# Patient Record
Sex: FEMALE | Race: WHITE | NOT HISPANIC OR LATINO | Employment: OTHER | URBAN - METROPOLITAN AREA
[De-identification: names, ages, dates, MRNs, and addresses within clinical notes are randomized per-mention and may not be internally consistent; named-entity substitution may affect disease eponyms.]

---

## 2017-01-02 ENCOUNTER — OFFICE VISIT (OUTPATIENT)
Dept: PODIATRY | Facility: CLINIC | Age: 82
End: 2017-01-02
Payer: COMMERCIAL

## 2017-01-02 VITALS
WEIGHT: 180 LBS | HEIGHT: 60 IN | SYSTOLIC BLOOD PRESSURE: 104 MMHG | BODY MASS INDEX: 35.34 KG/M2 | DIASTOLIC BLOOD PRESSURE: 62 MMHG

## 2017-01-02 DIAGNOSIS — L60.2 NAIL HYPERTROPHY: ICD-10-CM

## 2017-01-02 DIAGNOSIS — E11.9 TYPE 2 DIABETES MELLITUS WITHOUT COMPLICATION, WITHOUT LONG-TERM CURRENT USE OF INSULIN (H): ICD-10-CM

## 2017-01-02 DIAGNOSIS — M79.671 PAIN IN BOTH FEET: ICD-10-CM

## 2017-01-02 DIAGNOSIS — M79.672 PAIN IN BOTH FEET: ICD-10-CM

## 2017-01-02 DIAGNOSIS — L60.8 NAIL DEFORMITY: ICD-10-CM

## 2017-01-02 DIAGNOSIS — R09.89 DECREASED PULSES IN FEET: Primary | ICD-10-CM

## 2017-01-02 PROCEDURE — G0127 TRIM NAIL(S): HCPCS | Performed by: PODIATRIST

## 2017-01-02 PROCEDURE — 99203 OFFICE O/P NEW LOW 30 MIN: CPT | Mod: 25 | Performed by: PODIATRIST

## 2017-01-02 NOTE — MR AVS SNAPSHOT
"              After Visit Summary   1/2/2017    Regla Benavidez    MRN: [id number]           Patient Information     Date Of Birth          1/5/1926        Visit Information        Provider Department      1/2/2017 2:00 PM Bill Garcia DPM [facility]        Care Instructions    A List of Nail/Callus Care Alternatives  [facility]    [phone]    $40.00 per visit   [facility]   [phone] [address] [place] [address]     [facility]  [phone]  Services the [facility]  $32 per visit   [facility]    [phone]    At your home or at [address] [address]      [facility]    [phone] [address], [place] [address] [phone], [place] [address]  [facility]    [phone]    Routine foot care for older and diabetic feet by a licensed nurse in your home.      DIABETES AND YOUR FEET    What effect does diabetes have on the feet? Diabetes can result in several problems in the feet including ulcers (open sores) and amputations.  Two of the most important reasons why people develop foot problems when they have diabetes is :  1.  Neuropathy (loss of feeling) and 2.  Vascular disease (loss or decrease of blood flow).    What is neuropathy?  Neuropathy is a term used to describe a loss of nerve function.  Patients with diabetes are at risk of developing neuropathy if their sugars continue to run high and are above the normal value.  One theory for neuropathy is that the \"extra\" sugar in the body enters the nerves and is broken down.  These by-products build up in the nerve causing it to swell and impairing nerve function.  Often times, this can be prevented by controlling your sugars, dieting and exercise.    How do I know if I have neuropathy?  When a person develops neuropathy, " they usually begin to feel numbness or tingling in their feet and sometime in their legs.  Other symptoms may include painful burning or hot feet, tingling or feeling like insects or ants are crawling on your feet or legs.  If the diabetes is sever and the sugars run high for long periods of time, neuropathy can also occur in the hands.    What is vascular disease?  Vascular disease is a term used to describe a loss or decrease in circulation (blood flow).  There is a problem in getting blood and oxygen to areas that need it.  Similar to neuropathy, sugars can build up in the walls of the arteries (blood vessels) and cause them to become swollen, thickened and hardened.  This decreases the amount of blood that can go to an area that needs it.  Though this is common in the legs of diabetic patients, it can also affect other arteries (blood vessels) in the body such as in the heart and eyes.    How do I know if I have vascular disease?  In the legs, vascular disease usually results in cramping.  Patients who develop leg cramps after walking the same distance every time (i.e. One block, half a mile, ect.) need to let their doctors know so that their circulation may be checked.  Cramps causing severe pain in the feet and/or legs while sleeping and the cramps go away when you stand or hang your legs off the side of the bed, may also be a sign of poor blood circulation.  Occasional cramping in cold weather or on rare occasions with activity may not be due to poor circulation, but you should inform your doctor.    How can these problems be prevented?  The key to prevention is good blood sugar control.  Poor blood sugar control is a big reason many of these problems start.  Physical activity (exercise) is a very good way to help decrease your blood sugars.  Exercise can lower your blood sugar, blood pressure, and cholesterol.  It also reduces your risk for heart disease and stroke, relieves stress, and strengthens your  heart, muscles and bones.  In addition, regular activity helps insulin work better, improves your blood circulation, and keeps your joints flexible.  If you're trying to lose weight, a combination of exercise and wise food choices can help you reach your target weight and maintain it.      Pain Management Strategies:  1.Blood Sugar Control - Most important  2. Medications such as:  Amytriptylline, duloxetine, gabapentin, lyrica, tramadol  3. Nutritional therapy:  Vitamin B6 (100mg daily), Vitamin B12 (75mcg daily), Vitamin D 2000 IU daily), Alpha-Lipoic Acid (600-1800mg daily), Acetyl-L-Carnitine (500-1000mg TID, L-methyl folate (1500mcg daily)    ** Metformin can block Vitamin B6 and B12 so it is important to supplement**    Diabetic Foot Care Recommendations For Avoiding Serious Foot Issues    DO'S   1.Wash your feet with lukewarm water and a mild soap and then dry them thoroughly, especially between the toes.     2. Examine your feet daily looking for cuts, corns, blisters, cracks, ect..., especially after wearing new shoes.  Make sure to look between your toes.  If you cannot see the bottom of your feet, set a mirror on the floor and hold your foot over it, or ask a spouse, friend or family member to examine your feet for you.  Contact your doctor immediately if new problems are noted or if sores are not healing.     3.  Immediately apply moisturizer to the tops and bottoms of your feet, avoiding areas between the toes.  Hand lotion (Intesive Care, Christin, Eucerin, Neutrogena, Curel, ect...) is sufficient unless your doctor prescribes a medicated lotion.  Apply sunscreen to your feet when going swimming outside.     4.Use clean comfortable shoes, wear white socks (if you have any bleeding or drainage, you will see it on white socks).  Socks should not have thick seams or cut off the circulation around the leg.  Break in new shoes slowly and rotate with older shoes until broken in.  Check the inside of your shoes  with your hand to look for areas of irritation or objects that may have fallen into your shoes.       5. Keep slippers by the side of your bed for use during the night.     6. Shoes should be fitted by a professional and should not cause areas of irritation.  Check your feet regularly when wearing a new pair of shoes and replace them as needed.     7.  Talk to your doctor about proper exercise.  Exercise and stretching stimulate blood flow to your feet and maintain proper glucose levels.     8.  Monitor your blood glucose level as instructed by your doctor.  Notify your doctor immediately if your blood sugar is abnormally high or low.     9.  Cut your nails straight across, but then gently round any sharp edges with a cardboard nail file.  If you have neuropathy, peripheral vascular disease or cannot see that well to trim your own toenails, see a medical professional for care.    DONT'S   1.  Do not soak your feet if you have an open sore.  Use only lukewarm water and always check the temperature with your hand as hot water can easily burn your feet.       2.  Never use a hot water bottle or heating pad on your feet.  Also do not apply cold compresses to your feet.  With decreased sensation, you could burn or freeze your feet.       3.  Do not apply any of these to your feet:    -  Over the counter medicine for corns or warts    -  Harsh chemicals like boric acid    -  Do not self-treat corns, cuts, blisters or infections.  Always consult your doctor.       4.  Do not wear sandals, slippers or walk barefoot, especially on hot sand or concrete or other harsh surfaces.     5.  If you smoke, stop!!!                  Follow-ups after your visit        Who to contact     If you have questions or need follow up information about today's clinic visit or your schedule please contact [facility] directly at [phone].  Normal or non-critical lab and imaging results will be communicated to you by  MyChart, letter or phone within 4 business days after the clinic has received the results. If you do not hear from us within 7 days, please contact the clinic through [facility] or phone. If you have a critical or abnormal lab result, we will notify you by phone as soon as possible.  Submit refill requests through [facility] or call your pharmacy and they will forward the refill request to us. Please allow 3 business days for your refill to be completed.          Additional Information About Your Visit        [employer] Information     [facility] gives you secure access to your electronic health record. If you see a primary care provider, you can also send messages to your care team and make appointments. If you have questions, please call your primary care clinic.  If you do not have a primary care provider, please call [phone] and they will assist you.        Your Vitals Were     Height BMI (Body Mass Index)                5' (1.524 m) 35.15 kg/m2           Blood Pressure from Last 3 Encounters:   01/02/17 104/62   12/13/16 126/68   11/16/16 128/76    Weight from Last 3 Encounters:   01/02/17 180 lb (81.647 kg)   12/13/16 180 lb (81.647 kg)   11/16/16 183 lb (83.008 kg)              Today, you had the following     No orders found for display       Primary Care Provider Office Phone # Fax #    Leo Melgar [phone]       [facility] [address] [facility] [id number]        Thank you!     Thank you for choosing [facility]  for your care. Our goal is always to provide you with excellent care. Hearing back from our patients is one way we can continue to improve our services. Please take a few minutes to complete the written survey that you may receive in the mail after your visit with us. Thank you!             Your Updated Medication List - Protect others around you: Learn how to safely use, store and throw away your medicines at www.disposemymeds.org.           This list is accurate as of: 1/2/17  2:29 PM.  Always use your most recent med list.                   Brand Name Dispense Instructions for use    acetaminophen 325 MG tablet    TYLENOL    184 tablet    Take 2 tablets (650 mg) by mouth At Bedtime Take 2 tablets at bedtime       aspirin 325 MG tablet     120 tablet    Take 1 tablet (325 mg) by mouth daily       blood glucose monitoring test strip    ONE TOUCH ULTRA    100 each    USE TO TEST ONCE EVERY MORNING AS DIRECTED       co-enzyme Q-10 100 MG Caps capsule     30 capsule    Take 1 capsule (100 mg) by mouth daily       furosemide 20 MG tablet    LASIX    60 tablet    Take 1 tablet (20 mg) by mouth 2 times daily       lactobacillus acidophilus & bulgar chewable tablet     60 tablet    Take 1 tablet by mouth 2 times daily With meals       metFORMIN 500 MG tablet    GLUCOPHAGE    60 tablet    Take 1 tablet (500 mg) by mouth 2 times daily (with meals)       nystatin 781965 UNIT/GM Powd    MYCOSTATIN    60 g    Apply topically 3 times daily as needed       * order for DME     1 Device    Equipment being ordered: glucometer kit- brand per insurance coverage Patient prefers brand with DRUM lancets - patient is to test fasting once each morning #30 with 5 refills each of the lancets and test strips       * order for DME     100 lancet    Equipment being ordered: retractable safety lancets       * order for DME     3 Package    Equipment being ordered: Large Underpads       * order for DME     150 Units    Equipment being ordered: Esthela xtra absorbent large pull ups - Uses 5 per day.       polyethylene glycol powder    MIRALAX    510 g    Take 17 g (1 capful) by mouth twice a week       pramipexole 0.125 MG tablet    MIRAPEX    60 tablet    Take 2 tablets (0.25 mg) by mouth At Bedtime       ranitidine 150 MG tablet    ZANTAC    60 tablet    Take 1 tablet (150 mg) by mouth 2 times daily       saccharomyces boulardii 250 MG capsule    FLORASTOR    60 capsule     Take 1 capsule (250 mg) by mouth 2 times daily       senna-docusate 8.6-50 MG per tablet    SENOKOT-S;PERICOLACE     Take 1 tablet by mouth daily HOLD IF HAS LOOSE STOOLS       simvastatin 5 MG tablet    Zocor    30 tablet    Take 1 tablet (5 mg) by mouth every evening       spironolactone 25 MG tablet    ALDACTONE    30 tablet    Take 1 tablet (25 mg) by mouth daily       VITAMIN D3 PO      Take 2,000 Units by mouth daily       * Notice:  This list has 4 medication(s) that are the same as other medications prescribed for you. Read the directions carefully, and ask your doctor or other care provider to review them with you.

## 2017-01-02 NOTE — NURSING NOTE
Chief Complaint   Patient presents with     Foot Problems     both foot pain        Initial /62 mmHg  Ht 5' (1.524 m)  Wt 180 lb (81.647 kg)  BMI 35.15 kg/m2 Estimated body mass index is 35.15 kg/(m^2) as calculated from the following:    Height as of this encounter: 5' (1.524 m).    Weight as of this encounter: 180 lb (81.647 kg).  BP completed using cuff size: jasmin Reardon MA

## 2017-01-02 NOTE — PATIENT INSTRUCTIONS
"A List of Nail/Callus Care Alternatives  [facility]    [phone]    $40.00 per visit   [facility]   [phone]  [address] [place] [address]     [facility]  [phone]  Services the [facility]  $32 per visit   [facility]   [facility] Care Clinic    [phone]    At your home or at [address] [address]      [facility]    [phone] [address], [place], [place] [id number] [phone] [address], [place] [address]  [facility]    [phone]    Routine foot care for older and diabetic feet by a licensed nurse in your home.      DIABETES AND YOUR FEET    What effect does diabetes have on the feet? Diabetes can result in several problems in the feet including ulcers (open sores) and amputations.  Two of the most important reasons why people develop foot problems when they have diabetes is :  1.  Neuropathy (loss of feeling) and 2.  Vascular disease (loss or decrease of blood flow).    What is neuropathy?  Neuropathy is a term used to describe a loss of nerve function.  Patients with diabetes are at risk of developing neuropathy if their sugars continue to run high and are above the normal value.  One theory for neuropathy is that the \"extra\" sugar in the body enters the nerves and is broken down.  These by-products build up in the nerve causing it to swell and impairing nerve function.  Often times, this can be prevented by controlling your sugars, dieting and exercise.    How do I know if I have neuropathy?  When a person develops neuropathy, they usually begin to feel numbness or tingling in their feet and sometime in their legs.  Other symptoms may include painful burning or hot feet, tingling or feeling like insects or ants are crawling on your feet or legs.  If the diabetes is sever and the sugars run high for long periods of time, neuropathy can " also occur in the hands.    What is vascular disease?  Vascular disease is a term used to describe a loss or decrease in circulation (blood flow).  There is a problem in getting blood and oxygen to areas that need it.  Similar to neuropathy, sugars can build up in the walls of the arteries (blood vessels) and cause them to become swollen, thickened and hardened.  This decreases the amount of blood that can go to an area that needs it.  Though this is common in the legs of diabetic patients, it can also affect other arteries (blood vessels) in the body such as in the heart and eyes.    How do I know if I have vascular disease?  In the legs, vascular disease usually results in cramping.  Patients who develop leg cramps after walking the same distance every time (i.e. One block, half a mile, ect.) need to let their doctors know so that their circulation may be checked.  Cramps causing severe pain in the feet and/or legs while sleeping and the cramps go away when you stand or hang your legs off the side of the bed, may also be a sign of poor blood circulation.  Occasional cramping in cold weather or on rare occasions with activity may not be due to poor circulation, but you should inform your doctor.    How can these problems be prevented?  The key to prevention is good blood sugar control.  Poor blood sugar control is a big reason many of these problems start.  Physical activity (exercise) is a very good way to help decrease your blood sugars.  Exercise can lower your blood sugar, blood pressure, and cholesterol.  It also reduces your risk for heart disease and stroke, relieves stress, and strengthens your heart, muscles and bones.  In addition, regular activity helps insulin work better, improves your blood circulation, and keeps your joints flexible.  If you're trying to lose weight, a combination of exercise and wise food choices can help you reach your target weight and maintain it.      Pain Management  Strategies:  1.Blood Sugar Control - Most important  2. Medications such as:  Amytriptylline, duloxetine, gabapentin, lyrica, tramadol  3. Nutritional therapy:  Vitamin B6 (100mg daily), Vitamin B12 (75mcg daily), Vitamin D 2000 IU daily), Alpha-Lipoic Acid (600-1800mg daily), Acetyl-L-Carnitine (500-1000mg TID, L-methyl folate (1500mcg daily)    ** Metformin can block Vitamin B6 and B12 so it is important to supplement**    Diabetic Foot Care Recommendations For Avoiding Serious Foot Issues    DO'S   1.Wash your feet with lukewarm water and a mild soap and then dry them thoroughly, especially between the toes.     2. Examine your feet daily looking for cuts, corns, blisters, cracks, ect..., especially after wearing new shoes.  Make sure to look between your toes.  If you cannot see the bottom of your feet, set a mirror on the floor and hold your foot over it, or ask a spouse, friend or family member to examine your feet for you.  Contact your doctor immediately if new problems are noted or if sores are not healing.     3.  Immediately apply moisturizer to the tops and bottoms of your feet, avoiding areas between the toes.  Hand lotion (Intesive Care, Christin, Eucerin, Neutrogena, Curel, ect...) is sufficient unless your doctor prescribes a medicated lotion.  Apply sunscreen to your feet when going swimming outside.     4.Use clean comfortable shoes, wear white socks (if you have any bleeding or drainage, you will see it on white socks).  Socks should not have thick seams or cut off the circulation around the leg.  Break in new shoes slowly and rotate with older shoes until broken in.  Check the inside of your shoes with your hand to look for areas of irritation or objects that may have fallen into your shoes.       5. Keep slippers by the side of your bed for use during the night.     6. Shoes should be fitted by a professional and should not cause areas of irritation.  Check your feet regularly when wearing a new  pair of shoes and replace them as needed.     7.  Talk to your doctor about proper exercise.  Exercise and stretching stimulate blood flow to your feet and maintain proper glucose levels.     8.  Monitor your blood glucose level as instructed by your doctor.  Notify your doctor immediately if your blood sugar is abnormally high or low.     9.  Cut your nails straight across, but then gently round any sharp edges with a cardboard nail file.  If you have neuropathy, peripheral vascular disease or cannot see that well to trim your own toenails, see a medical professional for care.    DONT'S   1.  Do not soak your feet if you have an open sore.  Use only lukewarm water and always check the temperature with your hand as hot water can easily burn your feet.       2.  Never use a hot water bottle or heating pad on your feet.  Also do not apply cold compresses to your feet.  With decreased sensation, you could burn or freeze your feet.       3.  Do not apply any of these to your feet:    -  Over the counter medicine for corns or warts    -  Harsh chemicals like boric acid    -  Do not self-treat corns, cuts, blisters or infections.  Always consult your doctor.       4.  Do not wear sandals, slippers or walk barefoot, especially on hot sand or concrete or other harsh surfaces.     5.  If you smoke, stop!!!

## 2017-01-02 NOTE — PROGRESS NOTES
"  ASSESSMENT/PLAN:    Encounter Diagnoses   Name Primary?     Decreased pulses in feet Yes     Type 2 diabetes mellitus without complication, without long-term current use of insulin (H)      Pain in both feet      Nail deformity      Nail hypertrophy      Using a sterile nail nippers, debridement of 9 nails was performed.  Nails were shortened. The thickest nails were cut in a fasion to skive off some of the bulk.  Subungual debris was cleared away where needed.  I trimmed the distal lateral aspect of the right hallux nail plate at a slant.     Pt to monitor and call clinic if increasing rendess, pain, and swelling.    STEVE/ arterial ultrasound.  I had a difficult time palpating her pulses.  She has pain in her calf muscles with walking.     Information for nail care provided.     Body mass index is 35.15 kg/(m^2).    Weight management plan: Patient was referred to their PCP to discuss a diet and exercise plan.      Bill Garcia DPM, FACFAS, MS    [place] [facility] Department of Podiatry/Foot & Ankle Surgery      ____________________________________________________________________    HPI:       She is accompanied by her daughter  Chief Complaint: pain in toes  Onset of problem: weeks  Pain/ discomfort is described as:  Intermittent. She said it comes and goes  There was some concern about an \"ingrown\" nail and need to see a podiatrist.       She has diabetes.  Last Hgb A1C = 7.6. .      Past Medical History   Diagnosis Date     HTN (hypertension)      Varicose vein of leg      Elevated glucose      Shingles 2005     DJD (degenerative joint disease) of lumbar spine 2009     Hard of hearing      hearing aids     Cholelithiasis 2009     incidental finding   *  *  Past Surgical History   Procedure Laterality Date     Cataract iol, rt/lt     *  *  Current Outpatient Prescriptions   Medication Sig Dispense Refill     saccharomyces boulardii (FLORASTOR) 250 MG capsule Take 1 capsule (250 mg) by mouth 2 times daily 60 capsule 3     " lactobacillus acidophilus & bulgar (LACTINEX) chewable tablet Take 1 tablet by mouth 2 times daily With meals 60 tablet 11     blood glucose monitoring (ONE TOUCH ULTRA) test strip USE TO TEST ONCE EVERY MORNING AS DIRECTED 100 each 1     acetaminophen (TYLENOL) 325 MG tablet Take 2 tablets (650 mg) by mouth At Bedtime Take 2 tablets at bedtime 184 tablet 2     pramipexole (MIRAPEX) 0.125 MG tablet Take 2 tablets (0.25 mg) by mouth At Bedtime 60 tablet 12     polyethylene glycol (MIRALAX) powder Take 17 g (1 capful) by mouth twice a week 510 g 12     ranitidine (ZANTAC) 150 MG tablet Take 1 tablet (150 mg) by mouth 2 times daily 60 tablet 5     furosemide (LASIX) 20 MG tablet Take 1 tablet (20 mg) by mouth 2 times daily 60 tablet 12     nystatin (MYCOSTATIN) 522789 UNIT/GM POWD Apply topically 3 times daily as needed 60 g 1     ORDER FOR DME Equipment being ordered: Large Underpads 3 Package 6     ORDER FOR DME Equipment being ordered: Esthela xtra absorbent large pull ups - Uses 5 per day. 150 Units 11     spironolactone (ALDACTONE) 25 MG tablet Take 1 tablet (25 mg) by mouth daily 30 tablet 3     Cholecalciferol (VITAMIN D3 PO) Take 2,000 Units by mouth daily       senna-docusate (SENOKOT-S;PERICOLACE) 8.6-50 MG per tablet Take 1 tablet by mouth daily HOLD IF HAS LOOSE STOOLS       ORDER FOR DME Equipment being ordered: retractable safety lancets 100 lancet 1     ORDER FOR DME Equipment being ordered: glucometer kit- brand per insurance coverage  Patient prefers brand with DRUM lancets - patient is to test fasting once each morning  #30 with 5 refills each of the lancets and test strips 1 Device 0     metFORMIN (GLUCOPHAGE) 500 MG tablet Take 1 tablet (500 mg) by mouth 2 times daily (with meals) 60 tablet 12     co-enzyme Q-10 100 MG CAPS Take 1 capsule (100 mg) by mouth daily 30 capsule 0     simvastatin (ZOCOR) 5 MG tablet Take 1 tablet (5 mg) by mouth every evening 30 tablet      aspirin 325 MG tablet Take 1  "tablet (325 mg) by mouth daily 120 tablet        ROS:     A 10-point review of systems was performed and is positive for that noted in the HPI and as seen below.  All other areas are negative.     Numbness in feet?  no   Calf pain with walking? both  Recent foot/ankle injury? no  Weight change over past 12 months? no  Self perception as overweight? yes  Recent flu-like symptoms? no  Joint pain other than feet ? no    Social History: Employment:  retired;  Exercise/Physical activity:  no;  Tobacco use:  no   Social History     Social History     Marital Status:      Spouse Name: N/A     Number of Children: 5     Years of Education: N/A     Occupational History     Not on file.     Social History Main Topics     Smoking status: Never Smoker      Smokeless tobacco: Never Used     Alcohol Use: No     Drug Use: No     Sexual Activity: No     Other Topics Concern     Parent/Sibling W/ Cabg, Mi Or Angioplasty Before 65f 55m? No     Social History Narrative       Family history:  Family History   Problem Relation Age of Onset     DIABETES Brother        EXAM:    Vitals: /62 mmHg  Ht 5' (1.524 m)  Wt 180 lb (81.647 kg)  BMI 35.15 kg/m2  BMI: Body mass index is 35.15 kg/(m^2).  Height: 5' 0\"    Constitutional/ general:  Pt is in no apparent distress, appears well-nourished.  Cooperative with history and physical exam.     Vascular:  Pedal pulses are faintly palpable bilaterally for both the DP and PT arteries.  CFT < 3 sec.  No edema.     Neuro:  Alert and oriented x 3. Coordinated gait.  Light touch sensation is intact to the L4, L5, S1 distributions. No obvious deficits.  No evidence of neurological-based weakness, spasticity, or contracture in the lower extremities.     Derm: Normal texture and turgor.  No erythema, ecchymosis, or cyanosis.  No open lesions.     Some nails are elongated, some thickened. The right hallux nail is thick and incurvates along the lateral edge. There is tenderness.  No erythema.  "     Musculoskeletal:    Lower extremity muscle strength is normal.  Patient is ambulatory without an assistive device or brace .  No gross deformities.        Bill Garcia DPM, FACFAS, MS    [facility] Department of Podiatry/Foot & Ankle Surgery

## 2017-01-04 ENCOUNTER — CARE COORDINATION (OUTPATIENT)
Dept: GERIATRIC MEDICINE | Facility: CLINIC | Age: 82
End: 2017-01-04

## 2017-01-04 NOTE — PROGRESS NOTES
[facility] Six-Month Telephone Assessment    6 month telephone assessment completed today.     ER visits: 0  Hospitalizations: 0  TCU stays: 0  Significant health status changes: 0  Falls/Injuries: one fall where client got out of bed in middle of night and no injuries.  ADL/IADL changes: none  Caregiver assessment follow up: mailed to daughter.    Changes in services:   No new service needs identified or requested at this time.     Goals: See POC in chart for goal progress documentation.    Will see client in 6 months for an annual health risk assessment.   Encouraged client to call CM with any questions or concerns in the meantime.   Autumn Jacobsen RN, PHN, CCM  [facility]   [phone]

## 2017-01-05 ENCOUNTER — TELEPHONE (OUTPATIENT)
Dept: FAMILY MEDICINE | Facility: CLINIC | Age: 82
End: 2017-01-05

## 2017-01-05 NOTE — TELEPHONE ENCOUNTER
Reason for Call:  Form, our goal is to have forms completed with 72 hours, however, some forms may require a visit or additional information.    Type of letter, form or note:  [facility]    Who is the form from?: [facility] (if other please explain)    Where did the form come from: form was faxed in    What clinic location was the form placed at?: [facility]    Where the form was placed: 's Box: Leo Melgar MD    What number is listed as a contact on the form?: [facility], fax # [phone]       Additional comments: Labs and vital signs    Call taken on 1/5/2017 at 3:51 PM by GINETTE MIX

## 2017-01-10 ENCOUNTER — TELEPHONE (OUTPATIENT)
Dept: FAMILY MEDICINE | Facility: CLINIC | Age: 82
End: 2017-01-10

## 2017-01-10 DIAGNOSIS — R19.7 DIARRHEA, UNSPECIFIED TYPE: Primary | ICD-10-CM

## 2017-01-10 RX ORDER — LACTOBACILLUS ACIDOPH-L.BULGARICUS 1 MILLION CELL CHEWABLE TABLET 1MM CELL
1 TABLET,CHEWABLE ORAL
Qty: 90 TABLET | Refills: 11 | Status: SHIPPED | OUTPATIENT
Start: 2017-01-10 | End: 2018-01-27

## 2017-01-10 NOTE — TELEPHONE ENCOUNTER
Daphney called.  Looks like insurance for 2017 might not cover it also.  She is asking family if they would like to pay for it because it is only about 13 dollar a month.  They will let us know what they decide

## 2017-01-10 NOTE — TELEPHONE ENCOUNTER
Form reviewed and signed with new orders by Dr. Leo Melgar and faxed to [facility].  Phuong Zafar TC

## 2017-01-10 NOTE — TELEPHONE ENCOUNTER
Form reviewed and signed by Dr. Leo Melgar and faxed to [facility].  Phuong Zafar TC

## 2017-01-10 NOTE — TELEPHONE ENCOUNTER
I filled the medication(s) and e-mailed the rx to the pharmacy.([facility])         Please let them know.

## 2017-01-10 NOTE — TELEPHONE ENCOUNTER
Reason for Call:  Form, our goal is to have forms completed with 72 hours, however, some forms may require a visit or additional information.    Type of letter, form or note:  [facility]    Who is the form from?: [facility] (if other please explain)    Where did the form come from: form was faxed in    What clinic location was the form placed at?: [facility]    Where the form was placed: Dr's Box: Leo Melgar MD    What number is listed as a contact on the form?: [facility], fax # [phone]       Additional comments: Order to substitute Lactinex    Call taken on 1/10/2017 at 11:17 AM by GINETTE MIX

## 2017-01-11 ENCOUNTER — TELEPHONE (OUTPATIENT)
Dept: FAMILY MEDICINE | Facility: CLINIC | Age: 82
End: 2017-01-11

## 2017-01-11 NOTE — TELEPHONE ENCOUNTER
Reason for Call:  Form, our goal is to have forms completed with 72 hours, however, some forms may require a visit or additional information.    Type of letter, form or note:  [facility]    Who is the form from?: [facility] (if other please explain)    Where did the form come from: form was faxed in    What clinic location was the form placed at?: [facility]    Where the form was placed: 's Box: Leo Melgar MD    What number is listed as a contact on the form?: [facility], fax # [phone]       Additional comments: Request for directives/orders for constipation    Call taken on 1/11/2017 at 3:34 PM by GINETTE MIX

## 2017-01-12 NOTE — TELEPHONE ENCOUNTER
Jaime from [facility] called.  He said the can get partial coverage for this medication  Through her secondary ins so they will fill it.

## 2017-01-12 NOTE — TELEPHONE ENCOUNTER
Form reviewed, new order written, and signed by Dr. Leo Melgar and faxed to [facility].  Phuong Zafar TC

## 2017-01-25 ENCOUNTER — TELEPHONE (OUTPATIENT)
Dept: FAMILY MEDICINE | Facility: CLINIC | Age: 82
End: 2017-01-25

## 2017-01-25 NOTE — TELEPHONE ENCOUNTER
Reason for Call:  Form, our goal is to have forms completed with 72 hours, however, some forms may require a visit or additional information.    Type of letter, form or note:  [facility]    Who is the form from?: [facility] (if other please explain)    Where did the form come from: form was faxed in    What clinic location was the form placed at?: [facility]    Where the form was placed: Dr's Box: Leo Melgar MD    What number is listed as a contact on the form?: [facility], fax # [phone]       Additional comments: Order for Hearing Evaluation     Call taken on 1/25/2017 at 9:32 AM by GINETTE MIX

## 2017-01-27 ENCOUNTER — TELEPHONE (OUTPATIENT)
Dept: FAMILY MEDICINE | Facility: CLINIC | Age: 82
End: 2017-01-27

## 2017-01-27 NOTE — TELEPHONE ENCOUNTER
Reason for Call:  Form, our goal is to have forms completed with 72 hours, however, some forms may require a visit or additional information.    Type of letter, form or note:  [facility]    Who is the form from?: [facility] (if other please explain)    Where did the form come from: form was faxed in    What clinic location was the form placed at?: [facility]    Where the form was placed: Dr's Box: Leo Melgar MD    What number is listed as a contact on the form?: [facility], fax # [phone]       Additional comments: Request for new order for Debrox    Call taken on 1/27/2017 at 4:03 PM by GINETTE MIX

## 2017-02-02 ENCOUNTER — TELEPHONE (OUTPATIENT)
Dept: FAMILY MEDICINE | Facility: CLINIC | Age: 82
End: 2017-02-02

## 2017-02-02 ENCOUNTER — TRANSFERRED RECORDS (OUTPATIENT)
Dept: HEALTH INFORMATION MANAGEMENT | Facility: CLINIC | Age: 82
End: 2017-02-02

## 2017-02-02 LAB
CREAT SERPL-MCNC: 1.16 MG/DL (ref 0.55–1.02)
GFR SERPL CREATININE-BSD FRML MDRD: 41 ML/MIN/1.73M2
GLUCOSE SERPL-MCNC: 185 MG/DL (ref 70–100)
POTASSIUM SERPL-SCNC: 4.9 MMOL/L (ref 3.5–5.2)

## 2017-02-02 NOTE — TELEPHONE ENCOUNTER
Reason for Call:  Form, our goal is to have forms completed with 72 hours, however, some forms may require a visit or additional information.    Type of letter, form or note:  [facility]    Who is the form from?: [facility] (if other please explain)    Where did the form come from: form was faxed in    What clinic location was the form placed at?: [facility]    Where the form was placed: Dr's Box: Leo Melgar MD    What number is listed as a contact on the form?: [facility], fax # [phone]       Additional comments: Monthly labs and vital signs.    Call taken on 2/2/2017 at 2:09 PM by GINETTE MIX

## 2017-02-07 NOTE — TELEPHONE ENCOUNTER
Form reviewed and signed by Dr. Leo Melgar and faxed to [facility].  [facility].  Phuong Zafar TC

## 2017-02-08 ENCOUNTER — TELEPHONE (OUTPATIENT)
Dept: FAMILY MEDICINE | Facility: CLINIC | Age: 82
End: 2017-02-08

## 2017-02-08 DIAGNOSIS — Z53.9 ERRONEOUS ENCOUNTER--DISREGARD: Primary | ICD-10-CM

## 2017-02-08 DIAGNOSIS — N39.0 RECURRENT UTI: Primary | ICD-10-CM

## 2017-02-08 NOTE — TELEPHONE ENCOUNTER
Reason for Call:  Form, our goal is to have forms completed with 72 hours, however, some forms may require a visit or additional information.    Type of letter, form or note:  [facility]    Who is the form from?: [facility] (if other please explain)    Where did the form come from: form was faxed in    What clinic location was the form placed at?: [facility]    Where the form was placed: Dr's Box: Leo Melgar MD    What number is listed as a contact on the form?: [facility], fax # [phone]      Additional comments: Physician's Orders    Call taken on 2/8/2017 at 1:41 PM by GINETTE MIX

## 2017-02-08 NOTE — TELEPHONE ENCOUNTER
I have generated an order for a urine test at our clinic.                         Please let them know.

## 2017-02-08 NOTE — TELEPHONE ENCOUNTER
We already have a sodium level from this wk; we do not need another one so soon.  Please let them know.

## 2017-02-08 NOTE — TELEPHONE ENCOUNTER
"Patient's dtr called. Patient and assisted living nurse are confused on how much water the patient should drink.   Per last OV 12/13/16 she was told by PCP to \"Cut back water intake advised.\"   Keshav thinks she drinks a lot of water, but nurses are encouraging her to drink more all the time.   Nurses need a specific amount she should be drinking.   Informed this is hard to do because we don't know the baseline and there is nobody to measure this at assisted living.   Suggested to use a smaller cup, and drink when thirsty, watch for signs for dehydration: dry mouth, dark urine, sunken eyes.   Keshav made an OV with PCP on Saturday, and would like him to place labs (sodium), so she can bring mom to lab today and review on Saturday.  Will route to PCP.    "

## 2017-02-08 NOTE — TELEPHONE ENCOUNTER
Huddled with Dr. Melgar, who said sodium was just done 1-2 days ago and was 140.   He has suggested increasing water intake by 6oz a day.   Appt cancelled for Saturday.   Assisted living nurses should be able to see Dr. Melgar's messages.  Dtr would like Dr. Melgar to place an order for a urine sample to be done at the clinic since she was taken off the prophylactic antibiotic.    Dtr did not mention any symptoms except occasional confusion.   She is more comfortable with patient giving a urine sample at the clinic.   Will route to PCP and providers team 1.

## 2017-02-14 ENCOUNTER — OFFICE VISIT (OUTPATIENT)
Dept: FAMILY MEDICINE | Facility: CLINIC | Age: 82
End: 2017-02-14
Payer: COMMERCIAL

## 2017-02-14 VITALS
HEART RATE: 71 BPM | TEMPERATURE: 97.8 F | DIASTOLIC BLOOD PRESSURE: 74 MMHG | WEIGHT: 181 LBS | RESPIRATION RATE: 16 BRPM | SYSTOLIC BLOOD PRESSURE: 118 MMHG | BODY MASS INDEX: 35.35 KG/M2 | OXYGEN SATURATION: 96 %

## 2017-02-14 DIAGNOSIS — N39.0 RECURRENT UTI: Primary | ICD-10-CM

## 2017-02-14 DIAGNOSIS — R79.89 PRERENAL AZOTEMIA: ICD-10-CM

## 2017-02-14 DIAGNOSIS — I50.9 CHRONIC CONGESTIVE HEART FAILURE, UNSPECIFIED CONGESTIVE HEART FAILURE TYPE: ICD-10-CM

## 2017-02-14 DIAGNOSIS — H61.23 CERUMINOSIS, BILATERAL: ICD-10-CM

## 2017-02-14 DIAGNOSIS — E11.9 TYPE 2 DIABETES MELLITUS WITHOUT COMPLICATION, WITHOUT LONG-TERM CURRENT USE OF INSULIN (H): ICD-10-CM

## 2017-02-14 LAB
ALBUMIN UR-MCNC: NEGATIVE MG/DL
APPEARANCE UR: CLEAR
BACTERIA #/AREA URNS HPF: ABNORMAL /HPF
BILIRUB UR QL STRIP: NEGATIVE
COLOR UR AUTO: YELLOW
GLUCOSE UR STRIP-MCNC: 100 MG/DL
HGB UR QL STRIP: ABNORMAL
KETONES UR STRIP-MCNC: NEGATIVE MG/DL
LEUKOCYTE ESTERASE UR QL STRIP: ABNORMAL
NITRATE UR QL: POSITIVE
NON-SQ EPI CELLS #/AREA URNS LPF: ABNORMAL /LPF
PH UR STRIP: 6 PH (ref 5–7)
RBC #/AREA URNS AUTO: ABNORMAL /HPF (ref 0–2)
SP GR UR STRIP: 1.01 (ref 1–1.03)
URN SPEC COLLECT METH UR: ABNORMAL
UROBILINOGEN UR STRIP-ACNC: 0.2 EU/DL (ref 0.2–1)
WBC #/AREA URNS AUTO: ABNORMAL /HPF (ref 0–2)

## 2017-02-14 PROCEDURE — 81001 URINALYSIS AUTO W/SCOPE: CPT | Performed by: INTERNAL MEDICINE

## 2017-02-14 PROCEDURE — 87186 SC STD MICRODIL/AGAR DIL: CPT | Performed by: INTERNAL MEDICINE

## 2017-02-14 PROCEDURE — 87088 URINE BACTERIA CULTURE: CPT | Performed by: INTERNAL MEDICINE

## 2017-02-14 PROCEDURE — 99214 OFFICE O/P EST MOD 30 MIN: CPT | Performed by: INTERNAL MEDICINE

## 2017-02-14 PROCEDURE — 87086 URINE CULTURE/COLONY COUNT: CPT | Performed by: INTERNAL MEDICINE

## 2017-02-14 RX ORDER — SPIRONOLACTONE 25 MG/1
25 TABLET ORAL DAILY
Qty: 30 TABLET | Refills: 3 | COMMUNITY
Start: 2017-02-14 | End: 2017-03-22

## 2017-02-14 NOTE — NURSING NOTE
Chief Complaint   Patient presents with     UTI     Fungal Infection     Other     labs f/u       Initial /74 (BP Location: Left arm, Patient Position: Chair, Cuff Size: Adult Large)  Pulse 71  Temp 97.8  F (36.6  C) (Tympanic)  Resp 16  Wt 181 lb (82.1 kg)  SpO2 96%  BMI 35.35 kg/m2 Estimated body mass index is 35.35 kg/(m^2) as calculated from the following:    Height as of 1/2/17: 5' (1.524 m).    Weight as of this encounter: 181 lb (82.1 kg).  Medication Reconciliation: complete

## 2017-02-14 NOTE — MR AVS SNAPSHOT
After Visit Summary   2/14/2017    Regla Benavidez    MRN: [id number]           Patient Information     Date Of Birth          1/5/1926        Visit Information        Provider Department      2/14/2017 11:30 AM Leo Melgar MD [facility]        Today's Diagnoses     Recurrent UTI    -  1    Prerenal azotemia        Type 2 diabetes mellitus without complication, without long-term current use of insulin (H)        Chronic congestive heart failure, unspecified congestive heart failure type (H)        Ceruminosis, bilateral           Follow-ups after your visit        Who to contact     If you have questions or need follow up information about today's clinic visit or your schedule please contact [facility] directly at [phone].  Normal or non-critical lab and imaging results will be communicated to you by MyChart, letter or phone within 4 business days after the clinic has received the results. If you do not hear from us within 7 days, please contact the clinic through [employer] or phone. If you have a critical or abnormal lab result, we will notify you by phone as soon as possible.  Submit refill requests through [employer] or call your pharmacy and they will forward the refill request to us. Please allow 3 business days for your refill to be completed.          Additional Information About Your Visit        MyChart Information     [employer] gives you secure access to your electronic health record. If you see a primary care provider, you can also send messages to your care team and make appointments. If you have questions, please call your primary care clinic.  If you do not have a primary care provider, please call [phone] and they will assist you.        Care EveryWhere ID     This is your Care EveryWhere ID. This could be used by other organizations to access your [facility] medical records  [id number]        Your Vitals Were      Pulse Temperature Respirations Pulse Oximetry BMI (Body Mass Index)       71 97.8  F (36.6  C) (Tympanic) 16 96% 35.35 kg/m2        Blood Pressure from Last 3 Encounters:   02/14/17 118/74   01/02/17 104/62   12/13/16 126/68    Weight from Last 3 Encounters:   02/14/17 181 lb (82.1 kg)   01/02/17 180 lb (81.6 kg)   12/13/16 180 lb (81.6 kg)              We Performed the Following     UA reflex to Microscopic     Urine Culture Aerobic Bacterial     Urine Microscopic          Today's Medication Changes          These changes are accurate as of: 2/14/17 12:38 PM.  If you have any questions, ask your nurse or doctor.               These medicines have changed or have updated prescriptions.        Dose/Directions    spironolactone 25 MG tablet   Commonly known as:  ALDACTONE   This may have changed:  when to take this   Used for:  Chronic congestive heart failure, unspecified congestive heart failure type (H)   Changed by:  Leo Melgar MD        Dose:  25 mg   Take 1 tablet (25 mg) by mouth every 48 hours   Quantity:  30 tablet   Refills:  3                Primary Care Provider Office Phone # Fax #    Leo Melgar [phone]       [facility] [address] [place] [facility] [address]        Thank you!     Thank you for choosing [facility]  for your care. Our goal is always to provide you with excellent care. Hearing back from our patients is one way we can continue to improve our services. Please take a few minutes to complete the written survey that you may receive in the mail after your visit with us. Thank you!             Your Updated Medication List - Protect others around you: Learn how to safely use, store and throw away your medicines at www.disposemymeds.org.          This list is accurate as of: 2/14/17 12:38 PM.  Always use your most recent med list.                   Brand Name Dispense Instructions for use    acetaminophen 325 MG tablet     TYLENOL    184 tablet    Take 2 tablets (650 mg) by mouth At Bedtime Take 2 tablets at bedtime       aspirin 325 MG tablet     120 tablet    Take 1 tablet (325 mg) by mouth daily       blood glucose monitoring test strip    ONE TOUCH ULTRA    100 each    USE TO TEST ONCE EVERY MORNING AS DIRECTED       co-enzyme Q-10 100 MG Caps capsule     30 capsule    Take 1 capsule (100 mg) by mouth daily       furosemide 20 MG tablet    LASIX    60 tablet    Take 1 tablet (20 mg) by mouth 2 times daily       * lactobacillus acidophilus & bulgar chewable tablet     60 tablet    Take 1 tablet by mouth 2 times daily With meals       * lactobacillus acidophilus & bulgar chewable tablet     90 tablet    Take 1 tablet by mouth 3 times daily (with meals)       metFORMIN 500 MG tablet    GLUCOPHAGE    60 tablet    Take 1 tablet (500 mg) by mouth 2 times daily (with meals)       nystatin 721835 UNIT/GM Powd    MYCOSTATIN    60 g    Apply topically 3 times daily as needed       * order for DME     1 Device    Equipment being ordered: glucometer kit- brand per insurance coverage Patient prefers brand with DRUM lancets - patient is to test fasting once each morning #30 with 5 refills each of the lancets and test strips       * order for DME     100 lancet    Equipment being ordered: retractable safety lancets       * order for DME     3 Package    Equipment being ordered: Large Underpads       * order for DME     150 Units    Equipment being ordered: Esthela xtra absorbent large pull ups - Uses 5 per day.       polyethylene glycol powder    MIRALAX    510 g    Take 17 g (1 capful) by mouth twice a week       pramipexole 0.125 MG tablet    MIRAPEX    60 tablet    Take 2 tablets (0.25 mg) by mouth At Bedtime       ranitidine 150 MG tablet    ZANTAC    60 tablet    Take 1 tablet (150 mg) by mouth 2 times daily       saccharomyces boulardii 250 MG capsule    FLORASTOR    60 capsule    Take 1 capsule (250 mg) by mouth 2 times daily        senna-docusate 8.6-50 MG per tablet    SENOKOT-S;PERICOLACE     Take 1 tablet by mouth daily HOLD IF HAS LOOSE STOOLS       simvastatin 5 MG tablet    ZOCOR    30 tablet    Take 1 tablet (5 mg) by mouth every evening       spironolactone 25 MG tablet    ALDACTONE    30 tablet    Take 1 tablet (25 mg) by mouth every 48 hours       VITAMIN D3 PO      Take 2,000 Units by mouth daily       * Notice:  This list has 6 medication(s) that are the same as other medications prescribed for you. Read the directions carefully, and ask your doctor or other care provider to review them with you.

## 2017-02-14 NOTE — PROGRESS NOTES
SUBJECTIVE:                                                    Regla Benavidez is a 89 year old female who presents to clinic today for the following health issues:      URINARY TRACT SYMPTOMS     Onset: today    Description:   Painful urination (Dysuria): no   Blood in urine (Hematuria): no   Delay in urine (Hesitency): no     Intensity: moderate    Progression of Symptoms:  worsening    Accompanying Signs & Symptoms:  Fever/chills: no   Flank pain no   Nausea and vomiting: no   Any vaginal symptoms: odor  Abdominal/Pelvic Pain: no    History:   History of frequent UTI's: yes   History of kidney stones: no   Sexually Active: no   Possibility of pregnancy: No    Precipitating factors:   n/a         Therapies Tried and outcome: n/a      Fungal infection      Duration: ongoing    Description (location/character/radiation): patient has fungal infection on her left foot/toes    Intensity:  mild, moderate    Accompanying signs and symptoms: see above    History (similar episodes/previous evaluation): None    Precipitating or alleviating factors: None    Therapies tried and outcome: would like to try topical to get rid of this issue        family is worried that she has or will have another UTI.  No longer getting a prophylactic antibiotic.                            Also, monthly labs continue to show variable electrolyte results, sometimes with hyponatremia; and with mild azotemia.            Also has a POLST form.        Also wants ears checked; getting new hearing aids.      Problem list and histories reviewed & adjusted, as indicated.  Additional history: as documented    Current Outpatient Prescriptions   Medication Sig Dispense Refill     lactobacillus acidophilus & bulgar (LACTINEX) chewable tablet Take 1 tablet by mouth 3 times daily (with meals) 90 tablet 11     saccharomyces boulardii (FLORASTOR) 250 MG capsule Take 1 capsule (250 mg) by mouth 2 times daily 60 capsule 3     lactobacillus acidophilus &  bulgar (LACTINEX) chewable tablet Take 1 tablet by mouth 2 times daily With meals 60 tablet 11     blood glucose monitoring (ONE TOUCH ULTRA) test strip USE TO TEST ONCE EVERY MORNING AS DIRECTED 100 each 1     acetaminophen (TYLENOL) 325 MG tablet Take 2 tablets (650 mg) by mouth At Bedtime Take 2 tablets at bedtime 184 tablet 2     pramipexole (MIRAPEX) 0.125 MG tablet Take 2 tablets (0.25 mg) by mouth At Bedtime 60 tablet 12     polyethylene glycol (MIRALAX) powder Take 17 g (1 capful) by mouth twice a week 510 g 12     ranitidine (ZANTAC) 150 MG tablet Take 1 tablet (150 mg) by mouth 2 times daily 60 tablet 5     furosemide (LASIX) 20 MG tablet Take 1 tablet (20 mg) by mouth 2 times daily 60 tablet 12     nystatin (MYCOSTATIN) 241275 UNIT/GM POWD Apply topically 3 times daily as needed 60 g 1     ORDER FOR DME Equipment being ordered: Large Underpads 3 Package 6     ORDER FOR DME Equipment being ordered: Esthela xtra absorbent large pull ups - Uses 5 per day. 150 Units 11     spironolactone (ALDACTONE) 25 MG tablet Take 1 tablet (25 mg) by mouth daily 30 tablet 3     Cholecalciferol (VITAMIN D3 PO) Take 2,000 Units by mouth daily       senna-docusate (SENOKOT-S;PERICOLACE) 8.6-50 MG per tablet Take 1 tablet by mouth daily HOLD IF HAS LOOSE STOOLS       ORDER FOR DME Equipment being ordered: retractable safety lancets 100 lancet 1     ORDER FOR DME Equipment being ordered: glucometer kit- brand per insurance coverage  Patient prefers brand with DRUM lancets - patient is to test fasting once each morning  #30 with 5 refills each of the lancets and test strips 1 Device 0     metFORMIN (GLUCOPHAGE) 500 MG tablet Take 1 tablet (500 mg) by mouth 2 times daily (with meals) 60 tablet 12     co-enzyme Q-10 100 MG CAPS Take 1 capsule (100 mg) by mouth daily 30 capsule 0     simvastatin (ZOCOR) 5 MG tablet Take 1 tablet (5 mg) by mouth every evening 30 tablet      aspirin 325 MG tablet Take 1 tablet (325 mg) by mouth daily  120 tablet      Recent Labs   Lab Test 02/02/17 12/01/16 07/07/16  11/05/15 10/22/15  07/02/15   03/15/15   1435   11/15/14   0815 09/04/14   10/22/13   0745   11/10/11   1351   A1C   --    --   7.6*   --   6.8*   --    --   6.5*   --    --    < >   --   6.6*   < >   --    --   6.0   LDL   --    --    --    --    --    --    --    --    --    --    --    --   69   --   114   --   141.6*   HDL   --    --    --    --    --    --    --    --    --    --    --    --   64   --   38*   --   59   TRIG   --    --    --    --    --    --    --    --    --    --    --    --   98   --   92   --   97   ALT   --    --    --    --    --   25   --    --    --   39   --   29   --    --   67*   --   33.0   CR  1.16*  0.94  1.33   < >  1.1  0.9   < >  1.0   < >  1.02   < >  0.70  1.1   < >  0.89   < >  1.0   GFRESTIMATED  41  53*  40   < >  46  58   < >  53   < >  51*   < >  78  48   < >  60*   < >   --    GFRESTBLACK  48  >60  46   < >  55   --    < >  >60   < >  62   < >  >90   GFR Calc    58   < >  73   < >   --    POTASSIUM  4.9  4.5  4.5   < >  4.2  4.6   < >  4.6   < >  3.9   < >  4.2   --    < >  3.1*   < >  3.7   TSH   --    --    --    --    --   1.98   --    --    --    --    --    --    --    --   0.45   --    --     < > = values in this interval not displayed.      BP Readings from Last 3 Encounters:   02/14/17 118/74   01/02/17 104/62   12/13/16 126/68    Wt Readings from Last 3 Encounters:   02/14/17 181 lb (82.1 kg)   01/02/17 180 lb (81.6 kg)   12/13/16 180 lb (81.6 kg)                  Problem list, Medication list, Allergies, and Medical/Social/Surgical histories reviewed in EPIC and updated as appropriate.    ROS:  CONSTITUTIONAL:NEGATIVE for fever, chills, change in weight  : negative for hematuria  NEURO: NEGATIVE for gait disturbance and expressive aphasia    OBJECTIVE:                                                    /74 (BP Location: Left arm, Patient Position: Chair, Cuff Size: Adult  Large)  Pulse 71  Temp 97.8  F (36.6  C) (Tympanic)  Resp 16  Wt 181 lb (82.1 kg)  SpO2 96%  BMI 35.35 kg/m2  Body mass index is 35.35 kg/(m^2).  GENERAL APPEARANCE: alert, no distress and over weight  HENT: Minimal cerumen both ear canals  CV: regular rates and rhythm and trace edema both lower legs  NEURO: gait abnormal and expressive aphasia    Diagnostic test results:  Results for orders placed or performed in visit on 02/14/17 (from the past 24 hour(s))   UA reflex to Microscopic   Result Value Ref Range    Color Urine Yellow     Appearance Urine Clear     Glucose Urine 100 (A) NEG mg/dL    Bilirubin Urine Negative NEG    Ketones Urine Negative NEG mg/dL    Specific Gravity Urine 1.010 1.003 - 1.035    Blood Urine Small (A) NEG    pH Urine 6.0 5.0 - 7.0 pH    Protein Albumin Urine Negative NEG mg/dL    Urobilinogen Urine 0.2 0.2 - 1.0 EU/dL    Nitrite Urine Positive (A) NEG    Leukocyte Esterase Urine Large (A) NEG    Source Midstream Urine    Urine Microscopic   Result Value Ref Range    WBC Urine 10-25 (A) 0 - 2 /HPF    RBC Urine 2-5 (A) 0 - 2 /HPF    Squamous Epithelial /LPF Urine Few FEW /LPF    Bacteria Urine Moderate (A) NEG /HPF        ASSESSMENT/PLAN:                                                        ICD-10-CM    1. Recurrent UTI N39.0 UA reflex to Microscopic     Urine Microscopic     Urine Culture Aerobic Bacterial   2. Type 2 diabetes mellitus without complication, without long-term current use of insulin (H) E11.9    3. Prerenal azotemia R79.89      Summary and implications:  Multiple issues.  Await UC result.                    Cut back diuretics slightly, namely spironolactone, to 25 mg every other day.  Continue with monthly basic metabolic panel.         POLST form signed. She wants no intubation.         Ear wash done.       Follow up with Provider - prn     Leo Melgar MD  [facility]   addendum: Klebsiella, S to most antibiotics.          Plan: cipro for  5 days

## 2017-02-17 LAB
BACTERIA SPEC CULT: ABNORMAL
MICRO REPORT STATUS: ABNORMAL
MICROORGANISM SPEC CULT: ABNORMAL
MICROORGANISM SPEC CULT: ABNORMAL
SPECIMEN SOURCE: ABNORMAL

## 2017-02-17 RX ORDER — CIPROFLOXACIN 250 MG/1
250 TABLET, FILM COATED ORAL 2 TIMES DAILY
Qty: 10 TABLET | Refills: 0 | Status: SHIPPED | OUTPATIENT
Start: 2017-02-17 | End: 2017-02-22

## 2017-03-01 ENCOUNTER — MEDICAL CORRESPONDENCE (OUTPATIENT)
Dept: HEALTH INFORMATION MANAGEMENT | Facility: CLINIC | Age: 82
End: 2017-03-01

## 2017-03-02 ENCOUNTER — TELEPHONE (OUTPATIENT)
Dept: FAMILY MEDICINE | Facility: CLINIC | Age: 82
End: 2017-03-02

## 2017-03-02 ENCOUNTER — TRANSFERRED RECORDS (OUTPATIENT)
Dept: HEALTH INFORMATION MANAGEMENT | Facility: CLINIC | Age: 82
End: 2017-03-02

## 2017-03-02 LAB
CREAT SERPL-MCNC: 1.03 MG/DL (ref 0.55–1.02)
GFR SERPL CREATININE-BSD FRML MDRD: 47 ML/MIN/1.73M2
GLUCOSE SERPL-MCNC: 200 MG/DL (ref 70–100)
HBA1C MFR BLD: 7.8 % (ref 4–5.6)
POTASSIUM SERPL-SCNC: 4.7 MMOL/L (ref 3.5–5.2)

## 2017-03-03 NOTE — TELEPHONE ENCOUNTER
Reason for Call:  Form, our goal is to have forms completed with 72 hours, however, some forms may require a visit or additional information.    Type of letter, form or note:  medical    Who is the form from?: [facility]    Where did the form come from: form was faxed in    What clinic location was the form placed at?: [facility]    Where the form was placed: Dr's Box: Leo Melgar MD    What number is listed as a contact on the form?: Fax # [phone]       Additional comments: Monthly Labs, Vitals, & Weights     Call taken on 3/3/2017 at 10:06 AM by Steven Barlow

## 2017-03-20 ENCOUNTER — CARE COORDINATION (OUTPATIENT)
Dept: GERIATRIC MEDICINE | Facility: CLINIC | Age: 82
End: 2017-03-20

## 2017-03-20 NOTE — PROGRESS NOTES
Daughter Keshav called [phone] and cell [phone], stating that her mom recently had her walker taken away from her at her lunch table and was wondering if anything could be done about this. Keshav states that other people have their walkers with them and client has had hers with her for years and finds it very upsetting that now it is being taken outside the dining room until after the meal is done. Keshav states her mom is aphasic and hard for her to ask for things. Recommended Keshav to discuss this with AL nurse Daphney and if that does not resolve things, Keshav wanted to know who to talk with next. Gave her the number for the ombudsman at [phone]. Keshav also states at some point, client may need to go to the NH side of the building and is hoping that it will be a smooth transition. Discussed talking with client to prepare her for when it may happen in the future.  Autumn Jacobsen RN, PHN, [facility]   [phone]

## 2017-03-22 ENCOUNTER — OFFICE VISIT (OUTPATIENT)
Dept: FAMILY MEDICINE | Facility: CLINIC | Age: 82
End: 2017-03-22
Payer: COMMERCIAL

## 2017-03-22 VITALS
HEART RATE: 71 BPM | BODY MASS INDEX: 36.52 KG/M2 | TEMPERATURE: 98 F | SYSTOLIC BLOOD PRESSURE: 118 MMHG | DIASTOLIC BLOOD PRESSURE: 60 MMHG | OXYGEN SATURATION: 94 % | WEIGHT: 186 LBS | HEIGHT: 60 IN | RESPIRATION RATE: 14 BRPM

## 2017-03-22 DIAGNOSIS — I73.9 PAD (PERIPHERAL ARTERY DISEASE) (H): ICD-10-CM

## 2017-03-22 DIAGNOSIS — N39.0 RECURRENT UTI: ICD-10-CM

## 2017-03-22 DIAGNOSIS — R79.89 PRERENAL AZOTEMIA: ICD-10-CM

## 2017-03-22 DIAGNOSIS — R60.0 BILATERAL LEG EDEMA: Primary | ICD-10-CM

## 2017-03-22 LAB
ALBUMIN UR-MCNC: NEGATIVE MG/DL
APPEARANCE UR: CLEAR
BILIRUB UR QL STRIP: NEGATIVE
COLOR UR AUTO: YELLOW
GLUCOSE UR STRIP-MCNC: NEGATIVE MG/DL
HGB UR QL STRIP: NEGATIVE
KETONES UR STRIP-MCNC: NEGATIVE MG/DL
LEUKOCYTE ESTERASE UR QL STRIP: ABNORMAL
NITRATE UR QL: NEGATIVE
NON-SQ EPI CELLS #/AREA URNS LPF: NORMAL /LPF
PH UR STRIP: 6 PH (ref 5–7)
RBC #/AREA URNS AUTO: NORMAL /HPF (ref 0–2)
SP GR UR STRIP: 1.01 (ref 1–1.03)
URN SPEC COLLECT METH UR: ABNORMAL
UROBILINOGEN UR STRIP-ACNC: 0.2 EU/DL (ref 0.2–1)
WBC #/AREA URNS AUTO: NORMAL /HPF (ref 0–2)

## 2017-03-22 PROCEDURE — 99214 OFFICE O/P EST MOD 30 MIN: CPT | Performed by: INTERNAL MEDICINE

## 2017-03-22 PROCEDURE — 87086 URINE CULTURE/COLONY COUNT: CPT | Performed by: INTERNAL MEDICINE

## 2017-03-22 PROCEDURE — 87088 URINE BACTERIA CULTURE: CPT | Performed by: INTERNAL MEDICINE

## 2017-03-22 PROCEDURE — 87186 SC STD MICRODIL/AGAR DIL: CPT | Performed by: INTERNAL MEDICINE

## 2017-03-22 PROCEDURE — 81001 URINALYSIS AUTO W/SCOPE: CPT | Performed by: INTERNAL MEDICINE

## 2017-03-22 RX ORDER — SPIRONOLACTONE 25 MG/1
25 TABLET ORAL DAILY
Qty: 30 TABLET | Refills: 11 | Status: SHIPPED | OUTPATIENT
Start: 2017-03-22 | End: 2018-01-27

## 2017-03-22 RX ORDER — FUROSEMIDE 20 MG
TABLET ORAL
Qty: 75 TABLET | Refills: 12 | Status: SHIPPED | OUTPATIENT
Start: 2017-03-22 | End: 2018-01-27

## 2017-03-22 NOTE — PROGRESS NOTES
SUBJECTIVE:                                                    Regla Benavidez is a 89 year old female who presents to clinic today for the following health issues:        Edema      Duration: 02/14/2017    Description (location/character/radiation): Bilateral ankle and foot edema, left greater than right    Intensity:  moderate    Accompanying signs and symptoms: Some discomfort    History (similar episodes/previous evaluation): Yes    Precipitating or alleviating factors: None    Therapies tried and outcome: Dose change in diuretic                  Patient is here with her daughter.         We decreased her spironolactone, and now patient seems to be retaining more fluid, at least has more peripheral edema.           The daughter is concerned about recurrent hyponatremia. However that occurred while she was taking hydrochlorothiazide and had an acute illness.                This patient has expressive aphasia, and does not contribute much at all to the history.    The daughter also expresses some concern regarding circulatory status of her mother's legs. 3 months ago a podiatrist recommended STEVE test due to poor peripheral pulses.    However the family decided not to go ahead with that testing.    Patient reports a long history of foot pain, some of this has been interpreted as restless legs in nature. It mainly bothers her in the evening. She indicates it starts in the right leg and moves to the left leg and foot. She does not have chronic rest pain. She has not had foot ulcers.                                                                                  Daughter is also concerned about urinary tract infections due to odiferous urine.  She wants her mother's urine checked.     Patient Active Problem List    Diagnosis Date Noted     Hyponatremia 12/20/2014     Priority: High     With acute illness 11/14; HCTZ stopped.                Later,furosemide and spironolactone added; in 3/15, lytes normal; K+4.8;  "BUN 29,creat 1.0 ,  A1C 6.2       Aspiration pneumonia (H) 11/15/2014     Priority: High     11/14; and UTI with sepsis       Type 2 diabetes mellitus without complication (H) 07/02/2014     Priority: High     Expressive aphasia 07/02/2014     Priority: High     Cerebral infarction (H) 10/22/2013     Priority: High     10/21/13;L frontal, MCA ; see hosp () and rehab (Dr. Saba) reports.            Has severe expressive aphasia  Diagnosis updated by automated process. Provider to review and confirm.       Elevated glucose 09/15/2009     Priority: High     115 with A1C 6.0 in 11/11                         123 and 6.6 in 9/14                    130 and 6.2 in 3/15       HTN (hypertension) 09/15/2009     Priority: High     Echo normal 4/09       Bilateral leg edema 03/22/2017     Priority: Medium     Prerenal azotemia 02/14/2017     Priority: Medium     Abdominal bloating 10/28/2015     Priority: Medium     CT neg except L ovarian cyst; CA-125= 3.    The other labs ok; see office visit note 10/21/15       Abnormal weight gain 10/28/2015     Priority: Medium     Cyst of left ovary 10/28/2015     Priority: Medium     Complex on CT and US: 6x4 cm in 11/15; d/w her son; recheck in 3-6 months       Recurrent UTI 03/18/2015     Priority: Medium     See urology note 12/15; cysto neg; PVR low; \"consider a prophylactic antibiotic\"       Low back pain 03/18/2015     Priority: Medium     Diagnosis updated by automated process. Provider to review and confirm.       Urinary frequency 01/22/2014     Priority: Medium     Restless legs syndrome 11/27/2013     Priority: Medium     Hard of hearing      Priority: Medium     hearing aids       Constipation 11/18/2013     Priority: Medium     Branch retinal vein occlusion of left eye 11/26/2010     Priority: Medium     Rx Avastin in 2011       Health Care Home 06/03/2016     Priority: Low     [facility] Care Coordinator  Autumn Jacobsen, RN  [phone]    [facility] " CARE PLAN SUMMARY    Client Name:  Regla Benavidez  Address:  C/O David Benavidez-son  [address] [facility] [address]    Regla Benavidez  [address] [place] [address]  [address]  [place] [address] Phone: [phone]  But call son Ed or daughter Obdulio  [phone] ext [phone] (work-preferred)     :  1926 Date of Assessment:  16   Health Plan:  [employer]  Health Plan Number: [id number] Medical Assistance Number: [id number]  Financial Worker: [facility]   Case #:     FVP :  Autumn Jacobsen RN CM Phone:  [phone]  CM Fax:  [phone]   FVP Enrollment Date: 16 Case Mix:  D  Rate Cell:  B    Waiver Type:  EW   Emergency Contact: Melchor Benavidez-son/POA   W Phone: [phone]-[phone].   Home [phone]  Secondary Contact: Obdulio Joyce-daughter  Home Phone: [phone] or [phone] Language:  English  :  No   Health Care Agent/POA: Son David Benavidez and then son Neftali Benavidez  Advanced Directives/Living Will:  Yes   Primary Care Clinic/Phone/Fax[facility]  [facility]p) [phone], (f) [phone] Primary Dx: Expressive Aphasia R47.01   Secondary Dx:  DM E11.9   Primary Physician:  Leo Melgar   Height: 5' 0'' Weight: 186 #     Specialty Physician:   Audiologist:  Has hearing aids, appt due   Eye Care Provider:  Has glasses and hx bilateral cataract surgery. Appt due Dental Care Provider:  Own teeth-appt due  [facility]: [facility] [phone]   Other:        [facility] CURRENT SERVICES SUMMARY  Equipment owned/DME history: toilet safety frame, transfer bath bench, bed rails, walker, lift chair    SERVICE TYPE/PROVIDER NAME/PHONE AUTH DATE FREQUENCY Units OR $ Amt DESCRIPTION   Medical Transportation: [id number] [phone] 16-17 As needed na Medial rides   Assisted Living: [facility] [phone] 24 hr Customized Living  Fax: [phone]  6/1/15-6/30/16 7/1/16-6/30/17 Daily See RS/AL Tool Assisted living services   DME: [facility] [phone]  Fax: [phone]    DME-[facility] [phone] and [phone]  7/1/16-9/30/16      10/1/16-6/30/17 Monthly      Monthly Wipes-2 pkgs at $8 ea pkg    Wipes-2 Pkgs $11.50/pkg Depends, Chux, Wipes      Prevail wipes, chux, 10 pks of XL Esthela Protective underwear [id number]   EW depends [facility] [phone]   Fax [phone]    EW Esthela pullups-[facility] [phone] fax [phone] 9/1/16-9/30/13      10/1/16-6/30/17 Monthly      Monthly EW-      EW- [id number] Esthela pull up briefs-3 pkgs of 14 per month    3 extra pkgs Esthela underwear at$23.60 each     * For ADC please select ADC provider and EW Transportation in order to process auth           ACP (advance care planning) 11/17/2014     Priority: Low     .Advance Care Planning 6/16/2016: ACP Review of ACP with client and family.  Reviewed advance care plan and POLST with client and it still reflects client's wishes.  Regla Benavidez has an up to date advance care plan on file.  Added by Autumn Jacobsen  Advance Care Planning:   Receipt of ACP document:  Received: POLST which was signed and dated by provider on 2/11/14.  Document previously scanned on 4/8/14.  Order reviewed and found to be valid.  Code Status reflects choices in most recent ACP document.  Confirmed/documented designated decision maker(s). See permanent comments section of demographics in clinical tab. View document(s) and details by clicking on code status. Added by Ashanti Benavidez on 11/17/2014.  Advance Care Planning:   Receipt of ACP document:  Received: Health Care Directive which was witnessed or notarized on 9/30/09.  Document previously scanned on 10/29/13.  Validation form completed and  scanned.  Code Status reflects choices in most recent ACP document has a POLST .  Confirmed/documented designated decision maker(s). See permanent comments section of demographics  in clinical tab. View document(s) and details by clicking on code status. Added by Ashanti Benavidez on 11/17/2014.           Preventive measure 09/11/2013     Priority: Low     APRIMA DATA BASE UNDER THE 9/11/13 NOTE  Colonoscopy 11/01; mammogram 6/00    [facility] [phone]           Current Outpatient Prescriptions   Medication Sig Dispense Refill     spironolactone (ALDACTONE) 25 MG tablet Take 1 tablet (25 mg) by mouth every 48 hours 30 tablet 3     lactobacillus acidophilus & bulgar (LACTINEX) chewable tablet Take 1 tablet by mouth 3 times daily (with meals) 90 tablet 11     saccharomyces boulardii (FLORASTOR) 250 MG capsule Take 1 capsule (250 mg) by mouth 2 times daily 60 capsule 3     lactobacillus acidophilus & bulgar (LACTINEX) chewable tablet Take 1 tablet by mouth 2 times daily With meals 60 tablet 11     blood glucose monitoring (ONE TOUCH ULTRA) test strip USE TO TEST ONCE EVERY MORNING AS DIRECTED 100 each 1     acetaminophen (TYLENOL) 325 MG tablet Take 2 tablets (650 mg) by mouth At Bedtime Take 2 tablets at bedtime 184 tablet 2     pramipexole (MIRAPEX) 0.125 MG tablet Take 2 tablets (0.25 mg) by mouth At Bedtime 60 tablet 12     polyethylene glycol (MIRALAX) powder Take 17 g (1 capful) by mouth twice a week 510 g 12     ranitidine (ZANTAC) 150 MG tablet Take 1 tablet (150 mg) by mouth 2 times daily 60 tablet 5     furosemide (LASIX) 20 MG tablet Take 1 tablet (20 mg) by mouth 2 times daily 60 tablet 12     nystatin (MYCOSTATIN) 978047 UNIT/GM POWD Apply topically 3 times daily as needed 60 g 1     ORDER FOR DME Equipment being ordered: Large Underpads 3 Package 6     ORDER FOR DME Equipment being ordered: Esthela xtra absorbent large pull ups - Uses 5 per day. 150 Units 11     Cholecalciferol (VITAMIN D3 PO) Take 2,000 Units by mouth daily       senna-docusate (SENOKOT-S;PERICOLACE) 8.6-50 MG per tablet Take 1 tablet by mouth daily HOLD IF HAS LOOSE STOOLS       ORDER FOR DME Equipment being  ordered: retractable safety lancets 100 lancet 1     ORDER FOR DME Equipment being ordered: glucometer kit- brand per insurance coverage  Patient prefers brand with DRUM lancets - patient is to test fasting once each morning  #30 with 5 refills each of the lancets and test strips 1 Device 0     metFORMIN (GLUCOPHAGE) 500 MG tablet Take 1 tablet (500 mg) by mouth 2 times daily (with meals) 60 tablet 12     co-enzyme Q-10 100 MG CAPS Take 1 capsule (100 mg) by mouth daily 30 capsule 0     simvastatin (ZOCOR) 5 MG tablet Take 1 tablet (5 mg) by mouth every evening 30 tablet      aspirin 325 MG tablet Take 1 tablet (325 mg) by mouth daily 120 tablet      Allergies   Allergen Reactions     Cozaar [Losartan Potassium] Swelling     Leg swelling. Same from Avapro.     Hydrochlorothiazide Other (See Comments)     hyponatremia     Hydrocodone Nausea and Vomiting     Nausea     BP Readings from Last 3 Encounters:   03/22/17 118/60   02/14/17 118/74   01/02/17 104/62    Wt Readings from Last 3 Encounters:   03/22/17 186 lb (84.4 kg)   02/14/17 181 lb (82.1 kg)   01/02/17 180 lb (81.6 kg)                    Reviewed and updated as needed this visit by clinical staff  Tobacco  Allergies  Meds  Med Hx  Surg Hx  Fam Hx  Soc Hx      Reviewed and updated as needed this visit by Provider         ROS:  CONSTITUTIONAL:POSITIVE  for weight gain of approximately 3 pounds and NEGATIVE  for chills and fever   RESP:NEGATIVE for significant cough or SOB  CV: POSITIVE for lower extremity edema and NEGATIVE for chest pain/chest pressure and claudication  : negative for dysuria    OBJECTIVE:                                                    /60 (BP Location: Right arm, Patient Position: Chair, Cuff Size: Adult Regular)  Pulse 71  Temp 98  F (36.7  C)  Resp 14  Ht 5' (1.524 m)  Wt 186 lb (84.4 kg)  SpO2 94%  Breastfeeding? No  BMI 36.33 kg/m2  Body mass index is 36.33 kg/(m^2).  GENERAL APPEARANCE: alert, no distress and  over weight  CV: regular rates and rhythm, abnormal pulse , both feet, which are diminished, and the left foot is noticeably cooler than the right foot. There are no foot ulcers or leg ulcers.             She also has pitting B/L LE edema to 2+ bilaterally.gait abnormal   NEURO: , uses a walker, and has expressive aphasia    Diagnostic test results:  none      ASSESSMENT/PLAN:                                                        ICD-10-CM    1. Bilateral leg edema R60.0 furosemide (LASIX) 20 MG tablet     spironolactone (ALDACTONE) 25 MG tablet     Urine Microscopic   2. PAD (peripheral artery disease) (H) I73.9    3. Prerenal azotemia R79.89    4. Recurrent UTI N39.0 UA reflex to Microscopic     Urine Culture Aerobic Bacterial       I had a long discussion with the daughter and with the patient present, regarding the finding of likely significant peripheral arterial disease.  We also discussed that this would not be a simple matter to correct.                     She does not have rest pain nor foot ulcers.                 We will continue observation for now.                        We can increase her diuretics a bit, to spironolactone 25 mg per day, and furosemide 60 mg per day, dosage is to be adjusted based on future lab results.          we can also check her urine today.    Follow up with Provider - kwabena.Leo Melgar MD  [facility]                                                                                                                                                   Addendum:  UC E coli , should be S to cefpodoxime.                                    Labs 4/6/17 received: Na+ 136, K+ 4.6, BUN 41, creat 1.15; My chart message sent to family.

## 2017-03-22 NOTE — MR AVS SNAPSHOT
After Visit Summary   3/22/2017    Regla Benavidez    MRN: [id number]           Patient Information     Date Of Birth          1/5/1926        Visit Information        Provider Department      3/22/2017 2:30 PM Leo Melgar MD [facility]        Today's Diagnoses     Bilateral leg edema    -  1    PAD (peripheral artery disease) (H)        Prerenal azotemia        Recurrent UTI           Follow-ups after your visit        Who to contact     If you have questions or need follow up information about today's clinic visit or your schedule please contact [facility] directly at [phone].  Normal or non-critical lab and imaging results will be communicated to you by MyChart, letter or phone within 4 business days after the clinic has received the results. If you do not hear from us within 7 days, please contact the clinic through [employer] or phone. If you have a critical or abnormal lab result, we will notify you by phone as soon as possible.  Submit refill requests through [employer] or call your pharmacy and they will forward the refill request to us. Please allow 3 business days for your refill to be completed.          Additional Information About Your Visit        MyChart Information     [employer] gives you secure access to your electronic health record. If you see a primary care provider, you can also send messages to your care team and make appointments. If you have questions, please call your primary care clinic.  If you do not have a primary care provider, please call [phone] and they will assist you.        Care EveryWhere ID     This is your Care EveryWhere ID. This could be used by other organizations to access your [place] medical records  [id number]        Your Vitals Were     Pulse Temperature Respirations Height Pulse Oximetry Breastfeeding?    71 98  F (36.7  C) 14 5' (1.524 m) 94% No    BMI (Body Mass Index)                    36.33 kg/m2            Blood Pressure from Last 3 Encounters:   03/22/17 118/60   02/14/17 118/74   01/02/17 104/62    Weight from Last 3 Encounters:   03/22/17 186 lb (84.4 kg)   02/14/17 181 lb (82.1 kg)   01/02/17 180 lb (81.6 kg)              We Performed the Following     UA reflex to Microscopic     Urine Culture Aerobic Bacterial     Urine Microscopic          Today's Medication Changes          These changes are accurate as of: 3/22/17  6:17 PM.  If you have any questions, ask your nurse or doctor.               These medicines have changed or have updated prescriptions.        Dose/Directions    furosemide 20 MG tablet   Commonly known as:  LASIX   This may have changed:    - how much to take  - how to take this  - when to take this  - additional instructions   Used for:  Bilateral leg edema   Changed by:  Leo Melgar MD        40 mg AM, 20 mg PM   Quantity:  75 tablet   Refills:  12            Where to get your medicines      These medications were sent to [facility] - [place], [place] - [address]  [address], [facility] [address]     Phone:  [phone]     furosemide 20 MG tablet    spironolactone 25 MG tablet                Primary Care Provider Office Phone # Fax #    Leo Melgar [phone]       [facility] [address] [facility] [place] [facility] [address]        Thank you!     Thank you for choosing [facility]  for your care. Our goal is always to provide you with excellent care. Hearing back from our patients is one way we can continue to improve our services. Please take a few minutes to complete the written survey that you may receive in the mail after your visit with us. Thank you!             Your Updated Medication List - Protect others around you: Learn how to safely use, store and throw away your medicines at www.disposemymeds.org.          This list is accurate as of: 3/22/17  6:17 PM.  Always use your  most recent med list.                   Brand Name Dispense Instructions for use    acetaminophen 325 MG tablet    TYLENOL    184 tablet    Take 2 tablets (650 mg) by mouth At Bedtime Take 2 tablets at bedtime       aspirin 325 MG tablet     120 tablet    Take 1 tablet (325 mg) by mouth daily       blood glucose monitoring test strip    ONE TOUCH ULTRA    100 each    USE TO TEST ONCE EVERY MORNING AS DIRECTED       co-enzyme Q-10 100 MG Caps capsule     30 capsule    Take 1 capsule (100 mg) by mouth daily       furosemide 20 MG tablet    LASIX    75 tablet    40 mg AM, 20 mg PM       * lactobacillus acidophilus & bulgar chewable tablet     60 tablet    Take 1 tablet by mouth 2 times daily With meals       * lactobacillus acidophilus & bulgar chewable tablet     90 tablet    Take 1 tablet by mouth 3 times daily (with meals)       metFORMIN 500 MG tablet    GLUCOPHAGE    60 tablet    Take 1 tablet (500 mg) by mouth 2 times daily (with meals)       nystatin 730760 UNIT/GM Powd    MYCOSTATIN    60 g    Apply topically 3 times daily as needed       * order for DME     1 Device    Equipment being ordered: glucometer kit- brand per insurance coverage Patient prefers brand with DRUM lancets - patient is to test fasting once each morning #30 with 5 refills each of the lancets and test strips       * order for DME     100 lancet    Equipment being ordered: retractable safety lancets       * order for DME     3 Package    Equipment being ordered: Large Underpads       * order for DME     150 Units    Equipment being ordered: Esthela xtra absorbent large pull ups - Uses 5 per day.       polyethylene glycol powder    MIRALAX    510 g    Take 17 g (1 capful) by mouth twice a week       pramipexole 0.125 MG tablet    MIRAPEX    60 tablet    Take 2 tablets (0.25 mg) by mouth At Bedtime       ranitidine 150 MG tablet    ZANTAC    60 tablet    Take 1 tablet (150 mg) by mouth 2 times daily       saccharomyces boulardii 250 MG capsule     FLORASTOR    60 capsule    Take 1 capsule (250 mg) by mouth 2 times daily       senna-docusate 8.6-50 MG per tablet    SENOKOT-S;PERICOLACE     Take 1 tablet by mouth daily HOLD IF HAS LOOSE STOOLS       simvastatin 5 MG tablet    ZOCOR    30 tablet    Take 1 tablet (5 mg) by mouth every evening       spironolactone 25 MG tablet    ALDACTONE    30 tablet    Take 1 tablet (25 mg) by mouth daily       VITAMIN D3 PO      Take 2,000 Units by mouth daily       * Notice:  This list has 6 medication(s) that are the same as other medications prescribed for you. Read the directions carefully, and ask your doctor or other care provider to review them with you.

## 2017-03-22 NOTE — NURSING NOTE
Chief Complaint   Patient presents with     Edema     Left ankle edema since decrease in diuretic       Initial /60 (BP Location: Right arm, Patient Position: Chair, Cuff Size: Adult Regular)  Pulse 71  Temp 98  F (36.7  C)  Resp 14  Ht 5' (1.524 m)  Wt 186 lb (84.4 kg)  SpO2 94%  Breastfeeding? No  BMI 36.33 kg/m2 Estimated body mass index is 36.33 kg/(m^2) as calculated from the following:    Height as of this encounter: 5' (1.524 m).    Weight as of this encounter: 186 lb (84.4 kg).  Medication Reconciliation: complete     Antonia Orr LPN

## 2017-03-23 ENCOUNTER — TELEPHONE (OUTPATIENT)
Dept: FAMILY MEDICINE | Facility: CLINIC | Age: 82
End: 2017-03-23

## 2017-03-23 NOTE — TELEPHONE ENCOUNTER
Received fax from [facility] stating that the son would like the CoQ10 medication/order discontinued.

## 2017-03-24 ENCOUNTER — TELEPHONE (OUTPATIENT)
Dept: FAMILY MEDICINE | Facility: CLINIC | Age: 82
End: 2017-03-24

## 2017-03-24 LAB
BACTERIA SPEC CULT: ABNORMAL
Lab: ABNORMAL
MICRO REPORT STATUS: ABNORMAL
MICROORGANISM SPEC CULT: ABNORMAL
SPECIMEN SOURCE: ABNORMAL

## 2017-03-24 RX ORDER — CEFPODOXIME PROXETIL 100 MG/1
100 TABLET, FILM COATED ORAL 2 TIMES DAILY
Qty: 14 TABLET | Refills: 0 | Status: SHIPPED | OUTPATIENT
Start: 2017-03-24 | End: 2017-04-24

## 2017-03-24 NOTE — TELEPHONE ENCOUNTER
Called patient's son, David to let him know the UC results and that a medication is to be picked up the pharmacy for the patient to treat her newly diagnosed UTI.  Ed asked me to call the nurse at [facility] and let her know. Gave me the name of Daphney with a phone number of [phone].     Spoke to Daphney let her know of the results and that the prescription has been called in to [facility].  Sara Chua RN  03/24/17  11:58 AM

## 2017-03-24 NOTE — TELEPHONE ENCOUNTER
Reason for Call:  Form, our goal is to have forms completed with 72 hours, however, some forms may require a visit or additional information.    Type of letter, form or note:  [facility]    Who is the form from?: [facility] (if other please explain)    Where did the form come from: form was faxed in    What clinic location was the form placed at?: [facility]    Where the form was placed: 's Box: Leo Melgar MD    What number is listed as a contact on the form?: [facility], fax # [phone]       Additional comments: Request to discontinue COQ10 per family request      Call taken on 3/24/2017 at 12:14 PM by GINETTE MIX

## 2017-03-24 NOTE — TELEPHONE ENCOUNTER
Daphney, Nurse @ [facility] is requesting a signed order to discontinue the Coenzyme Q10 from Dr. Melgar.   Sara Chua RN  03/24/17  11:59 AM

## 2017-03-28 ENCOUNTER — TELEPHONE (OUTPATIENT)
Dept: FAMILY MEDICINE | Facility: CLINIC | Age: 82
End: 2017-03-28

## 2017-03-28 NOTE — TELEPHONE ENCOUNTER
Reason for Call:  Form, our goal is to have forms completed with 72 hours, however, some forms may require a visit or additional information.    Type of letter, form or note:  medical    Who is the form from?: Home care    Where did the form come from: form was faxed in    What clinic location was the form placed at?: [facility]     Where the form was placed: 's Box: Leo Melgar MD    What number is listed as a contact on the form?: [phone]       Additional comments:     Call taken on 3/28/2017 at 10:31 AM by Helene Yu

## 2017-03-29 NOTE — TELEPHONE ENCOUNTER
Form reviewed and signed by Dr. Leo Melgar and faxed to [facility] on 03-.  Phuong Zafar TC

## 2017-03-30 ENCOUNTER — MEDICAL CORRESPONDENCE (OUTPATIENT)
Dept: HEALTH INFORMATION MANAGEMENT | Facility: CLINIC | Age: 82
End: 2017-03-30

## 2017-04-06 ENCOUNTER — TELEPHONE (OUTPATIENT)
Dept: FAMILY MEDICINE | Facility: CLINIC | Age: 82
End: 2017-04-06

## 2017-04-06 ENCOUNTER — TRANSFERRED RECORDS (OUTPATIENT)
Dept: HEALTH INFORMATION MANAGEMENT | Facility: CLINIC | Age: 82
End: 2017-04-06

## 2017-04-06 LAB
CREAT SERPL-MCNC: 1.15 MG/DL (ref 0.55–1.02)
GFR SERPL CREATININE-BSD FRML MDRD: 42 ML/MIN/1.73M2
GLUCOSE SERPL-MCNC: 201 MG/DL (ref 70–100)
POTASSIUM SERPL-SCNC: 4.6 MMOL/L (ref 3.5–5.2)

## 2017-04-06 NOTE — TELEPHONE ENCOUNTER
Reason for Call:  Form, our goal is to have forms completed with 72 hours, however, some forms may require a visit or additional information.    Type of letter, form or note:   [facility]    Who is the form from?:  [facility] (if other please explain)    Where did the form come from: form was faxed in    What clinic location was the form placed at?: [facility]    Where the form was placed: Dr's Box: Leo Melgar MD    What number is listed as a contact on the form?: [facility], fax # [phone]       Additional comments: Forms with monthly labs and vital signs    Call taken on 4/6/2017 at 2:17 PM by GINETTE MIX

## 2017-04-11 ENCOUNTER — TELEPHONE (OUTPATIENT)
Dept: FAMILY MEDICINE | Facility: CLINIC | Age: 82
End: 2017-04-11

## 2017-04-11 NOTE — TELEPHONE ENCOUNTER
Reason for Call:  Form, our goal is to have forms completed with 72 hours, however, some forms may require a visit or additional information.    Type of letter, form or note:  [facility]    Who is the form from?: [facility] (if other please explain)    Where did the form come from: form was faxed in    What clinic location was the form placed at?: [facility]    Where the form was placed: Dr's Box: Leo Melgar MD    What number is listed as a contact on the form?: [facility], fax # [phone]       Additional comments: Signature on form with Physician Orders    Call taken on 4/11/2017 at 11:54 AM by GINETTE MIX

## 2017-04-12 NOTE — TELEPHONE ENCOUNTER
Form reviewed and signed by Dr. Leo Melgar and faxed to  [facility].  Phuong Zafar TC

## 2017-04-12 NOTE — TELEPHONE ENCOUNTER
Forms reviewed and signed by Dr. Leo Melgar and faxed to [facility].  Phuong Zafar TC

## 2017-04-24 ENCOUNTER — TELEPHONE (OUTPATIENT)
Dept: FAMILY MEDICINE | Facility: CLINIC | Age: 82
End: 2017-04-24

## 2017-04-24 DIAGNOSIS — N39.0 RECURRENT UTI: ICD-10-CM

## 2017-04-24 DIAGNOSIS — R30.0 DYSURIA: ICD-10-CM

## 2017-04-24 DIAGNOSIS — R30.0 DYSURIA: Primary | ICD-10-CM

## 2017-04-24 LAB
ALBUMIN UR-MCNC: NEGATIVE MG/DL
AMORPH CRY #/AREA URNS HPF: ABNORMAL /HPF
APPEARANCE UR: CLEAR
BACTERIA #/AREA URNS HPF: ABNORMAL /HPF
BILIRUB UR QL STRIP: NEGATIVE
COLOR UR AUTO: YELLOW
GLUCOSE UR STRIP-MCNC: 500 MG/DL
HGB UR QL STRIP: ABNORMAL
KETONES UR STRIP-MCNC: NEGATIVE MG/DL
LEUKOCYTE ESTERASE UR QL STRIP: ABNORMAL
NITRATE UR QL: POSITIVE
NON-SQ EPI CELLS #/AREA URNS LPF: ABNORMAL /LPF
PH UR STRIP: 5.5 PH (ref 5–7)
RBC #/AREA URNS AUTO: ABNORMAL /HPF (ref 0–2)
SP GR UR STRIP: 1.01 (ref 1–1.03)
URN SPEC COLLECT METH UR: ABNORMAL
UROBILINOGEN UR STRIP-ACNC: 0.2 EU/DL (ref 0.2–1)
WBC #/AREA URNS AUTO: ABNORMAL /HPF (ref 0–2)

## 2017-04-24 PROCEDURE — 87086 URINE CULTURE/COLONY COUNT: CPT | Performed by: INTERNAL MEDICINE

## 2017-04-24 PROCEDURE — 81001 URINALYSIS AUTO W/SCOPE: CPT | Performed by: FAMILY MEDICINE

## 2017-04-24 PROCEDURE — 87186 SC STD MICRODIL/AGAR DIL: CPT | Performed by: INTERNAL MEDICINE

## 2017-04-24 PROCEDURE — 87088 URINE BACTERIA CULTURE: CPT | Performed by: INTERNAL MEDICINE

## 2017-04-24 RX ORDER — CEFPODOXIME PROXETIL 100 MG/1
100 TABLET, FILM COATED ORAL 2 TIMES DAILY
Qty: 14 TABLET | Refills: 0 | Status: SHIPPED | OUTPATIENT
Start: 2017-04-24 | End: 2017-07-05

## 2017-04-24 NOTE — TELEPHONE ENCOUNTER
Called patient's daughter Keshav and went over the message below. Patient's daughter wants to wait until Dr. Melgar is in office and what he recommends would like a call back to see if he would rather the patient wait until the urine culture gets back and if a different antibiotic would be better suited for the patient. Keshav (patient's daughter) would like a call back from dr Melgar on how to proceed once he is in office.   Sara Chua RN  04/24/17  12:43 PM

## 2017-04-24 NOTE — TELEPHONE ENCOUNTER
Called Daughter back, she will collect the sample and bring it in to the clinic.   Routing to provider so he knows to check on lab results and treat if possible.

## 2017-04-24 NOTE — TELEPHONE ENCOUNTER
UA does look suspicious for infection.  Notify Pt's daughter  I sent refill of the Cefpodoxime (Vantin) 100 mg to [facility] for her to start taking 1 twice daily  Urine culture started

## 2017-04-25 ENCOUNTER — TELEPHONE (OUTPATIENT)
Dept: FAMILY MEDICINE | Facility: CLINIC | Age: 82
End: 2017-04-25

## 2017-04-25 NOTE — TELEPHONE ENCOUNTER
Called and spoke with Keshav, Patient's daughter, she is in agreement with the plan.  Sara Chua RN  04/25/17  8:27 AM

## 2017-04-25 NOTE — TELEPHONE ENCOUNTER
Nurse is calling asking why we they have an antibiotic, went over the message from yesterday regarding the treatment of the UTI. Please see message from 4/24.  Sara hCua RN  04/25/17  11:20 AM

## 2017-04-27 ENCOUNTER — TELEPHONE (OUTPATIENT)
Dept: FAMILY MEDICINE | Facility: CLINIC | Age: 82
End: 2017-04-27

## 2017-04-27 NOTE — TELEPHONE ENCOUNTER
Nurse from assisted living calling back to clarify that the provider does want the patient to start the ABx (cefpodoxime (VANTIN) 100 MG tablet) after receiving the urine culture results. Writer confirmed that Yes, we would like the patient to start the medication to treat the UTI with this antibiotic.  Sara Chua RN  04/27/17  9:38 AM

## 2017-05-04 ENCOUNTER — TELEPHONE (OUTPATIENT)
Dept: FAMILY MEDICINE | Facility: CLINIC | Age: 82
End: 2017-05-04

## 2017-05-04 ENCOUNTER — MEDICAL CORRESPONDENCE (OUTPATIENT)
Dept: HEALTH INFORMATION MANAGEMENT | Facility: CLINIC | Age: 82
End: 2017-05-04

## 2017-05-04 ENCOUNTER — TRANSFERRED RECORDS (OUTPATIENT)
Dept: HEALTH INFORMATION MANAGEMENT | Facility: CLINIC | Age: 82
End: 2017-05-04

## 2017-05-04 LAB
CREAT SERPL-MCNC: 1.18 MG/DL (ref 0.55–1.02)
GFR SERPL CREATININE-BSD FRML MDRD: 40 ML/MIN/1.73M2
GLUCOSE SERPL-MCNC: 257 MG/DL (ref 70–100)
POTASSIUM SERPL-SCNC: 4.5 MMOL/L (ref 3.5–5.2)

## 2017-05-09 ENCOUNTER — MYC MEDICAL ADVICE (OUTPATIENT)
Dept: FAMILY MEDICINE | Facility: CLINIC | Age: 82
End: 2017-05-09

## 2017-05-09 DIAGNOSIS — E87.1 HYPONATREMIA: ICD-10-CM

## 2017-05-10 NOTE — TELEPHONE ENCOUNTER
Reason for Call:  Form, our goal is to have forms completed with 72 hours, however, some forms may require a visit or additional information.    Type of letter, form or note:  medical    Who is the form from?: Home care    Where did the form come from: form was faxed in    What clinic location was the form placed at?: [facility]    Where the form was placed: 's Box: Leo Melgar MD    What number is listed as a contact on the form?: [phone]       Additional comments: [facility] review of labs and vitals by MD    Call taken on 5/10/2017 at 9:54 AM by Helene Yu

## 2017-05-12 ENCOUNTER — TELEPHONE (OUTPATIENT)
Dept: FAMILY MEDICINE | Facility: CLINIC | Age: 82
End: 2017-05-12

## 2017-05-12 DIAGNOSIS — Z20.828 EXPOSURE TO INFLUENZA: Primary | ICD-10-CM

## 2017-05-12 RX ORDER — OSELTAMIVIR PHOSPHATE 30 MG/1
30 CAPSULE ORAL DAILY
Qty: 10 CAPSULE | Refills: 0 | Status: SHIPPED | OUTPATIENT
Start: 2017-05-12 | End: 2017-05-22

## 2017-05-12 NOTE — TELEPHONE ENCOUNTER
Daphney nurse from [facility] called reporting there are two confirmed cases of influenza B in their facility. Nurse would like to know if PCP recommend pt take tamiflu. If Rx appropriate can it be sent to [facility]. Nurse is also requesting a call with providers response.

## 2017-05-12 NOTE — TELEPHONE ENCOUNTER
Daphney at [facility] was called with provider response. Copy of script was faxed per request to Daphney fax #[phone].

## 2017-06-01 ENCOUNTER — TRANSFERRED RECORDS (OUTPATIENT)
Dept: HEALTH INFORMATION MANAGEMENT | Facility: CLINIC | Age: 82
End: 2017-06-01

## 2017-06-01 ENCOUNTER — MEDICAL CORRESPONDENCE (OUTPATIENT)
Dept: HEALTH INFORMATION MANAGEMENT | Facility: CLINIC | Age: 82
End: 2017-06-01

## 2017-06-01 ENCOUNTER — TELEPHONE (OUTPATIENT)
Dept: FAMILY MEDICINE | Facility: CLINIC | Age: 82
End: 2017-06-01

## 2017-06-01 LAB
CREAT SERPL-MCNC: 1.23 MG/DL (ref 0.55–1.02)
GFR SERPL CREATININE-BSD FRML MDRD: 38 ML/MIN/1.73M2
GLUCOSE SERPL-MCNC: 201 MG/DL (ref 70–100)
POTASSIUM SERPL-SCNC: 4.8 MMOL/L (ref 3.5–5.2)

## 2017-06-12 ENCOUNTER — CARE COORDINATION (OUTPATIENT)
Dept: GERIATRIC MEDICINE | Facility: CLINIC | Age: 82
End: 2017-06-12

## 2017-06-12 DIAGNOSIS — Z76.89 HEALTH CARE HOME: ICD-10-CM

## 2017-06-12 DIAGNOSIS — Z71.89 ACP (ADVANCE CARE PLANNING): Chronic | ICD-10-CM

## 2017-06-12 NOTE — PROGRESS NOTES
Home visit/Naldo Risk Assessment/EW screening completed today.  Present included client, son David and this CM. Met with AL nurse Daphney after the visit. Client with expressive aphasia and is able to communicate fairly well at times and son supplemented when she was having difficulty. Client may nod or say yes when she means no. Family are very in tune to what client is wanting or saying. Client likes to radha and may forget how to radha while she is in the middle of a project. Client will continue to work on the project until she remembers again how to do it and then gets it right. Client able to speak in complete sentences and at times, may not have appropriate content. Family catches this right away and will re ask client the question. Client seems to understand your speaking to her if she can see your face/mouth and you speak a little slower. AL nurse also states that client likes to keep things orderly and even her used depends are folded in an orderly fashion in the garbage. Daphney reports that client will ask for something from the kitchen, like yogurt and when staff brings it to her, she get angry and upset and will yell and swear as that was not what she wanted, even though that is what she asked for. In order to lessen these outbursts, daughter will go through the weeks menu with client and pick out the foods with client.   Client is a  and had 5 children. One son  and another is at [facility] with epilepsy and autistic disorder. Client has two other sons and a daughter who live nearby and are involved. 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   Member resides: In her own apt at [facility]. Her room is large and includes her bed, table and chairs. She has her own bathroom and a small refrigerator in room. No stove.  Member currently receiving the following services: Assisted Living services and incontinent products.   See EMR for a list of client's diagnoses and  medications.  AL staff set up and administer all meds to client.   Medication management: Medications reviewed: Client does not know what meds she is on, but her children know her meds and med list is in apt. Client has her meds in a lock box in her closet and staff come, unlock the box, and give client her meds.   Falls: Client fell in April while trying to pull out her chair by her desk and tripping over her walker. No injuries.   ADL's/IADL's:  Client can answer her phone but lets it go to the machine and listens to the message. If it is someone she recognizes like her children, she will pick it up. Otherwise she will let it go to her voicemail. Client can dial her children and has their numbers written down and uses this list to call children. Children state it is basic information that can be done on the phone with their mom, but if it is more detailed, they need to come visit her to find out what she is wanting. Client will rarely shop with daughter, so daughter usually picks things up for client. Client does not cook and has mainly yogurt in her refrigerator. All meals are in the AL dining room. Staff comes in daily to do the light cleaning and weekly for the heavy cleaning, as well as laundry. Client on oral meds for diabetes. Family transports client to all medical appts. Client has a walker she uses and needs assist in getting into and out of car. Discussed [place] transportation and client would not be able to get in and out of taxi on her own and would need to go by wheelchair. Daughter has a w/c for client in case she needs it. Client can get dressed mainly by herself but daughter or staff assist as needed. Client needs assist with her hearing aids and reports can wash face and teeth as well as comb her hair. Daughter reports that client states she can brush her teeth but checks toothbrush and it is dry after client states she has brushed. Client also needs assist with weekly shower. Client has a flexible  shower head and transfer bath bench. Client can feed herself. Client has two bed rails and can get in and out of bed by self. Client has a lift chair in her room and needs assist from staff in dining room to get up from chair at times. Client has a walker she uses most of the time for ambulation. Client has a toilet safety frame in the bathroom and staff need to assist with clean up, neyda after incontinence from stool. Client has a hard time reaching back to clean herself and Daphney reports client will just pull up her pants instead of wiping herself off.  Member Mood/behavior-PHQ2 score:  0  [facility] sponsored benefits: Information shared.  Plan of Care Is: Continue with AL services and incontinent products.  Reviewed Community wide emergency planning: Client to follow direction of AL staff and family would help in any community wide disaster.   Release of Information: Signed to speak with two sons Melchor and Neftali, as well as daughter Obdulio.  Follow-Up Plan: Member informed of future contact, plan to f/u with member with a 6 month telephone assessment.  Contact information shared with member and family, encouraged member to call with any questions or concerns prior to this.  See [facility] for further detailed information.  Autumn Jacobsen RN, PHN, CCM  [facility]   [phone]

## 2017-06-12 NOTE — PROGRESS NOTES
Late entry. Called son Ed and daughter Obdulio to see about scheduling an annual visit with client and was able to make an appt with son Ed to see client for 6/12/17.  Autumn Jacobsen RN, PHN, [facility] [place]   [phone]

## 2017-06-13 NOTE — TELEPHONE ENCOUNTER
Reason for Call:  Form, our goal is to have forms completed with 72 hours, however, some forms may require a visit or additional information.    Type of letter, form or note:  medical    Who is the form from?: Home care    Where did the form come from: form was faxed in    What clinic location was the form placed at?: [facility]    Where the form was placed: 's Box: Leo Melgar MD    What number is listed as a contact on the form?: [phone]       Additional comments: [facility] and services faxing monthly vitals and labs    Call taken on 6/13/2017 at 9:30 AM by Helene Yu

## 2017-06-16 NOTE — PROGRESS NOTES
MMIS completed and entered. Client remains a rate cell B. Residential Services tool completed and faxed to Luann at [facility]. Luann checked it over and agrees with plan as it is. CPS updated in epic. POC completed and ready for CMS to process along with RS tool and send copy to son as he handles all finances.   Autumn Jacobsen RN, PHN, [facility]   [phone]

## 2017-06-19 ENCOUNTER — CARE COORDINATION (OUTPATIENT)
Dept: GERIATRIC MEDICINE | Facility: CLINIC | Age: 82
End: 2017-06-19

## 2017-06-19 NOTE — PROGRESS NOTES
Mailed copy of care plan to client.  As requested/needed:  emailed CPS to Lynnette for auths, updated services in access as needed and submitted appropriate referrals/auths. Chart was returned to CM.     Mailed copy of CL tool to client, faxed copy to AL facility, uploaded into [employer] and emailed copy to Lynnette LOPEZ for auth.    Angela Benavidez  Case Management Specialist  [facility]  [phone]

## 2017-06-20 ENCOUNTER — CARE COORDINATION (OUTPATIENT)
Dept: GERIATRIC MEDICINE | Facility: CLINIC | Age: 82
End: 2017-06-20

## 2017-06-20 NOTE — PROGRESS NOTES
Sent authorization to [facility] for Esthela pullups 3 extra packages Esthela underwear at $23.60 each.     Angela Benavidez  Case Management Specialist  [facility]  [phone]

## 2017-06-29 ENCOUNTER — TRANSFERRED RECORDS (OUTPATIENT)
Dept: HEALTH INFORMATION MANAGEMENT | Facility: CLINIC | Age: 82
End: 2017-06-29

## 2017-06-29 LAB — HBA1C MFR BLD: 9.8 % (ref 4–5.6)

## 2017-06-30 ENCOUNTER — MEDICAL CORRESPONDENCE (OUTPATIENT)
Dept: HEALTH INFORMATION MANAGEMENT | Facility: CLINIC | Age: 82
End: 2017-06-30

## 2017-07-05 DIAGNOSIS — E11.65 TYPE 2 DIABETES MELLITUS WITH HYPERGLYCEMIA, WITHOUT LONG-TERM CURRENT USE OF INSULIN (H): Primary | ICD-10-CM

## 2017-07-05 RX ORDER — GLIPIZIDE 5 MG/1
2.5 TABLET ORAL
Qty: 45 TABLET | Refills: 3 | Status: SHIPPED | OUTPATIENT
Start: 2017-07-05 | End: 2017-10-31

## 2017-07-06 ENCOUNTER — TRANSFERRED RECORDS (OUTPATIENT)
Dept: HEALTH INFORMATION MANAGEMENT | Facility: CLINIC | Age: 82
End: 2017-07-06

## 2017-07-06 ENCOUNTER — TELEPHONE (OUTPATIENT)
Dept: FAMILY MEDICINE | Facility: CLINIC | Age: 82
End: 2017-07-06

## 2017-07-06 LAB
CREAT SERPL-MCNC: 1.17 MG/DL (ref 0.55–1.02)
GFR SERPL CREATININE-BSD FRML MDRD: 41 ML/MIN/1.73M2
GLUCOSE SERPL-MCNC: 257 MG/DL (ref 70–100)
POTASSIUM SERPL-SCNC: 4.4 MMOL/L (ref 3.5–5.2)

## 2017-07-06 NOTE — TELEPHONE ENCOUNTER
Called Daphney and let her know that I am faxing this order over for [place]-looks like Dr. Melgar wrote these orders and placed them in his outbox. Left VM for Daphney.

## 2017-07-06 NOTE — TELEPHONE ENCOUNTER
Reason for Call: Request for an order or referral: order for medication  Order or referral being requested: glipiZIDE (GLUCOTROL) 5 MG tablet    Date needed: as soon as possible    Has the patient been seen by the PCP for this problem? YES    Additional comments: Daphney from [facility] called.  They have an rx for the Glipizide but  Do not have an order.  Please call [phone] or fax to [phone]    Phone number Patient can be reached at:  Home number on file [phone] (home)    Best Time:  any    Can we leave a detailed message on this number?  YES    Call taken on 7/6/2017 at 11:44 AM by ZEE LUEVANO

## 2017-07-13 ENCOUNTER — TELEPHONE (OUTPATIENT)
Dept: FAMILY MEDICINE | Facility: CLINIC | Age: 82
End: 2017-07-13

## 2017-07-13 ENCOUNTER — DOCUMENTATION ONLY (OUTPATIENT)
Dept: OTHER | Facility: CLINIC | Age: 82
End: 2017-07-13

## 2017-07-13 DIAGNOSIS — Z71.89 ACP (ADVANCE CARE PLANNING): Chronic | ICD-10-CM

## 2017-07-13 NOTE — TELEPHONE ENCOUNTER
Reason for Call:  Form, our goal is to have forms completed with 72 hours, however, some forms may require a visit or additional information.    Type of letter, form or note:  medical    Who is the form from?: Home care    Where did the form come from: form was faxed in    What clinic location was the form placed at?: [facility]    Where the form was placed: 's Box: Leo Melgar MD    What number is listed as a contact on the form?: [phone]       Additional comments: [facility] review of monthly lab results and vitals, & A1C check since next one is 11/9/17    Call taken on 7/13/2017 at 10:15 AM by Helene Yu

## 2017-07-28 ENCOUNTER — MEDICAL CORRESPONDENCE (OUTPATIENT)
Dept: HEALTH INFORMATION MANAGEMENT | Facility: CLINIC | Age: 82
End: 2017-07-28

## 2017-07-29 ENCOUNTER — OFFICE VISIT (OUTPATIENT)
Dept: FAMILY MEDICINE | Facility: CLINIC | Age: 82
End: 2017-07-29
Payer: COMMERCIAL

## 2017-07-29 VITALS
RESPIRATION RATE: 15 BRPM | DIASTOLIC BLOOD PRESSURE: 76 MMHG | BODY MASS INDEX: 36.44 KG/M2 | WEIGHT: 185.6 LBS | SYSTOLIC BLOOD PRESSURE: 118 MMHG | OXYGEN SATURATION: 94 % | HEIGHT: 60 IN | HEART RATE: 80 BPM | TEMPERATURE: 98.2 F

## 2017-07-29 DIAGNOSIS — R47.01 EXPRESSIVE APHASIA: ICD-10-CM

## 2017-07-29 DIAGNOSIS — N39.45 CONTINUOUS LEAKAGE OF URINE: ICD-10-CM

## 2017-07-29 DIAGNOSIS — H91.93 BILATERAL HEARING LOSS, UNSPECIFIED HEARING LOSS TYPE: ICD-10-CM

## 2017-07-29 DIAGNOSIS — R82.90 ABNORMAL URINE FINDINGS: ICD-10-CM

## 2017-07-29 DIAGNOSIS — L30.9 DERMATITIS: ICD-10-CM

## 2017-07-29 DIAGNOSIS — N76.0 OTHER VAGINITIS: ICD-10-CM

## 2017-07-29 DIAGNOSIS — E11.9 TYPE 2 DIABETES MELLITUS WITHOUT COMPLICATION, WITHOUT LONG-TERM CURRENT USE OF INSULIN (H): ICD-10-CM

## 2017-07-29 DIAGNOSIS — N39.0 RECURRENT UTI: Primary | ICD-10-CM

## 2017-07-29 LAB
ALBUMIN UR-MCNC: NEGATIVE MG/DL
APPEARANCE UR: ABNORMAL
BACTERIA #/AREA URNS HPF: ABNORMAL /HPF
BILIRUB UR QL STRIP: NEGATIVE
COLOR UR AUTO: YELLOW
GLUCOSE UR STRIP-MCNC: 500 MG/DL
HGB UR QL STRIP: ABNORMAL
KETONES UR STRIP-MCNC: NEGATIVE MG/DL
LEUKOCYTE ESTERASE UR QL STRIP: ABNORMAL
NITRATE UR QL: POSITIVE
NON-SQ EPI CELLS #/AREA URNS LPF: ABNORMAL /LPF
PH UR STRIP: 7 PH (ref 5–7)
RBC #/AREA URNS AUTO: ABNORMAL /HPF (ref 0–2)
SP GR UR STRIP: 1.01 (ref 1–1.03)
URN SPEC COLLECT METH UR: ABNORMAL
UROBILINOGEN UR STRIP-ACNC: 0.2 EU/DL (ref 0.2–1)
WBC #/AREA URNS AUTO: >100 /HPF (ref 0–2)

## 2017-07-29 PROCEDURE — 87086 URINE CULTURE/COLONY COUNT: CPT | Performed by: INTERNAL MEDICINE

## 2017-07-29 PROCEDURE — 87088 URINE BACTERIA CULTURE: CPT | Performed by: INTERNAL MEDICINE

## 2017-07-29 PROCEDURE — 87186 SC STD MICRODIL/AGAR DIL: CPT | Performed by: INTERNAL MEDICINE

## 2017-07-29 PROCEDURE — 81001 URINALYSIS AUTO W/SCOPE: CPT | Performed by: INTERNAL MEDICINE

## 2017-07-29 PROCEDURE — 99214 OFFICE O/P EST MOD 30 MIN: CPT | Performed by: INTERNAL MEDICINE

## 2017-07-29 RX ORDER — NYSTATIN 100000 [USP'U]/G
1 POWDER TOPICAL 3 TIMES DAILY PRN
Qty: 60 G | Refills: 11 | Status: SHIPPED | OUTPATIENT
Start: 2017-07-29 | End: 2019-11-26

## 2017-07-29 NOTE — PATIENT INSTRUCTIONS
Let's do this:  Keep using the nystatin powder under the breasts and in the inguinal areas.                  Especially at night, put a cloth under the breasts and in the inguinal areas to keep the skin folds apart.                                 The vaginal/labial areas show evidence of chemical irritation due to urinary incontinence.                    Adding some creamy Desitin on a regular basis should help.

## 2017-07-29 NOTE — NURSING NOTE
Chief Complaint   Patient presents with     UTI     odor     Sore     Under the right breast, right pelvic area     Derm Problem     Left side of back       Initial /76 (BP Location: Left arm, Patient Position: Sitting, Cuff Size: Adult Regular)  Pulse 80  Temp 98.2  F (36.8  C)  Resp 15  Ht 5' (1.524 m)  Wt 185 lb 9.6 oz (84.2 kg)  SpO2 94%  Breastfeeding? No  BMI 36.25 kg/m2 Estimated body mass index is 36.25 kg/(m^2) as calculated from the following:    Height as of this encounter: 5' (1.524 m).    Weight as of this encounter: 185 lb 9.6 oz (84.2 kg).  Medication Reconciliation: complete     Antonia Orr LPN

## 2017-07-29 NOTE — MR AVS SNAPSHOT
After Visit Summary   7/29/2017    Regla Benavidez    MRN: [id number]           Patient Information     Date Of Birth          1/5/1926        Visit Information        Provider Department      7/29/2017 10:00 AM Leo Melgar MD [facility]        Today's Diagnoses     Recurrent UTI    -  1    Dermatitis        Other vaginitis        Continuous leakage of urine          Care Instructions    Let's do this:  Keep using the nystatin powder under the breasts and in the inguinal areas.                  Especially at night, put a cloth under the breasts and in the inguinal areas to keep the skin folds apart.                                 The vaginal/labial areas show evidence of chemical irritation due to urinary incontinence.                    Adding some creamy Desitin on a regular basis should help.                           Follow-ups after your visit        Who to contact     If you have questions or need follow up information about today's clinic visit or your schedule please contact [facility] directly at [phone].  Normal or non-critical lab and imaging results will be communicated to you by [employer], letter or phone within 4 business days after the clinic has received the results. If you do not hear from us within 7 days, please contact the clinic through [employer] or phone. If you have a critical or abnormal lab result, we will notify you by phone as soon as possible.  Submit refill requests through [employer] or call your pharmacy and they will forward the refill request to us. Please allow 3 business days for your refill to be completed.          Additional Information About Your Visit        [employer] Information     [employer] gives you secure access to your electronic health record. If you see a primary care provider, you can also send messages to your care team and make appointments. If you have questions, please call your primary  [facility] clinic.  If you do not have a primary care provider, please call [phone] and they will assist you.        Care EveryWhere ID     This is your Care EveryWhere ID. This could be used by other organizations to access your [place] medical records  [id number]        Your Vitals Were     Pulse Temperature Respirations Height Pulse Oximetry Breastfeeding?    80 98.2  F (36.8  C) 15 5' (1.524 m) 94% No    BMI (Body Mass Index)                   36.25 kg/m2            Blood Pressure from Last 3 Encounters:   07/29/17 118/76   03/22/17 118/60   02/14/17 118/74    Weight from Last 3 Encounters:   07/29/17 185 lb 9.6 oz (84.2 kg)   03/22/17 186 lb (84.4 kg)   02/14/17 181 lb (82.1 kg)              We Performed the Following     UA with Microscopic reflex to Culture        Primary Care Provider Office Phone # Fax #    Leo Melgar [phone]       [facility] [address] [address]        Equal Access to Services     DESI REESE : Hadii aad ku hadasho Soomaali, waaxda luqadaha, qaybta kaalmada adeegyada, eriberto gamboain hayavelinan tracey priest . So [facility] [phone].    ATENCIÓN: Si habla español, tiene a garzon disposición servicios gratuitos de asistencia lingüística. Rubina al [phone].    We comply with applicable federal civil rights laws and [place] laws. We do not discriminate on the basis of race, color, national origin, age, disability sex, sexual orientation or gender identity.            Thank you!     Thank you for choosing [facility]  for your care. Our goal is always to provide you with excellent care. Hearing back from our patients is one way we can continue to improve our services. Please take a few minutes to complete the written survey that you may receive in the mail after your visit with us. Thank you!             Your Updated Medication List - Protect others around you: Learn how to safely use, store and  throw away your medicines at www.disposemymeds.org.          This list is accurate as of: 7/29/17 10:55 AM.  Always use your most recent med list.                   Brand Name Dispense Instructions for use Diagnosis    * ACETAMINOPHEN PO      Take 325 mg by mouth 2 Tablets  As needed. May have 4 hrs apart as needed up to a total of 3 times daily prn. Already receives scheduled dose at bedtime. Not to exceed 3000 mg in 24 hours. For pain.        * acetaminophen 325 MG tablet    TYLENOL    184 tablet    Take 2 tablets (650 mg) by mouth At Bedtime Take 2 tablets at bedtime    Midline low back pain without sciatica       aspirin 325 MG tablet     120 tablet    Take 1 tablet (325 mg) by mouth daily    Stroke (H)       blood glucose monitoring test strip    ONE TOUCH ULTRA    100 each    USE TO TEST ONCE EVERY MORNING AS DIRECTED    Type 2 diabetes mellitus without complication, without long-term current use of insulin (H)       carbamide peroxide 6.5 % otic solution    DEBROX     Place 5 drops into both ears daily as needed for other        co-enzyme Q-10 100 MG Caps capsule     30 capsule    Take 1 capsule (100 mg) by mouth daily    Stroke (H), Hyperlipidemia LDL goal <70       furosemide 20 MG tablet    LASIX    75 tablet    40 mg AM, 20 mg PM    Bilateral leg edema       glipiZIDE 5 MG tablet    GLUCOTROL    45 tablet    Take 0.5 tablets (2.5 mg) by mouth every morning (before breakfast)    Type 2 diabetes mellitus with hyperglycemia, without long-term current use of insulin (H)       * lactobacillus acidophilus & bulgar chewable tablet     60 tablet    Take 1 tablet by mouth 2 times daily With meals    Diarrhea, unspecified type       * lactobacillus acidophilus & bulgar chewable tablet     90 tablet    Take 1 tablet by mouth 3 times daily (with meals)    Diarrhea, unspecified type       metFORMIN 500 MG tablet    GLUCOPHAGE    60 tablet    Take 1 tablet (500 mg) by mouth 2 times daily (with meals)    Type 2  diabetes, HbA1C goal < 8% (H)       nystatin [id number] UNIT/GM Powd    MYCOSTATIN    60 g    Apply topically 3 times daily as needed    Fungal infection       * order for DME     1 Device    Equipment being ordered: glucometer kit- brand per insurance coverage Patient prefers brand with DRUM lancets - patient is to test fasting once each morning #30 with 5 refills each of the lancets and test strips    Type II or unspecified type diabetes mellitus without mention of complication, not stated as uncontrolled       * order for DME     100 lancet    Equipment being ordered: retractable safety lancets    Type II or unspecified type diabetes mellitus without mention of complication, not stated as uncontrolled       * order for DME     3 Package    Equipment being ordered: Large Underpads    Urinary frequency       * order for DME     150 Units    Equipment being ordered: Esthela xtra absorbent large pull ups - Uses 5 per day.    Urinary incontinence       polyethylene glycol powder    MIRALAX    510 g    Take 17 g (1 capful) by mouth twice a week    Constipation, unspecified constipation type       pramipexole 0.125 MG tablet    MIRAPEX    60 tablet    Take 2 tablets (0.25 mg) by mouth At Bedtime    Restless legs syndrome       ranitidine 150 MG tablet    ZANTAC    60 tablet    Take 1 tablet (150 mg) by mouth 2 times daily    Gastroesophageal reflux disease without esophagitis       saccharomyces boulardii 250 MG capsule    FLORASTOR    60 capsule    Take 1 capsule (250 mg) by mouth 2 times daily    Diarrhea, unspecified type       senna-docusate 8.6-50 MG per tablet    SENOKOT-S;PERICOLACE     Take 1 tablet by mouth daily HOLD IF HAS LOOSE STOOLS        simvastatin 5 MG tablet    ZOCOR    30 tablet    Take 1 tablet (5 mg) by mouth every evening    Stroke (H)       spironolactone 25 MG tablet    ALDACTONE    30 tablet    Take 1 tablet (25 mg) by mouth daily    Bilateral leg edema       VITAMIN D3 PO      Take 2,000 Units by  mouth daily        * Notice:  This list has 8 medication(s) that are the same as other medications prescribed for you. Read the directions carefully, and ask your doctor or other care provider to review them with you.

## 2017-08-01 LAB
BACTERIA SPEC CULT: ABNORMAL
MICRO REPORT STATUS: ABNORMAL
MICROORGANISM SPEC CULT: ABNORMAL
SPECIMEN SOURCE: ABNORMAL

## 2017-08-01 RX ORDER — SULFAMETHOXAZOLE AND TRIMETHOPRIM 400; 80 MG/1; MG/1
1 TABLET ORAL 2 TIMES DAILY
Qty: 10 TABLET | Refills: 0 | Status: SHIPPED | OUTPATIENT
Start: 2017-08-01 | End: 2017-08-06

## 2017-08-03 ENCOUNTER — TRANSFERRED RECORDS (OUTPATIENT)
Dept: HEALTH INFORMATION MANAGEMENT | Facility: CLINIC | Age: 82
End: 2017-08-03

## 2017-08-03 LAB
CREAT SERPL-MCNC: 1.27 MG/DL (ref 0.55–1.02)
GFR SERPL CREATININE-BSD FRML MDRD: 37 ML/MIN/1.73M2
GLUCOSE SERPL-MCNC: 229 MG/DL (ref 70–100)
POTASSIUM SERPL-SCNC: 4.5 MMOL/L (ref 3.5–5.2)

## 2017-08-04 ENCOUNTER — TELEPHONE (OUTPATIENT)
Dept: FAMILY MEDICINE | Facility: CLINIC | Age: 82
End: 2017-08-04

## 2017-09-01 ENCOUNTER — MEDICAL CORRESPONDENCE (OUTPATIENT)
Dept: HEALTH INFORMATION MANAGEMENT | Facility: CLINIC | Age: 82
End: 2017-09-01

## 2017-09-07 ENCOUNTER — TRANSFERRED RECORDS (OUTPATIENT)
Dept: HEALTH INFORMATION MANAGEMENT | Facility: CLINIC | Age: 82
End: 2017-09-07

## 2017-09-07 LAB
CREAT SERPL-MCNC: 1.11 MG/DL (ref 0.55–1.02)
GFR SERPL CREATININE-BSD FRML MDRD: 43 ML/MIN/1.73M2
GLUCOSE SERPL-MCNC: 271 MG/DL (ref 70–100)
POTASSIUM SERPL-SCNC: 4.5 MMOL/L (ref 3.5–5.2)

## 2017-09-08 ENCOUNTER — TELEPHONE (OUTPATIENT)
Dept: FAMILY MEDICINE | Facility: CLINIC | Age: 82
End: 2017-09-08

## 2017-09-12 NOTE — TELEPHONE ENCOUNTER
Reason for Call:  Form, our goal is to have forms completed with 72 hours, however, some forms may require a visit or additional information.    Type of letter, form or note:  medical    Who is the form from?: Home care    Where did the form come from: form was faxed in    What clinic location was the form placed at?: [facility]    Where the form was placed: 's Box: Leo Melgar MD    What number is listed as a contact on the form?: [phone]       Additional comments: [facility] monthly BMP and vitals     Call taken on 9/12/2017 at 11:08 AM by Helene Yu

## 2017-10-13 ENCOUNTER — TELEPHONE (OUTPATIENT)
Dept: FAMILY MEDICINE | Facility: CLINIC | Age: 82
End: 2017-10-13

## 2017-10-13 DIAGNOSIS — N39.0 URINARY TRACT INFECTION WITHOUT HEMATURIA, SITE UNSPECIFIED: ICD-10-CM

## 2017-10-13 DIAGNOSIS — R30.0 DYSURIA: Primary | ICD-10-CM

## 2017-10-13 DIAGNOSIS — R82.90 NONSPECIFIC FINDING ON EXAMINATION OF URINE: Primary | ICD-10-CM

## 2017-10-13 DIAGNOSIS — R30.0 DYSURIA: ICD-10-CM

## 2017-10-13 LAB
ALBUMIN UR-MCNC: NEGATIVE MG/DL
APPEARANCE UR: ABNORMAL
BACTERIA #/AREA URNS HPF: ABNORMAL /HPF
BILIRUB UR QL STRIP: NEGATIVE
COLOR UR AUTO: YELLOW
GLUCOSE UR STRIP-MCNC: >=1000 MG/DL
HGB UR QL STRIP: ABNORMAL
KETONES UR STRIP-MCNC: NEGATIVE MG/DL
LEUKOCYTE ESTERASE UR QL STRIP: ABNORMAL
NITRATE UR QL: NEGATIVE
PH UR STRIP: 7 PH (ref 5–7)
RBC #/AREA URNS AUTO: ABNORMAL /HPF
SOURCE: ABNORMAL
SP GR UR STRIP: <=1.005 (ref 1–1.03)
UROBILINOGEN UR STRIP-ACNC: 0.2 EU/DL (ref 0.2–1)
WBC #/AREA URNS AUTO: ABNORMAL /HPF

## 2017-10-13 PROCEDURE — 87086 URINE CULTURE/COLONY COUNT: CPT | Performed by: INTERNAL MEDICINE

## 2017-10-13 PROCEDURE — 87186 SC STD MICRODIL/AGAR DIL: CPT | Performed by: INTERNAL MEDICINE

## 2017-10-13 PROCEDURE — 81001 URINALYSIS AUTO W/SCOPE: CPT | Performed by: PHYSICIAN ASSISTANT

## 2017-10-13 NOTE — TELEPHONE ENCOUNTER
Patient's daughter calling to say that patient is symptomatic of an UTI with cloudy smelly urine.Has sterile container at home for collection and will bring in specimen for testing.

## 2017-10-13 NOTE — TELEPHONE ENCOUNTER
Reason for Call:  Other     Detailed comments: mother has UTI and daughter usually bring in sample.  Needs a order    Phone Number Patient can be reached at: Other phone number:  [phone] daughter Keshav    Best Time: soon    Can we leave a detailed message on this number? YES    Call taken on 10/13/2017 at 8:34 AM by KALLIE BOSS

## 2017-10-15 LAB
BACTERIA SPEC CULT: ABNORMAL
Lab: ABNORMAL
SPECIMEN SOURCE: ABNORMAL

## 2017-10-15 RX ORDER — CEFUROXIME AXETIL 250 MG/1
250 TABLET ORAL 2 TIMES DAILY
Qty: 14 TABLET | Refills: 0 | Status: SHIPPED | OUTPATIENT
Start: 2017-10-15 | End: 2018-12-21

## 2017-10-15 NOTE — PROGRESS NOTES
Results for orders placed or performed in visit on 10/13/17   UA with Microscopic reflex to Culture   Result Value Ref Range    Color Urine Yellow     Appearance Urine Slightly Cloudy     Glucose Urine >=1000 (A) NEG^Negative mg/dL    Bilirubin Urine Negative NEG^Negative    Ketones Urine Negative NEG^Negative mg/dL    Specific Gravity Urine <=1.005 1.003 - 1.035    pH Urine 7.0 5.0 - 7.0 pH    Protein Albumin Urine Negative NEG^Negative mg/dL    Urobilinogen Urine 0.2 0.2 - 1.0 EU/dL    Nitrite Urine Negative NEG^Negative    Blood Urine Trace (A) NEG^Negative    Leukocyte Esterase Urine Large (A) NEG^Negative    Source Midstream Urine     WBC Urine 10-25 (A) OTO2^O - 2 /HPF    RBC Urine O - 2 OTO2^O - 2 /HPF    Bacteria Urine Moderate (A) NEG^Negative /HPF   Urine Culture Aerobic Bacterial   Result Value Ref Range    Specimen Description Midstream Urine     Special Requests Midstream Urine     Culture Micro >100,000 colonies/mL  Escherichia coli   (A)        Susceptibility    Escherichia coli - AMMON     AMPICILLIN >=32 Resistant ug/mL     CEFAZOLIN* <=4 Sensitive ug/mL      * Cefazolin AMMON breakpoints are for the treatment of uncomplicated urinary tract infections.  For the treatment of systemic infections, please contact the laboratory for additional testing.     CEFOXITIN <=4 Sensitive ug/mL     CEFTAZIDIME <=1 Sensitive ug/mL     CEFTRIAXONE <=1 Sensitive ug/mL     CIPROFLOXACIN >=4 Resistant ug/mL     GENTAMICIN >=16 Resistant ug/mL     LEVOFLOXACIN >=8 Resistant ug/mL     NITROFURANTOIN <=16 Sensitive ug/mL     TOBRAMYCIN 8 Intermediate ug/mL     Trimethoprim/Sulfa >=16/304 Resistant ug/mL     AMPICILLIN/SULBACTAM >=32 Resistant ug/mL     Piperacillin/Tazo <=4 Sensitive ug/mL     CEFEPIME <=1 Sensitive ug/mL     Rz awnr.

## 2017-10-27 ENCOUNTER — MEDICAL CORRESPONDENCE (OUTPATIENT)
Dept: HEALTH INFORMATION MANAGEMENT | Facility: CLINIC | Age: 82
End: 2017-10-27

## 2017-10-30 ENCOUNTER — TRANSFERRED RECORDS (OUTPATIENT)
Dept: HEALTH INFORMATION MANAGEMENT | Facility: CLINIC | Age: 82
End: 2017-10-30

## 2017-10-30 ENCOUNTER — CARE COORDINATION (OUTPATIENT)
Dept: GERIATRIC MEDICINE | Facility: CLINIC | Age: 82
End: 2017-10-30

## 2017-10-30 ENCOUNTER — TELEPHONE (OUTPATIENT)
Dept: FAMILY MEDICINE | Facility: CLINIC | Age: 82
End: 2017-10-30

## 2017-10-30 DIAGNOSIS — E11.65 TYPE 2 DIABETES MELLITUS WITH HYPERGLYCEMIA, WITHOUT LONG-TERM CURRENT USE OF INSULIN (H): ICD-10-CM

## 2017-10-30 DIAGNOSIS — R19.7 DIARRHEA: Primary | ICD-10-CM

## 2017-10-30 LAB
CREAT SERPL-MCNC: 1.3 MG/DL (ref 0.55–1.02)
GFR SERPL CREATININE-BSD FRML MDRD: 36 ML/MIN/1.73M2
GLUCOSE SERPL-MCNC: 495 MG/DL (ref 70–100)
POTASSIUM SERPL-SCNC: 4.9 MMOL/L (ref 3.5–5.2)

## 2017-10-30 NOTE — TELEPHONE ENCOUNTER
Alec is requesting a call from ASH Pandey. She would like to know if BMP labs results were received. Informed Keshav no record that labs were received. These are reviwed by PCP and then scanned to pt's chart. Will send message to Katherin.   Keshav can be called at [phone]

## 2017-10-30 NOTE — TELEPHONE ENCOUNTER
Keshav will call Assisted Living re patient's monthly BMP.    Patient is active on MyChart for any advise.

## 2017-10-30 NOTE — PROGRESS NOTES
Received a message from Kimi at [facility] stating client's waiver needed to be updated for her Esthela [place]. [place] for [place]. Sometimes, waiver closes accidentally by the county when a person reapplies for their insurance. Called Kimi back at [phone] and an answer. ML that this  saw client in June and [place] and [place]. To call back if has questions.  Autumn Jacobsen RN, PHN, [facility]   [phone]

## 2017-10-30 NOTE — TELEPHONE ENCOUNTER
cefuroxime (CEFTIN) 250 MG tablet 14 tablet 0 10/15/2017 10/22/2017 --      Sig: Take 1 tablet (250 mg) by mouth 2 times daily for 7 days     Class: E-Prescribe     Route: Oral     Order: [id number]     E-Prescribing Status: Receipt confirmed by pharmacy (10/15/2017 12:54 PM CDT)     Patient called reporting diarrhea     Diarrhea  Onset: 3 days    Description:   Consistency of stool: watery, pasty and stains water bowl, loose  Blood in stool: no   Number of loose stools in past 24 hours: atleast 3 a day since Saturday, patient is a poor historian    Progression of Symptoms:  worsening    Accompanying Signs & Symptoms:  Fever: no   Nausea or vomiting; no   Abdominal pain: no   Episodes of constipation: no   Weight loss: no     History:   Ill contacts: no   Recent use of antibiotics: YES   Recent travels: no          Recent medication-new or changes(Rx or OTC): YES    Precipitating factors:   On Ceftin completed course 10/22/17 for UTI    Alleviating factors:   None identified.     Therapies Tried and outcome:  None, patient is in an assisted living and has all of her medications given to her. Daughter is asking for advice on what the doctor can prescribe to help with the patient's comfort. Would like a phone call back from the provider today if possible. Routing to provider for review and advice.        Recent Medication changes: Ceftin for UTI course completed 10/22/17    Home Care information given: patient to stay hydrated, but daughter was worried of giving her too much liquid as she has heart failure and was told her fluids should be restricted. Wants advice from the doctor on what to do that is safe for the patient.     References used: Telephone Triage Protocols for Nurses fifth edition     Please advise as appropriate with further recommendations as appropriate.    Sara Chua, RN   Triage RN  [facility]

## 2017-10-30 NOTE — TELEPHONE ENCOUNTER
dtr was called - the patient never had the October monthly labs done as ordered so assisted living is having someone come today to draw a BMP.  We are to watch for these labs and give to Dr. Leo Melgar .  dtr will bring in stool sample.

## 2017-10-31 DIAGNOSIS — R19.7 DIARRHEA: ICD-10-CM

## 2017-10-31 LAB
C DIFF TOX B STL QL: NEGATIVE
SPECIMEN SOURCE: NORMAL

## 2017-10-31 PROCEDURE — 87493 C DIFF AMPLIFIED PROBE: CPT | Performed by: INTERNAL MEDICINE

## 2017-10-31 RX ORDER — GLIPIZIDE 5 MG/1
5 TABLET ORAL
Qty: 180 TABLET | Refills: 3 | Status: SHIPPED | OUTPATIENT
Start: 2017-10-31 | End: 2017-11-14

## 2017-10-31 NOTE — TELEPHONE ENCOUNTER
Glucose was 495 after supper.    Current glipizide dose is 2.5 mg bid.                                 Plan: increase glipizide to 5 mg bid; with bkfst and supper; please let them know.                I have sent an Rx to her pharmacy for the new dosage.

## 2017-10-31 NOTE — TELEPHONE ENCOUNTER
Called Keshav and let her know that the new order for glipizide 5 mg bid was sent to the pharmacy as her glucose was 495 after supper.

## 2017-10-31 NOTE — TELEPHONE ENCOUNTER
Daphney at [facility] notified of change in glipizide dose and that prescription has been sent into pharmacy.

## 2017-10-31 NOTE — TELEPHONE ENCOUNTER
rec'd call from nurse, Daphney, at [facility] [phone].  She is faxing over patient's labs done yesterday now.  BS is noted to be elevated.

## 2017-11-09 ENCOUNTER — TELEPHONE (OUTPATIENT)
Dept: FAMILY MEDICINE | Facility: CLINIC | Age: 82
End: 2017-11-09

## 2017-11-09 NOTE — TELEPHONE ENCOUNTER
Reason for Call:  Form, our goal is to have forms completed with 72 hours, however, some forms may require a visit or additional information.    Type of letter, form or note:  medical    Who is the form from?: Home care    Where did the form come from: form was faxed in    What clinic location was the form placed at?: [facility]    Where the form was placed: 's Box: Leo Melgar MD    What number is listed as a contact on the form?: [phone]  Phone [phone]       Additional comments: scheduled A1C faxed over     Call taken on 11/9/2017 at 4:46 PM by Helene Yu

## 2017-11-10 ENCOUNTER — CARE COORDINATION (OUTPATIENT)
Dept: GERIATRIC MEDICINE | Facility: CLINIC | Age: 82
End: 2017-11-10

## 2017-11-10 NOTE — PROGRESS NOTES
Client due for 6 month telephone assessment. Called son Ed and no answer. ML requesting a call back.  Autumn Jacobsen RN, PHN, [facility]   [phone]

## 2017-11-14 ENCOUNTER — RESULT FOLLOW UP (OUTPATIENT)
Dept: FAMILY MEDICINE | Facility: CLINIC | Age: 82
End: 2017-11-14

## 2017-11-14 ENCOUNTER — TELEPHONE (OUTPATIENT)
Dept: FAMILY MEDICINE | Facility: CLINIC | Age: 82
End: 2017-11-14

## 2017-11-14 DIAGNOSIS — E11.65 TYPE 2 DIABETES MELLITUS WITH HYPERGLYCEMIA, WITHOUT LONG-TERM CURRENT USE OF INSULIN (H): ICD-10-CM

## 2017-11-14 DIAGNOSIS — E11.9 TYPE 2 DIABETES MELLITUS WITHOUT COMPLICATION, WITHOUT LONG-TERM CURRENT USE OF INSULIN (H): ICD-10-CM

## 2017-11-14 RX ORDER — GLIPIZIDE 10 MG/1
10 TABLET ORAL
Qty: 60 TABLET | Refills: 11 | Status: SHIPPED | OUTPATIENT
Start: 2017-11-14 | End: 2018-11-08

## 2017-11-14 RX ORDER — INSULIN GLARGINE 100 [IU]/ML
INJECTION, SOLUTION SUBCUTANEOUS
Qty: 10 ML | Refills: 1 | Status: SHIPPED | OUTPATIENT
Start: 2017-11-14 | End: 2017-11-14

## 2017-11-14 RX ORDER — INSULIN GLARGINE 100 [IU]/ML
INJECTION, SOLUTION SUBCUTANEOUS
Qty: 10 ML | Refills: 11 | Status: SHIPPED | OUTPATIENT
Start: 2017-11-14 | End: 2017-11-14

## 2017-11-14 NOTE — PROGRESS NOTES
David called back. He would like to know if there is something else they can try like changes in diet and oral medications before they start the insulin? Please advise.

## 2017-11-14 NOTE — PROGRESS NOTES
I called Daphney at [facility] ([phone]). She states that adding lantus will take a while to do as she will have to have educators come out, teach her staff how to properly administer insulin and she is not sure if the patient's family even wants to do this. I am going to call pt's family to discuss this. Daphney is wondering if there are any oral medications pt can take instead of insulin? Also Daphney wants to know how often [place] should be getting her A1C checked as well as her BMP? She states that she was getting the BMP checked monthly.    I called David and left  asking him to call me back regarding this.    Please advise

## 2017-11-14 NOTE — PROGRESS NOTES
Daphney wants to know how often her A1C should be checked? They are testing BMP once a month. Please advise

## 2017-11-14 NOTE — PROGRESS NOTES
Called David and Daphney and left VM for both of them letting them know Dr. Melgar's message-cancelled insulin, changed dose on glipizide and added januvia. Called pharmacy to let them know that the insulin is cancelled and that the glipizide and januvia are new scripts.

## 2017-11-14 NOTE — TELEPHONE ENCOUNTER
David called- wants to know if patient should continue to test once daily? If so, a new Rx will be needed.

## 2017-11-14 NOTE — PROGRESS NOTES
Cancel insulin.                       Keep sugars and desserts away from her.               Increase glipizide to 10 mg bid.              Add Januvia 50 mg per day.                           I refilled the medication(s) and e-mailed the rx to the pharmacy.         Please let them know.

## 2017-11-14 NOTE — PROGRESS NOTES
Received a call back from son Ed.  [facility] Six-Month Telephone Assessment    6 month telephone assessment completed today.    ER visits: 0  Hospitalizations: 0  TCU stays: 0  Significant health status changes: a couple UTI's. Elevated blood sugars over last 6 months so starting new meds for diabetes. Ed was asking for printed info on diabetes and what client can eat. Recommended speaking with PCP and or dietician for specific info. General info on diabetes from [employer] sent to son.  Falls/Injuries: none  ADL/IADL changes: none  Caregiver assessment follow up: na    Changes in services:   No new service needs identified or requested at this time.     Goals: See POC in chart for goal progress documentation.          Will see client in 6 months for an annual health risk assessment.   Encouraged client to call CM with any questions or concerns in the meantime.   Autumn Jacobsen RN, PHN, CCM  [facility]   [phone]

## 2017-11-14 NOTE — PROGRESS NOTES
A1C 11.8.               The A1C of 11.8 correlates to an average blood sugar of approximately 290.                     Add lantus 10 units every evening.                                             Rx sent.                                    Check glucometer every morning and send to me the results weekly.                             Please let the people at her assisted living know these orders; call them.   Also let the family know.

## 2017-11-14 NOTE — TELEPHONE ENCOUNTER
Reason for Call:  Form, our goal is to have forms completed with 72 hours, however, some forms may require a visit or additional information.    Type of letter, form or note:  medical    Who is the form from?: Home care    Where did the form come from: form was faxed in    What clinic location was the form placed at?: [facility]    Where the form was placed: 's Box: Leo Melgar MD    What number is listed as a contact on the form?: [phone]  Phone [phone]       Additional comments: [facility] control solution for level 1 & 2    Call taken on 11/14/2017 at 4:40 PM by Helene Yu

## 2017-11-16 ENCOUNTER — TELEPHONE (OUTPATIENT)
Dept: FAMILY MEDICINE | Facility: CLINIC | Age: 82
End: 2017-11-16

## 2017-11-16 NOTE — TELEPHONE ENCOUNTER
Reason for Call:  Form, our goal is to have forms completed with 72 hours, however, some forms may require a visit or additional information.    Type of letter, form or note:  medical    Who is the form from?: Home care    Where did the form come from: form was faxed in    What clinic location was the form placed at?: [facility]    Where the form was placed: 's Box: Leo Melgar MD    What number is listed as a contact on the form?: [phone]  Phone [phone]       [facility]   change in diabetic medications, which parameters do you want to be notified of?    Call taken on 11/16/2017 at 1:49 PM by Helene Yu

## 2017-11-27 ENCOUNTER — TRANSFERRED RECORDS (OUTPATIENT)
Dept: HEALTH INFORMATION MANAGEMENT | Facility: CLINIC | Age: 82
End: 2017-11-27

## 2017-11-27 ENCOUNTER — MEDICAL CORRESPONDENCE (OUTPATIENT)
Dept: HEALTH INFORMATION MANAGEMENT | Facility: CLINIC | Age: 82
End: 2017-11-27

## 2017-11-27 LAB
CREAT SERPL-MCNC: 1.26 MG/DL (ref 0.55–1.02)
GFR SERPL CREATININE-BSD FRML MDRD: 37 ML/MIN/1.73M2
GLUCOSE SERPL-MCNC: 188 MG/DL (ref 70–100)
POTASSIUM SERPL-SCNC: 4.3 MMOL/L (ref 3.5–5.2)

## 2017-11-28 ENCOUNTER — TELEPHONE (OUTPATIENT)
Dept: FAMILY MEDICINE | Facility: CLINIC | Age: 82
End: 2017-11-28

## 2017-11-28 NOTE — TELEPHONE ENCOUNTER
Reason for Call:  Form, our goal is to have forms completed with 72 hours, however, some forms may require a visit or additional information.    Type of letter, form or note:  medical    Who is the form from?: Home care    Where did the form come from: form was faxed in    What clinic location was the form placed at?: [facility]    Where the form was placed: 's Box: Leo Melgar MD    What number is listed as a contact on the form?: [phone]  Phone [phone]       Additional comments: [facility] MD sign annual physician orders     Call taken on 11/28/2017 at 3:36 PM by Helene Yu

## 2017-12-07 ENCOUNTER — TELEPHONE (OUTPATIENT)
Dept: FAMILY MEDICINE | Facility: CLINIC | Age: 82
End: 2017-12-07

## 2017-12-07 NOTE — TELEPHONE ENCOUNTER
Reason for Call:  Form, our goal is to have forms completed with 72 hours, however, some forms may require a visit or additional information.    Type of letter, form or note:  medical    Who is the form from?: Home care    Where did the form come from: form was faxed in    What clinic location was the form placed at?: [facility]    Where the form was placed: 's Box: Leo Melgar MD    What number is listed as a contact on the form?: [phone]  Phone [phone]       Additional comments: [facility] med orders and D/C insulin order     Call taken on 12/7/2017 at 2:37 PM by Helene Yu

## 2017-12-07 NOTE — TELEPHONE ENCOUNTER
Reason for Call:  Form, our goal is to have forms completed with 72 hours, however, some forms may require a visit or additional information.    Type of letter, form or note:  medical    Who is the form from?: Home care    Where did the form come from: form was faxed in    What clinic location was the form placed at?: [facility]    Where the form was placed: 's Box: Leo Melgar MD    What number is listed as a contact on the form?: [phone]  Phone [phone]       Additional comments: [facility] annual physician orders ( claim they never got the form)    Call taken on 12/7/2017 at 2:35 PM by Helene Yu

## 2017-12-12 ENCOUNTER — TELEPHONE (OUTPATIENT)
Dept: FAMILY MEDICINE | Facility: CLINIC | Age: 82
End: 2017-12-12

## 2017-12-12 NOTE — TELEPHONE ENCOUNTER
Reason for Call:  Form, our goal is to have forms completed with 72 hours, however, some forms may require a visit or additional information.    Type of letter, form or note:  medical    Who is the form from?: Home care    Where did the form come from: form was faxed in    What clinic location was the form placed at?: [facility]    Where the form was placed: 's Box: Leo Melgar MD    What number is listed as a contact on the form?: [phone]  Phone [phone]       Additional comments: [facility] new orders   Signature required by MD    Call taken on 12/12/2017 at 2:13 PM by Helene uY

## 2017-12-28 ENCOUNTER — MEDICAL CORRESPONDENCE (OUTPATIENT)
Dept: HEALTH INFORMATION MANAGEMENT | Facility: CLINIC | Age: 82
End: 2017-12-28

## 2017-12-28 ENCOUNTER — TRANSFERRED RECORDS (OUTPATIENT)
Dept: HEALTH INFORMATION MANAGEMENT | Facility: CLINIC | Age: 82
End: 2017-12-28

## 2017-12-28 LAB
CREAT SERPL-MCNC: 1.28 MG/DL (ref 0.55–1.02)
GFR SERPL CREATININE-BSD FRML MDRD: 37 ML/MIN/1.73M2
GLUCOSE SERPL-MCNC: 126 MG/DL (ref 70–100)
POTASSIUM SERPL-SCNC: 3.9 MMOL/L (ref 3.5–5.2)

## 2018-01-02 ENCOUNTER — TELEPHONE (OUTPATIENT)
Dept: FAMILY MEDICINE | Facility: CLINIC | Age: 83
End: 2018-01-02

## 2018-01-02 DIAGNOSIS — Z20.828 EXPOSURE TO INFLUENZA: Primary | ICD-10-CM

## 2018-01-02 NOTE — TELEPHONE ENCOUNTER
Okay for Tamiflu, 30 mg per day (dose adjusted for renal function)                      please let them know

## 2018-01-02 NOTE — TELEPHONE ENCOUNTER
Reason for Call:  Other     Detailed comments: Daphney from [facility] called, a number of patient have the flu, wondering if they could give patient gilbert flu, ok per family.  Please call Daphney back    Phone Number Patient can be reached at: Other phone number:  [phone]     Best Time: soon    Can we leave a detailed message on this number? YES    Call taken on 1/2/2018 at 1:26 PM by KALLIE BOSS

## 2018-01-03 RX ORDER — OSELTAMIVIR PHOSPHATE 30 MG/1
30 CAPSULE ORAL DAILY
Qty: 30 CAPSULE | Refills: 3 | Status: CANCELLED | OUTPATIENT
Start: 2018-01-03

## 2018-01-03 RX ORDER — OSELTAMIVIR PHOSPHATE 30 MG/1
30 CAPSULE ORAL DAILY
Qty: 30 CAPSULE | Refills: 3 | Status: SHIPPED | OUTPATIENT
Start: 2018-01-03 | End: 2018-01-04

## 2018-01-03 NOTE — TELEPHONE ENCOUNTER
Left detailed voice message with providers message below. Asked Daphney to call triage back if further questions re: message.  Okay for Tamiflu, 30 mg per day (dose adjusted for renal function).

## 2018-01-03 NOTE — TELEPHONE ENCOUNTER
Please resend prescription for Tamiflu- need length for treatment. Please return to triage again and we will contact assisted living nurse.  thanks

## 2018-01-03 NOTE — TELEPHONE ENCOUNTER
Need prescription sent to [facility] pharmacy with length needed.  please check t'd up med for accuracy--thanks

## 2018-01-03 NOTE — TELEPHONE ENCOUNTER
The Rx is for 30 days.     There is no definite time frame when it is being ordered for prophylaxis.         Please let them know.

## 2018-01-04 ENCOUNTER — TELEPHONE (OUTPATIENT)
Dept: FAMILY MEDICINE | Facility: CLINIC | Age: 83
End: 2018-01-04

## 2018-01-04 DIAGNOSIS — Z20.828 EXPOSURE TO INFLUENZA: ICD-10-CM

## 2018-01-04 RX ORDER — OSELTAMIVIR PHOSPHATE 30 MG/1
30 CAPSULE ORAL DAILY
Qty: 14 CAPSULE | Refills: 0 | Status: SHIPPED | OUTPATIENT
Start: 2018-01-04 | End: 2018-01-27

## 2018-01-04 NOTE — TELEPHONE ENCOUNTER
For how long does the NH anticipated ongoing issues with influenza in their facility?              Of course this is unknown.                 If they want to just give her the med for 14 days, and call back if they want to extend it, that will be fine.                Please let them know.

## 2018-01-04 NOTE — TELEPHONE ENCOUNTER
Chelo from [facility] called requesting clarification on rx sent for tamiflu Rx was sent for 30 day supply. Per pharmacist Rx is not typically sent for that amount. Typical prophylactic  is 14 days. Would provider like patient to take as ordered or can new script be sent with usual dose?

## 2018-01-08 ENCOUNTER — TELEPHONE (OUTPATIENT)
Dept: FAMILY MEDICINE | Facility: CLINIC | Age: 83
End: 2018-01-08

## 2018-01-08 NOTE — TELEPHONE ENCOUNTER
Reason for Call:  Form, our goal is to have forms completed with 72 hours, however, some forms may require a visit or additional information.    Type of letter, form or note:  medical    Who is the form from?: Home care    Where did the form come from: form was faxed in    What clinic location was the form placed at?: [facility]    Where the form was placed: 's Box: Leo Melgar MD    What number is listed as a contact on the form?: [phone]  Phone [phone]         Additional comments: [facility] annual physician orders and what does this message say  on the page    Call taken on 1/8/2018 at 1:27 PM by Helene Yu

## 2018-01-26 ENCOUNTER — TELEPHONE (OUTPATIENT)
Dept: FAMILY MEDICINE | Facility: CLINIC | Age: 83
End: 2018-01-26

## 2018-01-26 NOTE — TELEPHONE ENCOUNTER
Reason for Call:  Form, our goal is to have forms completed with 72 hours, however, some forms may require a visit or additional information.    Type of letter, form or note:  medical    Who is the form from?: Home care    Where did the form come from: form was faxed in    What clinic location was the form placed at?: [facility]    Where the form was placed: 's Box: Leo Melgar MD    What number is listed as a contact on the form?: [phone]  Phone         Additional comments: [facility] & services  lab results and recent BS daily    Call taken on 1/26/2018 at 12:00 PM by Helene Yu

## 2018-01-27 ENCOUNTER — OFFICE VISIT (OUTPATIENT)
Dept: FAMILY MEDICINE | Facility: CLINIC | Age: 83
End: 2018-01-27
Payer: COMMERCIAL

## 2018-01-27 VITALS
DIASTOLIC BLOOD PRESSURE: 64 MMHG | BODY MASS INDEX: 35.53 KG/M2 | RESPIRATION RATE: 20 BRPM | SYSTOLIC BLOOD PRESSURE: 128 MMHG | WEIGHT: 181 LBS | HEART RATE: 73 BPM | TEMPERATURE: 97.8 F | HEIGHT: 60 IN | OXYGEN SATURATION: 95 %

## 2018-01-27 DIAGNOSIS — G25.81 RESTLESS LEGS SYNDROME: ICD-10-CM

## 2018-01-27 DIAGNOSIS — H91.93 BILATERAL HEARING LOSS, UNSPECIFIED HEARING LOSS TYPE: ICD-10-CM

## 2018-01-27 DIAGNOSIS — E11.65 TYPE 2 DIABETES MELLITUS WITH HYPERGLYCEMIA, WITHOUT LONG-TERM CURRENT USE OF INSULIN (H): ICD-10-CM

## 2018-01-27 DIAGNOSIS — I10 ESSENTIAL HYPERTENSION: ICD-10-CM

## 2018-01-27 DIAGNOSIS — R47.01 EXPRESSIVE APHASIA: ICD-10-CM

## 2018-01-27 DIAGNOSIS — R60.0 BILATERAL LEG EDEMA: ICD-10-CM

## 2018-01-27 DIAGNOSIS — Z00.00 ROUTINE GENERAL MEDICAL EXAMINATION AT A HEALTH CARE FACILITY: Primary | ICD-10-CM

## 2018-01-27 DIAGNOSIS — N18.30 CRI (CHRONIC RENAL INSUFFICIENCY), STAGE 3 (MODERATE) (H): ICD-10-CM

## 2018-01-27 PROCEDURE — 69209 REMOVE IMPACTED EAR WAX UNI: CPT | Performed by: INTERNAL MEDICINE

## 2018-01-27 PROCEDURE — 99207 ZZC FOOT EXAM  NO CHARGE: CPT | Mod: 25 | Performed by: INTERNAL MEDICINE

## 2018-01-27 PROCEDURE — 99397 PER PM REEVAL EST PAT 65+ YR: CPT | Mod: 25 | Performed by: INTERNAL MEDICINE

## 2018-01-27 RX ORDER — SPIRONOLACTONE 25 MG/1
12.5 TABLET ORAL DAILY
Qty: 30 TABLET | Refills: 11 | COMMUNITY
Start: 2018-01-27 | End: 2019-08-22

## 2018-01-27 RX ORDER — FUROSEMIDE 20 MG
20 TABLET ORAL 2 TIMES DAILY
Qty: 60 TABLET | Refills: 12 | COMMUNITY
Start: 2018-01-27

## 2018-01-27 ASSESSMENT — ACTIVITIES OF DAILY LIVING (ADL)
CURRENT_FUNCTION: LAUNDRY REQUIRES ASSISTANCE
CURRENT_FUNCTION: MEDICATION ADMINISTRATION REQUIRES ASSISTANCE
CURRENT_FUNCTION: HOUSEWORK REQUIRES ASSISTANCE
CURRENT_FUNCTION: TELEPHONE REQUIRES ASSISTANCE
CURRENT_FUNCTION: MONEY MANAGEMENT REQUIRES ASSISTANCE
CURRENT_FUNCTION: SHOPPING REQUIRES ASSISTANCE
CURRENT_FUNCTION: TRANSPORTATION REQUIRES ASSISTANCE
CURRENT_FUNCTION: PREPARING MEALS REQUIRES ASSISTANCE
CURRENT_FUNCTION: BATHING REQUIRES ASSISTANCE

## 2018-01-27 NOTE — MR AVS SNAPSHOT
After Visit Summary   1/27/2018    Regla Benavidez    MRN: [id number]           Patient Information     Date Of Birth          1/5/1926        Visit Information        Provider Department      1/27/2018 11:00 AM Leo Melgar MD [facility]        Today's Diagnoses     Routine general medical examination at a health care facility    -  1    Type 2 diabetes mellitus with hyperglycemia, without long-term current use of insulin (H)        Expressive aphasia        CRI (chronic renal insufficiency), stage 3 (moderate)        Restless legs syndrome        Bilateral hearing loss, unspecified hearing loss type        Essential hypertension          Care Instructions         Blood pressure and diabetes control are acceptable.                  Renal function is slightly worse.                    We need to cut back the furosemide to 20 mg twice per day, and the spironolactone to 12.5 mg daily.            We will also cut back the metformin to 500 mg per day.                         They should use the nystatin powder daily.                              Follow-ups after your visit        Who to contact     If you have questions or need follow up information about today's clinic visit or your schedule please contact [facility] directly at [phone].  Normal or non-critical lab and imaging results will be communicated to you by MyChart, letter or phone within 4 business days after the clinic has received the results. If you do not hear from us within 7 days, please contact the clinic through MyChart or phone. If you have a critical or abnormal lab result, we will notify you by phone as soon as possible.  Submit refill requests through [email or web address] or call your pharmacy and they will forward the refill request to us. Please allow 3 business days for your refill to be completed.          Additional Information About Your Visit        MyChart Information      [employer] gives you secure access to your electronic health record. If you see a primary care provider, you can also send messages to your care team and make appointments. If you have questions, please call your primary care clinic.  If you do not have a primary care provider, please call [phone] and they will assist you.        Care EveryWhere ID     This is your Care EveryWhere ID. This could be used by other organizations to access your [facility] medical records  [id number]        Your Vitals Were     Pulse Temperature Respirations Height Pulse Oximetry Breastfeeding?    73 97.8  F (36.6  C) 20 5' (1.524 m) 95% No    BMI (Body Mass Index)                   35.35 kg/m2            Blood Pressure from Last 3 Encounters:   01/27/18 128/64   07/29/17 118/76   03/22/17 118/60    Weight from Last 3 Encounters:   01/27/18 181 lb (82.1 kg)   07/29/17 185 lb 9.6 oz (84.2 kg)   03/22/17 186 lb (84.4 kg)              Today, you had the following     No orders found for display       Primary Care Provider Office Phone # Fax #    Leo Melgar [phone]       [address] [facility] [address]        Equal Access to Services     DESI REESE : Hadii aad ku hadasho Soomaali, waaxda luqadaha, qaybta kaalmada adeegyada, waxay idiin hayaan adeeg kharaluther la'noah . So [facility] [phone].    ATENCIÓN: Si habla español, tiene a garzon disposición servicios gratuitos de asistencia lingüística. Llame al [phone].    We comply with applicable federal civil rights laws and [place] laws. We do not discriminate on the basis of race, color, national origin, age, disability, sex, sexual orientation, or gender identity.            Thank you!     Thank you for choosing [facility]  for your care. Our goal is always to provide you with excellent care. Hearing back from our patients is one way we can continue to improve our services. Please take a few minutes to complete the  written survey that you may receive in the mail after your visit with us. Thank you!             Your Updated Medication List - Protect others around you: Learn how to safely use, store and throw away your medicines at www.disposemymeds.org.          This list is accurate as of 1/27/18 11:43 AM.  Always use your most recent med list.                   Brand Name Dispense Instructions for use Diagnosis    acetaminophen 325 MG tablet    TYLENOL    184 tablet    Take 2 tablets (650 mg) by mouth At Bedtime Take 2 tablets at bedtime    Midline low back pain without sciatica       aspirin 325 MG tablet     120 tablet    Take 1 tablet (325 mg) by mouth daily    Stroke (H)       blood glucose monitoring test strip    ONETOUCH ULTRA    100 each    USE TO TEST ONCE EVERY MORNING AS DIRECTED    Type 2 diabetes mellitus without complication, without long-term current use of insulin (H)       carbamide peroxide 6.5 % otic solution    DEBROX     Place 5 drops into both ears daily as needed for other        co-enzyme Q-10 100 MG Caps capsule     30 capsule    Take 1 capsule (100 mg) by mouth daily    Stroke (H), Hyperlipidemia LDL goal <70       furosemide 20 MG tablet    LASIX    75 tablet    40 mg AM, 20 mg PM    Bilateral leg edema       glipiZIDE 10 MG tablet    GLUCOTROL    60 tablet    Take 1 tablet (10 mg) by mouth 2 times daily (before meals)    Type 2 diabetes mellitus with hyperglycemia, without long-term current use of insulin (H)       lactobacillus acidophilus & bulgar chewable tablet     60 tablet    Take 1 tablet by mouth 2 times daily With meals    Diarrhea, unspecified type       metFORMIN 500 MG tablet    GLUCOPHAGE    60 tablet    Take 1 tablet (500 mg) by mouth 2 times daily (with meals)    Type 2 diabetes, HbA1C goal < 8% (H)       nystatin 951190 UNIT/GM Powd    MYCOSTATIN    60 g    Apply 1 g topically 3 times daily as needed        * order for DME     1 Device    Equipment being ordered: glucometer kit-  brand per insurance coverage Patient prefers brand with DRUM lancets - patient is to test fasting once each morning #30 with 5 refills each of the lancets and test strips    Type II or unspecified type diabetes mellitus without mention of complication, not stated as uncontrolled       * order for DME     100 lancet    Equipment being ordered: retractable safety lancets    Type II or unspecified type diabetes mellitus without mention of complication, not stated as uncontrolled       * order for DME     3 Package    Equipment being ordered: Large Underpads    Urinary frequency       * order for DME     150 Units    Equipment being ordered: Esthela xtra absorbent large pull ups - Uses 5 per day.    Urinary incontinence       polyethylene glycol powder    MIRALAX    510 g    Take 17 g (1 capful) by mouth twice a week    Constipation, unspecified constipation type       pramipexole 0.125 MG tablet    MIRAPEX    60 tablet    Take 2 tablets (0.25 mg) by mouth At Bedtime    Restless legs syndrome       ranitidine 150 MG tablet    ZANTAC    60 tablet    Take 1 tablet (150 mg) by mouth 2 times daily    Gastroesophageal reflux disease without esophagitis       saccharomyces boulardii 250 MG capsule    FLORASTOR    60 capsule    Take 1 capsule (250 mg) by mouth 2 times daily    Diarrhea, unspecified type       senna-docusate 8.6-50 MG per tablet    SENOKOT-S;PERICOLACE     Take 1 tablet by mouth daily HOLD IF HAS LOOSE STOOLS        simvastatin 5 MG tablet    ZOCOR    30 tablet    Take 1 tablet (5 mg) by mouth every evening    Stroke (H)       sitagliptin 50 MG tablet    JANUVIA    30 tablet    Take 1 tablet (50 mg) by mouth daily    Uncontrolled type 2 diabetes mellitus with complication, without long-term current use of insulin (H), Type 2 diabetes mellitus with hyperglycemia, without long-term current use of insulin (H)       spironolactone 25 MG tablet    ALDACTONE    30 tablet    Take 1 tablet (25 mg) by mouth daily     Bilateral leg edema       VITAMIN D3 PO      Take 2,000 Units by mouth daily        * Notice:  This list has 4 medication(s) that are the same as other medications prescribed for you. Read the directions carefully, and ask your doctor or other care provider to review them with you.

## 2018-01-27 NOTE — PATIENT INSTRUCTIONS
Blood pressure and diabetes control are acceptable.                  Renal function is slightly worse.                    We need to cut back the furosemide to 20 mg twice per day, and the spironolactone to 12.5 mg daily.            We will also cut back the metformin to 500 mg per day.                         They should use the nystatin powder daily.

## 2018-01-27 NOTE — NURSING NOTE
Chief Complaint   Patient presents with     Physical       Initial /64 (BP Location: Right arm, Patient Position: Chair, Cuff Size: Adult Large)  Pulse 73  Temp 97.8  F (36.6  C)  Resp 20  Ht 5' (1.524 m)  Wt 181 lb (82.1 kg)  SpO2 95%  Breastfeeding? No  BMI 35.35 kg/m2 Estimated body mass index is 35.35 kg/(m^2) as calculated from the following:    Height as of this encounter: 5' (1.524 m).    Weight as of this encounter: 181 lb (82.1 kg).  Medication Reconciliation: complete   Oumou Calzada LPN

## 2018-01-27 NOTE — PROGRESS NOTES
SUBJECTIVE:   Regla Benavidez is a 89 year old female who presents for Preventive Visit.      Are you in the first 12 months of your Medicare coverage?  No    Physical   Annual:     Getting at least 3 servings of Calcium per day::  Yes    Bi-annual eye exam::  NO    Dental care twice a year::  NO    Sleep apnea or symptoms of sleep apnea::  None    Diet::  Diabetic    Frequency of exercise::  None    Taking medications regularly::  Yes    Medication side effects::  None    Additional concerns today::  YES    Ability to successfully perform activities of daily living: preparing meals requires assistance, shopping requires assistance, transportation requires assistance, telephone requires assistance, housework requires assistance, bathing requires assistance, laundry requires assistance, medication administration requires assistance and money management requires assistance    Hearing Impairment: need to ask people to speak up or repeat themselves        Fall risk:       COGNITIVE SCREEN- not done due to mental stabilty today      Mini-CogTM Copyright HERBERTH Morales. Licensed by the author for use in [facility]; reprinted with permission (dae@[facility]). All rights reserved.        Reviewed and updated as needed this visit by clinical staff  Tobacco  Allergies  Meds  Med Hx  Surg Hx  Fam Hx  Soc Hx        Reviewed and updated as needed this visit by Provider            No flowsheet data found.                     Patient is here with a son and a daughter.  Due to her expressive aphasia, she does not contribute much to the history. The daughter reports that overall her intertrigo is improving.  nystatin powder is on her medication list, but the daughter reports nursing staff does not apply this regularly.                         Her diabetes control is somewhat better.  Nearly all of her glucoses are under 200, but not lower than 138.  Recent labs showed renal insufficiency slightly worse with a BUN 43  creatinine 1.47 and potassium was 4.7.    Today's PHQ-2 Score:   PHQ-2 ( 1999 Pfizer) 6/12/2017   Q1: Little interest or pleasure in doing things 0   Q2: Feeling down, depressed or hopeless 0   PHQ-2 Score 0       Do you feel safe in your environment - Yes    Do you have a Health Care Directive?: Yes: Advance Directive has been received and scanned.    Current providers sharing in care for this patient include:   Patient Care Team:  Leo Melgar MD as PCP - General (Internal Medicine)  Autumn Jacobsen, ASH as   Heidi Durand as Other (see comments)  Puja Perez    The following health maintenance items are reviewed in Epic and correct as of today:  Health Maintenance   Topic Date Due     EYE EXAM Q1 YEAR  01/05/1927     MICROALBUMIN Q1 YEAR  01/05/1927     LIPID MONITORING Q1 YEAR  09/04/2015     INFLUENZA VACCINE (SYSTEM ASSIGNED)  09/01/2017     TSH W/ FREE T4 REFLEX Q2 YEAR  10/22/2017     FOOT EXAM Q1 YEAR  12/13/2017     A1C Q6 MO  12/29/2017     FALL RISK ASSESSMENT  03/22/2018     CREATININE Q1 YEAR  11/27/2018     TETANUS IMMUNIZATION (SYSTEM ASSIGNED)  11/08/2020     ADVANCE DIRECTIVE PLANNING Q5 YRS  07/13/2022     PNEUMOCOCCAL  Completed     Patient Active Problem List    Diagnosis Date Noted     Hyponatremia 12/20/2014     Priority: High     With acute illness 11/14; HCTZ stopped.                Later,furosemide and spironolactone added; in 5/17, 36/1.18, Na+ 137,K+4.5, glucose 257       Aspiration pneumonia (H) 11/15/2014     Priority: High     11/14; and UTI with sepsis       Type 2 diabetes mellitus without complication (H) 07/02/2014     Priority: High     Expressive aphasia 07/02/2014     Priority: High     Cerebral infarction (H) 10/22/2013     Priority: High     10/21/13;L frontal, MCA ; see hosp () and rehab (Dr. Saba) reports.            Has severe expressive aphasia  Diagnosis updated by automated process. Provider to review and confirm.       Elevated glucose  "09/15/2009     Priority: High     115 with A1C 6.0 in 11/11                         123 and 6.6 in 9/14                    130 and 6.2 in 3/15       HTN (hypertension) 09/15/2009     Priority: High     Echo normal 4/09       Other vaginitis 07/29/2017     Priority: Medium     due to urinary incontinence       Dermatitis 07/29/2017     Priority: Medium     skin folds       Continuous leakage of urine 07/29/2017     Priority: Medium     Bilateral hearing loss, unspecified hearing loss type 07/29/2017     Priority: Medium     Type 2 diabetes mellitus with hyperglycemia, without long-term current use of insulin (H) 07/05/2017     Priority: Medium     Bilateral leg edema 03/22/2017     Priority: Medium     Prerenal azotemia 02/14/2017     Priority: Medium     Abdominal bloating 10/28/2015     Priority: Medium     CT neg except L ovarian cyst; CA-125= 3.    The other labs ok; see office visit note 10/21/15       Abnormal weight gain 10/28/2015     Priority: Medium     Cyst of left ovary 10/28/2015     Priority: Medium     Complex on CT and US: 6x4 cm in 11/15; d/w her son; recheck in 3-6 months       Recurrent UTI 03/18/2015     Priority: Medium     See urology note 12/15; cysto neg; PVR low; \"consider a prophylactic antibiotic\"       Low back pain 03/18/2015     Priority: Medium     Diagnosis updated by automated process. Provider to review and confirm.       Urinary frequency 01/22/2014     Priority: Medium     Restless legs syndrome 11/27/2013     Priority: Medium     Hard of hearing      Priority: Medium     hearing aids       Constipation 11/18/2013     Priority: Medium     Branch retinal vein occlusion of left eye 11/26/2010     Priority: Medium     Rx Avastin in 2011       Health Care Home 06/03/2016     Priority: Low     [facility] Care Coordinator  Autumn Jacobsen, RN  [phone]    [facility] CARE PLAN SUMMARY    Client Name:  Regla Benavidez  Address:  C/O David Benavidez-son  [address]  [facility] [address]    [address]  [place] [address] Phone: [phone]  But call son Melchor or daughter Obdulio  [phone] ext [phone] (work-preferred)     :  1926 Date of Assessment:  2017   Health Plan:  [employer] [facility]  Health Plan Number: [id number] Medical Assistance Number: [id number]  Financial Worker: [facility]   Case #:     FV :  Autumn Jacobsen RN CM Phone:  [phone]  CM Fax:  [phone]   FVP Enrollment Date: 16 Case Mix:  D  Rate Cell:  B    Waiver Type:  EW   Emergency Contact: Melchor Benavidez-son/POA   W Phone: [phone]-preferred.   Home [phone]  Secondary Contact: Obdulio Joyce-daughter  Home Phone: [phone] or [phone] Language:  English  :  No   Health Care Agent/POA: Son David Benavidez and then son Neftali Benavidez  Advanced Directives/Living Will:  Yes   Primary Care Clinic/Phone/Fax:  [facility]/(p) [phone], (f) [phone] Primary Dx: Expressive Aphasia R47.01   Secondary Dx:  DM E11.9   Primary Physician:  Leo Melgar   Height: 5' 0'' Weight: 186 lbs   Specialty Physician:   Audiologist:  Has hearing aids, appt due   Eye Care Provider:  Has glasses and hx bilateral cataract surgery. Appt due Dental Care Provider:  Own teeth-appt due  [facility]: [facility] [phone]   Other:        [facility] CURRENT SERVICES SUMMARY  Equipment owned/DME history: toilet safety frame, transfer bath bench, bed rails, walker, lift chair    SERVICE TYPE/PROVIDER NAME/PHONE AUTH DATE FREQUENCY Units OR $ Amt DESCRIPTION   Medical Transportation: [facility] [phone] 17-18 As needed na Medial rides   Assisted Living: [facility] [phone] 24 hr Customized Living  Fax: [phone] 17-18 Daily See RS/AL Tool Assisted living services   [facility] [phone]  [phone]   7/1/17-6/30/18 Monthly Wipes-2 Pkgs $11.50/pkg Prevail wipes, chux, 10 pks of XL Esthela Protective underwear 55706  EW-Esthela pullups 3 extra packages Esthela underwear at $23.60 each            * For ADC please select ADC provider and [employer] Transportation in order to process auth           ACP (advance care planning) 11/17/2014     Priority: Low     Advance Care Planning 6/12/2017: ACP Review of ACP with client and family. Reviewed advanced care plan and POLST and all remain current.  Justusmiguel Benavidez has an up to date advance care plan on file.  Added by Autumn Jacobsen  Advance Care Planning 6/16/2016: ACP Review of ACP with client and family.  Reviewed advance care plan and POLST with client and it still reflects client's wishes.  Regla Benavidez has an up to date advance care plan on file.  Added by Autumn Jacobsen  Advance Care Planning:   Receipt of ACP document:  Received: POLST which was signed and dated by provider on 2/11/14.  Document previously scanned on 4/8/14.  Order reviewed and found to be valid.  Code Status reflects choices in most recent ACP document.  Confirmed/documented designated decision maker(s). See permanent comments section of demographics in clinical tab. View document(s) and details by clicking on code status. Added by Ashanti Benavidez on 11/17/2014.  Advance Care Planning: Receipt of ACP document:  Received: Health Care Directive which was witnessed or notarized on 9/30/09.  Document previously scanned on 10/29/13.  Validation form completed and  scanned.  Code Status reflects choices in most recent ACP document has a POLST .  Confirmed/documented designated decision maker(s). See permanent comments section of demographics in clinical tab. View document(s) and details by clicking on code status. Added by Ashanti Benavidez on 11/17/2014.           Preventive measure 09/11/2013     Priority: Low     APRIMA DATA BASE UNDER THE 9/11/13 NOTE  Colonoscopy 11/01; mammogram 6/00    [facility]  [phone]       Past Surgical History:   Procedure Laterality Date     CATARACT IOL, RT/LT       Social History     Social History     Marital status:      Spouse name: N/A     Number of children: 5     Years of education: N/A     Occupational History     Not on file.     Social History Main Topics     Smoking status: Never Smoker     Smokeless tobacco: Never Used     Alcohol use No     Drug use: No     Sexual activity: No     Other Topics Concern     Parent/Sibling W/ Cabg, Mi Or Angioplasty Before 65f 55m? No     Social History Narrative     Immunization History   Administered Date(s) Administered     Influenza (High Dose) 3 valent vaccine 09/25/2015, 10/04/2016     Influenza (IIV3) PF 09/15/2009, 10/03/2013, 10/14/2014     Pneumo Conj 13-V (2010&after) 11/16/2016     Pneumococcal 23 valent 09/15/2002     TD (ADULT, 7+) 11/08/2010       BP Readings from Last 3 Encounters:   07/29/17 118/76   03/22/17 118/60   02/14/17 118/74    Wt Readings from Last 3 Encounters:   07/29/17 185 lb 9.6 oz (84.2 kg)   03/22/17 186 lb (84.4 kg)   02/14/17 181 lb (82.1 kg)                  Current Outpatient Prescriptions   Medication Sig Dispense Refill     glipiZIDE (GLUCOTROL) 10 MG tablet Take 1 tablet (10 mg) by mouth 2 times daily (before meals) 60 tablet 11     blood glucose monitoring (ONETOUCH ULTRA) test strip USE TO TEST ONCE EVERY MORNING AS DIRECTED 100 each 11     sitagliptin (JANUVIA) 50 MG tablet Take 1 tablet (50 mg) by mouth daily 30 tablet 11     nystatin (MYCOSTATIN) 532632 UNIT/GM POWD Apply 1 g topically 3 times daily as needed 60 g 11     carbamide peroxide (DEBROX) 6.5 % otic solution Place 5 drops into both ears daily as needed for other       furosemide (LASIX) 20 MG tablet 40 mg AM, 20 mg PM 75 tablet 12     spironolactone (ALDACTONE) 25 MG tablet Take 1 tablet (25 mg) by mouth daily 30 tablet 11     saccharomyces boulardii (FLORASTOR) 250 MG capsule Take 1 capsule (250 mg) by mouth 2 times daily  60 capsule 3     lactobacillus acidophilus & bulgar (LACTINEX) chewable tablet Take 1 tablet by mouth 2 times daily With meals 60 tablet 11     acetaminophen (TYLENOL) 325 MG tablet Take 2 tablets (650 mg) by mouth At Bedtime Take 2 tablets at bedtime 184 tablet 2     pramipexole (MIRAPEX) 0.125 MG tablet Take 2 tablets (0.25 mg) by mouth At Bedtime 60 tablet 12     polyethylene glycol (MIRALAX) powder Take 17 g (1 capful) by mouth twice a week 510 g 12     ranitidine (ZANTAC) 150 MG tablet Take 1 tablet (150 mg) by mouth 2 times daily 60 tablet 5     ORDER FOR DME Equipment being ordered: Large Underpads 3 Package 6     ORDER FOR DME Equipment being ordered: Esthela xtra absorbent large pull ups - Uses 5 per day. 150 Units 11     Cholecalciferol (VITAMIN D3 PO) Take 2,000 Units by mouth daily       senna-docusate (SENOKOT-S;PERICOLACE) 8.6-50 MG per tablet Take 1 tablet by mouth daily HOLD IF HAS LOOSE STOOLS       ORDER FOR DME Equipment being ordered: retractable safety lancets 100 lancet 1     ORDER FOR DME Equipment being ordered: glucometer kit- brand per insurance coverage  Patient prefers brand with DRUM lancets - patient is to test fasting once each morning  #30 with 5 refills each of the lancets and test strips 1 Device 0     metFORMIN (GLUCOPHAGE) 500 MG tablet Take 1 tablet (500 mg) by mouth 2 times daily (with meals) 60 tablet 12     co-enzyme Q-10 100 MG CAPS Take 1 capsule (100 mg) by mouth daily 30 capsule 0     simvastatin (ZOCOR) 5 MG tablet Take 1 tablet (5 mg) by mouth every evening 30 tablet      aspirin 325 MG tablet Take 1 tablet (325 mg) by mouth daily 120 tablet      Allergies   Allergen Reactions     Cozaar [Losartan Potassium] Swelling     Leg swelling. Same from Avapro.     Hydrochlorothiazide Other (See Comments)     hyponatremia     Hydrocodone Nausea and Vomiting     Nausea           Review of Systems of questionable accuracy due to her expressive aphasia  C: NEGATIVE for fever, chills,  change in weight  I: NEGATIVE for worrisome rashes, moles or lesions  E: NEGATIVE for vision changes or irritation  ENT/MOUTH: POSITIVE for hearing loss  R: NEGATIVE for significant cough or SOB  CV: NEGATIVE for chest pain/chest pressure and palpitations  GI: NEGATIVE for hematochezia and melena  NEURO: POSITIVE for restless legs sx; controlled with pramipexole  E: NEGATIVE for temperature intolerance, skin/hair changes  H: NEGATIVE for bleeding problems  P: NEGATIVE for changes in mood or affect  Wt Readings from Last 4 Encounters:   01/27/18 181 lb (82.1 kg)   07/29/17 185 lb 9.6 oz (84.2 kg)   03/22/17 186 lb (84.4 kg)   02/14/17 181 lb (82.1 kg)       OBJECTIVE:   /64 (BP Location: Right arm, Patient Position: Chair, Cuff Size: Adult Large)  Pulse 73  Temp 97.8  F (36.6  C)  Resp 20  Ht 5' (1.524 m)  Wt 181 lb (82.1 kg)  SpO2 95%  Breastfeeding? No  BMI 35.35 kg/m2 Estimated body mass index is 36.25 kg/(m^2) as calculated from the following:    Height as of 7/29/17: 5' (1.524 m).    Weight as of 7/29/17: 185 lb 9.6 oz (84.2 kg).  Physical Exam  GENERAL APPEARANCE: alert, no distress, over weight and elderly  EYES: Eyes grossly normal to inspection  HENT: oropharynx clear and right cerumen impaction  NECK: no adenopathy, no asymmetry, masses, or scars and thyroid normal to palpation  RESP: lungs clear to auscultation - no rales, rhonchi or wheezes  CV: regular rates and rhythm, normal S1 S2, no S3 or S4, no murmur, click or rub and pulses are diminished both feet, but both feet are warm  ABDOMEN: soft, nontender, no hepatosplenomegaly, no masses and protuberant  SKIN: Minimal intertrigo under abdominal pannus  NEURO: Expressive aphasia, and uses a walker  DIABETIC FOOT EXAM: no trophic changes or ulcerative lesions, DP reduced bilateral and PT reduced bilateral  PSYCH: well groomed    ASSESSMENT / PLAN:   Alberta was seen today for physical.    Diagnoses and all orders for this visit:    Routine  general medical examination at a health care facility    Type 2 diabetes mellitus with hyperglycemia, without long-term current use of insulin (H)    Expressive aphasia    CRI (chronic renal insufficiency), stage 3 (moderate)    Restless legs syndrome    Bilateral hearing loss, unspecified hearing loss type    Essential hypertension    Bilateral leg edema               Summary and implications:     We reviewed multiple issues.  All of her labs are obtained at the assisted living facility.                   Patient Instructions        Blood pressure and diabetes control are acceptable.                  Renal function is slightly worse.                    We need to cut back the furosemide to 20 mg twice per day, and the spironolactone to 12.5 mg daily.            We will also cut back the metformin to 500 mg per day.                         They should use the nystatin powder daily.                               Ear wash done by the LPN right ear with good results.      End of Life Planning:  Patient currently has an advanced directive: Yes.  Practitioner is supportive of decision.    COUNSELING:  Reviewed preventive health counseling, as reflected in patient instructions  Special attention given to:       Regular exercise       Healthy diet/nutrition        Estimated body mass index is 36.25 kg/(m^2) as calculated from the following:    Height as of 7/29/17: 5' (1.524 m).    Weight as of 7/29/17: 185 lb 9.6 oz (84.2 kg).  Weight management plan: Discussed healthy diet and exercise guidelines and patient will follow up in 12 months in clinic to re-evaluate.   reports that she has never smoked. She has never used smokeless tobacco.      Appropriate preventive services were discussed with this patient, including applicable screening as appropriate for cardiovascular disease, diabetes, osteopenia/osteoporosis, and glaucoma.  As appropriate for age/gender, discussed screening for colorectal cancer, prostate cancer,  breast cancer, and cervical cancer. Checklist reviewing preventive services available has been given to the patient.    Reviewed patients plan of care and provided an AVS. The Advanced Care Plan (routine screening as documented in Health Maintenance) for Alberta meets the Care Plan requirement. This Care Plan has been established and reviewed with the Patient and son and daughter.    Counseling Resources:  ATP IV Guidelines  Pooled Cohorts Equation Calculator  Breast Cancer Risk Calculator  FRAX Risk Assessment  ICSI Preventive Guidelines  Dietary Guidelines for Americans, 2010  USDA's MyPlate  ASA Prophylaxis  Lung CA Screening    Leo Melgar MD  [facility]

## 2018-02-01 ENCOUNTER — CARE COORDINATION (OUTPATIENT)
Dept: GERIATRIC MEDICINE | Facility: CLINIC | Age: 83
End: 2018-02-01

## 2018-02-01 ENCOUNTER — TRANSFERRED RECORDS (OUTPATIENT)
Dept: HEALTH INFORMATION MANAGEMENT | Facility: CLINIC | Age: 83
End: 2018-02-01

## 2018-02-01 LAB — HBA1C MFR BLD: 7.4 % (ref 4–5.6)

## 2018-02-02 ENCOUNTER — TELEPHONE (OUTPATIENT)
Dept: FAMILY MEDICINE | Facility: CLINIC | Age: 83
End: 2018-02-02

## 2018-02-02 NOTE — TELEPHONE ENCOUNTER
Reason for Call:  Form, our goal is to have forms completed with 72 hours, however, some forms may require a visit or additional information.    Type of letter, form or note:  medical    Who is the form from?: Home care    Where did the form come from: form was faxed in    What clinic location was the form placed at?: [facility]    Where the form was placed: 's Box: Leo Melgar MD    What number is listed as a contact on the form?: [phone]  Phone [phone]       Additional comments: [facility] A1C results     Call taken on 2/2/2018 at 2:07 PM by Helene Yu

## 2018-02-06 ENCOUNTER — TELEPHONE (OUTPATIENT)
Dept: FAMILY MEDICINE | Facility: CLINIC | Age: 83
End: 2018-02-06

## 2018-02-06 NOTE — TELEPHONE ENCOUNTER
Reason for Call:  Form, our goal is to have forms completed with 72 hours, however, some forms may require a visit or additional information.    Type of letter, form or note:  medical    Who is the form from?: Home care    Where did the form come from: form was faxed in    What clinic location was the form placed at?: [facility]    Where the form was placed: 's Box: Leo Melgar MD    What number is listed as a contact on the form?: [phone]  Phone [phone]       Additional comments: [facility]  medication review       Call taken on 2/6/2018 at 3:48 PM by Helene Yu

## 2018-02-07 ENCOUNTER — CARE COORDINATION (OUTPATIENT)
Dept: GERIATRIC MEDICINE | Facility: CLINIC | Age: 83
End: 2018-02-07

## 2018-02-07 NOTE — PROGRESS NOTES
Order placed with [facility] (p: [phone]; f: [phone]) for No Rinse Body Wash.  Order placed on 02/01/2018. Access updated.  As required, authorization submitted to health plan.    Angela Benavidez  Case Management Specialist  [facility]  [phone]

## 2018-02-07 NOTE — PROGRESS NOTES
Had received a VM from Carine from [facility] at [phone] last week asking for more information on client getting a hearing exam in August and again in Feb. Had contacted family members and was told that they at first worked with the provider that came to the NH and the hearing aids were too big and fell out of the ear and the sound could not be adjusted well enough for client to hear out of, then client lost one of them. Client then went to an outside provider and they were able to provide a kick stand for the one hearing aid so it would fit better as well as fitted for the other ear hearing aid. Son Ed and daughter also asked about a body wash for client and had requested CMS to please order this for client-2 bottles a month. Called and left a message for Carine last week re the hearing aid situation and no response. So called Carine today to be sure she got this CM message and she said she did and it was all taken care of. Carine states she was sorry she did not contact this CM to let this CM know that she got all the info she needed.  Autumn Jacobsen RN, PHN, [facility]   [phone]

## 2018-02-09 ENCOUNTER — OFFICE VISIT (OUTPATIENT)
Dept: FAMILY MEDICINE | Facility: CLINIC | Age: 83
End: 2018-02-09
Payer: COMMERCIAL

## 2018-02-09 ENCOUNTER — RADIANT APPOINTMENT (OUTPATIENT)
Dept: GENERAL RADIOLOGY | Facility: CLINIC | Age: 83
End: 2018-02-09
Attending: FAMILY MEDICINE
Payer: COMMERCIAL

## 2018-02-09 ENCOUNTER — TELEPHONE (OUTPATIENT)
Dept: FAMILY MEDICINE | Facility: CLINIC | Age: 83
End: 2018-02-09

## 2018-02-09 VITALS
TEMPERATURE: 98 F | DIASTOLIC BLOOD PRESSURE: 68 MMHG | OXYGEN SATURATION: 98 % | HEIGHT: 60 IN | BODY MASS INDEX: 35.34 KG/M2 | HEART RATE: 78 BPM | RESPIRATION RATE: 14 BRPM | SYSTOLIC BLOOD PRESSURE: 112 MMHG | WEIGHT: 180 LBS

## 2018-02-09 DIAGNOSIS — E11.65 TYPE 2 DIABETES MELLITUS WITH HYPERGLYCEMIA, WITHOUT LONG-TERM CURRENT USE OF INSULIN (H): ICD-10-CM

## 2018-02-09 DIAGNOSIS — H91.93 BILATERAL HEARING LOSS, UNSPECIFIED HEARING LOSS TYPE: ICD-10-CM

## 2018-02-09 DIAGNOSIS — J20.9 ACUTE BRONCHITIS, UNSPECIFIED ORGANISM: Primary | ICD-10-CM

## 2018-02-09 DIAGNOSIS — N18.30 CKD (CHRONIC KIDNEY DISEASE) STAGE 3, GFR 30-59 ML/MIN (H): ICD-10-CM

## 2018-02-09 DIAGNOSIS — J01.90 ACUTE NON-RECURRENT SINUSITIS, UNSPECIFIED LOCATION: ICD-10-CM

## 2018-02-09 DIAGNOSIS — R09.89 ABNORMAL LUNG SOUNDS: ICD-10-CM

## 2018-02-09 DIAGNOSIS — R60.0 BILATERAL LEG EDEMA: ICD-10-CM

## 2018-02-09 DIAGNOSIS — R09.82 POST-NASAL DRIP: ICD-10-CM

## 2018-02-09 DIAGNOSIS — R07.0 THROAT PAIN: ICD-10-CM

## 2018-02-09 DIAGNOSIS — I10 ESSENTIAL HYPERTENSION: ICD-10-CM

## 2018-02-09 DIAGNOSIS — N18.30 TYPE 2 DIABETES MELLITUS WITH STAGE 3 CHRONIC KIDNEY DISEASE, WITHOUT LONG-TERM CURRENT USE OF INSULIN (H): ICD-10-CM

## 2018-02-09 DIAGNOSIS — I73.9 PAD (PERIPHERAL ARTERY DISEASE) (H): ICD-10-CM

## 2018-02-09 DIAGNOSIS — E11.22 TYPE 2 DIABETES MELLITUS WITH STAGE 3 CHRONIC KIDNEY DISEASE, WITHOUT LONG-TERM CURRENT USE OF INSULIN (H): ICD-10-CM

## 2018-02-09 DIAGNOSIS — I50.9 CHRONIC CONGESTIVE HEART FAILURE, UNSPECIFIED CONGESTIVE HEART FAILURE TYPE: ICD-10-CM

## 2018-02-09 DIAGNOSIS — J20.9 ACUTE BRONCHITIS, UNSPECIFIED ORGANISM: ICD-10-CM

## 2018-02-09 DIAGNOSIS — Z78.9 NONSMOKER: ICD-10-CM

## 2018-02-09 PROBLEM — R73.01 IMPAIRED FASTING GLUCOSE: Status: ACTIVE | Noted: 2018-02-09

## 2018-02-09 LAB
BASOPHILS # BLD AUTO: 0 10E9/L (ref 0–0.2)
BASOPHILS NFR BLD AUTO: 0.2 %
CHOLEST SERPL-MCNC: 122 MG/DL
CREAT UR-MCNC: 84 MG/DL
DIFFERENTIAL METHOD BLD: ABNORMAL
EOSINOPHIL # BLD AUTO: 0.3 10E9/L (ref 0–0.7)
EOSINOPHIL NFR BLD AUTO: 2.5 %
ERYTHROCYTE [DISTWIDTH] IN BLOOD BY AUTOMATED COUNT: 15.5 % (ref 10–15)
FLUAV+FLUBV AG SPEC QL: NEGATIVE
FLUAV+FLUBV AG SPEC QL: NEGATIVE
FLUAV+FLUBV RNA SPEC QL NAA+PROBE: ABNORMAL
FLUAV+FLUBV RNA SPEC QL NAA+PROBE: ABNORMAL
HBA1C MFR BLD: 6.9 % (ref 4.3–6)
HCT VFR BLD AUTO: 40.1 % (ref 35–47)
HDLC SERPL-MCNC: 46 MG/DL
HGB BLD-MCNC: 12.8 G/DL (ref 11.7–15.7)
LDLC SERPL CALC-MCNC: 49 MG/DL
LYMPHOCYTES # BLD AUTO: 1.7 10E9/L (ref 0.8–5.3)
LYMPHOCYTES NFR BLD AUTO: 15.4 %
MCH RBC QN AUTO: 29.6 PG (ref 26.5–33)
MCHC RBC AUTO-ENTMCNC: 31.9 G/DL (ref 31.5–36.5)
MCV RBC AUTO: 93 FL (ref 78–100)
MICROALBUMIN UR-MCNC: 6 MG/L
MICROALBUMIN/CREAT UR: 7.1 MG/G CR (ref 0–25)
MONOCYTES # BLD AUTO: 1.2 10E9/L (ref 0–1.3)
MONOCYTES NFR BLD AUTO: 10.6 %
NEUTROPHILS # BLD AUTO: 7.7 10E9/L (ref 1.6–8.3)
NEUTROPHILS NFR BLD AUTO: 71.3 %
NONHDLC SERPL-MCNC: 76 MG/DL
PLATELET # BLD AUTO: 190 10E9/L (ref 150–450)
RBC # BLD AUTO: 4.33 10E12/L (ref 3.8–5.2)
RSV RNA SPEC NAA+PROBE: ABNORMAL
SPECIMEN SOURCE: ABNORMAL
SPECIMEN SOURCE: NORMAL
TRIGL SERPL-MCNC: 135 MG/DL
TSH SERPL DL<=0.005 MIU/L-ACNC: 0.77 MU/L (ref 0.4–4)
WBC # BLD AUTO: 10.8 10E9/L (ref 4–11)

## 2018-02-09 PROCEDURE — 83036 HEMOGLOBIN GLYCOSYLATED A1C: CPT | Performed by: FAMILY MEDICINE

## 2018-02-09 PROCEDURE — 85025 COMPLETE CBC W/AUTO DIFF WBC: CPT | Performed by: FAMILY MEDICINE

## 2018-02-09 PROCEDURE — 80061 LIPID PANEL: CPT | Performed by: FAMILY MEDICINE

## 2018-02-09 PROCEDURE — 82043 UR ALBUMIN QUANTITATIVE: CPT | Performed by: FAMILY MEDICINE

## 2018-02-09 PROCEDURE — 36415 COLL VENOUS BLD VENIPUNCTURE: CPT | Performed by: FAMILY MEDICINE

## 2018-02-09 PROCEDURE — 87631 RESP VIRUS 3-5 TARGETS: CPT | Performed by: FAMILY MEDICINE

## 2018-02-09 PROCEDURE — 71046 X-RAY EXAM CHEST 2 VIEWS: CPT | Mod: FY

## 2018-02-09 PROCEDURE — 99215 OFFICE O/P EST HI 40 MIN: CPT | Performed by: FAMILY MEDICINE

## 2018-02-09 PROCEDURE — 84443 ASSAY THYROID STIM HORMONE: CPT | Performed by: FAMILY MEDICINE

## 2018-02-09 PROCEDURE — 87804 INFLUENZA ASSAY W/OPTIC: CPT | Mod: 59 | Performed by: FAMILY MEDICINE

## 2018-02-09 NOTE — LETTER
February 9, 2018      Regla Benavidez  C/O MONALISA BENAVIDEZ  [address]  [place] [address]        Dear ,    We are writing to inform you of your test results.    They are all normal     THE FOLLOWING ARE EXPLANATIONS OF SOME OF OUR LAB TESTS     YOU DID NOT NECESSARILY HAVE ALL OF THESE DONE     Hgb is the blood iron level   WBC means White Blood Cells   Platelets are small blood cells that help with forming the blood clots along with other blood factors.   Electrolytes are Sodium, Potassium, Calcium, Magnesium, Phosphorus.   Liver tests are: AST, ALT, Bilirubin, Alkaline Phosphatase.   Kidney tests are Creatinine, GFR.   HDL Cholesterol - is the good cholesterol and it is good to have it high.   LDL cholesterol is the bad cholesterol and it is good to have it low.   It is recommended to have LDL less than 130 for people with hypertension and to have it less than 100 for people with heart disease, diabetes and chronic kidney disease.   Triglycerides are another type of lipid that can cause heart disease, like the cholesterol and should be kept low   Thyroid tests are TSH, T4, T3   Glucose is sugar.   A1c is a test that gives us an idea about how well was controlled the diabetes for the last 3 months.###Diabetes is in good control     PSA stands for Prostate Specific Antigen and it can be elevated with prostate cancer or prostate inflammation.     Your chest x ray is normal   The heart is a little large but not increasing in size     Get better !!!       Please continue on the same medications    Resulted Orders   HEMOGLOBIN A1C   Result Value Ref Range    Hemoglobin A1C 6.9 (H) 4.3 - 6.0 %   Influenza A and B and RSV PCR   Result Value Ref Range    Specimen Description Nasal     Influenza A PCR Canceled, Test credited (A) NEG^Negative      Comment:      Incorrectly ordered by PCU/Clinic  PLEASE SEE REORDERED TEST FOR RESULTS      Influenza B PCR Canceled, Test credited (A) NEG^Negative       Comment:      Incorrectly ordered by PCU/Clinic  PLEASE SEE REORDERED TEST FOR RESULTS      Resp Syncytial Virus Canceled, Test credited (A) NEG^Negative      Comment:      Incorrectly ordered by PCU/Clinic  PLEASE SEE REORDERED TEST FOR RESULTS     CBC with platelets differential   Result Value Ref Range    WBC 10.8 4.0 - 11.0 10e9/L    RBC Count 4.33 3.8 - 5.2 10e12/L    Hemoglobin 12.8 11.7 - 15.7 g/dL    Hematocrit 40.1 35.0 - 47.0 %    MCV 93 78 - 100 fl    MCH 29.6 26.5 - 33.0 pg    MCHC 31.9 31.5 - 36.5 g/dL    RDW 15.5 (H) 10.0 - 15.0 %    Platelet Count 190 150 - 450 10e9/L    Diff Method Automated Method     % Neutrophils 71.3 %    % Lymphocytes 15.4 %    % Monocytes 10.6 %    % Eosinophils 2.5 %    % Basophils 0.2 %    Absolute Neutrophil 7.7 1.6 - 8.3 10e9/L    Absolute Lymphocytes 1.7 0.8 - 5.3 10e9/L    Absolute Monocytes 1.2 0.0 - 1.3 10e9/L    Absolute Eosinophils 0.3 0.0 - 0.7 10e9/L    Absolute Basophils 0.0 0.0 - 0.2 10e9/L   Influenza A/B antigen   Result Value Ref Range    Influenza A/B Agn Specimen Nasal     Influenza A Negative NEG^Negative    Influenza B Negative NEG^Negative      Comment:      Test results must be correlated with clinical data. If necessary, results   should be confirmed by a molecular assay or viral culture.         If you have any questions or concerns, please call the clinic at the number listed above.       Sincerely,        Corin Heath MD

## 2018-02-09 NOTE — LETTER
My Heart Failure Action Plan   Name: Regla Benavidez    YOB: 1926   Date: 2/9/2018    My doctor: Leo Melgar     [address] [id number] [phone]  My Diagnosis: Combined Heart Failure   My Ejection Fraction: Over 50%    My Exercise Goal: 30 minutes daily  .     My Weight Goal: --  Wt Readings from Last 2 Encounters:   02/09/18 180 lb (81.6 kg)   01/27/18 181 lb (82.1 kg)     Weigh yourself daily using the same scale. If you gain more than 2 pounds in 24 hours or 5 pounds in a week 0    My Diet Goal: No added salt    Emergency Room Visits:    Our goal is to improve your quality of life and help you avoid a visit to the emergency room or hospital.  If we work together, we can achieve this goal. But, if you feel you need to call 911 or go to the emergency room, please do so.  If you go to the emergency room, please bring your list of medicines and your daily weight chart with you.       GREEN ZONE     Doing well today    Weight gained is no more than 2 pounds a day or 5 pounds a week.    No swelling in feet, ankles, legs or stomach.    No more swelling than usual.    No more trouble breathing than usual.    No change in my sleep.    No other problems. Actions:    I am doing fine.  I will take my medicine, follow my diet, see my doctor, exercise, and watch for symptoms.           YELLOW ZONE         Having a bad day or flare up    Weight gain of more than 2 pounds in one day or 5 pounds in one week.    New swelling in ankle, leg, knee or thigh.    Bloating in belly, pants feel tighter.    Swelling in hands or face.    Coughing or trouble breathing while walking or talking.    Harder to breathe last night.    Have trouble sleeping, wake up short of breath.    Much more tired than usual.    Not eating.    Pain in my chest or bad leg cramps.    Feel weak or dizzy. Actions:    I need to take action and call my  doctor or nurse today.                 RED ZONE         Need medical care now    Weight gain of 5 pounds overnight.    Chest pain or pressure that does not go away.    Feel less alert.    Wheezing or have trouble breathing when at rest.    Cannot sleep lying down.    Cannot take my water pill.    Pass out or faint. Actions:    I need to call my doctor or nurse now!    Call 911 if I have chest pain or cannot breathe.        Electronically signed by: Corin Heath, February 9, 2018

## 2018-02-09 NOTE — MR AVS SNAPSHOT
After Visit Summary   2/9/2018    Regla Benavidez    MRN: [id number]           Patient Information     Date Of Birth          1/5/1926        Visit Information        Provider Department      2/9/2018 11:40 AM Corin Heath MD [facility]        Today's Diagnoses     Acute bronchitis, unspecified organism    -  1    Chronic congestive heart failure, unspecified congestive heart failure type (H)        Acute non-recurrent sinusitis, unspecified location        PAD (peripheral artery disease) (H)        Throat pain        Post-nasal drip        Impaired fasting glucose        Type 2 diabetes mellitus with hyperglycemia, without long-term current use of insulin (H)        Bilateral leg edema        Bilateral hearing loss, unspecified hearing loss type        Essential hypertension        Nonsmoker          Care Instructions    1.  Boil water and breath the warm vapors 2-3 times a day to try to open up the sinuses. Run a steamer or cold air vaporizer as much as possible. Take Mucinex plain blue  1200 mg  One tablet twice a day (This may come as 600mg/tablet and you need to take 2 tabs twice a day) or you could buy generic 400mgm / tab and take 2 tablets 3 x a day or 1 and 1/2 tablets 4 x a day . . Mucinex is guaifenesin, the major component of most cough syrups, because it makes the mucus less thick, and therefore it drains out better and you are less likely to cough from it dripping on the back of your throat.  Irrigate the  nose with plain water under the kitchen sink faucet or the shower.  Mari pots, spray bottles, etc accumulate bacteria and are not recommended.   The tickle in the throat is also helped by gargling with vinegar and honey mixture, or pop or mouth wash as these coat the throat.  Please try to rinse teeth with water after using these .     2.  Weight Loss Tips  1. Do not eat after 6 hrs before your expected bedtime  2. Have your heaviest meal  for breakfast, a slightly lighter meal at lunch and a snack 6 hrs before bed  3. No sugar/calorie drinks except milk ie no fruit juice, pop, alcohol.  4. Drink milk 30min before meals to decrease your hunger. Also it is excellent as part of your last meal of the day snack  5. Drink lots of water  6. Increase fiber in diet: all bran cereal, salads, popcorn etc  7. Have only one small serving of fruit a day about 1/2 cup (as this is high in sugar)  8. EXERCISE is the bottom line. Without it, you will gain weight even on a low calorie diet. Best if done 2-3X a day as can    Being overweight contributes to high blood pressure and high cholesterol, both of which cause heart attacks, strokes and kidney failure, prediabetes and diabetes, arthritis, and liver disease             Follow-ups after your visit        Who to contact     If you have questions or need follow up information about today's clinic visit or your schedule please contact [facility] directly at [phone].  Normal or non-critical lab and imaging results will be communicated to you by [facility], letter or phone within 4 business days after the clinic has received the results. If you do not hear from us within 7 days, please contact the clinic through [email or web address] or phone. If you have a critical or abnormal lab result, we will notify you by phone as soon as possible.  Submit refill requests through [email or web address] or call your pharmacy and they will forward the refill request to us. Please allow 3 business days for your refill to be completed.          Additional Information About Your Visit        [email or web address] Information     [email or web address] gives you secure access to your electronic health record. If you see a primary care provider, you can also send messages to your care team and make appointments. If you have questions, please call your primary care clinic.  If you do not have a primary care provider, please call [phone] and they will assist  you.        Care EveryWhere ID     This is your Care EveryWhere ID. This could be used by other organizations to access your [place] medical records  [id number]        Your Vitals Were     Pulse Temperature Respirations Height Last Period Pulse Oximetry    78 98  F (36.7  C) (Tympanic) 14 5' (1.524 m) (LMP Unknown) 98%    Breastfeeding? BMI (Body Mass Index)                No 35.15 kg/m2           Blood Pressure from Last 3 Encounters:   02/09/18 112/68   01/27/18 128/64   07/29/17 118/76    Weight from Last 3 Encounters:   02/09/18 180 lb (81.6 kg)   01/27/18 181 lb (82.1 kg)   07/29/17 185 lb 9.6 oz (84.2 kg)              We Performed the Following     Albumin Random Urine Quantitative with Creat Ratio     CBC with platelets differential     HEMOGLOBIN A1C     Influenza A and B and RSV PCR     Influenza A/B antigen     Lipid panel reflex to direct LDL Fasting     TSH WITH FREE T4 REFLEX        Primary Care Provider Office Phone # Fax #    Leo Melgar [phone]       [address] [id number]        Equal Access to Services     JAMES REESE : Hadii aad ku hadasho Soomaali, waaxda luqadaha, qaybta kaalmada adeegyada, waxay josue hayavelinan tracey priest . So [facility] [phone].    ATENCIÓN: Si habla español, tiene a garzon disposición servicios gratuitos de asistencia lingüística. Llame al [phone].    We comply with applicable federal civil rights laws and [place] laws. We do not discriminate on the basis of race, color, national origin, age, disability, sex, sexual orientation, or gender identity.            Thank you!     Thank you for choosing [facility]  for your care. Our goal is always to provide you with excellent care. Hearing back from our patients is one way we can continue to improve our services. Please take a few minutes to complete the written survey that you may receive in the mail after your visit with us. Thank you!              Your Updated Medication List - Protect others around you: Learn how to safely use, store and throw away your medicines at www.disposemymeds.org.          This list is accurate as of 2/9/18  1:20 PM.  Always use your most recent med list.                   Brand Name Dispense Instructions for use Diagnosis    acetaminophen 325 MG tablet    TYLENOL    184 tablet    Take 2 tablets (650 mg) by mouth At Bedtime Take 2 tablets at bedtime    Midline low back pain without sciatica       aspirin 325 MG tablet     120 tablet    Take 1 tablet (325 mg) by mouth daily    Stroke (H)       blood glucose monitoring test strip    ONETOUCH ULTRA    100 each    USE TO TEST ONCE EVERY MORNING AS DIRECTED    Type 2 diabetes mellitus without complication, without long-term current use of insulin (H)       carbamide peroxide 6.5 % otic solution    DEBROX     Place 5 drops into both ears daily as needed for other        co-enzyme Q-10 100 MG Caps capsule     30 capsule    Take 1 capsule (100 mg) by mouth daily    Stroke (H), Hyperlipidemia LDL goal <70       furosemide 20 MG tablet    LASIX    60 tablet    Take 1 tablet (20 mg) by mouth 2 times daily As of 1/27/18    Bilateral leg edema       glipiZIDE 10 MG tablet    GLUCOTROL    60 tablet    Take 1 tablet (10 mg) by mouth 2 times daily (before meals)    Type 2 diabetes mellitus with hyperglycemia, without long-term current use of insulin (H)       lactobacillus acidophilus & bulgar chewable tablet     60 tablet    Take 1 tablet by mouth 2 times daily With meals    Diarrhea, unspecified type       metFORMIN 500 MG tablet    GLUCOPHAGE    30 tablet    Take 1 tablet (500 mg) by mouth daily (with dinner)        nystatin 019047 UNIT/GM Powd    MYCOSTATIN    60 g    Apply 1 g topically 3 times daily as needed        * order for DME     1 Device    Equipment being ordered: glucometer kit- brand per insurance coverage Patient prefers brand with DRUM lancets - patient is to test fasting  once each morning #30 with 5 refills each of the lancets and test strips    Type II or unspecified type diabetes mellitus without mention of complication, not stated as uncontrolled       * order for DME     100 lancet    Equipment being ordered: retractable safety lancets    Type II or unspecified type diabetes mellitus without mention of complication, not stated as uncontrolled       * order for DME     3 Package    Equipment being ordered: Large Underpads    Urinary frequency       * order for DME     150 Units    Equipment being ordered: Esthela xtra absorbent large pull ups - Uses 5 per day.    Urinary incontinence       polyethylene glycol powder    MIRALAX    510 g    Take 17 g (1 capful) by mouth twice a week    Constipation, unspecified constipation type       pramipexole 0.125 MG tablet    MIRAPEX    60 tablet    Take 2 tablets (0.25 mg) by mouth At Bedtime    Restless legs syndrome       ranitidine 150 MG tablet    ZANTAC    60 tablet    Take 1 tablet (150 mg) by mouth 2 times daily    Gastroesophageal reflux disease without esophagitis       saccharomyces boulardii 250 MG capsule    FLORASTOR    60 capsule    Take 1 capsule (250 mg) by mouth 2 times daily    Diarrhea, unspecified type       senna-docusate 8.6-50 MG per tablet    SENOKOT-S;PERICOLACE     Take 1 tablet by mouth daily HOLD IF HAS LOOSE STOOLS        simvastatin 5 MG tablet    ZOCOR    30 tablet    Take 1 tablet (5 mg) by mouth every evening    Stroke (H)       sitagliptin 50 MG tablet    JANUVIA    30 tablet    Take 1 tablet (50 mg) by mouth daily    Uncontrolled type 2 diabetes mellitus with complication, without long-term current use of insulin (H), Type 2 diabetes mellitus with hyperglycemia, without long-term current use of insulin (H)       spironolactone 25 MG tablet    ALDACTONE    30 tablet    Take 0.5 tablets (12.5 mg) by mouth daily    Bilateral leg edema       VITAMIN D3 PO      Take 2,000 Units by mouth daily        * Notice:  This  list has 4 medication(s) that are the same as other medications prescribed for you. Read the directions carefully, and ask your doctor or other care provider to review them with you.

## 2018-02-09 NOTE — PROGRESS NOTES
SUBJECTIVE:   Regla Benavidez is a 89 year old female who presents to clinic today for the following health issues:    ENT Symptoms             Symptoms: cc Present Absent Comment   Fever/Chills  x     Fatigue  x     Muscle Aches  x     Eye Irritation   x    Sneezing   x    Nasal Uziel/Drg  x     Sinus Pressure/Pain  x     Loss of smell   x    Dental pain   x    Sore Throat  x     Swollen Glands   x    Ear Pain/Fullness   x    Cough  x     Wheeze  x     Chest Pain  x     Shortness of breath  x     Rash   x    Other   x      Symptom duration:  x 3 days   Symptom severity:  Mild   Treatments tried:  Tylenol, [place], Increased Fluids and Cough Drops   Contacts:  Home=assisted living with other ill people      Diabetes Follow-up    Patient is checking blood sugars: once daily.  Results are as follows:         am -     Diabetic concerns: CKD, PAD , CHF , HTN -uncontrolled     Symptoms of hypoglycemia (low blood sugar): none--always confused  Since CVA     Paresthesias (numbness or burning in feet) or sores: No     Date of last diabetic eye exam: 2017    BP Readings from Last 2 Encounters:   02/09/18 112/68   01/27/18 128/64     Hemoglobin A1C (%)   Date Value   06/29/2017 9.8 (A)   03/02/2017 7.8 (H)     LDL Cholesterol Calculated (mg/dL)   Date Value   09/04/2014 69   10/22/2013 114     Hypertension Follow-up      Outpatient blood pressures are being checked at home.  Results are <140/80.    Low Salt Diet: no added salt    Vascular Disease Follow-up:  Peripheral Vascular Disease (PVD)      Chest pain or pressure, left side neck or arm pain: No    Shortness of breath/increased sweats/nausea with exertion: [place] hardly walks > 1.2 block     Pain in calves walking 1-2 blocks: No    Worsened or new symptoms since last visit: No    Nitroglycerin use: no    Daily aspirin use: Yes    No hx of surgery bypass     Heart Failure Follow-up    Symptoms:    Shortness of breath: happens with exertion only - stable & hardly  walks > 1block a day     Lower extremity edema: stable     Chest pain: No    Using more pillows than normal: No    Cough at night: Yes-  But has bronchitis     Weight:    Checking weight daily: No    Weight change: none    Cardiology visits, ER/UC, or hospital admissions since last visit: None and Cardiology Visit -     Medication side effects: none    Chronic Kidney Disease:Stage 3 Follow-up      Current NSAID use?  No    Comorbid DM, HTN, PAD, CHF,     EDEMA OF LEGS     -less so with pt ill and in bed a lot   -still pitting     NONMORBID OBESITY    -BMI= 35.23  -Comorbid DM, HTN, PAD, CHF,   - sedentary in her elderly state and with her [place] and some confusion     Problem list and histories reviewed & adjusted, as indicated.  Additional history: as documented    Labs reviewed in EPIC    Reviewed and updated as needed this visit by clinical staff  Allergies  Meds  Problems       Reviewed and updated as needed this visit by Provider         ROS:  CONSTITUTIONAL:POSITIVE  for chills, fatigue, fever  and malaise, chills, fatigue, fever , malaise and weight gain  I: NEGATIVE for worrisome rashes, moles or lesions  E: NEGATIVE for vision changes or irritation  ENT/MOUTH: POSITIVE for , hoarse voice, nasal congestion, postnasal drainage, rhinorrhea-clear and sore throat  RESP:POSITIVE for , cough-productive and dyspnea on exertion- partly from CHF   B: NEGATIVE for masses, tenderness or discharge  CV: NEGATIVE for chest pain, palpitations or peripheral edema  GI: NEGATIVE for nausea, abdominal pain, heartburn, or change in bowel habits  : NEGATIVE for frequency, dysuria, or hematuria  M: NEGATIVE for significant arthralgias or myalgia  N: NEGATIVE for weakness, dizziness or paresthesias  E: NEGATIVE for temperature intolerance, skin/hair changes  H: NEGATIVE for bleeding problems  PSYCHIATRIC: POSITIVE for, concentration difficulty, depressed mood, fatigue and impaired memory    OBJECTIVE:     /68  Pulse 78   Temp 98  F (36.7  C) (Tympanic)  Resp 14  Ht 5' (1.524 m)  Wt 180 lb (81.6 kg)  LMP  (LMP Unknown)  SpO2 98%  Breastfeeding? No  BMI 35.15 kg/m2  Body mass index is 35.15 kg/(m^2).  GENERAL: healthy, alert,  distress, obese, frail, elderly and fatigued  EYES: Eyes grossly normal to inspection, PERRL and conjunctivae and sclerae normal  HENT: ear canals and TM's normal, nose and mouth without ulcers or lesions  NECK: no adenopathy, no asymmetry, masses, or scars and thyroid normal to palpation  RESP: lungs clear to auscultation - no rales, rhonchi or wheezes  CV: regular rate and rhythm, normal S1 S2, no S3 or S4, no murmur, click or rub, no peripheral edema and peripheral pulses strong  MS: no gross musculoskeletal defects noted, no edema  SKIN: no suspicious lesions or rashes  NEURO: Normal strength and tone, mentation intact and speech normal  PSYCH: concentration poor, confused, disoriented, inattentive, affect normal/bright, affect flat, fatigued and appearance well groomed  LYMPH: Negative CCOA nodes and thyroid and inguinal nodes     Diagnostic Test Results:  Results for orders placed or performed in visit on 02/09/18   HEMOGLOBIN A1C   Result Value Ref Range    Hemoglobin A1C 6.9 (H) 4.3 - 6.0 %   CBC with platelets differential   Result Value Ref Range    WBC 10.8 4.0 - 11.0 10e9/L    RBC Count 4.33 3.8 - 5.2 10e12/L    Hemoglobin 12.8 11.7 - 15.7 g/dL    Hematocrit 40.1 35.0 - 47.0 %    MCV 93 78 - 100 fl    MCH 29.6 26.5 - 33.0 pg    MCHC 31.9 31.5 - 36.5 g/dL    RDW 15.5 (H) 10.0 - 15.0 %    Platelet Count 190 150 - 450 10e9/L    Diff Method Automated Method     % Neutrophils 71.3 %    % Lymphocytes 15.4 %    % Monocytes 10.6 %    % Eosinophils 2.5 %    % Basophils 0.2 %    Absolute Neutrophil 7.7 1.6 - 8.3 10e9/L    Absolute Lymphocytes 1.7 0.8 - 5.3 10e9/L    Absolute Monocytes 1.2 0.0 - 1.3 10e9/L    Absolute Eosinophils 0.3 0.0 - 0.7 10e9/L    Absolute Basophils 0.0 0.0 - 0.2 10e9/L   Influenza  A and B and RSV PCR   Result Value Ref Range    Specimen Description Nasal     Influenza A PCR Canceled, Test credited (A) NEG^Negative    Influenza B PCR Canceled, Test credited (A) NEG^Negative    Resp Syncytial Virus Canceled, Test credited (A) NEG^Negative   Influenza A/B antigen   Result Value Ref Range    Influenza A/B Agn Specimen Nasal     Influenza A Negative NEG^Negative    Influenza B Negative NEG^Negative       ASSESSMENT/PLAN:               ICD-10-CM    1. Acute bronchitis, unspecified organism J20.9 Influenza A and B and RSV PCR     CBC with platelets differential     XR Chest 2 Views     Influenza A/B antigen   2. Abnormal lung sounds- rhonchi Rt base  R09.89    3. Chronic congestive heart failure, unspecified congestive heart failure type (H) I50.9 Albumin Random Urine Quantitative with Creat Ratio     CBC with platelets differential     XR Chest 2 Views     HEART FAILURE ACTION PLAN   4. Acute non-recurrent sinusitis, unspecified location J01.90 CBC with platelets differential   5. PAD (peripheral artery disease) (H) I73.9    6. Throat pain R07.0    7. Post-nasal drip R09.82    8. Type 2 diabetes mellitus with hyperglycemia, without long-term current use of insulin (H) E11.65 HEMOGLOBIN A1C     Lipid panel reflex to direct LDL Fasting     Albumin Random Urine Quantitative with Creat Ratio     TSH WITH FREE T4 REFLEX   9. Type 2 diabetes mellitus with stage 3 chronic kidney disease, without long-term current use of insulin (H) E11.22     N18.3    10. Bilateral leg edema R60.0    11. Bilateral hearing loss, unspecified hearing loss type H91.93    12. Essential hypertension I10 Albumin Random Urine Quantitative with Creat Ratio   13. CKD (chronic kidney disease) stage 3, GFR 30-59 ml/min N18.3    14. Nonsmoker Z78.9    15. Class 2 obesity due to excess calories with serious comorbidity and body mass index (BMI) of 35.0 to 35.9 in adult E66.01     Z68.35        Patient Instructions   1.  Boil water and  breath the warm vapors 2-3 times a day to try to open up the sinuses. Run a steamer or cold air vaporizer as much as possible. Take Mucinex plain blue  1200 mg  One tablet twice a day (This may come as 600mg/tablet and you need to take 2 tabs twice a day) or you could buy generic 400mgm / tab and take 2 tablets 3 x a day or 1 and 1/2 tablets 4 x a day . . Mucinex is guaifenesin, the major component of most cough syrups, because it makes the mucus less thick, and therefore it drains out better and you are less likely to cough from it dripping on the back of your throat.  Irrigate the  nose with plain water under the kitchen sink faucet or the shower.  Mari pots, spray bottles, etc accumulate bacteria and are not recommended.   The tickle in the throat is also helped by gargling with vinegar and honey mixture, or pop or mouth wash as these coat the throat.  Please try to rinse teeth with water after using these .     2.  Weight Loss Tips  1. Do not eat after 6 hrs before your expected bedtime  2. Have your heaviest meal for breakfast, a slightly lighter meal at lunch and a snack 6 hrs before bed  3. No sugar/calorie drinks except milk ie no fruit juice, pop, alcohol.  4. Drink milk 30min before meals to decrease your hunger. Also it is excellent as part of your last meal of the day snack  5. Drink lots of water  6. Increase fiber in diet: all bran cereal, salads, popcorn etc  7. Have only one small serving of fruit a day about 1/2 cup (as this is high in sugar)  8. EXERCISE is the bottom line. Without it, you will gain weight even on a low calorie diet. Best if done 2-3X a day as can    Being overweight contributes to high blood pressure and high cholesterol, both of which cause heart attacks, strokes and kidney failure, prediabetes and diabetes, arthritis, and liver disease         Corin Heath MD  [facility]      Weight management plan: Discussed healthy diet and exercise  guidelines and patient will follow up in 3 months in clinic to re-evaluate.    Time spent with the patient 44mins, more than 50% in counseling and coordinating care, Re above medical problems.    Difficult to communicate with pt with her severe [place] and appears also to have some dementia .  Isaiah is here & is agreat help     The differential was between bronchiti , pneumonia, exacerbation of her CHF ---the CXR is neg , despite the rhonchi in the Rt base ,  And the CBC is wnl so assue viral bronchitis  Emphasized to isaiah to tell all caregivers that this elderly lady can get ill/septic, rapidly  If goes [place] at all, needs to go to ED  Not [facility]     eval and review of all above problems     Needs to rtc for re auscultation of chest to be sure the rhonchi resolve        Corin Heath MD

## 2018-02-09 NOTE — PROGRESS NOTES
.  Please see attached lab results  They are all normal     THE FOLLOWING ARE EXPLANATIONS OF SOME OF OUR LAB TESTS     YOU DID NOT NECESSARILY HAVE ALL OF THESE DONE     Hgb is the blood iron level  WBC means White Blood Cells  Platelets are small blood cells that help with forming the blood clots along with other blood factors.  Electrolytes are Sodium, Potassium, Calcium, Magnesium, Phosphorus.  Liver tests are: AST, ALT, Bilirubin, Alkaline Phosphatase.  Kidney tests are Creatinine, GFR.  HDL Cholesterol - is the good cholesterol and it is good to have it high.  LDL cholesterol is the bad cholesterol and it is good to have it low.  It is recommended to have LDL less than 130 for people with hypertension and to have it less than 100 for people with heart disease, diabetes and chronic kidney disease.  Triglycerides are another type of lipid that can cause heart disease, like the cholesterol and should be kept low   Thyroid tests are TSH, T4, T3  Glucose is sugar.  A1c is a test that gives us an idea about how well was controlled the diabetes for the last 3 months.###Diabetes is in good control    PSA stands for Prostate Specific Antigen and it can be elevated with prostate cancer or prostate inflammation.    Your chest x ray is normal   The heart is a little large but not increasing in size     Get better !!!       Please continue on the same medications

## 2018-02-09 NOTE — LETTER
February 12, 2018      Regla Benavidez  C/O MONALISA BENAVIDEZ  [address]  [place] [address]        Dear ,    We are writing to inform you of your test results.    They are all normal     THE FOLLOWING ARE EXPLANATIONS OF SOME OF OUR LAB TESTS     YOU DID NOT NECESSARILY HAVE ALL OF THESE DONE     Hgb is the blood iron level   WBC means White Blood Cells   Platelets are small blood cells that help with forming the blood clots along with other blood factors.   Electrolytes are Sodium, Potassium, Calcium, Magnesium, Phosphorus.   Liver tests are: AST, ALT, Bilirubin, Alkaline Phosphatase.   Kidney tests are Creatinine, GFR.   HDL Cholesterol - is the good cholesterol and it is good to have it high.   LDL cholesterol is the bad cholesterol and it is good to have it low.   It is recommended to have LDL less than 130 for people with hypertension and to have it less than 100 for people with heart disease, diabetes and chronic kidney disease.   Triglycerides are another type of lipid that can cause heart disease, like the cholesterol and should be kept low   Thyroid tests are TSH, T4, T3   Glucose is sugar.   A1c is a test that gives us an idea about how well was controlled the diabetes for the last 3 months.   PSA stands for Prostate Specific Antigen and it can be elevated with prostate cancer or prostate inflammation.     Please continue on the same medications    Resulted Orders   HEMOGLOBIN A1C   Result Value Ref Range    Hemoglobin A1C 6.9 (H) 4.3 - 6.0 %   Lipid panel reflex to direct LDL Fasting   Result Value Ref Range    Cholesterol 122 <200 mg/dL    Triglycerides 135 <150 mg/dL    HDL Cholesterol 46 (L) >49 mg/dL    LDL Cholesterol Calculated 49 <100 mg/dL      Comment:      Desirable:       <100 mg/dl    Non HDL Cholesterol 76 <130 mg/dL   Albumin Random Urine Quantitative with Creat Ratio   Result Value Ref Range    Creatinine Urine 84 mg/dL    Albumin Urine mg/L 6 mg/L    Albumin Urine mg/g  Cr 7.10 0 - 25 mg/g Cr   TSH WITH FREE T4 REFLEX   Result Value Ref Range    TSH 0.77 0.40 - 4.00 mU/L   Influenza A and B and RSV PCR   Result Value Ref Range    Specimen Description Nasal     Influenza A PCR Canceled, Test credited (A) NEG^Negative      Comment:      Incorrectly ordered by PCU/Clinic  PLEASE SEE REORDERED TEST FOR RESULTS      Influenza B PCR Canceled, Test credited (A) NEG^Negative      Comment:      Incorrectly ordered by PCU/Clinic  PLEASE SEE REORDERED TEST FOR RESULTS      Resp Syncytial Virus Canceled, Test credited (A) NEG^Negative      Comment:      Incorrectly ordered by PCU/Clinic  PLEASE SEE REORDERED TEST FOR RESULTS     CBC with platelets differential   Result Value Ref Range    WBC 10.8 4.0 - 11.0 10e9/L    RBC Count 4.33 3.8 - 5.2 10e12/L    Hemoglobin 12.8 11.7 - 15.7 g/dL    Hematocrit 40.1 35.0 - 47.0 %    MCV 93 78 - 100 fl    MCH 29.6 26.5 - 33.0 pg    MCHC 31.9 31.5 - 36.5 g/dL    RDW 15.5 (H) 10.0 - 15.0 %    Platelet Count 190 150 - 450 10e9/L    Diff Method Automated Method     % Neutrophils 71.3 %    % Lymphocytes 15.4 %    % Monocytes 10.6 %    % Eosinophils 2.5 %    % Basophils 0.2 %    Absolute Neutrophil 7.7 1.6 - 8.3 10e9/L    Absolute Lymphocytes 1.7 0.8 - 5.3 10e9/L    Absolute Monocytes 1.2 0.0 - 1.3 10e9/L    Absolute Eosinophils 0.3 0.0 - 0.7 10e9/L    Absolute Basophils 0.0 0.0 - 0.2 10e9/L   Influenza A/B antigen   Result Value Ref Range    Influenza A/B Agn Specimen Nasal     Influenza A Negative NEG^Negative    Influenza B Negative NEG^Negative      Comment:      Test results must be correlated with clinical data. If necessary, results   should be confirmed by a molecular assay or viral culture.         If you have any questions or concerns, please call the clinic at the number listed above.       Sincerely,        Corin Heath MD

## 2018-02-09 NOTE — TELEPHONE ENCOUNTER
Influenza-Like Illness ([facility]) Protocol    Regla Benavidez      Age: 89 year old     YOB: 1926    Are you currently sick or have you had close contact with someone who is currently sick?   Yes, this patient is currently sick.     Adult Clinical Evaluation    Is this patient experiencing ANY of the following?  Unconsciousness or unresponsiveness No   Difficulty breathing or swallowing No   Blue or dusky lips, skin, or nail beds No   Chest pain No   Severe confusion or delirium No   Seizure activity: ongoing or stopped No   Severe dehydration or signs of shock No   Patient sounds very sick on the phone No     Is this patient experiencing ANY of the following?  Fever > 104 or shaking chills No   Wheezing with minimal response to usual wheezing medications or new wheezing No   Repeated vomiting or diarrhea with signs of dehydration (no urination within last 12 hours) No   Flu-like symptoms that initially improved but returned with fever and a worse cough No   Stiff or painful neck No   Severe headache No     Does the patient have any of the following?  Measured fever > 100 degrees Yes   Chills or feels very warm to the touch No   Cough Yes   Sore throat Yes   Muscle/ body aches No   Headaches No   Fatigue (tiredness) No     Nursing Plan  Scheduled appointment    Provided home care instructions    General home care instruction:      Avoid contact with people in your household who are at increased risk for more severe complications of influenza (such as pregnant women or people who have a chronic health condition, for example diabetes, heart disease, asthma, or emphysema).    Stay home from work, school, childcare or other public places until your fever (37.8 degrees Celsius [100 degrees Fahrenheit]) has been gone for at least 24 hours, except to seek medical care. (Fever should be gone without the use of fever-reducing medications.) Use a surgical mask if available, or cover your mouth and nose with a  tissue if possible if you need to seek medical care. Contact your work place, school, or  as they may have longer exclusion times.    You may continue to shed virus after your fever is gone. Limit your contact with high-risk individuals for 10 days after your symptoms started and be especially careful to cover your coughs/sneezes and wash your hands.    Cover your cough and wash your hands often, and especially after coughing, sneezing, blowing your nose.    Drink plenty of fluids (such as water, broth, sports drinks, electrolyte beverages for children) to prevent dehydration.    Avoid tobacco and second hand smoke.    Get plenty of rest.    Use over-the-counter pain relievers as needed per  instructions.    Do not give aspirin (acetylsalicylic acid) or products that contain aspirin (e.g. bismuth subsalicylate - Pepto Bismol) to children or teenagers 18 years or younger.    A small number of people with influenza do not have fever. If you have respiratory symptoms and are at increased risk for complications of influenza, contact your health care provider to discuss these symptoms.    For parents of infants:    If possible, only family members who are not sick should care for infants.    Wash your hands with soap and water, or an alcohol-based hand rub (if your hands are not visibly soiled) before caring for your infant.    Cover your mouth and nose with a tissue when coughing or sneezing, and clean your hands.    Contact a health care provider to discuss your illness within 1-2 days if you are    Pregnant    Immunocompromised    Call 911 if you experience:    Difficulty breathing or shortness of breath    Pain or pressure in the chest    Confusion or less responsive than normal    Seizure activity: ongoing or stopped    Severe dehydration or signs of shock     Blue or dusky lips, skin, or nail beds    If further questions/concerns or if new symptoms develop, call your PCP or [facility] as soon as possible.    When to seek medical attention    Contact your health care provider right away if you experience:    A painful sore throat accompanied by fever persists for more than 48 hours    Ear pain, sinus pain, persistent vomiting and/or diarrhea    Oral temperature greater than 104  Fahrenheit (40  Celsius)    Dehydration (e.g., mouth feeling dry, dizzy when sitting/standing, decreased urine output)    Severe or persistent vomiting; unable to keep fluids down    Improvement in flu-like symptoms (fever and cough or sore throat) but then return of fever and worse cough or sore throat    Not drinking enough fluid    Any other concerns not stated above      Additional educational resources include:    http://www.[employer].com    http://www.cdc.gov/flu/  Sara Chua

## 2018-02-09 NOTE — PATIENT INSTRUCTIONS
1.  Boil water and breath the warm vapors 2-3 times a day to try to open up the sinuses. Run a steamer or cold air vaporizer as much as possible. Take Mucinex plain blue  1200 mg  One tablet twice a day (This may come as 600mg/tablet and you need to take 2 tabs twice a day) or you could buy generic 400mgm / tab and take 2 tablets 3 x a day or 1 and 1/2 tablets 4 x a day . . Mucinex is guaifenesin, the major component of most cough syrups, because it makes the mucus less thick, and therefore it drains out better and you are less likely to cough from it dripping on the back of your throat.  Irrigate the  nose with plain water under the kitchen sink faucet or the shower.  Mari pots, spray bottles, etc accumulate bacteria and are not recommended.   The tickle in the throat is also helped by gargling with vinegar and honey mixture, or pop or mouth wash as these coat the throat.  Please try to rinse teeth with water after using these .     2.  Weight Loss Tips  1. Do not eat after 6 hrs before your expected bedtime  2. Have your heaviest meal for breakfast, a slightly lighter meal at lunch and a snack 6 hrs before bed  3. No sugar/calorie drinks except milk ie no fruit juice, pop, alcohol.  4. Drink milk 30min before meals to decrease your hunger. Also it is excellent as part of your last meal of the day snack  5. Drink lots of water  6. Increase fiber in diet: all bran cereal, salads, popcorn etc  7. Have only one small serving of fruit a day about 1/2 cup (as this is high in sugar)  8. EXERCISE is the bottom line. Without it, you will gain weight even on a low calorie diet. Best if done 2-3X a day as can    Being overweight contributes to high blood pressure and high cholesterol, both of which cause heart attacks, strokes and kidney failure, prediabetes and diabetes, arthritis, and liver disease

## 2018-02-09 NOTE — NURSING NOTE
Chief Complaint   Patient presents with     URI     /68  Pulse 78  Temp 98  F (36.7  C) (Tympanic)  Resp 14  Ht 5' (1.524 m)  Wt 180 lb (81.6 kg)  LMP  (LMP Unknown)  SpO2 98%  Breastfeeding? No  BMI 35.15 kg/m2 Estimated body mass index is 35.15 kg/(m^2) as calculated from the following:    Height as of this encounter: 5' (1.524 m).    Weight as of this encounter: 180 lb (81.6 kg).  BP completed using cuff size: jasmin Merrill CMA    Health Maintenance Due   Topic Date Due     EYE EXAM Q1 YEAR  01/05/1927     MICROALBUMIN Q1 YEAR  01/05/1927     LIPID MONITORING Q1 YEAR  09/04/2015     TSH W/ FREE T4 REFLEX Q2 YEAR  10/22/2017     A1C Q6 MO  12/29/2017     Health Maintenance reviewed at today's visit patient asked to schedule/complete:   Diabetes:  Patient agrees to schedule

## 2018-02-16 ENCOUNTER — TELEPHONE (OUTPATIENT)
Dept: FAMILY MEDICINE | Facility: CLINIC | Age: 83
End: 2018-02-16

## 2018-02-16 NOTE — TELEPHONE ENCOUNTER
Reason for Call:  Form, our goal is to have forms completed with 72 hours, however, some forms may require a visit or additional information.    Type of letter, form or note:  medical    Who is the form from?: Home care    Where did the form come from: form was faxed in    What clinic location was the form placed at?: [facility]    Where the form was placed: 's Box: Leo Melgar MD    What number is listed as a contact on the form?: [phone]  Phone [phone]        Additional comments: [facility] results & vitals    Call taken on 2/16/2018 at 4:25 PM by Helene Yu

## 2018-02-19 ENCOUNTER — TELEPHONE (OUTPATIENT)
Dept: FAMILY MEDICINE | Facility: CLINIC | Age: 83
End: 2018-02-19

## 2018-02-19 NOTE — TELEPHONE ENCOUNTER
Daphney from [facility] called today re: patient and wheezing and poss? Trying Mucinex, but has other concerns as well. Please call at [phone]. Thank you  Oumou Calzada LPN

## 2018-02-22 ENCOUNTER — TELEPHONE (OUTPATIENT)
Dept: FAMILY MEDICINE | Facility: CLINIC | Age: 83
End: 2018-02-22

## 2018-02-22 ENCOUNTER — MEDICAL CORRESPONDENCE (OUTPATIENT)
Dept: HEALTH INFORMATION MANAGEMENT | Facility: CLINIC | Age: 83
End: 2018-02-22

## 2018-02-22 ENCOUNTER — TRANSFERRED RECORDS (OUTPATIENT)
Dept: HEALTH INFORMATION MANAGEMENT | Facility: CLINIC | Age: 83
End: 2018-02-22

## 2018-02-22 LAB
BUN SERPL-MCNC: 35 MG/DL (ref 9–26)
CALCIUM SERPL-MCNC: 9.5 MG/DL (ref 8.4–10.2)
CHLORIDE SERPLBLD-SCNC: 103 MMOL/L (ref 98–109)
CO2 SERPL-SCNC: 27 MMOL/L (ref 22–31)
CREAT SERPL-MCNC: 1.18 MG/DL (ref 0.55–1.02)
GFR SERPL CREATININE-BSD FRML MDRD: 40 ML/MIN/1.73M2
GLUCOSE SERPL-MCNC: 169 MG/DL (ref 70–100)
POTASSIUM SERPL-SCNC: 4.7 MMOL/L (ref 3.5–5.2)
SODIUM SERPL-SCNC: 140 MMOL/L (ref 136–145)

## 2018-02-22 NOTE — TELEPHONE ENCOUNTER
Reason for Call:  Form, our goal is to have forms completed with 72 hours, however, some forms may require a visit or additional information.    Type of letter, form or note:  medical    Who is the form from?: Home care    Where did the form come from: form was faxed in    What clinic location was the form placed at?: [facility]    Where the form was placed: 's Box: Leo Melgar MD    What number is listed as a contact on the form?: [phone]  Phone [phone]       Additional comments: [facility] BMP, weight, and vitals    Call taken on 2/22/2018 at 4:16 PM by Helene Yu

## 2018-02-24 ENCOUNTER — HOSPITAL ENCOUNTER (EMERGENCY)
Facility: CLINIC | Age: 83
Discharge: HOME | End: 2018-02-24
Attending: EMERGENCY MEDICINE | Admitting: INTERNAL MEDICINE
Payer: COMMERCIAL

## 2018-02-24 ENCOUNTER — APPOINTMENT (OUTPATIENT)
Dept: GENERAL RADIOLOGY | Facility: CLINIC | Age: 83
End: 2018-02-24
Attending: EMERGENCY MEDICINE
Payer: COMMERCIAL

## 2018-02-24 ENCOUNTER — APPOINTMENT (OUTPATIENT)
Dept: ULTRASOUND IMAGING | Facility: CLINIC | Age: 83
End: 2018-02-24
Attending: EMERGENCY MEDICINE
Payer: COMMERCIAL

## 2018-02-24 VITALS
SYSTOLIC BLOOD PRESSURE: 126 MMHG | OXYGEN SATURATION: 93 % | TEMPERATURE: 98.6 F | RESPIRATION RATE: 18 BRPM | DIASTOLIC BLOOD PRESSURE: 63 MMHG

## 2018-02-24 DIAGNOSIS — M79.673 FOOT PAIN: ICD-10-CM

## 2018-02-24 DIAGNOSIS — R26.2 INABILITY TO AMBULATE DUE TO ANKLE OR FOOT: ICD-10-CM

## 2018-02-24 DIAGNOSIS — L03.119 CELLULITIS OF FOOT: ICD-10-CM

## 2018-02-24 LAB
ANION GAP SERPL CALCULATED.3IONS-SCNC: 7 MMOL/L (ref 3–14)
BASOPHILS # BLD AUTO: 0 10E9/L (ref 0–0.2)
BASOPHILS NFR BLD AUTO: 0.1 %
BUN SERPL-MCNC: 27 MG/DL (ref 7–30)
CALCIUM SERPL-MCNC: 8.7 MG/DL (ref 8.5–10.1)
CHLORIDE SERPL-SCNC: 104 MMOL/L (ref 94–109)
CO2 SERPL-SCNC: 28 MMOL/L (ref 20–32)
CREAT SERPL-MCNC: 1.09 MG/DL (ref 0.52–1.04)
DIFFERENTIAL METHOD BLD: ABNORMAL
EOSINOPHIL # BLD AUTO: 0.2 10E9/L (ref 0–0.7)
EOSINOPHIL NFR BLD AUTO: 1.6 %
ERYTHROCYTE [DISTWIDTH] IN BLOOD BY AUTOMATED COUNT: 15.2 % (ref 10–15)
GFR SERPL CREATININE-BSD FRML MDRD: 47 ML/MIN/1.7M2
GLUCOSE SERPL-MCNC: 114 MG/DL (ref 70–99)
HCT VFR BLD AUTO: 38 % (ref 35–47)
HGB BLD-MCNC: 12.5 G/DL (ref 11.7–15.7)
IMM GRANULOCYTES # BLD: 0 10E9/L (ref 0–0.4)
IMM GRANULOCYTES NFR BLD: 0.2 %
LYMPHOCYTES # BLD AUTO: 2.1 10E9/L (ref 0.8–5.3)
LYMPHOCYTES NFR BLD AUTO: 14.4 %
MCH RBC QN AUTO: 29.6 PG (ref 26.5–33)
MCHC RBC AUTO-ENTMCNC: 32.9 G/DL (ref 31.5–36.5)
MCV RBC AUTO: 90 FL (ref 78–100)
MONOCYTES # BLD AUTO: 1.5 10E9/L (ref 0–1.3)
MONOCYTES NFR BLD AUTO: 10.5 %
NEUTROPHILS # BLD AUTO: 10.6 10E9/L (ref 1.6–8.3)
NEUTROPHILS NFR BLD AUTO: 73.2 %
NRBC # BLD AUTO: 0 10*3/UL
NRBC BLD AUTO-RTO: 0 /100
PLATELET # BLD AUTO: 246 10E9/L (ref 150–450)
POTASSIUM SERPL-SCNC: 3.8 MMOL/L (ref 3.4–5.3)
RBC # BLD AUTO: 4.22 10E12/L (ref 3.8–5.2)
SODIUM SERPL-SCNC: 139 MMOL/L (ref 133–144)
WBC # BLD AUTO: 14.4 10E9/L (ref 4–11)

## 2018-02-24 PROCEDURE — 25000132 ZZH RX MED GY IP 250 OP 250 PS 637: Mod: GY | Performed by: EMERGENCY MEDICINE

## 2018-02-24 PROCEDURE — 99285 EMERGENCY DEPT VISIT HI MDM: CPT | Mod: 25

## 2018-02-24 PROCEDURE — 93971 EXTREMITY STUDY: CPT | Mod: LT

## 2018-02-24 PROCEDURE — 85025 COMPLETE CBC W/AUTO DIFF WBC: CPT | Performed by: EMERGENCY MEDICINE

## 2018-02-24 PROCEDURE — 80048 BASIC METABOLIC PNL TOTAL CA: CPT | Performed by: EMERGENCY MEDICINE

## 2018-02-24 PROCEDURE — 73630 X-RAY EXAM OF FOOT: CPT | Mod: LT

## 2018-02-24 PROCEDURE — A9270 NON-COVERED ITEM OR SERVICE: HCPCS | Mod: GY | Performed by: EMERGENCY MEDICINE

## 2018-02-24 RX ORDER — CEPHALEXIN 500 MG/1
500 CAPSULE ORAL 2 TIMES DAILY
Qty: 14 CAPSULE | Refills: 0 | Status: ON HOLD | OUTPATIENT
Start: 2018-02-24 | End: 2018-03-03

## 2018-02-24 RX ORDER — CEPHALEXIN 500 MG/1
500 CAPSULE ORAL ONCE
Status: COMPLETED | OUTPATIENT
Start: 2018-02-24 | End: 2018-02-24

## 2018-02-24 RX ORDER — LACTOBACILLUS RHAMNOSUS GG 10B CELL
1 CAPSULE ORAL 2 TIMES DAILY
Qty: 28 CAPSULE | Refills: 0 | Status: SHIPPED | OUTPATIENT
Start: 2018-02-24 | End: 2018-03-01

## 2018-02-24 RX ADMIN — CEPHALEXIN 500 MG: 500 CAPSULE ORAL at 13:24

## 2018-02-24 NOTE — PROGRESS NOTES
Care Transition Initial Assessment -   Reason For Consult: discharge planning  Met with: Patient and Family    Active Problems:    * No active hospital problems. *       DATA  Lives With: facility resident  Living Arrangements: assisted living-[facility] ([phone]).  Description of Support System: Supportive, Involved  Who is your support system?: Children  Support Assessment: Adequate family and caregiver support.     Identified issues/concerns regarding health management: Per MD, pt presented to the ED due to foot pain. However, she is medically cleared and can d/c to a TCU. Writer spoke to pt's daughter Keshav, and she shared that after discussing the options with her siblings they have decided to take pt back to her Ind. living and will take turns caring for her. Per Keshav, the Ind. Living facility does manage pt's medications, food, and light household chores. Writer followed up with MD, and she informed writer that pt will be admitted on the OBS unit. Family shared that they are overwhelmed by the choices, and are unable to care for pt at her Ind. Living.      Quality Of Family Relationships: supportive, involved, helpful     ASSESSMENT  Cognitive Status:  alert  Concerns to be addressed: D/C planning .   PLAN  Financial costs for the patient includes: N/A-Pt has a MA plan.  Patient given options and choices for discharge: Yes  Patient/family is agreeable to the plan?  No-Family are undecided.   Patient Goals and Preferences: Return to Ind. Living vs TCU.   Patient anticipates discharging to: Return to Ind. Living vs TCU.     ANA Amador, [facility]  [phone]  2:00 PM

## 2018-02-24 NOTE — DISCHARGE INSTRUCTIONS
*Elevate your leg as much as possible.  *Take medications as prescribed. Cephalexin.  Culturelle to prevent diarrhea. Continue your current medications  *Follow-up with your doctor for a recheck in the next 24-48 hours.  *Return if you develop fever, rapid spread or pain/redness/warmth, worsening pain, faint or feel like you will faint or become worse in any way.      Discharge Instructions  Cellulitis    Cellulitis is an infection of the skin that occurs when bacteria enter the skin.   Symptoms are generally redness, swelling, warmth and pain.  Your infection appeared to be appropriate to treat at home with antibiotics.  However, sometimes your infection may be worse than it seemed at first, or may worsen with time. If you have new or worse symptoms, you may need to be seen again in the Emergency Department or by your primary doctor.    Return to the Emergency Department if:    The redness, pain, or swelling gets a lot worse.  If the red area was marked, return if it is red beyond the marked area.    You are unable to get your antibiotics, or are vomiting them up or you can t take them.    You are feeling more ill, weak or lightheaded.    You start to run a new fever (temperature >101).    Anything else about the infection worries or concerns you.  Treatment:    Start your antibiotics right away, and take them as prescribed. Be sure to finish the whole prescription, even if you are better.    Apply a heating pad, warm packs, or warm water soaks to the infected area for 15 minutes at a time, at least 3 times a day. Do not use a heating pad on your feet or legs if you have diabetes. Do not sleep with a heating pad on, since this can cause burns or skin injury.    Rest your injured area for at least 1-2 days. After that you may start using your extremity again as long as there is not too much pain.     Raise the injured area above the level of your heart as much as possible in the first 1-2 days.    Tylenol   "(acetaminophen), Motrin  (ibuprofen), or Advil  (ibuprofen) may help may help reduce pain and fever and may help you feel more comfortable. Be sure to read and follow the package directions, and ask your doctor if you have questions.    Follow-up with your doctor:    Re-check in clinic within 2-3 days.  Probiotics: If you have been given an antibiotic, you may want to also take a probiotic pill or eat yogurt with live cultures. Probiotics have \"good bacteria\" to help your intestines stay healthy. Studies have shown that probiotics help prevent diarrhea and other intestine problems (including C. diff infection) when you take antibiotics. You can buy these without a prescription in the pharmacy section of the store.     If you were given a prescription for medicine here today, be sure to read all of the information (including the package insert) that comes with your prescription.  This will include important information about the medicine, its side effects, and any warnings that you need to know about.  The pharmacist who fills the prescription can provide more information and answer questions you may have about the medicine.  If you have questions or concerns that the pharmacist cannot address, please call or return to the Emergency Department.     Opioid Medication Information    Pain medications are among the most commonly prescribed medicines, so we are including this information for all our patients. If you did not receive pain medication or get a prescription for pain medicine, you can ignore it.     You may have been given a prescription for an opioid (narcotic) pain medicine and/or have received a pain medicine while here in the Emergency Department. These medicines can make you drowsy or impaired. You must not drive, operate dangerous equipment, or engage in any other dangerous activities while taking these medications. If you drive while taking these medications, you could be arrested for DUI, or driving " under the influence. Do not drink any alcohol while you are taking these medications.     Opioid pain medications can cause addiction. If you have a history of chemical dependency of any type, you are at a higher risk of becoming addicted to pain medications.  Only take these prescribed medications to treat your pain when all other options have been tried. Take it for as short a time and as few doses as possible. Store your pain pills in a secure place, as they are frequently stolen and provide a dangerous opportunity for children or visitors in your house to start abusing these powerful medications. We will not replace any lost or stolen medicine.  As soon as your pain is better, you should flush all your remaining medication.     Many prescription pain medications contain Tylenol  (acetaminophen), including Vicodin , Tylenol #3 , Norco , Lortab , and Percocet .  You should not take any extra pills of Tylenol  if you are using these prescription medications or you can get very sick.  Do not ever take more than 3000 mg of acetaminophen in any 24 hour period.    All opioids tend to cause constipation. Drink plenty of water and eat foods that have a lot of fiber, such as fruits, vegetables, prune juice, apple juice and high fiber cereal.  Take a laxative if you don t move your bowels at least every other day. Miralax , Milk of Magnesia, Colace , or Senna  can be used to keep you regular.      Remember that you can always come back to the Emergency Department if you are not able to see your regular doctor in the amount of time listed above, if you get any new symptoms, or if there is anything that worries you.

## 2018-02-24 NOTE — Clinical Note
Admitting Physician: ROBERTO AVILA [[id number]]   Bed Type: Observation Unit [54]   Special needs: No Special Needs [20]   Bed request comments: OBS

## 2018-02-24 NOTE — ED AVS SNAPSHOT
Emergency Department    [address] [place] [id number]    Phone:  [phone]    Fax:  [phone]                                       Regla Benavidez   MRN: [id number]    Department:   Emergency Department   Date of Visit:  2/24/2018           After Visit Summary Signature Page     I have received my discharge instructions, and my questions have been answered. I have discussed any challenges I see with this plan with the nurse or doctor.    ..........................................................................................................................................  Patient/Patient Representative Signature      ..........................................................................................................................................  Patient Representative Print Name and Relationship to Patient    ..................................................               ................................................  Date                                            Time    ..........................................................................................................................................  Reviewed by Signature/Title    ...................................................              ..............................................  Date                                                            Time

## 2018-02-24 NOTE — PLAN OF CARE
RECEIVING UNIT ED HANDOFF REVIEW    ED Nurse Handoff Report was reviewed by: Olivia Berg on February 24, 2018 at 2:13 PM

## 2018-02-24 NOTE — ED NOTES
[facility]  ED Nurse Handoff Report    ED Chief complaint: Foot Pain (L foot pain, sent from nursing home)      ED Diagnosis:   Final diagnoses:   Foot pain   Inability to ambulate due to ankle or foot       Code Status: DNR / DNI    Allergies:   Allergies   Allergen Reactions     Cozaar [Losartan Potassium] Swelling     Leg swelling. Same from Avapro.     Hydrochlorothiazide Other (See Comments)     hyponatremia     Hydrocodone Nausea and Vomiting     Nausea       Activity level - Baseline/Home:  Independent    Activity Level - Current:   Stand with Assist of 2     Needed?: No    Isolation: No  Infection: Not Applicable    Bariatric?: No    Vital Signs:   Vitals:    02/24/18 1138   BP: 147/60   Resp: 16   Temp: 98.6  F (37  C)   TempSrc: Oral   SpO2: 94%       Cardiac Rhythm: ,        Pain level: 0-10 Pain Scale: 4    Is this patient confused?: No    Patient Report: Initial Complaint: Foot pain   Focused Assessment: Pt came to ER today complaining of left foot pain.  No injury noted.  She lives in assisted living and due to foot pain is unable to care for self at this time.  She has minimal weight baring on that foot.  She normally uses a walker at home.  Pt has aphasia from a previous stroke.  She can talk a little.    Tests Performed:   Abnormal Results:   Results for orders placed or performed during the hospital encounter of 02/24/18 (from the past 24 hour(s))   Foot XR, G/E 3 views, left    Narrative    LEFT FOOT THREE OR MORE VIEWS  2/24/2018 12:02 PM     HISTORY: Atraumatic foot pain over medial aspect of plantar foot.     COMPARISON: None.      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 No evidence for  fracture. Benign traction spurs are seen off the plantar fashion and  Achilles tendon attachment sites.    LUCIE AMIN MD   US Lower Extremity Venous Duplex Left    Narrative    VENOUS  ULTRASOUND LEFT LEG  2/24/2018 12:39 PM     HISTORY: Lower calf and foot pain.    COMPARISON: None.    FINDINGS:  Examination of the deep veins with graded compression and  color flow Doppler with spectral wave form analysis shows no evidence  of thrombus in the common femoral vein, femoral vein, popliteal vein  or calf veins.  There is no venous insufficiency.  Incidental note is  made of a probable 1 cm lipoma in the left groin.      Impression    IMPRESSION: No evidence of deep venous thrombosis.    LUCIE AMIN MD   CBC with platelets + differential   Result Value Ref Range    WBC 14.4 (H) 4.0 - 11.0 10e9/L    RBC Count 4.22 3.8 - 5.2 10e12/L    Hemoglobin 12.5 11.7 - 15.7 g/dL    Hematocrit 38.0 35.0 - 47.0 %    MCV 90 78 - 100 fl    MCH 29.6 26.5 - 33.0 pg    MCHC 32.9 31.5 - 36.5 g/dL    RDW 15.2 (H) 10.0 - 15.0 %    Platelet Count 246 150 - 450 10e9/L    Diff Method Automated Method     % Neutrophils 73.2 %    % Lymphocytes 14.4 %    % Monocytes 10.5 %    % Eosinophils 1.6 %    % Basophils 0.1 %    % Immature Granulocytes 0.2 %    Nucleated RBCs 0 0 /100    Absolute Neutrophil 10.6 (H) 1.6 - 8.3 10e9/L    Absolute Lymphocytes 2.1 0.8 - 5.3 10e9/L    Absolute Monocytes 1.5 (H) 0.0 - 1.3 10e9/L    Absolute Eosinophils 0.2 0.0 - 0.7 10e9/L    Absolute Basophils 0.0 0.0 - 0.2 10e9/L    Abs Immature Granulocytes 0.0 0 - 0.4 10e9/L    Absolute Nucleated RBC 0.0      Treatments provided:     Family Comments: Daughter at bedside    OBS brochure/video discussed/provided to patient: Yes    ED Medications:   Medications   cephALEXin (KEFLEX) capsule 500 mg (500 mg Oral Given 2/24/18 1324)       Drips infusing?:  No      ED NURSE PHONE NUMBER: [phone]

## 2018-02-24 NOTE — ED PROVIDER NOTES
History     Chief Complaint:  Foot Pain    The history is provided by the patient. The history is limited by the condition of the patient.      Regla Benavidez is a 89 year old female who presents to the emergency department today for evaluation of foot pain. The patient was sent here from her nursing home with left foot pain on the bottom of her foot. She does not remember injuring it, and has some lower left leg pain as well.  Daughter feels it is mildly red.  No fevers, vomiting or other symptoms.  Patient has expressive aphasia from and old stroke.     Allergies:  Cozaar [Losartan Potassium]  Hydrochlorothiazide  Hydrocodone     Medications:    furosemide (LASIX) 20 MG tablet  spironolactone (ALDACTONE) 25 MG tablet  metformin (GLUCOPHAGE) 500 MG tablet  glipizide (GLUCOTROL) 10 MG tablet  blood glucose monitoring (ONETOUCH ULTRA) test strip  sitagliptin (JANUVIA) 50 MG tablet  nystatin (MYCOSTATIN) 490799 UNIT/GM POWD  carbamide peroxide (DEBROX) 6.5 % otic solution  saccharomyces boulardii (FLORASTOR) 250 MG capsule  lactobacillus acidophilus & bulgar (LACTINEX) chewable tablet  acetaminophen (TYLENOL) 325 MG tablet  pramipexole (MIRAPEX) 0.125 MG tablet  polyethylene glycol (MIRALAX) powder  ranitidine (ZANTAC) 150 MG tablet  Cholecalciferol (VITAMIN D3 PO)  senna-docusate (SENOKOT-S;PERICOLACE) 8.6-50 MG per tablet  co-enzyme Q-10 100 MG CAPS  simvastatin (ZOCOR) 5 MG tablet  aspirin 325 MG tablet     Past Medical History:    Cholelithiasis   DJD (degenerative joint disease) of lumbar spine   Elevated glucose   Hard of hearing   HTN (hypertension)   Shingles   Varicose vein of leg    Past Surgical History:    Cataract IOL, RT/LT    Family History:    Diabetes  Brother    Social History:  The patient was accompanied to the ED by brother and assisted living careworker.  Smoking Status: Never Smoker  Smokeless Tobacco: Never Used  Alcohol Use: Negative   Marital Status:       Review of Systems    Musculoskeletal:        Foot pain (L)   All other systems reviewed and are negative.    Physical Exam     Patient Vitals for the past 24 hrs:   BP Temp Temp src Heart Rate Resp SpO2   02/24/18 1138 147/60 98.6  F (37  C) Oral 78 16 94 %      Physical Exam  General: Well-nourished  Eyes: PERRL, conjunctivae pink no scleral icterus or conjunctival injection  ENT:  Moist mucus membranes, posterior oropharynx clear without erythema or exudates  Respiratory:  Lungs clear to auscultation bilaterally, no crackles/rubs/wheezes.  Good air movement  CV: Normal rate and rhythm, no murmurs/rubs/gallops.  Symmetric DP pulses and capillary refill, temp and color over dorsum of foot  GI:  Abdomen soft and non-distended.  Normoactive BS.  No tenderness, guarding or rebound  Skin: Warm, dry.  No rashes or petechiae.  ?subtle redness and warmth of skin of arch of left foot extending slightly medially  Musculoskeletal: No peripheral edema or calf tenderness.  Mild calf discomfort.  Tenderness over arch of left foot.  Joints of foot/ankle/toes without obvious redness or effusion  Neuro: Alert and oriented to person/place/time  Psychiatric: Normal affect      Emergency Department Course     Imaging:  Radiology findings were communicated with the patient and her caregiver who voiced understanding of the findings.    US Lower Extremity Venous Duplex Left  No evidence of deep venous thrombosis.  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 No evidence for  fracture. Benign traction spurs are seen off the plantar fashion and  Achilles tendon attachment sites.  Reading per radiology    Interventions:  1324 Keflex 500 mg PO    Emergency Department Course:    1138 Nursing notes and vitals reviewed.    1145 I performed an exam of the patient as documented above.     1149 The patient was sent for a XR and US while in the emergency  department, results above.     1331 I spoke with Dr. Wade of the Hospitalist service regarding patient's presentation, findings, and plan of care.     1342 Patient was rechecked and updated.    1345 I personally reviewed the imaging results with the patient and her family and answered all related questions prior to admission for observation.    Impression & Plan      Medical Decision Making:  Regla Benavidez is a 89 year old female who presents to the emergency department today for evaluation of atraumatic left foot pain. On exam there's a tiny area of redness and some very subtle edema. I don't appreciate any streaking or other signs of rapidly expanding cellulitis but given the findings, pain and slight leukocytosis, I suspect this could be an early or subtle cellulitis. The patient is not toxic, is afebrile, and otherwise well appearing. An XR of the foot is negative without signs of fracture. Her Doppler US shows no signs of DVT and she has normal, symmetric DP pulses, cap refill, and color so I doubt this is arterial issue, although I  do know that she has a history of Periferal Arterial Disease. It does not appear in the right distribution for Gout, and she has no history of Gout, so this is less likely. She was treated with Tylenol. Her pain has improved but she is still not able to ambulate on it. She was given oral Keflex. Labs were drawn. I spoke with a  about placement, and were not able to find placement today. Family vacillated on observation vs discharge and so I spoke with hospitalist Dr. Wade who was very gracious about accepting the patient for nursing cares and placement.  Family eventually was able to come up with a plan for her family to provide care in her assisted living facility.  We will discharge with a script for cephalexin and culturelle, recommendations for ice and elevation and early follow-up with her PMD.    Diagnosis:    ICD-10-CM    1. Foot pain M79.673       Disposition:   Admission to observation     Scribe Disclosure:  I, Fred Pramod, am serving as a scribe at 11:43 AM on 2/24/2018 to document services personally performed by Lauren Bray MD based on my observations and the provider's statements to me.       EMERGENCY DEPARTMENT       Lauren Bray MD  02/24/18 1425

## 2018-02-24 NOTE — ED AVS SNAPSHOT
Emergency Department    [address] [facility] [address]    Phone:  [phone]    Fax:  [phone]                                       Regla Benavidez   MRN: [id number]    Department:   Emergency Department   Date of Visit:  2/24/2018           Patient Information     Date Of Birth          1/5/1926        Your diagnoses for this visit were:     Foot pain     Inability to ambulate due to ankle or foot     Cellulitis of foot        You were seen by Lauren Bray MD.      Follow-up Information     Follow up with Leo Melgar MD. Schedule an appointment as soon as possible for a visit in 2 days.    Specialty:  Internal Medicine    Contact information:    [address]  [facility] [address] [phone]          Discharge Instructions       *Elevate your leg as much as possible.  *Take medications as prescribed. Cephalexin.  Culturelle to prevent diarrhea. Continue your current medications  *Follow-up with your doctor for a recheck in the next 24-48 hours.  *Return if you develop fever, rapid spread or pain/redness/warmth, worsening pain, faint or feel like you will faint or become worse in any way.      Discharge Instructions  Cellulitis    Cellulitis is an infection of the skin that occurs when bacteria enter the skin.   Symptoms are generally redness, swelling, warmth and pain.  Your infection appeared to be appropriate to treat at home with antibiotics.  However, sometimes your infection may be worse than it seemed at first, or may worsen with time. If you have new or worse symptoms, you may need to be seen again in the Emergency Department or by your primary doctor.    Return to the Emergency Department if:    The redness, pain, or swelling gets a lot worse.  If the red area was marked, return if it is red beyond the marked area.    You are unable to get your antibiotics, or are vomiting them up or you can t take them.    You are feeling more ill, weak or lightheaded.    You  "start to run a new fever (temperature >101).    Anything else about the infection worries or concerns you.  Treatment:    Start your antibiotics right away, and take them as prescribed. Be sure to finish the whole prescription, even if you are better.    Apply a heating pad, warm packs, or warm water soaks to the infected area for 15 minutes at a time, at least 3 times a day. Do not use a heating pad on your feet or legs if you have diabetes. Do not sleep with a heating pad on, since this can cause burns or skin injury.    Rest your injured area for at least 1-2 days. After that you may start using your extremity again as long as there is not too much pain.     Raise the injured area above the level of your heart as much as possible in the first 1-2 days.    Tylenol  (acetaminophen), Motrin  (ibuprofen), or Advil  (ibuprofen) may help may help reduce pain and fever and may help you feel more comfortable. Be sure to read and follow the package directions, and ask your doctor if you have questions.    Follow-up with your doctor:    Re-check in clinic within 2-3 days.  Probiotics: If you have been given an antibiotic, you may want to also take a probiotic pill or eat yogurt with live cultures. Probiotics have \"good bacteria\" to help your intestines stay healthy. Studies have shown that probiotics help prevent diarrhea and other intestine problems (including C. diff infection) when you take antibiotics. You can buy these without a prescription in the pharmacy section of the store.     If you were given a prescription for medicine here today, be sure to read all of the information (including the package insert) that comes with your prescription.  This will include important information about the medicine, its side effects, and any warnings that you need to know about.  The pharmacist who fills the prescription can provide more information and answer questions you may have about the medicine.  If you have questions or " concerns that the pharmacist cannot address, please call or return to the Emergency Department.     Opioid Medication Information    Pain medications are among the most commonly prescribed medicines, so we are including this information for all our patients. If you did not receive pain medication or get a prescription for pain medicine, you can ignore it.     You may have been given a prescription for an opioid (narcotic) pain medicine and/or have received a pain medicine while here in the Emergency Department. These medicines can make you drowsy or impaired. You must not drive, operate dangerous equipment, or engage in any other dangerous activities while taking these medications. If you drive while taking these medications, you could be arrested for DUI, or driving under the influence. Do not drink any alcohol while you are taking these medications.     Opioid pain medications can cause addiction. If you have a history of chemical dependency of any type, you are at a higher risk of becoming addicted to pain medications.  Only take these prescribed medications to treat your pain when all other options have been tried. Take it for as short a time and as few doses as possible. Store your pain pills in a secure place, as they are frequently stolen and provide a dangerous opportunity for children or visitors in your house to start abusing these powerful medications. We will not replace any lost or stolen medicine.  As soon as your pain is better, you should flush all your remaining medication.     Many prescription pain medications contain Tylenol  (acetaminophen), including Vicodin , Tylenol #3 , Norco , Lortab , and Percocet .  You should not take any extra pills of Tylenol  if you are using these prescription medications or you can get very sick.  Do not ever take more than 3000 mg of acetaminophen in any 24 hour period.    All opioids tend to cause constipation. Drink plenty of water and eat foods that have a lot  of fiber, such as fruits, vegetables, prune juice, apple juice and high fiber cereal.  Take a laxative if you don t move your bowels at least every other day. Miralax , Milk of Magnesia, Colace , or Senna  can be used to keep you regular.      Remember that you can always come back to the Emergency Department if you are not able to see your regular doctor in the amount of time listed above, if you get any new symptoms, or if there is anything that worries you.            24 Hour Appointment Hotline       To make an appointment at any [facility], call [phone] ([phone]). If you don't have a family doctor or clinic, we will help you find one. [facility] are conveniently located to serve the needs of you and your family.             Review of your medicines      START taking        Dose / Directions Last dose taken    [facility] Caps   Dose:  1 capsule   Quantity:  28 capsule        Take 1 capsule by mouth 2 times daily for 14 days   Refills:  0          Our records show that you are taking the medicines listed below. If these are incorrect, please call your family doctor or clinic.        Dose / Directions Last dose taken    acetaminophen 325 MG tablet   Commonly known as:  TYLENOL   Dose:  650 mg   Quantity:  184 tablet        Take 2 tablets (650 mg) by mouth At Bedtime Take 2 tablets at bedtime   Refills:  2        aspirin 325 MG tablet   Dose:  325 mg   Quantity:  120 tablet        Take 1 tablet (325 mg) by mouth daily   Refills:  0        blood glucose monitoring test strip   Commonly known as:  ONETOUCH ULTRA   Quantity:  100 each        USE TO TEST ONCE EVERY MORNING AS DIRECTED   Refills:  11        carbamide peroxide 6.5 % otic solution   Commonly known as:  DEBROX   Dose:  5 drop        Place 5 drops into both ears daily as needed for other   Refills:  0        co-enzyme Q-10 100 MG Caps capsule   Dose:  100 mg   Quantity:  30 capsule        Take 1 capsule (100 mg) by  mouth daily   Refills:  0        furosemide 20 MG tablet   Commonly known as:  LASIX   Dose:  20 mg   Quantity:  60 tablet        Take 1 tablet (20 mg) by mouth 2 times daily As of 1/27/18   Refills:  12        glipiZIDE 10 MG tablet   Commonly known as:  GLUCOTROL   Dose:  10 mg   Quantity:  60 tablet        Take 1 tablet (10 mg) by mouth 2 times daily (before meals)   Refills:  11        lactobacillus acidophilus & bulgar chewable tablet   Dose:  1 tablet   Quantity:  60 tablet        Take 1 tablet by mouth 2 times daily With meals   Refills:  11        metFORMIN 500 MG tablet   Commonly known as:  GLUCOPHAGE   Dose:  500 mg   Quantity:  30 tablet        Take 1 tablet (500 mg) by mouth daily (with dinner)   Refills:  12        nystatin 618062 UNIT/GM Powd   Commonly known as:  MYCOSTATIN   Dose:  1 g   Quantity:  60 g        Apply 1 g topically 3 times daily as needed   Refills:  11        * order for DME   Quantity:  1 Device        Equipment being ordered: glucometer kit- brand per insurance coverage Patient prefers brand with DRUM lancets - patient is to test fasting once each morning #30 with 5 refills each of the lancets and test strips   Refills:  0        * order for DME   Quantity:  100 lancet        Equipment being ordered: retractable safety lancets   Refills:  1        * order for DME   Quantity:  3 Package        Equipment being ordered: Large Underpads   Refills:  6        * order for DME   Quantity:  150 Units        Equipment being ordered: Esthela xtra absorbent large pull ups - Uses 5 per day.   Refills:  11        polyethylene glycol powder   Commonly known as:  MIRALAX   Dose:  1 capful   Quantity:  510 g        Take 17 g (1 capful) by mouth twice a week   Refills:  12        pramipexole 0.125 MG tablet   Commonly known as:  MIRAPEX   Dose:  0.25 mg   Quantity:  60 tablet        Take 2 tablets (0.25 mg) by mouth At Bedtime   Refills:  12        ranitidine 150 MG tablet   Commonly known as:  ZANTAC    Dose:  150 mg   Quantity:  60 tablet        Take 1 tablet (150 mg) by mouth 2 times daily   Refills:  5        saccharomyces boulardii 250 MG capsule   Commonly known as:  FLORASTOR   Dose:  250 mg   Quantity:  60 capsule        Take 1 capsule (250 mg) by mouth 2 times daily   Refills:  3        senna-docusate 8.6-50 MG per tablet   Commonly known as:  SENOKOT-S;PERICOLACE   Dose:  1 tablet        Take 1 tablet by mouth daily HOLD IF HAS LOOSE STOOLS   Refills:  0        simvastatin 5 MG tablet   Commonly known as:  ZOCOR   Dose:  5 mg   Quantity:  30 tablet        Take 1 tablet (5 mg) by mouth every evening   Refills:  0        sitagliptin 50 MG tablet   Commonly known as:  JANUVIA   Dose:  50 mg   Quantity:  30 tablet        Take 1 tablet (50 mg) by mouth daily   Refills:  11        spironolactone 25 MG tablet   Commonly known as:  ALDACTONE   Dose:  12.5 mg   Quantity:  30 tablet        Take 0.5 tablets (12.5 mg) by mouth daily   Refills:  11        VITAMIN D3 PO   Dose:  2000 Units        Take 2,000 Units by mouth daily   Refills:  0        * Notice:  This list has 4 medication(s) that are the same as other medications prescribed for you. Read the directions carefully, and ask your doctor or other care provider to review them with you.            Prescriptions were sent or printed at these locations (1 Prescription)                   Other Prescriptions                Printed at Department/Unit printer (1 of 1)         Lactobacillus-Inulin ([facility] DIGESTIVE [facility]) CAPS                Procedures and tests performed during your visit     Basic metabolic panel    CBC with platelets + differential    ED Bed Request    Foot XR, G/E 3 views, left    Patient care order    US Lower Extremity Venous Duplex Left      Orders Needing Specimen Collection     None      Pending Results     No orders found from 2/22/2018 to 2/25/2018.            Pending Culture Results     No orders found from 2/22/2018 to 2/25/2018.             Pending Results Instructions     If you had any lab results that were not finalized at the time of your Discharge, you can call the ED Lab Result RN at [phone]. You will be contacted by this team for any positive Lab results or changes in treatment. The nurses are available 7 days a week from 10A to 6:30P.  You can leave a message 24 hours per day and they will return your call.        Test Results From Your Hospital Stay        2/24/2018 12:28 PM      Narrative     LEFT FOOT THREE OR MORE VIEWS  2/24/2018 12:02 PM     HISTORY: Atraumatic foot pain over medial aspect of plantar foot.     COMPARISON: None.        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 No evidence for  fracture. Benign traction spurs are seen off the plantar fashion and  Achilles tendon attachment sites.    LUCIE AMIN MD         2/24/2018 12:51 PM      Narrative     VENOUS ULTRASOUND LEFT LEG  2/24/2018 12:39 PM     HISTORY: Lower calf and foot pain.    COMPARISON: None.    FINDINGS:  Examination of the deep veins with graded compression and  color flow Doppler with spectral wave form analysis shows no evidence  of thrombus in the common femoral vein, femoral vein, popliteal vein  or calf veins.  There is no venous insufficiency.  Incidental note is  made of a probable 1 cm lipoma in the left groin.        Impression     IMPRESSION: No evidence of deep venous thrombosis.    LUCIE AMIN MD         2/24/2018  1:57 PM      Component Results     Component Value Ref Range & Units Status    WBC 14.4 (H) 4.0 - 11.0 10e9/L Final    RBC Count 4.22 3.8 - 5.2 10e12/L Final    Hemoglobin 12.5 11.7 - 15.7 g/dL Final    Hematocrit 38.0 35.0 - 47.0 % Final    MCV 90 78 - 100 fl Final    MCH 29.6 26.5 - 33.0 pg Final    MCHC 32.9 31.5 - 36.5 g/dL Final    RDW 15.2 (H) 10.0 - 15.0 % Final    Platelet Count 246 150 - 450 10e9/L Final    Diff Method Automated  Method  Final    % Neutrophils 73.2 % Final    % Lymphocytes 14.4 % Final    % Monocytes 10.5 % Final    % Eosinophils 1.6 % Final    % Basophils 0.1 % Final    % Immature Granulocytes 0.2 % Final    Nucleated RBCs 0 0 /100 Final    Absolute Neutrophil 10.6 (H) 1.6 - 8.3 10e9/L Final    Absolute Lymphocytes 2.1 0.8 - 5.3 10e9/L Final    Absolute Monocytes 1.5 (H) 0.0 - 1.3 10e9/L Final    Absolute Eosinophils 0.2 0.0 - 0.7 10e9/L Final    Absolute Basophils 0.0 0.0 - 0.2 10e9/L Final    Abs Immature Granulocytes 0.0 0 - 0.4 10e9/L Final    Absolute Nucleated RBC 0.0  Final         2/24/2018  2:06 PM      Component Results     Component Value Ref Range & Units Status    Sodium 139 133 - 144 mmol/L Final    Potassium 3.8 3.4 - 5.3 mmol/L Final    Chloride 104 94 - 109 mmol/L Final    Carbon Dioxide 28 20 - 32 mmol/L Final    Anion Gap 7 3 - 14 mmol/L Final    Glucose 114 (H) 70 - 99 mg/dL Final    Urea Nitrogen 27 7 - 30 mg/dL Final    Creatinine 1.09 (H) 0.52 - 1.04 mg/dL Final    GFR Estimate 47 (L) >60 mL/min/1.7m2 Final    Non  GFR Calc    GFR Estimate If Black 57 (L) >60 mL/min/1.7m2 Final    African American GFR Calc    Calcium 8.7 8.5 - 10.1 mg/dL Final                Clinical Quality Measure: Blood Pressure Screening     Your blood pressure was checked while you were in the emergency department today. The last reading we obtained was  BP: 147/60 . Please read the guidelines below about what these numbers mean and what you should do about them.  If your systolic blood pressure (the top number) is less than 120 and your diastolic blood pressure (the bottom number) is less than 80, then your blood pressure is normal. There is nothing more that you need to do about it.  If your systolic blood pressure (the top number) is 120-139 or your diastolic blood pressure (the bottom number) is 80-89, your blood pressure may be higher than it should be. You should have your blood pressure rechecked within a  year by a primary care provider.  If your systolic blood pressure (the top number) is 140 or greater or your diastolic blood pressure (the bottom number) is 90 or greater, you may have high blood pressure. High blood pressure is treatable, but if left untreated over time it can put you at risk for heart attack, stroke, or kidney failure. You should have your blood pressure rechecked by a primary care provider within the next 4 weeks.  If your provider in the emergency department today gave you specific instructions to follow-up with your doctor or provider even sooner than that, you should follow that instruction and not wait for up to 4 weeks for your follow-up visit.        Thank you for choosing [facility]       Thank you for choosing [facility] for your care. Our goal is always to provide you with excellent care. Hearing back from our patients is one way we can continue to improve our services. Please take a few minutes to complete the written survey that you may receive in the mail after you visit with us. Thank you!        [employer] Information     [employer] gives you secure access to your electronic health record. If you see a primary care provider, you can also send messages to your care team and make appointments. If you have questions, please call your primary care clinic.  If you do not have a primary care provider, please call [phone] and they will assist you.        Care EveryWhere ID     This is your Care EveryWhere ID. This could be used by other organizations to access your [facility] medical records  [id number]        Equal Access to Services     DESI REESE : Jessica Jenkins, wamilo damon, qaybta eriberto yanez. So [facility] [phone].    ATENCIÓN: Si habla español, tiene a garzon disposición servicios gratuitos de asistencia lingüística. Rubina al [phone].    We comply with applicable federal civil rights laws and [place] laws. We do  not discriminate on the basis of race, color, national origin, age, disability, sex, sexual orientation, or gender identity.            After Visit Summary       This is your record. Keep this with you and show to your community pharmacist(s) and doctor(s) at your next visit.

## 2018-02-26 ENCOUNTER — CARE COORDINATION (OUTPATIENT)
Dept: GERIATRIC MEDICINE | Facility: CLINIC | Age: 83
End: 2018-02-26

## 2018-02-26 NOTE — PROGRESS NOTES
Received notice that member had an ER visit on 2/24/18 for foot pain and inability to walk. Member was not admitted to the hospital and instead was discharged back to the AL with antibiotics for cellulitis. Called and spoke with AL nurse Luann. Luann reports that family brought in a w/c for member to use and that son Ed had stayed overnight on the weekend. Daughter Bismark is with member today. Biggest concern is compliance on part of member in using the call light when member needs to use the bathroom, so member does not transfer on her own. Luann states she discussed this with member and member indicated she would use the light to call for staff but then 15 min later, member was found in the bathroom and had transferred herself. Discussed TCU and if member needed that, member would not need a 3 day hospital stay. As long as member will use the call light as member could fall in AL or NH if doesn't use her light. Member on antibiotics and will hopefully have therapy once she is able to bear weight. Member has a follow up appt with PCP on 2/28/18. Called daughter Bismark and no answer, so detailed ML requesting a call back if has questions.   Autumn Jacobsen RN, PHN, [facility]   [phone]

## 2018-02-28 ENCOUNTER — OFFICE VISIT (OUTPATIENT)
Dept: FAMILY MEDICINE | Facility: CLINIC | Age: 83
End: 2018-02-28
Payer: COMMERCIAL

## 2018-02-28 VITALS
RESPIRATION RATE: 20 BRPM | OXYGEN SATURATION: 96 % | DIASTOLIC BLOOD PRESSURE: 68 MMHG | SYSTOLIC BLOOD PRESSURE: 138 MMHG | WEIGHT: 180 LBS | HEART RATE: 67 BPM | TEMPERATURE: 97.8 F | HEIGHT: 60 IN | BODY MASS INDEX: 35.34 KG/M2

## 2018-02-28 DIAGNOSIS — I73.9 PAD (PERIPHERAL ARTERY DISEASE) (H): ICD-10-CM

## 2018-02-28 DIAGNOSIS — G25.81 RESTLESS LEGS SYNDROME: ICD-10-CM

## 2018-02-28 DIAGNOSIS — M79.672 LEFT FOOT PAIN: Primary | ICD-10-CM

## 2018-02-28 DIAGNOSIS — E11.65 TYPE 2 DIABETES MELLITUS WITH HYPERGLYCEMIA, WITHOUT LONG-TERM CURRENT USE OF INSULIN (H): ICD-10-CM

## 2018-02-28 LAB
BASOPHILS # BLD AUTO: 0 10E9/L (ref 0–0.2)
BASOPHILS NFR BLD AUTO: 0.2 %
DIFFERENTIAL METHOD BLD: ABNORMAL
EOSINOPHIL # BLD AUTO: 0.5 10E9/L (ref 0–0.7)
EOSINOPHIL NFR BLD AUTO: 4.4 %
ERYTHROCYTE [DISTWIDTH] IN BLOOD BY AUTOMATED COUNT: 15 % (ref 10–15)
ERYTHROCYTE [SEDIMENTATION RATE] IN BLOOD BY WESTERGREN METHOD: 49 MM/H (ref 0–30)
HCT VFR BLD AUTO: 39.4 % (ref 35–47)
HGB BLD-MCNC: 12.6 G/DL (ref 11.7–15.7)
LYMPHOCYTES # BLD AUTO: 2.1 10E9/L (ref 0.8–5.3)
LYMPHOCYTES NFR BLD AUTO: 18.1 %
MCH RBC QN AUTO: 29.6 PG (ref 26.5–33)
MCHC RBC AUTO-ENTMCNC: 32 G/DL (ref 31.5–36.5)
MCV RBC AUTO: 93 FL (ref 78–100)
MONOCYTES # BLD AUTO: 1 10E9/L (ref 0–1.3)
MONOCYTES NFR BLD AUTO: 8.3 %
NEUTROPHILS # BLD AUTO: 8.1 10E9/L (ref 1.6–8.3)
NEUTROPHILS NFR BLD AUTO: 69 %
PLATELET # BLD AUTO: 266 10E9/L (ref 150–450)
RBC # BLD AUTO: 4.26 10E12/L (ref 3.8–5.2)
WBC # BLD AUTO: 11.7 10E9/L (ref 4–11)

## 2018-02-28 PROCEDURE — 99214 OFFICE O/P EST MOD 30 MIN: CPT | Performed by: INTERNAL MEDICINE

## 2018-02-28 PROCEDURE — 84550 ASSAY OF BLOOD/URIC ACID: CPT | Performed by: INTERNAL MEDICINE

## 2018-02-28 PROCEDURE — 85025 COMPLETE CBC W/AUTO DIFF WBC: CPT | Performed by: INTERNAL MEDICINE

## 2018-02-28 PROCEDURE — 85652 RBC SED RATE AUTOMATED: CPT | Performed by: INTERNAL MEDICINE

## 2018-02-28 PROCEDURE — 36415 COLL VENOUS BLD VENIPUNCTURE: CPT | Performed by: INTERNAL MEDICINE

## 2018-02-28 RX ORDER — PRAMIPEXOLE DIHYDROCHLORIDE 0.25 MG/1
0.38 TABLET ORAL AT BEDTIME
Qty: 45 TABLET | Refills: 11 | Status: SHIPPED | OUTPATIENT
Start: 2018-02-28 | End: 2021-04-28

## 2018-02-28 RX ORDER — COLCHICINE 0.6 MG/1
TABLET ORAL
Qty: 16 TABLET | Refills: 0 | Status: ON HOLD | OUTPATIENT
Start: 2018-02-28 | End: 2018-03-03

## 2018-02-28 NOTE — NURSING NOTE
Chief Complaint   Patient presents with     Hospital F/U       Initial /68 (BP Location: Left arm, Patient Position: Chair, Cuff Size: Adult Large)  Pulse 67  Temp 97.8  F (36.6  C)  Resp 20  Ht 5' (1.524 m)  Wt 180 lb (81.6 kg)  LMP  (LMP Unknown)  SpO2 96%  Breastfeeding? No  BMI 35.15 kg/m2 Estimated body mass index is 35.15 kg/(m^2) as calculated from the following:    Height as of this encounter: 5' (1.524 m).    Weight as of this encounter: 180 lb (81.6 kg).  Medication Reconciliation: complete   Oumou Calzada LPN

## 2018-02-28 NOTE — MR AVS SNAPSHOT
After Visit Summary   2/28/2018    Regla Benavidez    MRN: [id number]           Patient Information     Date Of Birth          1/5/1926        Visit Information        Provider Department      2/28/2018 11:30 AM Leo Melgar MD [facility]        Today's Diagnoses     Left foot pain    -  1    PAD (peripheral artery disease) (H)        Type 2 diabetes mellitus with hyperglycemia, without long-term current use of insulin (H)        Restless legs syndrome          Care Instructions    Please call to schedule the MRI of the left foot.              Call [phone] for an appointment.                      Follow-ups after your visit        Future tests that were ordered for you today     Open Future Orders        Priority Expected Expires Ordered    MR Foot Left w/o Contrast Routine  2/28/2019 2/28/2018            Who to contact     If you have questions or need follow up information about today's clinic visit or your schedule please contact [facility] directly at [phone].  Normal or non-critical lab and imaging results will be communicated to you by NameMediahart, letter or phone within 4 business days after the clinic has received the results. If you do not hear from us within 7 days, please contact the clinic through CPUsaget or phone. If you have a critical or abnormal lab result, we will notify you by phone as soon as possible.  Submit refill requests through TripTouch or call your pharmacy and they will forward the refill request to us. Please allow 3 business days for your refill to be completed.          Additional Information About Your Visit        NameMediahart Information     TripTouch gives you secure access to your electronic health record. If you see a primary care provider, you can also send messages to your care team and make appointments. If you have questions, please call your primary care clinic.  If you do not have a primary care  provider, please call [phone] and they will assist you.        Care EveryWhere ID     This is your Care EveryWhere ID. This could be used by other organizations to access your [place] medical records  [id number]        Your Vitals Were     Pulse Temperature Respirations Height Last Period Pulse Oximetry    67 97.8  F (36.6  C) 20 5' (1.524 m) (LMP Unknown) 96%    Breastfeeding? BMI (Body Mass Index)                No 35.15 kg/m2           Blood Pressure from Last 3 Encounters:   02/28/18 138/68   02/24/18 126/63   02/09/18 112/68    Weight from Last 3 Encounters:   02/28/18 180 lb (81.6 kg)   02/09/18 180 lb (81.6 kg)   01/27/18 181 lb (82.1 kg)              We Performed the Following     CBC with platelets and differential     ESR: Erythrocyte sedimentation rate     Uric acid          Today's Medication Changes          These changes are accurate as of 2/28/18 12:40 PM.  If you have any questions, ask your nurse or doctor.               Start taking these medicines.        Dose/Directions    colchicine 0.6 MG tablet   Commonly known as:  COLCRYS   Used for:  Left foot pain   Started by:  Leo Melgar MD        Take one tablet bid for 2 days, then one per day for 4 more days.   Quantity:  16 tablet   Refills:  0         These medicines have changed or have updated prescriptions.        Dose/Directions    pramipexole 0.25 MG tablet   Commonly known as:  MIRAPEX   This may have changed:    - medication strength  - how much to take   Used for:  Restless legs syndrome   Changed by:  Leo Melgar MD        Dose:  0.375 mg   Take 1.5 tablets (0.375 mg) by mouth At Bedtime   Quantity:  45 tablet   Refills:  11         Stop taking these medicines if you haven't already. Please contact your care team if you have questions.     co-enzyme Q-10 100 MG Caps capsule   Stopped by:  Leo Melgar MD                Where to get your medicines      These medications were sent to [facility] - [place], [place] -  [address]  [address], [facility] [address]     Phone:  [phone]     colchicine 0.6 MG tablet    pramipexole 0.25 MG tablet                Primary Care Provider Office Phone # Fax #    Leo Melgar [phone] [address]  [facility] [phone]        Equal Access to Services     DESI REESE : Hadii aad ku hadasho Soomaali, waaxda luqadaha, qaybta kaalmada adeegyada, waxay idiin hayaan adeeg kharash la'aan . So [facility] [phone].    ATENCIÓN: Si habla espcelia, tiene a garzon disposición servicios gratuitos de asistencia lingüística. Llame al [phone].    We comply with applicable federal civil rights laws and [place] laws. We do not discriminate on the basis of race, color, national origin, age, disability, sex, sexual orientation, or gender identity.            Thank you!     Thank you for choosing [facility]  for your care. Our goal is always to provide you with excellent care. Hearing back from our patients is one way we can continue to improve our services. Please take a few minutes to complete the written survey that you may receive in the mail after your visit with us. Thank you!             Your Updated Medication List - Protect others around you: Learn how to safely use, store and throw away your medicines at www.disposemymeds.org.          This list is accurate as of 2/28/18 12:40 PM.  Always use your most recent med list.                   Brand Name Dispense Instructions for use Diagnosis    acetaminophen 325 MG tablet    TYLENOL    184 tablet    Take 2 tablets (650 mg) by mouth At Bedtime Take 2 tablets at bedtime    Midline low back pain without sciatica       aspirin 325 MG tablet     120 tablet    Take 1 tablet (325 mg) by mouth daily    Stroke (H)       blood glucose monitoring test strip    ONETOUCH ULTRA    100 each    USE TO TEST ONCE EVERY MORNING AS DIRECTED    Type 2 diabetes mellitus without complication,  without long-term current use of insulin (H)       carbamide peroxide 6.5 % otic solution    DEBROX     Place 5 drops into both ears daily as needed for other        cephALEXin 500 MG capsule    KEFLEX    14 capsule    Take 1 capsule (500 mg) by mouth 2 times daily for 7 days        colchicine 0.6 MG tablet    COLCRYS    16 tablet    Take one tablet bid for 2 days, then one per day for 4 more days.    Left foot pain       CULTUREE DIGESTIVE HEALTH Caps     28 capsule    Take 1 capsule by mouth 2 times daily for 14 days        furosemide 20 MG tablet    LASIX    60 tablet    Take 1 tablet (20 mg) by mouth 2 times daily As of 1/27/18    Bilateral leg edema       glipiZIDE 10 MG tablet    GLUCOTROL    60 tablet    Take 1 tablet (10 mg) by mouth 2 times daily (before meals)    Type 2 diabetes mellitus with hyperglycemia, without long-term current use of insulin (H)       lactobacillus acidophilus & bulgar chewable tablet     60 tablet    Take 1 tablet by mouth 2 times daily With meals    Diarrhea, unspecified type       metFORMIN 500 MG tablet    GLUCOPHAGE    30 tablet    Take 1 tablet (500 mg) by mouth daily (with dinner)        nystatin 554050 UNIT/GM Powd    MYCOSTATIN    60 g    Apply 1 g topically 3 times daily as needed        * order for DME     1 Device    Equipment being ordered: glucometer kit- brand per insurance coverage Patient prefers brand with DRUM lancets - patient is to test fasting once each morning #30 with 5 refills each of the lancets and test strips    Type II or unspecified type diabetes mellitus without mention of complication, not stated as uncontrolled       * order for DME     100 lancet    Equipment being ordered: retractable safety lancets    Type II or unspecified type diabetes mellitus without mention of complication, not stated as uncontrolled       * order for DME     3 Package    Equipment being ordered: Large Underpads    Urinary frequency       * order for DME     150 Units     Equipment being ordered: Esthela xtra absorbent large pull ups - Uses 5 per day.    Urinary incontinence       polyethylene glycol powder    MIRALAX    510 g    Take 17 g (1 capful) by mouth twice a week    Constipation, unspecified constipation type       pramipexole 0.25 MG tablet    MIRAPEX    45 tablet    Take 1.5 tablets (0.375 mg) by mouth At Bedtime    Restless legs syndrome       ranitidine 150 MG tablet    ZANTAC    60 tablet    Take 1 tablet (150 mg) by mouth 2 times daily    Gastroesophageal reflux disease without esophagitis       saccharomyces boulardii 250 MG capsule    FLORASTOR    60 capsule    Take 1 capsule (250 mg) by mouth 2 times daily    Diarrhea, unspecified type       senna-docusate 8.6-50 MG per tablet    SENOKOT-S;PERICOLACE     Take 1 tablet by mouth daily HOLD IF HAS LOOSE STOOLS        simvastatin 5 MG tablet    ZOCOR    30 tablet    Take 1 tablet (5 mg) by mouth every evening    Stroke (H)       sitagliptin 50 MG tablet    JANUVIA    30 tablet    Take 1 tablet (50 mg) by mouth daily    Uncontrolled type 2 diabetes mellitus with complication, without long-term current use of insulin (H), Type 2 diabetes mellitus with hyperglycemia, without long-term current use of insulin (H)       spironolactone 25 MG tablet    ALDACTONE    30 tablet    Take 0.5 tablets (12.5 mg) by mouth daily    Bilateral leg edema       VITAMIN D3 PO      Take 2,000 Units by mouth daily        ZOFRAN PO      Take 4 mg by mouth every 8 hours as needed for nausea or vomiting        * Notice:  This list has 4 medication(s) that are the same as other medications prescribed for you. Read the directions carefully, and ask your doctor or other care provider to review them with you.

## 2018-02-28 NOTE — PROGRESS NOTES
"  SUBJECTIVE:   Regla Benavidez is a 89 year old female who presents to clinic today for the following health issues:      ED/UC Followup:    Facility:  [facility]  Date of visit: 2/24/18  Reason for visit: Left foot pain  Current Status: stable- diagnosed with Cellulitis, but \"family\" has concerns         This patient is here with her son and with her daughter.            She had an abrupt onset of left foot pain without injury 4 days ago.       She asked to be brought to the emergency room.         Evaluation there included a negative foot x-ray, negative ultrasound of the left leg for DVT, but her white count was elevated at 14,000.  They suspected cellulitis, so cephalexin was ordered  , and she is still taking that.  History is difficult from the patient given her expressive aphasia.           However family reports she is a little better able to bear weight now.                                                                                                                    They are also concerned about her ongoing restless leg symptoms and want a higher dose of the pramipexole.    Problem list and histories reviewed & adjusted, as indicated.      Patient Active Problem List    Diagnosis Date Noted     Hyponatremia 12/20/2014     Priority: High     With acute illness 11/14; HCTZ stopped.                Later,furosemide and spironolactone added; in 5/17, 36/1.18, Na+ 137,K+4.5, glucose 257       Aspiration pneumonia (H) 11/15/2014     Priority: High     11/14; and UTI with sepsis       Type 2 diabetes mellitus without complication (H) 07/02/2014     Priority: High     Expressive aphasia 07/02/2014     Priority: High     Cerebral infarction (H) 10/22/2013     Priority: High     10/21/13;L frontal, MCA ; see hosp () and rehab (Dr. Saba) reports.            Has severe expressive aphasia  Diagnosis updated by automated process. Provider to review and confirm.       Impaired fasting glucose 02/09/2018     " "Priority: Medium     PAD (peripheral artery disease) (H) 02/09/2018     Priority: Medium     Nonsmoker 02/09/2018     Priority: Medium     CKD (chronic kidney disease) stage 3, GFR 30-59 ml/min 02/09/2018     Priority: Medium     Class 2 obesity due to excess calories with serious comorbidity and body mass index (BMI) of 35.0 to 35.9 in adult 02/09/2018     Priority: Medium     Abnormal lung sounds- rhonchi Rt base  02/09/2018     Priority: Medium     Type 2 diabetes mellitus with stage 3 chronic kidney disease, without long-term current use of insulin (H) 02/09/2018     Priority: Medium     CRI (chronic renal insufficiency), stage 3 (moderate) 01/27/2018     Priority: Medium     Other vaginitis 07/29/2017     Priority: Medium     due to urinary incontinence       Dermatitis 07/29/2017     Priority: Medium     skin folds       Continuous leakage of urine 07/29/2017     Priority: Medium     Bilateral hearing loss, unspecified hearing loss type 07/29/2017     Priority: Medium     Type 2 diabetes mellitus with hyperglycemia, without long-term current use of insulin (H) 07/05/2017     Priority: Medium     Bilateral leg edema 03/22/2017     Priority: Medium     Prerenal azotemia 02/14/2017     Priority: Medium     Abdominal bloating 10/28/2015     Priority: Medium     CT neg except L ovarian cyst; CA-125= 3.    The other labs ok; see office visit note 10/21/15       Abnormal weight gain 10/28/2015     Priority: Medium     Cyst of left ovary 10/28/2015     Priority: Medium     Complex on CT and US: 6x4 cm in 11/15; d/w her son; recheck in 3-6 months       Recurrent UTI 03/18/2015     Priority: Medium     See urology note 12/15; cysto neg; PVR low; \"consider a prophylactic antibiotic\"       Low back pain 03/18/2015     Priority: Medium     Diagnosis updated by automated process. Provider to review and confirm.       Urinary frequency 01/22/2014     Priority: Medium     Restless legs syndrome 11/27/2013     Priority: Medium "     Hard of hearing      Priority: Medium     hearing aids       Constipation 2013     Priority: Medium     Branch retinal vein occlusion of left eye 2010     Priority: Medium     Rx Avastin in 2011       Health Care Home 2016     Priority: Low     [facility] Care Coordinator  Autumn Jacobsen RN  [phone]    [facility] CARE PLAN SUMMARY    Client Name:  Regla Benavidez  Address:  C/O David Benavidez-son  [address] [address]    Regla Benavidez  [address] [place] [address] [id number]  [place] [address] Phone: [phone]  But call son Ed or daughter Obdulio  [phone] ext [phone] (work-preferred)     :  [phone] Date of Assessment:  2017   Health Plan:  [employer]  Health Plan Number: [id number] Medical Assistance Number: [id number]  Financial Worker: [facility]   Case #:     FVP :  Autumn Jacobsen RN CM Phone:  [phone]  CM Fax:  [phone]   FVP Enrollment Date: 16 Case Mix:  D  Rate Cell:  B    Waiver Type:  EW   Emergency Contact: Melchor Benavidez-son/POA   W Phone: [phone]-[phone].   Home [phone]  Secondary Contact: Obdulio Joyce-daughter  Home Phone: [phone] or [phone] Language:  English  :  No   Health Care Agent/POA: Son David Benavidez and then son Neftali Benavidez  Advanced Directives/Living Will:  Yes   Primary Care Clinic/Phone/Fax:  [facility]/(p) [phone], (f) [phone] Primary Dx: Expressive Aphasia R47.01   Secondary Dx:  DM E11.9   Primary Physician:  Leo Melgar   Height: 5' 0'' Weight: 186 lbs   Specialty Physician:   Audiologist:  Has hearing aids, appt due   Eye Care Provider:  Has glasses and hx bilateral cataract surgery. Appt due Dental Care Provider:  Own teeth-appt due  [facility]: [facility] [phone]   Other:        [facility] CURRENT SERVICES SUMMARY  Equipment owned/DME history: toilet safety frame, transfer bath bench,  bed rails, walker, lift chair    SERVICE TYPE/PROVIDER NAME/PHONE AUTH DATE FREQUENCY Units OR $ Amt DESCRIPTION   Medical Transportation: [facility] [phone] 7/1/17-6/30/18 As needed na Medial rides   Assisted Living: [facility] [phone] 24 hr Customized Living  Fax: [phone] 7/1/17-6/30/18 Daily See RS/AL Tool Assisted living services   [facility] [phone]  [phone]  7/1/17-6/30/18 Monthly Wipes-2 Pkgs $11.50/pkg Prevail wipes, chux, 10 pks of XL Esthela Protective underwear 27843  EW-Esthela pullups 3 extra packages Esthela underwear at $23.60 each            * For ADC please select ADC provider and [employer] Transportation in order to process auth           ACP (advance care planning) 11/17/2014     Priority: Low     Advance Care Planning 6/12/2017: ACP Review of ACP with client and family. Reviewed advanced care plan and POLST and all remain current.  Regla Benavidez has an up to date advance care plan on file.  Added by Autumn Jacobsen  Advance Care Planning 6/16/2016: ACP Review of ACP with client and family.  Reviewed advance care plan and POLST with client and it still reflects client's wishes.  Regla Benavidez has an up to date advance care plan on file.  Added by Autumn Jacobsen  Advance Care Planning:   Receipt of ACP document:  Received: POLST which was signed and dated by provider on 2/11/14.  Document previously scanned on 4/8/14.  Order reviewed and found to be valid.  Code Status reflects choices in most recent ACP document.  Confirmed/documented designated decision maker(s). See permanent comments section of demographics in clinical tab. View document(s) and details by clicking on code status. Added by Ashanti Benavidez on 11/17/2014.  Advance Care Planning: Receipt of ACP document:  Received: Health Care Directive which was witnessed or notarized on 9/30/09.  Document previously scanned on 10/29/13.  Validation form completed and  scanned.   Code Status reflects choices in most recent ACP document has a POLST .  Confirmed/documented designated decision maker(s). See permanent comments section of demographics in clinical tab. View document(s) and details by clicking on code status. Added by Ashanti Benavidez on 11/17/2014.           Preventive measure 09/11/2013     Priority: Low     [facility] 9[facility]11[facility]13 NOTE  Colonoscopy 11/01; mammogram 6/00    [facility] [phone]       Past Surgical History:   Procedure Laterality Date     CATARACT IOL, RT/LT         Current Outpatient Prescriptions   Medication Sig Dispense Refill     Ondansetron HCl (ZOFRAN PO) Take 4 mg by mouth every 8 hours as needed for nausea or vomiting       Lactobacillus-Inulin ([facility] DIGESTIVE [facility]) CAPS Take 1 capsule by mouth 2 times daily for 14 days 28 capsule 0     cephALEXin (KEFLEX) 500 MG capsule Take 1 capsule (500 mg) by mouth 2 times daily for 7 days 14 capsule 0     furosemide (LASIX) 20 MG tablet Take 1 tablet (20 mg) by mouth 2 times daily As of 1/27/18 60 tablet 12     spironolactone (ALDACTONE) 25 MG tablet Take 0.5 tablets (12.5 mg) by mouth daily 30 tablet 11     metFORMIN (GLUCOPHAGE) 500 MG tablet Take 1 tablet (500 mg) by mouth daily (with dinner) 30 tablet 12     glipiZIDE (GLUCOTROL) 10 MG tablet Take 1 tablet (10 mg) by mouth 2 times daily (before meals) 60 tablet 11     blood glucose monitoring (ONETOUCH ULTRA) test strip USE TO TEST ONCE EVERY MORNING AS DIRECTED 100 each 11     sitagliptin (JANUVIA) 50 MG tablet Take 1 tablet (50 mg) by mouth daily 30 tablet 11     nystatin (MYCOSTATIN) 399346 UNIT/GM POWD Apply 1 g topically 3 times daily as needed 60 g 11     carbamide peroxide (DEBROX) 6.5 % otic solution Place 5 drops into both ears daily as needed for other       saccharomyces boulardii (FLORASTOR) 250 MG capsule Take 1 capsule (250 mg) by mouth 2 times daily 60 capsule 3     lactobacillus acidophilus & bulgar (LACTINEX) chewable  tablet Take 1 tablet by mouth 2 times daily With meals 60 tablet 11     acetaminophen (TYLENOL) 325 MG tablet Take 2 tablets (650 mg) by mouth At Bedtime Take 2 tablets at bedtime 184 tablet 2     pramipexole (MIRAPEX) 0.125 MG tablet Take 2 tablets (0.25 mg) by mouth At Bedtime 60 tablet 12     polyethylene glycol (MIRALAX) powder Take 17 g (1 capful) by mouth twice a week 510 g 12     ranitidine (ZANTAC) 150 MG tablet Take 1 tablet (150 mg) by mouth 2 times daily 60 tablet 5     ORDER FOR DME Equipment being ordered: Large Underpads 3 Package 6     ORDER FOR DME Equipment being ordered: Esthela xtra absorbent large pull ups - Uses 5 per day. 150 Units 11     Cholecalciferol (VITAMIN D3 PO) Take 2,000 Units by mouth daily       senna-docusate (SENOKOT-S;PERICOLACE) 8.6-50 MG per tablet Take 1 tablet by mouth daily HOLD IF HAS LOOSE STOOLS       ORDER FOR DME Equipment being ordered: retractable safety lancets 100 lancet 1     ORDER FOR DME Equipment being ordered: glucometer kit- brand per insurance coverage  Patient prefers brand with DRUM lancets - patient is to test fasting once each morning  #30 with 5 refills each of the lancets and test strips 1 Device 0             simvastatin (ZOCOR) 5 MG tablet Take 1 tablet (5 mg) by mouth every evening 30 tablet      aspirin 325 MG tablet Take 1 tablet (325 mg) by mouth daily 120 tablet      Allergies   Allergen Reactions     Cozaar [Losartan Potassium] Swelling     Leg swelling. Same from Avapro.     Hydrochlorothiazide Other (See Comments)     hyponatremia     Hydrocodone Nausea and Vomiting     Nausea     BP Readings from Last 3 Encounters:   02/28/18 138/68   02/24/18 126/63   02/09/18 112/68    Wt Readings from Last 3 Encounters:   02/28/18 180 lb (81.6 kg)   02/09/18 180 lb (81.6 kg)   01/27/18 181 lb (82.1 kg)                    Reviewed and updated as needed this visit by clinical staff       Reviewed and updated as needed this visit by Provider          ROS:  CONSTITUTIONAL:NEGATIVE for fever, chills, change in weight  MUSCULOSKELETAL: NEGATIVE for injury to left foot    OBJECTIVE:                                                    /68 (BP Location: Left arm, Patient Position: Chair, Cuff Size: Adult Large)  Pulse 67  Temp 97.8  F (36.6  C)  Resp 20  Ht 5' (1.524 m)  Wt 180 lb (81.6 kg)  LMP  (LMP Unknown)  SpO2 96%  Breastfeeding? No  BMI 35.15 kg/m2  Body mass index is 35.15 kg/(m^2).  GENERAL APPEARANCE: alert, no distress and over weight  CV: regular rates and rhythm  MS: Exam of the left foot shows some diffuse swelling.  The foot is warm, I cannot palpate pedal pulses.                  There is some generalized tenderness to palpation over the plantar forefoot area but also the dorsal forefoot area.      I do not see any localized skin breakdown, no foot ulcers, no unusual calluses,etc.          NEURO: Expressive aphasia    Diagnostic test results:  Results for orders placed or performed during the hospital encounter of 02/24/18   Foot XR, G/E 3 views, left    Narrative    LEFT FOOT THREE OR MORE VIEWS  2/24/2018 12:02 PM     HISTORY: Atraumatic foot pain over medial aspect of plantar foot.     COMPARISON: None.      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 No evidence for  fracture. Benign traction spurs are seen off the plantar fashion and  Achilles tendon attachment sites.    LUCIE AMIN MD   US Lower Extremity Venous Duplex Left    Narrative    VENOUS ULTRASOUND LEFT LEG  2/24/2018 12:39 PM     HISTORY: Lower calf and foot pain.    COMPARISON: None.    FINDINGS:  Examination of the deep veins with graded compression and  color flow Doppler with spectral wave form analysis shows no evidence  of thrombus in the common femoral vein, femoral vein, popliteal vein  or calf veins.  There is no venous insufficiency.  Incidental note is  made of a probable  1 cm lipoma in the left groin.      Impression    IMPRESSION: No evidence of deep venous thrombosis.    LUCIE AMIN MD   CBC with platelets + differential   Result Value Ref Range    WBC 14.4 (H) 4.0 - 11.0 10e9/L    RBC Count 4.22 3.8 - 5.2 10e12/L    Hemoglobin 12.5 11.7 - 15.7 g/dL    Hematocrit 38.0 35.0 - 47.0 %    MCV 90 78 - 100 fl    MCH 29.6 26.5 - 33.0 pg    MCHC 32.9 31.5 - 36.5 g/dL    RDW 15.2 (H) 10.0 - 15.0 %    Platelet Count 246 150 - 450 10e9/L    Diff Method Automated Method     % Neutrophils 73.2 %    % Lymphocytes 14.4 %    % Monocytes 10.5 %    % Eosinophils 1.6 %    % Basophils 0.1 %    % Immature Granulocytes 0.2 %    Nucleated RBCs 0 0 /100    Absolute Neutrophil 10.6 (H) 1.6 - 8.3 10e9/L    Absolute Lymphocytes 2.1 0.8 - 5.3 10e9/L    Absolute Monocytes 1.5 (H) 0.0 - 1.3 10e9/L    Absolute Eosinophils 0.2 0.0 - 0.7 10e9/L    Absolute Basophils 0.0 0.0 - 0.2 10e9/L    Abs Immature Granulocytes 0.0 0 - 0.4 10e9/L    Absolute Nucleated RBC 0.0    Basic metabolic panel   Result Value Ref Range    Sodium 139 133 - 144 mmol/L    Potassium 3.8 3.4 - 5.3 mmol/L    Chloride 104 94 - 109 mmol/L    Carbon Dioxide 28 20 - 32 mmol/L    Anion Gap 7 3 - 14 mmol/L    Glucose 114 (H) 70 - 99 mg/dL    Urea Nitrogen 27 7 - 30 mg/dL    Creatinine 1.09 (H) 0.52 - 1.04 mg/dL    GFR Estimate 47 (L) >60 mL/min/1.7m2    GFR Estimate If Black 57 (L) >60 mL/min/1.7m2    Calcium 8.7 8.5 - 10.1 mg/dL               Results for orders placed or performed in visit on 02/28/18   CBC with platelets and differential   Result Value Ref Range    WBC 11.7 (H) 4.0 - 11.0 10e9/L    RBC Count 4.26 3.8 - 5.2 10e12/L    Hemoglobin 12.6 11.7 - 15.7 g/dL    Hematocrit 39.4 35.0 - 47.0 %    MCV 93 78 - 100 fl    MCH 29.6 26.5 - 33.0 pg    MCHC 32.0 31.5 - 36.5 g/dL    RDW 15.0 10.0 - 15.0 %    Platelet Count 266 150 - 450 10e9/L    Diff Method Automated Method     % Neutrophils 69.0 %    % Lymphocytes 18.1 %    % Monocytes 8.3 %    %  Eosinophils 4.4 %    % Basophils 0.2 %    Absolute Neutrophil 8.1 1.6 - 8.3 10e9/L    Absolute Lymphocytes 2.1 0.8 - 5.3 10e9/L    Absolute Monocytes 1.0 0.0 - 1.3 10e9/L    Absolute Eosinophils 0.5 0.0 - 0.7 10e9/L    Absolute Basophils 0.0 0.0 - 0.2 10e9/L   ESR: Erythrocyte sedimentation rate   Result Value Ref Range    Sed Rate 49 (H) 0 - 30 mm/h       ASSESSMENT/PLAN:                                                        ICD-10-CM    1. Left foot pain M79.672 Uric acid     CBC with platelets and differential     ESR: Erythrocyte sedimentation rate     colchicine (COLCRYS) 0.6 MG tablet     MR Foot Left w/o Contrast   2. PAD (peripheral artery disease) (H) I73.9    3. Type 2 diabetes mellitus with hyperglycemia, without long-term current use of insulin (H) E11.65    4. Restless legs syndrome G25.81 pramipexole (MIRAPEX) 0.25 MG tablet       The abrupt onset of pain and swelling in this patient suggests that this could be an inflammatory arthritis such as gout.                                 She is at risk for diabetic foot infection;cannot rule this out.                      Continue the cephalexin.         Add colchicine on a trial basis.            MRI left foot.                               Increase the pramipexole.                          Patient Instructions   Please call to schedule the MRI of the left foot.              Call [phone] for an appointment.                  Leo Melgar MD  [facility]                                                                                              Uric acid = 9.3   Recent Results (from the past 24 hour(s))   MR Foot Left w/o Contrast    Narrative    MR FOOT LEFT WITHOUT CONTRAST 3/1/2018 2:19 PM    HISTORY: Left foot pain and swelling; etiology? Differential diagnosis  includes gout and diabetic foot infection.    COMPARISON: X-ray left foot from 2/24/2018.    TECHNIQUE: Routine multiplanar, multisequence imaging was  performed.    FINDINGS:   Osseous structures: Prominent first MTP joint degenerative changes and  hallux valgus with some marginal erosive changes and subchondral cyst  formation. This could be degenerative but gout could have a similar  appearance as there is some moderate adjacent soft tissue prominence.  Remaining osseous structures are unremarkable. No evidence of fracture  or osteomyelitis.    Additional findings: Diffuse subcutaneous edema. There is also edema  in the deep musculature of the plantar aspect of the foot. Findings  suggest cellulitis and perhaps myositis as well. No evidence of  abscess or fluid collection.      Impression    IMPRESSION:  1. Somewhat focal marginal erosive changes surrounding the first MTP  joint suggests the possibility of gout. There is also soft tissue  prominence surrounding the joint supporting this diagnosis. There may  be secondary degenerative changes as well.  2. Diffuse subcutaneous and deep muscle edema suggesting  cellulitis/myositis. No abscess or fluid collection.    REYNALDO BLACKWOOD MD     Addendum: I called the head nurse, Daphney Hou, at her assisted living facility.    Pt remains unwilling to bear weight.               This patient needs to be hospitalized for further evaluation and treatment.                 Daphney Hou will call the family.

## 2018-02-28 NOTE — PATIENT INSTRUCTIONS
Please call to schedule the MRI of the left foot.              Call [phone] for an appointment.

## 2018-03-01 ENCOUNTER — HOSPITAL ENCOUNTER (OUTPATIENT)
Dept: MRI IMAGING | Facility: CLINIC | Age: 83
Discharge: HOME | DRG: 603 | End: 2018-03-01
Attending: INTERNAL MEDICINE | Admitting: INTERNAL MEDICINE
Payer: COMMERCIAL

## 2018-03-01 ENCOUNTER — HOSPITAL ENCOUNTER (INPATIENT)
Facility: CLINIC | Age: 83
LOS: 3 days | Discharge: NURSING, REHAB OR LONG-TERM CARE | DRG: 603 | End: 2018-03-05
Attending: EMERGENCY MEDICINE | Admitting: INTERNAL MEDICINE
Payer: COMMERCIAL

## 2018-03-01 DIAGNOSIS — L03.116 CELLULITIS OF LEFT LOWER EXTREMITY: Primary | ICD-10-CM

## 2018-03-01 DIAGNOSIS — M79.672 LEFT FOOT PAIN: ICD-10-CM

## 2018-03-01 DIAGNOSIS — M60.872: ICD-10-CM

## 2018-03-01 PROBLEM — L02.419 CELLULITIS AND ABSCESS OF LEG: Status: ACTIVE | Noted: 2018-03-01

## 2018-03-01 PROBLEM — L03.119 CELLULITIS AND ABSCESS OF LEG: Status: ACTIVE | Noted: 2018-03-01

## 2018-03-01 PROBLEM — N18.30 CRF (CHRONIC RENAL FAILURE), STAGE 3 (MODERATE) (H): Status: ACTIVE | Noted: 2018-03-01

## 2018-03-01 LAB
ANION GAP SERPL CALCULATED.3IONS-SCNC: 7 MMOL/L (ref 3–14)
BASOPHILS # BLD AUTO: 0 10E9/L (ref 0–0.2)
BASOPHILS NFR BLD AUTO: 0.2 %
BUN SERPL-MCNC: 28 MG/DL (ref 7–30)
CALCIUM SERPL-MCNC: 8.8 MG/DL (ref 8.5–10.1)
CHLORIDE SERPL-SCNC: 101 MMOL/L (ref 94–109)
CO2 SERPL-SCNC: 31 MMOL/L (ref 20–32)
CREAT SERPL-MCNC: 1.15 MG/DL (ref 0.52–1.04)
DIFFERENTIAL METHOD BLD: ABNORMAL
EOSINOPHIL # BLD AUTO: 0.5 10E9/L (ref 0–0.7)
EOSINOPHIL NFR BLD AUTO: 3.9 %
ERYTHROCYTE [DISTWIDTH] IN BLOOD BY AUTOMATED COUNT: 15 % (ref 10–15)
ERYTHROCYTE [SEDIMENTATION RATE] IN BLOOD BY WESTERGREN METHOD: 64 MM/H (ref 0–30)
GFR SERPL CREATININE-BSD FRML MDRD: 44 ML/MIN/1.7M2
GLUCOSE SERPL-MCNC: 159 MG/DL (ref 70–99)
HCT VFR BLD AUTO: 37.3 % (ref 35–47)
HGB BLD-MCNC: 12.4 G/DL (ref 11.7–15.7)
IMM GRANULOCYTES # BLD: 0 10E9/L (ref 0–0.4)
IMM GRANULOCYTES NFR BLD: 0.3 %
LACTATE BLD-SCNC: 1.6 MMOL/L (ref 0.7–2)
LYMPHOCYTES # BLD AUTO: 2 10E9/L (ref 0.8–5.3)
LYMPHOCYTES NFR BLD AUTO: 16.2 %
MCH RBC QN AUTO: 29.9 PG (ref 26.5–33)
MCHC RBC AUTO-ENTMCNC: 33.2 G/DL (ref 31.5–36.5)
MCV RBC AUTO: 90 FL (ref 78–100)
MONOCYTES # BLD AUTO: 1 10E9/L (ref 0–1.3)
MONOCYTES NFR BLD AUTO: 8.4 %
NEUTROPHILS # BLD AUTO: 8.8 10E9/L (ref 1.6–8.3)
NEUTROPHILS NFR BLD AUTO: 71 %
NRBC # BLD AUTO: 0 10*3/UL
NRBC BLD AUTO-RTO: 0 /100
PLATELET # BLD AUTO: 279 10E9/L (ref 150–450)
POTASSIUM SERPL-SCNC: 4.1 MMOL/L (ref 3.4–5.3)
RBC # BLD AUTO: 4.15 10E12/L (ref 3.8–5.2)
SODIUM SERPL-SCNC: 139 MMOL/L (ref 133–144)
URATE SERPL-MCNC: 9.3 MG/DL (ref 2.6–6)
WBC # BLD AUTO: 12.3 10E9/L (ref 4–11)

## 2018-03-01 PROCEDURE — 96376 TX/PRO/DX INJ SAME DRUG ADON: CPT

## 2018-03-01 PROCEDURE — 99223 1ST HOSP IP/OBS HIGH 75: CPT | Mod: AI | Performed by: INTERNAL MEDICINE

## 2018-03-01 PROCEDURE — 85025 COMPLETE CBC W/AUTO DIFF WBC: CPT | Performed by: EMERGENCY MEDICINE

## 2018-03-01 PROCEDURE — 96366 THER/PROPH/DIAG IV INF ADDON: CPT

## 2018-03-01 PROCEDURE — 96375 TX/PRO/DX INJ NEW DRUG ADDON: CPT

## 2018-03-01 PROCEDURE — 99285 EMERGENCY DEPT VISIT HI MDM: CPT | Mod: 25

## 2018-03-01 PROCEDURE — 27210995 ZZH RX 272: Performed by: EMERGENCY MEDICINE

## 2018-03-01 PROCEDURE — 25000132 ZZH RX MED GY IP 250 OP 250 PS 637: Performed by: EMERGENCY MEDICINE

## 2018-03-01 PROCEDURE — 96365 THER/PROPH/DIAG IV INF INIT: CPT

## 2018-03-01 PROCEDURE — 36415 COLL VENOUS BLD VENIPUNCTURE: CPT

## 2018-03-01 PROCEDURE — 85652 RBC SED RATE AUTOMATED: CPT | Performed by: EMERGENCY MEDICINE

## 2018-03-01 PROCEDURE — 83605 ASSAY OF LACTIC ACID: CPT | Performed by: EMERGENCY MEDICINE

## 2018-03-01 PROCEDURE — 87040 BLOOD CULTURE FOR BACTERIA: CPT | Performed by: EMERGENCY MEDICINE

## 2018-03-01 PROCEDURE — 80048 BASIC METABOLIC PNL TOTAL CA: CPT | Performed by: EMERGENCY MEDICINE

## 2018-03-01 PROCEDURE — 73718 MRI LOWER EXTREMITY W/O DYE: CPT | Mod: LT

## 2018-03-01 PROCEDURE — 25000128 H RX IP 250 OP 636: Performed by: EMERGENCY MEDICINE

## 2018-03-01 RX ORDER — CEFAZOLIN SODIUM 1 G
1 VIAL (EA) INJECTION EVERY 8 HOURS
Status: DISCONTINUED | OUTPATIENT
Start: 2018-03-02 | End: 2018-03-03

## 2018-03-01 RX ORDER — MORPHINE SULFATE 2 MG/ML
2 INJECTION, SOLUTION INTRAMUSCULAR; INTRAVENOUS ONCE
Status: COMPLETED | OUTPATIENT
Start: 2018-03-01 | End: 2018-03-01

## 2018-03-01 RX ORDER — POLYETHYLENE GLYCOL 3350 17 G/17G
8.5 POWDER, FOR SOLUTION ORAL
COMMUNITY

## 2018-03-01 RX ORDER — NYSTATIN 100000 [USP'U]/G
POWDER TOPICAL DAILY
COMMUNITY
End: 2019-02-24

## 2018-03-01 RX ORDER — CEFAZOLIN SODIUM 1 G
1 VIAL (EA) INJECTION ONCE
Status: COMPLETED | OUTPATIENT
Start: 2018-03-01 | End: 2018-03-01

## 2018-03-01 RX ADMIN — CEFAZOLIN SODIUM 1 G: 10 INJECTION, POWDER, FOR SOLUTION INTRAVENOUS at 21:32

## 2018-03-01 RX ADMIN — MORPHINE SULFATE 2 MG: 2 INJECTION, SOLUTION INTRAMUSCULAR; INTRAVENOUS at 20:37

## 2018-03-01 RX ADMIN — MORPHINE SULFATE 2 MG: 2 INJECTION, SOLUTION INTRAMUSCULAR; INTRAVENOUS at 21:59

## 2018-03-01 RX ADMIN — PRAMIPEXOLE DIHYDROCHLORIDE 0.38 MG: 0.25 TABLET ORAL at 22:46

## 2018-03-01 ASSESSMENT — ENCOUNTER SYMPTOMS
FEVER: 0
DIAPHORESIS: 0
VOMITING: 0
NAUSEA: 0

## 2018-03-01 NOTE — IP AVS SNAPSHOT
"` `           [place] [address] [facility]: [phone]                                              INTERAGENCY TRANSFER FORM - NURSING   3/1/2018                    Hospital Admission Date: 3/1/2018  LYNDA CONWAY   : 1926  Sex: Female        Attending Provider: Asad Ball MD     Allergies:  Cozaar [Losartan Potassium], Hydrochlorothiazide, Hydrocodone    Infection:  None   Service:  HOSPITALIST    Ht:  1.575 m (5' 2\")   Wt:  82 kg (180 lb 12.4 oz)   Admission Wt:  81.6 kg (180 lb)    BMI:  33.06 kg/m 2   BSA:  1.89 m 2            Patient PCP Information     Provider PCP Type    Leo Melgar MD General      Current Code Status     Date Active Code Status Order ID Comments User Context       3/2/2018  1:11 AM DNR/DNI [id number]  Asad Ball MD Inpatient       Code Status History     Date Active Date Inactive Code Status Order ID Comments User Context    2014 12:14 PM 3/2/2018  1:11 AM DNR/DNI [id number]  Jodi Roland MD Outpatient    2014  7:17 AM 2014 12:14 PM DNR/DNI [id number]  Jodi Roland MD Outpatient    11/15/2014 11:49 AM 2014  7:17 AM DNR/DNI [id number]  Jodi Roland MD ED    11/15/2013 11:42 AM 11/15/2014 11:49 AM DNR/DNI [id number]  Kendall Saba MD Outpatient    10/25/2013  2:57 PM 11/15/2013 11:42 AM DNR/DNI [id number]  Kendall Saba MD Inpatient    10/22/2013  2:13 AM 10/25/2013  1:12 PM DNR/DNI [id number]  Maral Garcia, RN Inpatient      Advance Directives        Scanned docmt in ACP Activity?           Yes, scanned ACP docmt        Hospital Problems as of 3/5/2018              Priority Class Noted POA    Type 2 diabetes mellitus without complication (H) Medium  2014 Yes    Expressive aphasia Medium  2014 Yes    PAD (peripheral artery disease) (H) Medium  2018 Yes    * (Principal)Cellulitis left leg Medium  3/1/2018 Unknown    CRF (chronic renal failure), stage 3 (moderate) Medium  " 3/1/2018 Unknown    Cellulitis, leg Medium  3/2/2018 Yes      Non-Hospital Problems as of 3/5/2018              Priority Class Noted    Branch retinal vein occlusion of left eye Medium  11/26/2010    Preventive measure Medium  9/11/2013    Cerebral infarction (H) Medium  10/22/2013    Constipation Medium  11/18/2013    Hard of hearing Medium  Unknown    Restless legs syndrome Medium  11/27/2013    Urinary frequency Medium  1/22/2014    Aspiration pneumonia (H) Medium  11/15/2014    ACP (advance care planning) Medium  11/17/2014    Hyponatremia Medium  12/20/2014    Recurrent UTI Medium  3/18/2015    Low back pain Medium  3/18/2015    Abdominal bloating Medium  10/28/2015    Abnormal weight gain Medium  10/28/2015    Cyst of left ovary Medium  10/28/2015    Health Care Home Medium  6/3/2016    Prerenal azotemia Medium  2/14/2017    Bilateral leg edema Medium  3/22/2017    Type 2 diabetes mellitus with hyperglycemia, without long-term current use of insulin (H) Medium  7/5/2017    Other vaginitis Medium  7/29/2017    Dermatitis Medium  7/29/2017    Continuous leakage of urine Medium  7/29/2017    Bilateral hearing loss, unspecified hearing loss type Medium  7/29/2017    CRI (chronic renal insufficiency), stage 3 (moderate) Medium  1/27/2018    Impaired fasting glucose Medium  2/9/2018    Nonsmoker Medium  2/9/2018    CKD (chronic kidney disease) stage 3, GFR 30-59 ml/min Medium  2/9/2018    Class 2 obesity due to excess calories with serious comorbidity and body mass index (BMI) of 35.0 to 35.9 in adult Medium  2/9/2018    Abnormal lung sounds- rhonchi Rt base  Medium  2/9/2018    Type 2 diabetes mellitus with stage 3 chronic kidney disease, without long-term current use of insulin (H) Medium  2/9/2018    Left foot pain Medium  2/28/2018      Immunizations     Name Date      Influenza (High Dose) 3 valent vaccine 10/04/16     Influenza (High Dose) 3 valent vaccine 09/25/15     Influenza (IIV3) PF 10/05/17      "Influenza (IIV3) PF 10/14/14     Influenza (IIV3) PF 10/03/13     Influenza (IIV3) PF 09/15/09     Pneumo Conj 13-V (2010&after) 11/16/16     Pneumococcal 23 valent 09/15/02     TD (ADULT, 7+) 11/08/10          END      ASSESSMENT     Discharge Profile Flowsheet     EXPECTED DISCHARGE     Patient's communication style  spoken language (English or Bilingual) 03/02/18 0030    Expected Discharge Date  03/05/18 ([facility]) 03/05/18 1045   SKIN      DISCHARGE NEEDS ASSESSMENT     Inspection of bony prominences  Full 03/05/18 0410    Concerns To Be Addressed  discharge planning concerns 02/24/18 1344   Inspection under devices  Full 03/05/18 0410    Equipment Currently Used at Home  walker, rolling;grab bar 03/03/18 1323   Skin WDL  ex 03/05/18 0410    Transportation Available  family or friend will provide (pt does not drive at baseline) 03/03/18 0759   Skin Color/Characteristics  bruised (ecchymotic) 03/05/18 0410    # of Referrals Placed by CTS  Post Acute Facilities 03/02/18 1627   Skin Temperature  warm 03/05/18 0410    Equipment Used at Home  bath bench;grab bar;raised toilet;walker, rolling 11/16/14 1413   Skin Moisture  dry 03/05/18 0410    GASTROINTESTINAL (ADULT,PEDIATRIC,OB)     Skin Integrity  bruise(s);scar(s) 03/05/18 0410    GI WDL  ex 03/05/18 0410   SAFETY      Abdominal Appearance  obese 03/05/18 0410   Safety WDL  WDL 03/05/18 0410    Passing flatus  yes 03/05/18 0410   Safety Factors  bed in low position 03/05/18 0410    COMMUNICATION ASSESSMENT     All Alarms  alarm(s) activated and audible 03/05/18 0410                 Assessment WDL (Within Defined Limits) Definitions           Safety WDL     Effective: 09/28/15    Row Information: <b>WDL Definition:</b> Bed in low position, wheels locked; call light in reach; upper side rails up x 2; ID band on<br> <font color=\"gray\"><i>Item=AS safety wdl>>List=AS safety wdl>>Version=F14</i></font>      Skin WDL     Effective: 09/28/15    Row Information: " "<b>WDL Definition:</b> Warm; dry; intact; elastic; without discoloration; pressure points without redness<br> <font color=\"gray\"><i>Item=AS skin wdl>>List=AS skin wdl>>Version=F14</i></font>      Vitals     Vital Signs Flowsheet     VITAL SIGNS     Side Effects Monitoring: Sedation Level  1 03/03/18 1932    Temp  97.8  F (36.6  C) 03/05/18 0737   HEIGHT AND WEIGHT      Temp src  Oral 03/05/18 0737   Height  1.575 m (5' 2\") 03/01/18 1905    Resp  18 03/05/18 0737   Weight  82 kg (180 lb 12.4 oz) 03/02/18 0254    Pulse  68 03/04/18 0747   Weight Method  Bed scale 03/02/18 0254    Heart Rate  64 03/05/18 0737   BSA (Calculated - sq m)  1.89 03/01/18 1905    Pulse/Heart Rate Source  Monitor 03/05/18 0737   BMI (Calculated)  32.99 03/01/18 1905    BP  143/69 03/05/18 0737   ABDON COMA SCALE      BP Location  Left arm 03/05/18 0737   Best Eye Response  4-->(E4) spontaneous 03/03/18 1609    OXYGEN THERAPY     Best Motor Response  6-->(M6) obeys commands 03/03/18 1609    SpO2  93 % 03/05/18 1029   Best Verbal Response  5-->(V5) oriented 03/03/18 1609    O2 Device  None (Room air) 03/05/18 1029   [place] Coma Scale Score  15 03/03/18 1609    Oxygen Delivery  2 LPM 03/05/18 0737   Assessment Qualifiers  patient not sedated/intubated 03/01/18 1944    PAIN/COMFORT     POSITIONING      Patient Currently in Pain  denies 03/05/18 1029   Body Position  refuses positioning 03/05/18 0359    0-10 Pain Scale  0 03/05/18 0402   Head of Bed (HOB)  HOB at 20-30 degrees 03/04/18 2044    Pain Location  Foot 03/02/18 0945   Positioning/Transfer Devices  pillows 03/04/18 2044    Pain Orientation  Left 03/02/18 0945   Chair  Upright in chair (refused getting back in bed) 03/04/18 1455    Pain Intervention(s)  Medication (See eMAR) (per familys request.) 03/03/18 1932   DAILY CARE      Response to Interventions  Decrease in pain 03/02/18 1125   Activity Management  ambulated to bathroom 03/05/18 0949    ANALGESIA SIDE EFFECTS MONITORING     " Activity Assistance Provided  assistance, 1 person 03/05/18 0949    Side Effects Monitoring: Respiratory Quality  R 03/03/18 1932   Assistive Device Utilized  gait belt;walker 03/05/18 0949    Side Effects Monitoring: Respiratory Depth  N 03/03/18 1932   Additional Documentation  Activity Device Assistance (Row) 03/03/18 0934            Patient Lines/Drains/Airways Status    Active LINES/DRAINS/AIRWAYS     Name: Placement date: Placement time: Site: Days: Last dressing change:    Peripheral IV 03/01/18 Right Upper arm 03/01/18   2000   Upper arm   3             Patient Lines/Drains/Airways Status    Active PICC/CVC     None            Intake/Output Detail Report     Date Intake     Output    Shift P.O. I.V. IV Piggyback Total Total       Noc 03/03/18 2300 - 03/04/18 0659 -- -- -- -- -- 0    Day 03/04/18 0700 - 03/04/18 1459 440 -- -- 440 -- 440    [place] 03/04/18 1500 - 03/04/18 2259 300 -- -- 300 -- 300    Noc 03/04/18 2300 - 03/05/18 0659 -- -- -- -- -- 0    Day 03/05/18 0700 - 03/05/18 1459 -- -- -- -- -- 0      Last Void/BM       Most Recent Value    Urine Occurrence 1 at 03/05/2018 0949    Stool Occurrence 1 at 03/04/2018 0918      Case Management/Discharge Planning     Case Management/Discharge Planning Flowsheet     REFERRAL INFORMATION     DISCHARGE PLANNING      Arrived From  other (see comments) (Assisted Living ([facility] 02/24/18 1344   Transportation Available  family or friend will provide (pt does not drive at baseline) 03/03/18 0759    # of Referrals Placed by CTS  Post Acute Facilities 03/02/18 1627   Equipment Used at Home  bath bench;grab bar;raised toilet;walker, rolling 11/16/14 1413    Post Acute Facilities  TCU 03/02/18 1627   FINAL NOTE      CTS Assigned to Case  Jeanna Davison 03/02/18 1627   Final Note  -- (D/C to [facility]) 03/05/18 1105    LIVING ENVIRONMENT     FINAL RESOURCES      Lives With  alone;facility resident 03/03/18 0759   Equipment Currently Used at Home  walker,  rolling;grab bar 03/03/18 1323    Living Arrangements  independent living facility ([facility]) 03/03/18 0801   ABUSE RISK SCREEN      Provides Primary Care For  no one 03/02/18 0112   QUESTION TO PATIENT:  Has a member of your family or a partner(now or in the past) intimidated, hurt, manipulated, or controlled you in any way?  no 03/02/18 0112    COPING/STRESS     QUESTION TO PATIENT: Do you feel safe going back to the place where you are living?  yes 03/02/18 0112    Major Change/Loss/Stressor  none 03/02/18 0112   OBSERVATION: Is there reason to believe there has been maltreatment of a vulnerable adult (ie. Physical/Sexual/Emotional abuse, self neglect, lack of adequate food, shelter, medical care, or financial exploitation)?  no 03/02/18 0112    Patient Personal Strengths  strong support system 11/18/14 1227   OTHER      EXPECTED DISCHARGE     Are you depressed or being treated for depression?  No 03/02/18 0112    Expected Discharge Date  03/05/18 ([facility]) 03/05/18 1045   HOMICIDE RISK      ASSESSMENT/CONCERNS TO BE ADDRESSED     Feels Like Hurting Others  no 03/01/18 1943    Concerns To Be Addressed  discharge planning concerns 02/24/18 1344

## 2018-03-01 NOTE — IP AVS SNAPSHOT
MRN:[id number]                      After Visit Summary   3/1/2018    Regla Benavidez    MRN: [id number]           Thank you!     Thank you for choosing [facility] for your care. Our goal is always to provide you with excellent care. Hearing back from our patients is one way we can continue to improve our services. Please take a few minutes to complete the written survey that you may receive in the mail after you visit with us. Thank you!        Patient Information     Date Of Birth          1/5/1926        Designated Caregiver       Most Recent Value    Caregiver    Will someone help with your care after discharge? no      About your hospital stay     You were admitted on:  March 2, 2018 You last received care in the:  [facility] [address] [facility]    You were discharged on:  March 5, 2018       Who to Call     For medical emergencies, please call 911.  For non-urgent questions about your medical care, please call your primary care provider or clinic, [phone]          Attending Provider     Provider Specialty    Criss Ladd MD Emergency Medicine    [facility], Asad Carrasco MD Internal Medicine       Primary Care Provider Office Phone # Fax #    Leo Melgar [phone]      After Care Instructions     Activity - Up with nursing assistance           Advance Diet as Tolerated       Follow this diet upon discharge: Orders Placed This Encounter      Room Service      Moderate Consistent CHO Diet              General info for SNF       Length of Stay Estimate: Short Term Care: Estimated # of Days <30  Condition at Discharge: Improving  Level of care:skilled   Rehabilitation Potential: Good  Admission H&P remains valid and up-to-date: Yes  Recent Chemotherapy: N/A  Use Nursing Home Standing Orders: Yes            Mantoux instructions       Give two-step Mantoux (PPD) Per Facility Policy Yes                  Follow-up Appointments     Follow Up and  "recommended labs and tests       Follow up with Nursing home physician.  Follow up with primary care provider after TCU discharge.                  Additional Services     Occupational Therapy Adult Consult       Evaluate and treat as clinically indicated.    Reason:  Weakness/deconditioning            Physical Therapy Adult Consult       Evaluate and treat as clinically indicated.    Reason:  weakness/deconditioning                  Further instructions from your care team       Discharge to [facility],  [phone]    Pending Results     Date and Time Order Name Status Description    3/1/2018 1944 Blood culture Preliminary     3/1/2018 1944 Blood culture Preliminary             Statement of Approval     Ordered          03/05/18 1037  I have reviewed and agree with all the recommendations and orders detailed in this document.  EFFECTIVE NOW     Approved and electronically signed by:  Gamaliel Madison MD             Admission Information     Date & Time Provider Department Dept. Phone    3/1/2018 Asad Ball MD [facility] [address] [facility] [phone]      Your Vitals Were     Blood Pressure Pulse Temperature Respirations Height Weight    143/69 (BP Location: Left arm) 68 97.8  F (36.6  C) (Oral) 18 1.575 m (5' 2\") 82 kg (180 lb 12.4 oz)    Last Period Pulse Oximetry BMI (Body Mass Index)             (LMP Unknown) 93% 33.06 kg/m2         [employer] Information     [employer] gives you secure access to your electronic health record. If you see a primary care provider, you can also send messages to your care team and make appointments. If you have questions, please call your primary care clinic.  If you do not have a primary care provider, please call [phone] and they will assist you.        Care EveryWhere ID     This is your Care EveryWhere ID. This could be used by other organizations to access your [facility] medical records  [id number]        Equal Access to Services     " DESI REESE : Hadii aad ku benoit Jenkins, waaxda luqadaha, qaybta kaalmada eloisaobedpeyton, waxivonne josue hesterdorieluther priest . [facility] [phone].    ATENCIÓN: Si habla español, tiene a garzon disposición servicios gratuitos de asistencia lingüística. Llame al [phone].    We comply with applicable federal civil rights laws and [place] laws. We do not discriminate on the basis of race, color, national origin, age, disability, sex, sexual orientation, or gender identity.               Review of your medicines      CONTINUE these medicines which may have CHANGED, or have new prescriptions. If we are uncertain of the size of tablets/capsules you have at home, strength may be listed as something that might have changed.        Dose / Directions    cephALEXin 500 MG capsule   Commonly known as:  KEFLEX   This may have changed:  when to take this        Dose:  500 mg   Take 1 capsule (500 mg) by mouth 3 times daily for 6 days   Quantity:  21 capsule   Refills:  0         CONTINUE these medicines which have NOT CHANGED        Dose / Directions    * ACETAMINOPHEN PO        Dose:  500 mg   Take 500 mg by mouth every 8 hours as needed for pain   Refills:  0       * acetaminophen 325 MG tablet   Commonly known as:  TYLENOL   Used for:  Midline low back pain without sciatica        Dose:  650 mg   Take 2 tablets (650 mg) by mouth At Bedtime Take 2 tablets at bedtime   Quantity:  184 tablet   Refills:  2       aspirin 325 MG tablet   Used for:  Stroke (H)        Dose:  325 mg   Take 1 tablet (325 mg) by mouth daily   Quantity:  120 tablet   Refills:  0       blood glucose monitoring test strip   Commonly known as:  ONETOUCH ULTRA   Used for:  Type 2 diabetes mellitus without complication, without long-term current use of insulin (H)        USE TO TEST ONCE EVERY MORNING AS DIRECTED   Quantity:  100 each   Refills:  11       carbamide peroxide 6.5 % otic solution   Commonly known as:  DEBROX        Dose:  5 drop   Place  5 drops into both ears daily as needed for other   Refills:  0       colchicine 0.6 MG tablet   Commonly known as:  COLCRYS   Used for:  Left foot pain        Take one tablet bid for 2 days, then one per day for 4 more days.   Quantity:  16 tablet   Refills:  0       furosemide 20 MG tablet   Commonly known as:  LASIX   Used for:  Bilateral leg edema        Dose:  20 mg   Take 1 tablet (20 mg) by mouth 2 times daily As of 1/27/18   Quantity:  60 tablet   Refills:  12       glipiZIDE 10 MG tablet   Commonly known as:  GLUCOTROL   Used for:  Type 2 diabetes mellitus with hyperglycemia, without long-term current use of insulin (H)        Dose:  10 mg   Take 1 tablet (10 mg) by mouth 2 times daily (before meals)   Quantity:  60 tablet   Refills:  11       LACTINEX PO        Dose:  1 tablet   Take 1 tablet by mouth 3 times daily 0900; 1300; 1645   Refills:  0       metFORMIN 500 MG tablet   Commonly known as:  GLUCOPHAGE        Dose:  500 mg   Take 500 mg by mouth daily   Quantity:  30 tablet   Refills:  12       * nystatin 492414 UNIT/GM Powd   Commonly known as:  MYCOSTATIN        Apply topically daily   Refills:  0       * nystatin 923990 UNIT/GM Powd   Commonly known as:  MYCOSTATIN        Dose:  1 g   Apply 1 g topically 3 times daily as needed   Quantity:  60 g   Refills:  11       * order for DME   Used for:  Type II or unspecified type diabetes mellitus without mention of complication, not stated as uncontrolled        Equipment being ordered: glucometer kit- brand per insurance coverage Patient prefers brand with DRUM lancets - patient is to test fasting once each morning #30 with 5 refills each of the lancets and test strips   Quantity:  1 Device   Refills:  0       * order for DME   Used for:  Type II or unspecified type diabetes mellitus without mention of complication, not stated as uncontrolled        Equipment being ordered: retractable safety lancets   Quantity:  100 lancet   Refills:  1       * order for  DME   Used for:  Urinary frequency        Equipment being ordered: Large Underpads   Quantity:  3 Package   Refills:  6       * order for DME   Used for:  Urinary incontinence        Equipment being ordered: Esthela xtra absorbent large pull ups - Uses 5 per day.   Quantity:  150 Units   Refills:  11       polyethylene glycol Packet   Commonly known as:  MIRALAX/GLYCOLAX        Dose:  8.5 g   Take 8.5 g by mouth every other day   Refills:  0       pramipexole 0.25 MG tablet   Commonly known as:  MIRAPEX   Used for:  Restless legs syndrome        Dose:  0.375 mg   Take 1.5 tablets (0.375 mg) by mouth At Bedtime   Quantity:  45 tablet   Refills:  11       ranitidine 150 MG tablet   Commonly known as:  ZANTAC   Used for:  Gastroesophageal reflux disease without esophagitis        Dose:  150 mg   Take 1 tablet (150 mg) by mouth 2 times daily   Quantity:  60 tablet   Refills:  5       simvastatin 5 MG tablet   Commonly known as:  ZOCOR   Used for:  Stroke (H)        Dose:  5 mg   Take 1 tablet (5 mg) by mouth every evening   Quantity:  30 tablet   Refills:  0       sitagliptin 50 MG tablet   Commonly known as:  JANUVIA   Used for:  Uncontrolled type 2 diabetes mellitus with complication, without long-term current use of insulin (H), Type 2 diabetes mellitus with hyperglycemia, without long-term current use of insulin (H)        Dose:  50 mg   Take 1 tablet (50 mg) by mouth daily   Quantity:  30 tablet   Refills:  11       spironolactone 25 MG tablet   Commonly known as:  ALDACTONE   Used for:  Bilateral leg edema        Dose:  12.5 mg   Take 0.5 tablets (12.5 mg) by mouth daily   Quantity:  30 tablet   Refills:  11       VITAMIN D3 PO        Dose:  2000 Units   Take 2,000 Units by mouth daily   Refills:  0       ZOFRAN PO        Dose:  4 mg   Take 4 mg by mouth every 8 hours as needed for nausea or vomiting   Refills:  0       * Notice:  This list has 8 medication(s) that are the same as other medications prescribed for  you. Read the directions carefully, and ask your doctor or other care provider to review them with you.         Where to get your medicines      Some of these will need a paper prescription and others can be bought over the counter. Ask your nurse if you have questions.     You don't need a prescription for these medications     cephALEXin 500 MG capsule    colchicine 0.6 MG tablet                Protect others around you: Learn how to safely use, store and throw away your medicines at www.disposemymeds.org.        ANTIBIOTIC INSTRUCTION     You've Been Prescribed an Antibiotic - Now What?  Your healthcare team thinks that you or your loved one might have an infection. Some infections can be treated with antibiotics, which are powerful, life-saving drugs. Like all medications, antibiotics have side effects and should only be used when necessary. There are some important things you should know about your antibiotic treatment.      Your healthcare team may run tests before you start taking an antibiotic.    Your team may take samples (e.g., from your blood, urine or other areas) to run tests to look for bacteria. These test can be important to determine if you need an antibiotic at all and, if you do, which antibiotic will work best.      Within a few days, your healthcare team might change or even stop your antibiotic.    Your team may start you on an antibiotic while they are working to find out what is making you sick.    Your team might change your antibiotic because test results show that a different antibiotic would be better to treat your infection.    In some cases, once your team has more information, they learn that you do not need an antibiotic at all. They may find out that you don't have an infection, or that the antibiotic you're taking won't work against your infection. For example, an infection caused by a virus can't be treated with antibiotics. Staying on an antibiotic when you don't need it is more  likely to be harmful than helpful.      You may experience side effects from your antibiotic.    Like all medications, antibiotics have side effects. Some of these can be serious.    Let you healthcare team know if you have any known allergies when you are admitted to the hospital.    One significant side effect of nearly all antibiotics is the risk of severe and sometimes deadly diarrhea caused by Clostridium difficile (C. Difficile). This occurs when a person takes antibiotics because some good germs are destroyed. Antibiotic use allows C. diificile to take over, putting patients at high risk for this serious infection.    As a patient or caregiver, it is important to understand your or your loved one's antibiotic treatment. It is especially important for caregivers to speak up when patients can't speak for themselves. Here are some important questions to ask your healthcare team.    What infection is this antibiotic treating and how do you know I have that infection?    What side effects might occur from this antibiotic?    How long will I need to take this antibiotic?    Is it safe to take this antibiotic with other medications or supplements (e.g., vitamins) that I am taking?     Are there any special directions I need to know about taking this antibiotic? For example, should I take it with food?    How will I be monitored to know whether my infection is responding to the antibiotic?    What tests may help to make sure the right antibiotic is prescribed for me?      Information provided by:  www.cdc.gov/getsmart  U.S. Department of Health and Human Services  Centers for disease Control and Prevention  National Center for Emerging and Zoonotic Infectious Diseases  Division of Healthcare Quality Promotion             Medication List: This is a list of all your medications and when to take them. Check marks below indicate your daily home schedule. Keep this list as a reference.      Medications           Morning  Afternoon Evening Bedtime As Needed    * ACETAMINOPHEN PO   Take 500 mg by mouth every 8 hours as needed for pain   Last time this was given:  650 mg on 3/3/2018  6:05 PM                                * acetaminophen 325 MG tablet   Commonly known as:  TYLENOL   Take 2 tablets (650 mg) by mouth At Bedtime Take 2 tablets at bedtime   Last time this was given:  650 mg on 3/3/2018  6:05 PM                                aspirin 325 MG tablet   Take 1 tablet (325 mg) by mouth daily   Last time this was given:  325 mg on 3/5/2018 10:24 AM                                blood glucose monitoring test strip   Commonly known as:  ONETOUCH ULTRA   USE TO TEST ONCE EVERY MORNING AS DIRECTED                                carbamide peroxide 6.5 % otic solution   Commonly known as:  DEBROX   Place 5 drops into both ears daily as needed for other                                cephALEXin 500 MG capsule   Commonly known as:  KEFLEX   Take 1 capsule (500 mg) by mouth 3 times daily for 6 days   Last time this was given:  500 mg on 3/5/2018 10:24 AM                                colchicine 0.6 MG tablet   Commonly known as:  COLCRYS   Take one tablet bid for 2 days, then one per day for 4 more days.   Last time this was given:  0.6 mg on 3/5/2018 10:23 AM                                furosemide 20 MG tablet   Commonly known as:  LASIX   Take 1 tablet (20 mg) by mouth 2 times daily As of 1/27/18                                glipiZIDE 10 MG tablet   Commonly known as:  GLUCOTROL   Take 1 tablet (10 mg) by mouth 2 times daily (before meals)   Last time this was given:  10 mg on 3/5/2018  6:45 AM                                LACTINEX PO   Take 1 tablet by mouth 3 times daily 0900; 1300; 1645                                metFORMIN 500 MG tablet   Commonly known as:  GLUCOPHAGE   Take 500 mg by mouth daily                                * nystatin 206547 UNIT/GM Powd   Commonly known as:  MYCOSTATIN   Apply topically daily                                 * nystatin 754798 UNIT/GM Powd   Commonly known as:  MYCOSTATIN   Apply 1 g topically 3 times daily as needed                                * order for DME   Equipment being ordered: glucometer kit- brand per insurance coverage Patient prefers brand with DRUM lancets - patient is to test fasting once each morning #30 with 5 refills each of the lancets and test strips                                * order for DME   Equipment being ordered: retractable safety lancets                                * order for DME   Equipment being ordered: Large Underpads                                * order for DME   Equipment being ordered: Esthela xtra absorbent large pull ups - Uses 5 per day.                                polyethylene glycol Packet   Commonly known as:  MIRALAX/GLYCOLAX   Take 8.5 g by mouth every other day   Last time this was given:  8.5 g on 3/4/2018  8:01 AM                                pramipexole 0.25 MG tablet   Commonly known as:  MIRAPEX   Take 1.5 tablets (0.375 mg) by mouth At Bedtime   Last time this was given:  0.375 mg on 3/4/2018  9:23 PM                                ranitidine 150 MG tablet   Commonly known as:  ZANTAC   Take 1 tablet (150 mg) by mouth 2 times daily   Last time this was given:  150 mg on 3/4/2018  9:22 PM                                simvastatin 5 MG tablet   Commonly known as:  ZOCOR   Take 1 tablet (5 mg) by mouth every evening   Last time this was given:  5 mg on 3/4/2018  9:22 PM                                sitagliptin 50 MG tablet   Commonly known as:  JANUVIA   Take 1 tablet (50 mg) by mouth daily   Last time this was given:  50 mg on 3/5/2018 10:23 AM                                spironolactone 25 MG tablet   Commonly known as:  ALDACTONE   Take 0.5 tablets (12.5 mg) by mouth daily   Last time this was given:  12.5 mg on 3/5/2018 10:24 AM                                VITAMIN D3 PO   Take 2,000 Units by mouth daily   Last time this was  given:  2,000 Units on 3/5/2018 10:24 AM                                ZOFRAN PO   Take 4 mg by mouth every 8 hours as needed for nausea or vomiting                                * Notice:  This list has 8 medication(s) that are the same as other medications prescribed for you. Read the directions carefully, and ask your doctor or other care provider to review them with you.

## 2018-03-01 NOTE — IP AVS SNAPSHOT
` `           [place] [address] [facility]: [phone]                 INTERAGENCY TRANSFER FORM - NOTES (H&P, Discharge Summary, Consults, Procedures, Therapies)   3/1/2018                    Hospital Admission Date: 3/1/2018  REGLA CONWAY   : 1926  Sex: Female        Patient PCP Information     Provider PCP Type    Leo Melgar MD General         History & Physicals      H&P by Asad Griffith MD at 3/1/2018 10:51 PM     Author:  Asad Griffith MD Service:  Hospitalist Author Type:  Physician    Filed:  3/1/2018 11:00 PM Date of Service:  3/1/2018 10:51 PM Creation Time:  3/1/2018 10:51 PM    Status:  Signed :  Asad Griffith MD (Physician)         [facility]    History and Physical  Hospitalist       Date of Admission:  3/1/2018    Assessment & Plan   Regla Conawy is a 89 year old female who presents with worsening left leg cellulitis after 5 days of Keflex:    Summary:[JM1.1]    Principal Problem:    Cellulitis left leg[JM1.2]   -- IV ancef (looks like strep infection), monitor progress, WBC in AM[JM1.1]  Active Problems:    Type 2 diabetes mellitus without complication (H)[JM1.2]   -- glucometer qid[JM1.1]    Expressive aphasia    PAD (peripheral artery disease) (H)[JM1.2] -- stable (both feet warm)[JM1.1]    CRF (chronic renal failure), stage 3 (moderate)[JM1.2]     Plan -- As above. Anticipate she may need a TCU (has one at her assisted living at [facility].     DVT Prophylaxis: Pneumatic Compression Devices  Code Status: DNR / DNI    Disposition: Expected discharge in 3 days once cellulitis improved.    Asad Griffith MD  Pager: [phone]  Cell Phone:  [phone]       Primary Care Physician   Leo Melgar    Chief Complaint   Left leg pain and swelling    History is obtained from the patient's son    History of Present Illness   89 year old female with DM type 2 and hx of PAD who  presents with redness left leg for 5 days, not improved with Keflex orally, and agreeable to admission for IV antibiotics.  She did go to the clinic today and MRI of left leg showed tissue swelling in skin and muscle.  She can not give any hx 2nd to the aphasia.     Past Medical History    I have reviewed this patient's medical history and updated it with pertinent information if needed.[JM1.1]   Past Medical History:   Diagnosis Date     Cholelithiasis 2009    incidental finding     DJD (degenerative joint disease) of lumbar spine 2009     Elevated glucose      Expressive aphasia related to prior stroke      Hard of hearing     hearing aids     HTN (hypertension)      Shingles 2005     Varicose vein of leg[JM1.3]        Past Surgical History   I have reviewed this patient's surgical history and updated it with pertinent information if needed.[JM1.1]  Past Surgical History:   Procedure Laterality Date     CATARACT IOL, RT/LT[JM1.3]         Prior to Admission Medications   Prior to Admission Medications   Prescriptions Last Dose Informant Patient Reported? Taking?   ACETAMINOPHEN PO  at prn Nursing Home Yes Yes   Sig: Take 500 mg by mouth every 8 hours as needed for pain   Cholecalciferol (VITAMIN D3 PO) 3/1/2018 at 0900 longterm Yes Yes   Sig: Take 2,000 Units by mouth daily   Lactobacillus (LACTINEX PO) 3/1/2018 at 1645 longterm Yes Yes   Sig: Take 1 tablet by mouth 3 times daily 0900; 1300; 1645   ORDER FOR DME   No No   Sig: Equipment being ordered: glucometer kit- brand per insurance coverage  Patient prefers brand with DRUM lancets - patient is to test fasting once each morning  #30 with 5 refills each of the lancets and test strips   ORDER FOR DME   No No   Sig: Equipment being ordered: retractable safety lancets   ORDER FOR DME   No No   Sig: Equipment being ordered: Large Underpads   ORDER FOR DME   No No   Sig: Equipment being ordered: Esthela xtra absorbent large pull ups - Uses 5 per day.    Ondansetron HCl (ZOFRAN PO)  at prn Nursing Home Yes Yes   Sig: Take 4 mg by mouth every 8 hours as needed for nausea or vomiting   acetaminophen (TYLENOL) 325 MG tablet 2/28/2018 at 2000 [facility] No Yes   Sig: Take 2 tablets (650 mg) by mouth At Bedtime Take 2 tablets at bedtime   aspirin 325 MG tablet 3/1/2018 at 0900 [facility] No Yes   Sig: Take 1 tablet (325 mg) by mouth daily   blood glucose monitoring (ONETOUCH ULTRA) test strip   No No   Sig: USE TO TEST ONCE EVERY MORNING AS DIRECTED   carbamide peroxide (DEBROX) 6.5 % otic solution  at prn [facility] Yes Yes   Sig: Place 5 drops into both ears daily as needed for other   cephALEXin (KEFLEX) 500 MG capsule 3/1/2018 at 0900 [facility] No Yes   Sig: Take 1 capsule (500 mg) by mouth 2 times daily for 7 days   colchicine (COLCRYS) 0.6 MG tablet 3/1/2018 at 0800 [facility] No Yes   Sig: Take one tablet bid for 2 days, then one per day for 4 more days.   furosemide (LASIX) 20 MG tablet 3/1/2018 at 1300 [facility] Yes Yes   Sig: Take 1 tablet (20 mg) by mouth 2 times daily As of 1/27/18   glipiZIDE (GLUCOTROL) 10 MG tablet 3/1/2018 at 1645 [facility] No Yes   Sig: Take 1 tablet (10 mg) by mouth 2 times daily (before meals)   metFORMIN (GLUCOPHAGE) 500 MG tablet 3/1/2018 at 0900 [facility] Yes Yes   Sig: Take 500 mg by mouth daily    nystatin (MYCOSTATIN) 375735 UNIT/GM POWD  at prn [facility] No Yes   Sig: Apply 1 g topically 3 times daily as needed   nystatin (MYCOSTATIN) 587221 UNIT/GM POWD 2/28/2018 at 2000 [facility] Yes Yes   Sig: Apply topically daily   polyethylene glycol (MIRALAX/GLYCOLAX) Packet  at Unknown time Nursing Home Yes Yes   Sig: Take 8.5 g by mouth every other day    pramipexole (MIRAPEX) 0.25 MG tablet 2/28/2018 at 2000 [facility] No Yes   Sig: Take 1.5 tablets (0.375 mg) by mouth At Bedtime   ranitidine (ZANTAC) 150 MG tablet 3/1/2018 at 0900 [facility] No Yes   Sig: Take 1 tablet (150 mg) by mouth 2 times daily    simvastatin (ZOCOR) 5 MG tablet 2/28/2018 at 2000 [facility] No Yes   Sig: Take 1 tablet (5 mg) by mouth every evening   sitagliptin (JANUVIA) 50 MG tablet 3/1/2018 at 0900 [facility] No Yes   Sig: Take 1 tablet (50 mg) by mouth daily   spironolactone (ALDACTONE) 25 MG tablet 3/1/2018 at 0900 [facility] Yes Yes   Sig: Take 0.5 tablets (12.5 mg) by mouth daily      Facility-Administered Medications: None     Allergies   Allergies   Allergen Reactions     Cozaar [Losartan Potassium] Swelling     Leg swelling. Same from Avapro.     Hydrochlorothiazide Other (See Comments)     hyponatremia     Hydrocodone Nausea and Vomiting     Nausea       Social History   I have reviewed this patient's social history and updated it with pertinent information if needed. Justusmiguel LUTZ Pramod[JM1.1]  reports that she has never smoked. She has never used smokeless tobacco. She reports that she does not drink alcohol or use illicit drugs.[JM1.3]    Family History   I have reviewed this patient's family history and updated it with pertinent information if needed.[JM1.1]   Family History   Problem Relation Age of Onset     DIABETES Brother      Unknown/Adopted Mother      Unknown/Adopted Father      Coronary Artery Disease No family hx of      Hypertension No family hx of      Hyperlipidemia No family hx of      CEREBROVASCULAR DISEASE No family hx of      Breast Cancer No family hx of      Colon Cancer No family hx of      Prostate Cancer No family hx of      Other Cancer No family hx of      Depression No family hx of      Anxiety Disorder No family hx of      MENTAL ILLNESS No family hx of      Substance Abuse No family hx of      Anesthesia Reaction No family hx of      Asthma No family hx of      OSTEOPOROSIS No family hx of      Genetic Disorder No family hx of      Thyroid Disease No family hx of      Obesity No family hx of[JM1.3]        Review of Systems   Review of systems not obtained due to patient factors - language  barrier  # Pain Assessment:[JM1.1]   Current Pain Score 2/24/2018 2/24/2018 10/25/2013   Pain score (0-10) 0 4 2   Pain location - - -[JM1.4]   Regla gutiérrez pain level was assessed and she currently denies pain.      Physical Exam   Temp: 97.2  F (36.2  C)   BP: 135/55 Pulse: 78 Heart Rate: 76 Resp: 12 SpO2: 93 % O2 Device: None (Room air)    Vital Signs with Ranges  Temp:  [97.2  F (36.2  C)] 97.2  F (36.2  C)  Pulse:  [78] 78  Heart Rate:  [76-78] 76  Resp:  [12] 12  BP: (135-140)/(47-55) 135/55  SpO2:  [93 %] 93 %  180 lbs 0 oz    Constitutional: Awake, alert, cooperative, no apparent distress.  Eyes: Conjunctiva and pupils examined and normal.  HEENT: Moist mucous membranes, normal dentition.  Respiratory: Clear to auscultation bilaterally, no crackles or wheezing.  Cardiovascular: Regular rate and rhythm, normal S1 and S2, and no murmur noted.  GI: Soft, non-distended, non-tender, normal bowel sounds.  Lymph/Hematologic: No anterior cervical or supraclavicular adenopathy.  Skin: mild erythema left foot and lower leg.  Non-tender to palpation.  1+ ankle edema bilaterally  Musculoskeletal: No joint swelling, erythema or tenderness.  Neurologic: Cranial nerves 2-12 intact, normal strength and sensation.  Psychiatric: Alert, some trouble expressing herself.     Data   Data reviewed today:  I personally reviewed no images or EKG's today.    Recent Labs  Lab 03/01/18 2000 02/28/18  1218 02/24/18  1344   WBC 12.3* 11.7* 14.4*   HGB 12.4 12.6 12.5   MCV 90 93 90    266 246     --  139   POTASSIUM 4.1  --  3.8   CHLORIDE 101  --  104   CO2 31  --  28   BUN 28  --  27   CR 1.15*  --  1.09*   ANIONGAP 7  --  7   AAYUSH 8.8  --  8.7   *  --  114*       Imaging:  Recent Results (from the past 24 hour(s))   MR Foot Left w/o Contrast    Narrative    MR FOOT LEFT WITHOUT CONTRAST 3/1/2018 2:19 PM    HISTORY: Left foot pain and swelling; etiology? Differential diagnosis  includes gout and diabetic foot  infection.    COMPARISON: X-ray left foot from 2/24/2018.    TECHNIQUE: Routine multiplanar, multisequence imaging was performed.    FINDINGS:   Osseous structures: Prominent first MTP joint degenerative changes and  hallux valgus with some marginal erosive changes and subchondral cyst  formation. This could be degenerative but gout could have a similar  appearance as there is some moderate adjacent soft tissue prominence.  Remaining osseous structures are unremarkable. No evidence of fracture  or osteomyelitis.    Additional findings: Diffuse subcutaneous edema. There is also edema  in the deep musculature of the plantar aspect of the foot. Findings  suggest cellulitis and perhaps myositis as well. No evidence of  abscess or fluid collection.      Impression    IMPRESSION:  1. Somewhat focal marginal erosive changes surrounding the first MTP  joint suggests the possibility of gout. There is also soft tissue  prominence surrounding the joint supporting this diagnosis. There may  be secondary degenerative changes as well.  2. Diffuse subcutaneous and deep muscle edema suggesting  cellulitis/myositis. No abscess or fluid collection.    REYNALDO BLACKWOOD MD[JM1.1]         Revision History        User Key Date/Time User Provider Type Action    > JM1.4 3/1/2018 11:00 PM Asad Ball MD Physician Sign     JM1.3 3/1/2018 10:57 PM Asad Ball MD Physician      JM1.2 3/1/2018 10:52 PM Asad Ball MD Physician      JM1.1 3/1/2018 10:51 PM Asad Ball MD Physician                      Discharge Summaries      Discharge Summaries by Gamaliel Madison MD at 3/5/2018 10:38 AM     Author:  Gamaliel Madison MD Service:  Internal Medicine Author Type:  Physician    Filed:  3/5/2018 10:38 AM Date of Service:  3/5/2018 10:38 AM Creation Time:  3/4/2018 10:42 AM    Status:  Addendum :  Gamaliel Madison MD (Physician)         [facility]  Discharge  Summary        Regla Benavidez MRN# [id number]   YOB: 1926 Age: 89 year old     Date of Admission: 3/1/2018  Date of Discharge: 3/5/2018  Admitting Physician: Asad Ball MD  Discharge Physician: Gamaleil Madison MD     Primary Provider: Leo Melgar  Primary Care Physician Phone Number: [phone]         Discharge Diagnoses:   1. Cellulitis and myositis of left leg.  2. Possible gout left foot.  3. Hypertension (benign essential).  4. Physical deconditioning.        Other Chronic Medical Problems:      1. Diabetes mellitus type 2 w/o complications.  2. CVA history.  3. Peripheral arterial disease  4. Restless leg syndrome  5. Chronic kidney disease, stage III.       Allergies:         Allergies   Allergen Reactions     Cozaar [Losartan Potassium] Swelling     Leg swelling. Same from Avapro.     Hydrochlorothiazide Other (See Comments)     hyponatremia     Hydrocodone Nausea and Vomiting     Nausea           Discharge Medications:        Current Discharge Medication List      CONTINUE these medications which have CHANGED    Details   cephALEXin (KEFLEX) 500 MG capsule Take 1 capsule (500 mg) by mouth 3 times daily for 6 days  Qty: 21 capsule, Refills: 0    Associated Diagnoses: Cellulitis of left lower extremity      colchicine (COLCRYS) 0.6 MG tablet Take one tablet bid for 2 days, then one per day for 4 more days.  Qty: 16 tablet, Refills: 0    Associated Diagnoses: Left foot pain         CONTINUE these medications which have NOT CHANGED    Details   polyethylene glycol (MIRALAX/GLYCOLAX) Packet Take 8.5 g by mouth every other day       Lactobacillus (LACTINEX PO) Take 1 tablet by mouth 3 times daily 0900; 1300; 1645      !! nystatin (MYCOSTATIN) 712368 UNIT/GM POWD Apply topically daily      !! ACETAMINOPHEN PO Take 500 mg by mouth every 8 hours as needed for pain      Ondansetron HCl (ZOFRAN PO) Take 4 mg by mouth every 8 hours as needed for nausea or vomiting       pramipexole (MIRAPEX) 0.25 MG tablet Take 1.5 tablets (0.375 mg) by mouth At Bedtime  Qty: 45 tablet, Refills: 11    Comments: Dosage change  Associated Diagnoses: Restless legs syndrome      furosemide (LASIX) 20 MG tablet Take 1 tablet (20 mg) by mouth 2 times daily As of 1/27/18  Qty: 60 tablet, Refills: 12    Associated Diagnoses: Bilateral leg edema      spironolactone (ALDACTONE) 25 MG tablet Take 0.5 tablets (12.5 mg) by mouth daily  Qty: 30 tablet, Refills: 11    Associated Diagnoses: Bilateral leg edema      metFORMIN (GLUCOPHAGE) 500 MG tablet Take 500 mg by mouth daily   Qty: 30 tablet, Refills: 12      glipiZIDE (GLUCOTROL) 10 MG tablet Take 1 tablet (10 mg) by mouth 2 times daily (before meals)  Qty: 60 tablet, Refills: 11    Comments: Dosage change  Associated Diagnoses: Type 2 diabetes mellitus with hyperglycemia, without long-term current use of insulin (H)      sitagliptin (JANUVIA) 50 MG tablet Take 1 tablet (50 mg) by mouth daily  Qty: 30 tablet, Refills: 11    Associated Diagnoses: Uncontrolled type 2 diabetes mellitus with complication, without long-term current use of insulin (H); Type 2 diabetes mellitus with hyperglycemia, without long-term current use of insulin (H)      !! nystatin (MYCOSTATIN) 459495 UNIT/GM POWD Apply 1 g topically 3 times daily as needed  Qty: 60 g, Refills: 11      carbamide peroxide (DEBROX) 6.5 % otic solution Place 5 drops into both ears daily as needed for other      !! acetaminophen (TYLENOL) 325 MG tablet Take 2 tablets (650 mg) by mouth At Bedtime Take 2 tablets at bedtime  Qty: 184 tablet, Refills: 2    Associated Diagnoses: Midline low back pain without sciatica      ranitidine (ZANTAC) 150 MG tablet Take 1 tablet (150 mg) by mouth 2 times daily  Qty: 60 tablet, Refills: 5    Associated Diagnoses: Gastroesophageal reflux disease without esophagitis      Cholecalciferol (VITAMIN D3 PO) Take 2,000 Units by mouth daily      simvastatin (ZOCOR) 5 MG tablet Take 1  tablet (5 mg) by mouth every evening  Qty: 30 tablet    Associated Diagnoses: Stroke (H)      aspirin 325 MG tablet Take 1 tablet (325 mg) by mouth daily  Qty: 120 tablet    Associated Diagnoses: Stroke (H)      blood glucose monitoring (ONETOUCH ULTRA) test strip USE TO TEST ONCE EVERY MORNING AS DIRECTED  Qty: 100 each, Refills: 11    Associated Diagnoses: Type 2 diabetes mellitus without complication, without long-term current use of insulin (H)      !! ORDER FOR DME Equipment being ordered: Large Underpads  Qty: 3 Package, Refills: 6    Associated Diagnoses: Urinary frequency      !! ORDER FOR DME Equipment being ordered: Esthela xtra absorbent large pull ups - Uses 5 per day.  Qty: 150 Units, Refills: 11    Associated Diagnoses: Urinary incontinence      !! ORDER FOR DME Equipment being ordered: retractable safety lancets  Qty: 100 lancet, Refills: 1    Associated Diagnoses: Type II or unspecified type diabetes mellitus without mention of complication, not stated as uncontrolled      !! ORDER FOR DME Equipment being ordered: glucometer kit- brand per insurance coverage  Patient prefers brand with DRUM lancets - patient is to test fasting once each morning  #30 with 5 refills each of the lancets and test strips  Qty: 1 Device, Refills: 0    Associated Diagnoses: Type II or unspecified type diabetes mellitus without mention of complication, not stated as uncontrolled       !! - Potential duplicate medications found. Please discuss with provider.              Discharge Instructions and Follow-Up:      Follow-up Appointments     Follow Up and recommended labs and tests       Follow up with Nursing home physician.  Follow up with primary care provider after TCU discharge.                          Consultations This Hospital Stay:      None.        Admission History:      Please see the H&P by Asad Ball MD on 3/1/2018 for complete details. Briefly, Ms. Regla Benavidez is a 89 year-old female with history  including DM2, HTN, CVA with resultant expressive aphasia and PAD, who was admitted on 3/1/2018 with persistent leg cellulitis with pain despite oral antibiotics.        Problem Oriented Hospital Course:      Cellulitis and myositis of left leg.  Admitted for ongoing leg and foot pain despite oral cephalexin course. MRI 3/1/18 as outpatient showed diffuse subcutaneous and deep muscle edema suggesting cellulitis/myositis, without abscess seen. Started on cefazolin an admission 3/1 with improvement. Antibiotics changed to PO 3/3.  Micro: BC's 3/1 NGTD.  - Continue Keflex 500 mg TID (started 3/3) - plan stop after 3/9.     Possible gout.  MRI 3/1 also showed focal marginal erosive changes of 1st MTP possibly suggestive of gout. Started on colchicine by PCP based on MRI findings. Left 1st MTP without tenderness to palpation presently.  - Complete colchicine course as previously prescribed; stop after 3/6.     Hypertension (benign essential).  [PTA: furosemide 20 mg BID, spironolactone 12.5 mg daily.]  - Continue spironolactone and furosemide.     Diabetes mellitus type 2 w/o complications.  [PTA: glipizide 10 mg BID, metformin 500 mg daily, sitagliptan 50 mg daily.]  HgbA1c 6.9% 2/9/2018.   Recent Labs   Lab Test  03/04/18   0825  03/04/18   0745  03/04/18   0104  03/03/18   2139  03/03/18   1704  03/03/18   1148    03/02/18   1021    03/01/18   2000  02/24/18   1344 12/28/17   GLC  157*   --    --    --    --    --    --   190*   --   159*  114*  126*   BGM   --   137*  112*  103*  85  194*   < >   --    < >   --    --    --     < > = values in this interval not displayed.   - Continue glipizide, metformin, sitagliptan.     History of CVA with expressive aphasia.  No acute issues.  - Continue ASA, simvastatin.      Peripheral arterial disease  No acute issues.  - Continue ASA.       Restless leg syndrome  - Continue pramipexole.       Chronic kidney disease, stage III.  Baseline creatinine 1.1-1.3. Creatinine at  baseline this stay.      Physical deconditioning.  - Continue PT, OT.          Code Status:      DNR / DNI        Pending Results:        Unresulted Labs Ordered in the Past 30 Days of this Admission     Date and Time Order Name Status Description    3/1/2018 1944 Blood culture Preliminary     3/1/2018 1944 Blood culture Preliminary                 Discharge Disposition:      Discharged to TCU.        Discharge Time:      Greater than 30 minutes.        Key Imaging Studies, Lab Findings and Procedures/Surgeries:        Results for orders placed or performed during the hospital encounter of 03/01/18   MR Foot Left w/o Contrast    Narrative    MR FOOT LEFT WITHOUT CONTRAST 3/1/2018 2:19 PM    HISTORY: Left foot pain and swelling; etiology? Differential diagnosis  includes gout and diabetic foot infection.    COMPARISON: X-ray left foot from 2/24/2018.    TECHNIQUE: Routine multiplanar, multisequence imaging was performed.    FINDINGS:   Osseous structures: Prominent first MTP joint degenerative changes and  hallux valgus with some marginal erosive changes and subchondral cyst  formation. This could be degenerative but gout could have a similar  appearance as there is some moderate adjacent soft tissue prominence.  Remaining osseous structures are unremarkable. No evidence of fracture  or osteomyelitis.    Additional findings: Diffuse subcutaneous edema. There is also edema  in the deep musculature of the plantar aspect of the foot. Findings  suggest cellulitis and perhaps myositis as well. No evidence of  abscess or fluid collection.      Impression    IMPRESSION:  1. Somewhat focal marginal erosive changes surrounding the first MTP  joint suggests the possibility of gout. There is also soft tissue  prominence surrounding the joint supporting this diagnosis. There may  be secondary degenerative changes as well.  2. Diffuse subcutaneous and deep muscle edema suggesting  cellulitis/myositis. No abscess or fluid  collection.    REYNALDO BLACKWOOD MD[GC1.1]          Revision History        User Key Date/Time User Provider Type Action    > [N/A] 3/5/2018 10:38 AM Gamaliel Madison MD Physician Addend     GC1.1 3/4/2018 10:45 AM Gamaliel Madison MD Physician Sign                  Consult Notes     No notes of this type exist for this encounter.         Progress Notes - Physician (Notes from 03/02/18 through 03/05/18)      Progress Notes by Valentín Christian at 3/5/2018 11:00 AM     Author:  Valentín Christian Service:  Social Work Author Type:      Filed:  3/5/2018 11:06 AM Date of Service:  3/5/2018 11:00 AM Creation Time:  3/5/2018 11:00 AM    Status:  Addendum :  Valentín Christian ()         PAS-RR    Per [facility] regulation, SW completed and submitted PAS-RR to [place] [employer] Direct Connect via the Senior LinkAge Line.  PAS-RR confirmation # is :               [id number]  Further questions may be directed to [facility] LinkAge Line at #[phone], option #4 for PAS-RR staff.  Confirmed with Delores at [facility] that they are able to accept patient today. Spoke with son David who requets wheelchair transport. Noted this is typically covered under patient's [facility]. Set up [facility] wheelchair ride at 1[RH1.1]3[RH1.2]00. Faxed orders and PAS to [facility].[RH1.1]       Revision History        User Key Date/Time User Provider Type Action    > RH1.2 3/5/2018 11:06 AM Valentín Christian  Addend     RH1.1 3/5/2018 11:04 AM Valentín Christian  Sign            Progress Notes by Gamaliel Madison MD at 3/5/2018 10:22 AM     Author:  Gamaliel Madison MD Service:  Internal Medicine Author Type:  Physician    Filed:  3/5/2018 10:38 AM Date of Service:  3/5/2018 10:22 AM Creation Time:  3/5/2018 10:22 AM    Status:  Signed :  Gamaliel Madison MD (Physician)         [facility]    Internal Medicine Hospitalist Progress Note  03/05/2018  I evaluated patient on the above  "date.    Gamaliel Madison Jr., MD  [phone] (p)  Text Page (7 am to 6 pm)      Assessment & Plan[GC1.1]   D/C to TCU today 3/5: see d/c summary for diagnoses.[GC1.2]    Interval History[GC1.1]   Doing OK overall[GC1.2].    -Data reviewed today: I reviewed all new labs and imaging over the last 24 hours. I personally reviewed no images or EKG's today.    Physical Exam   Heart Rate: 64, Blood pressure 143/69, pulse 68, temperature 97.8  F (36.6  C), temperature source Oral, resp. rate 18, height 1.575 m (5' 2\"), weight 82 kg (180 lb 12.4 oz), SpO2 93 %, not currently breastfeeding.  Vitals:    03/01/18 1903 03/02/18 0254   Weight: 81.6 kg (180 lb) 82 kg (180 lb 12.4 oz)     Vital Signs with Ranges  Temp:  [97.8  F (36.6  C)-98.7  F (37.1  C)] 97.8  F (36.6  C)  Heart Rate:  [62-69] 64  Resp:  [16-20] 18  BP: (130-143)/(59-69) 143/69  SpO2:  [92 %-96 %] 93 %  Patient Vitals for the past 24 hrs:   BP Temp Temp src Heart Rate Resp SpO2   03/05/18 0736 143/69 97.8  F (36.6  C) Oral 64 18 93 %   03/05/18 0413 - - - - - 96 %   03/05/18 0359 134/66 98.7  F (37.1  C) Axillary 69 20 92 %   03/04/18 1552 130/59 98.2  F (36.8  C) Oral 62 16 94 %     I/O's Last 24 hours  I/O last 3 completed shifts:  In: 740 [P.O.:740]  Out: -     Constitutional: Alert, oriented, pleasant.  Respiratory:   Cardiovascular:   GI:   Skin/Integumen: No significant left lower extremity edema[GC1.1] or erythema[GC1.2]. No left foot MTP erythema or swelling.   Other:        Data     Recent Labs  Lab 03/04/18  0825 03/02/18  1021 03/01/18 2000   WBC 8.6 9.3 12.3*   HGB 13.0 11.1* 12.4   MCV 91 91 90    233 279    138 139   POTASSIUM 4.5 4.4 4.1   CHLORIDE 104 103 101   CO2 29 30 31   BUN 21 26 28   CR 1.00 1.05* 1.15*   ANIONGAP 6 5 7   AAYUSH 8.7 8.1* 8.8   * 190* 159*     Recent Labs   Lab Test  03/05/18   0235  03/04/18   2114  03/04/18   1657  03/04/18   1221  03/04/18   0825  03/04/18   0745   03/02/18   1021   03/01/18 2000  " 02/24/18   1344 12/28/17   GLC   --    --    --    --   157*   --    --   190*   --   159*  114*  126*   BGM  135*  150*  106*  141*   --   137*   < >   --    < >   --    --    --     < > = values in this interval not displayed.         No results found for this or any previous visit (from the past 24 hour(s)).    Medications   All medications were reviewed.      ranitidine  150 mg Oral At Bedtime     cephalexin  500 mg Oral TID     spironolactone  12.5 mg Oral Daily     sitagliptin  50 mg Oral Daily     simvastatin  5 mg Oral QPM     pramipexole  0.375 mg Oral At Bedtime     polyethylene glycol  8.5 g Oral Every Other Day     glipiZIDE  10 mg Oral BID AC     colchicine  0.6 mg Oral Daily     cholecalciferol (vitamin D3) tablet 2,000 Units  2,000 Units Oral Daily     aspirin  325 mg Oral Daily     insulin aspart  1-7 Units Subcutaneous TID AC     insulin aspart  1-5 Units Subcutaneous At Bedtime[GC1.1]          Revision History        User Key Date/Time User Provider Type Action    > GC1.2 3/5/2018 10:38 AM Gamaliel Madison MD Physician Sign     GC1.1 3/5/2018 10:22 AM Gamaliel Madison MD Physician             Progress Notes by Gamaliel Madison MD at 3/4/2018 10:28 AM     Author:  Gamaliel Madison MD Service:  Internal Medicine Author Type:  Physician    Filed:  3/4/2018 10:40 AM Date of Service:  3/4/2018 10:28 AM Creation Time:  3/4/2018 10:28 AM    Status:  Signed :  Gamaliel Madison MD (Physician)         [facility]    Internal Medicine Hospitalist Progress Note  03/04/2018  I evaluated patient on the above date.    Gamaliel Madison Jr., MD  [phone] (p)  Text Page (7 am to 6 pm)      Assessment & Plan   Ms. Regla Benavidez is a 89 year-old female with history including DM2, HTN, CVA with resultant expressive aphasia and PAD, who was admitted on 3/1/2018 with persistent leg cellulitis with pain despite oral antibiotics.     Cellulitis and myositis of left  leg.  Admitted for ongoing leg and foot pain despite oral cephalexin course. MRI 3/1/18 as outpatient showed diffuse subcutaneous and deep muscle edema suggesting cellulitis/myositis, without abscess seen. Started on cefazolin an admission 3/1 with improvement. Antibiotics changed to PO 3/3.  Micro: BC's 3/1 NGTD.  - Continue Keflex 500 mg TID (started 3/3)[GC1.1] - plan stop after 3/9.[GC1.2]  - Monitor cultures.    Possible gout.  MRI 3/1 also showed focal marginal erosive changes of 1st MTP possibly suggestive of gout. Started on colchicine by PCP based on MRI findings. Left 1st MTP without tenderness to palpation presently.  - Complete colchicine course as previously prescribed.    Hypertension (benign essential).  [PTA: furosemide 20 mg BID, spironolactone 12.5 mg daily.]  - Continue spironolactone.  - Resume furosemide after d/c.    Diabetes mellitus type 2 w/o complications.  [PTA: glipizide 10 mg BID, metformin 500 mg daily, sitagliptan 50 mg daily.]  HgbA1c 6.9% 2/9/2018.   Recent Labs   Lab Test  03/04/18   0825  03/04/18   0745  03/04/18   0104  03/03/18   2139  03/03/18   1704  03/03/18   1148   03/02/18   1021   03/01/18   2000  02/24/18   1344 12/28/17   GLC  157*   --    --    --    --    --    --   190*   --   159*  114*  126*   BGM   --   137*  112*  103*  85  194*   < >   --    < >   --    --    --     < > = values in this interval not displayed.   - Continue glipizide, sitagliptan.  - Resume metformin at home.  - Continue moderate CHO diet.  - Continue ISS.    History of CVA with expressive aphasia.  No acute issues.  - Continue ASA, simvastatin.     Peripheral arterial disease  No acute issues.  - Continue ASA.       Restless leg syndrome  - Continue prior to admission pramipexole.      Chronic kidney disease, stage III.  Baseline creatinine 1.1-1.3. Creatinine at baseline  - Avoid nephrotoxins as able.     Physical deconditioning.  - PT, OT.      Prophylaxis.  - PCD's, ambulation.    CODE  "STATUS: DNR/DNI.    Dispo.  - Pending above.  - Plan d[GC1.1]/c to TCU 3/5.    Communication.  - I d/w pt's son 3/3 and with pt's daughter 3/4.[GC1.2]      Interval History[GC1.1]   Doing OK. Denies pain in foot while walking.[GC1.2]    -Data reviewed today: I reviewed all new labs and imaging over the last 24 hours. I personally reviewed no images or EKG's today.    Physical Exam   Heart Rate: 68, Blood pressure 145/67, pulse 68, temperature 98  F (36.7  C), temperature source Oral, resp. rate 16, height 1.575 m (5' 2\"), weight 82 kg (180 lb 12.4 oz), SpO2 91 %, not currently breastfeeding.  Vitals:    03/01/18 1903 03/02/18 0254   Weight: 81.6 kg (180 lb) 82 kg (180 lb 12.4 oz)     Vital Signs with Ranges  Temp:  [98  F (36.7  C)-98.4  F (36.9  C)] 98  F (36.7  C)  Pulse:  [68] 68  Heart Rate:  [66-68] 68  Resp:  [16-18] 16  BP: (123-147)/(44-67) 145/67  SpO2:  [91 %-92 %] 91 %  Patient Vitals for the past 24 hrs:   BP Temp Temp src Pulse Heart Rate Resp SpO2   03/04/18 0746 145/67 98  F (36.7  C) Oral 68 68 16 91 %   03/04/18 0103 147/66 98.1  F (36.7  C) Oral - 66 16 91 %   03/03/18 1930 - - - - - 18 -   03/03/18 1521 123/44 98.4  F (36.9  C) Oral - 67 18 92 %     I/O's Last 24 hours  I/O last 3 completed shifts:  In: 540 [P.O.:540]  Out: -     Constitutional: Alert, oriented, pleasant.  Respiratory: Diminished in bases. No crackles or wheezes.  Cardiovascular: RRR no m/r/g.  GI: Soft, nt, nd +BS.  Skin/Integumen: No significant left lower extremity edema.[GC1.1] No left foot MTP erythema or swelling. Toes tender to palpation.[GC1.2]  Other:        Data     Recent Labs  Lab 03/04/18  0825 03/02/18  1021 03/01/18 2000   WBC 8.6 9.3 12.3*   HGB 13.0 11.1* 12.4   MCV 91 91 90    233 279    138 139   POTASSIUM 4.5 4.4 4.1   CHLORIDE 104 103 101   CO2 29 30 31   BUN 21 26 28   CR 1.00 1.05* 1.15*   ANIONGAP 6 5 7   AAYUSH 8.7 8.1* 8.8   * 190* 159*     Recent Labs   Lab Test  03/04/18   0825  " 03/04/18   0745  03/04/18   0104  03/03/18   2139  03/03/18   1704  03/03/18   1148   03/02/18   1021   03/01/18   2000  02/24/18   1344 12/28/17   GLC  157*   --    --    --    --    --    --   190*   --   159*  114*  126*   BGM   --   137*  112*  103*  85  194*   < >   --    < >   --    --    --     < > = values in this interval not displayed.         No results found for this or any previous visit (from the past 24 hour(s)).    Medications   All medications were reviewed.      ranitidine  150 mg Oral At Bedtime     cephalexin  500 mg Oral TID     spironolactone  12.5 mg Oral Daily     sitagliptin  50 mg Oral Daily     simvastatin  5 mg Oral QPM     pramipexole  0.375 mg Oral At Bedtime     polyethylene glycol  8.5 g Oral Every Other Day     glipiZIDE  10 mg Oral BID AC     colchicine  0.6 mg Oral Daily     cholecalciferol (vitamin D3) tablet 2,000 Units  2,000 Units Oral Daily     aspirin  325 mg Oral Daily     insulin aspart  1-7 Units Subcutaneous TID AC     insulin aspart  1-5 Units Subcutaneous At Bedtime[GC1.1]          Revision History        User Key Date/Time User Provider Type Action    > GC1.2 3/4/2018 10:40 AM Gamaliel Madison MD Physician Sign     GC1.1 3/4/2018 10:28 AM Gamaliel Madison MD Physician             Progress Notes by Jen Kuhn PT at 3/3/2018  8:30 AM     Author:  Jen Kuhn PT Service:  (none) Author Type:  Physical Therapist    Filed:  3/3/2018  1:28 PM Date of Service:  3/3/2018  8:30 AM Creation Time:  3/3/2018  1:28 PM    Status:  Signed :  Jen Kuhn PT (Physical Therapist)          03/03/18 0800   Quick Adds   Type of Visit Initial PT Evaluation   Living Environment   Lives With alone;facility resident   Living Arrangements independent living facility  ([facility] vs [facility])   Home Accessibility no concerns   Number of Stairs to Enter Home 0   Number of Stairs Within Home 0   Transportation Available family or friend will  provide  (pt does not drive at baseline)   Living Environment Comment per chart, Patient was being cared for at home by family since Sat.   Self-Care   Regular Exercise no   Equipment Currently Used at Home walker, rolling;grab bar   Activity/Exercise/Self-Care Comment medication setup/managment, light household cleaning and meals provided at [facility]   Functional Level Prior   Ambulation 1-->assistive equipment   Transferring 1-->assistive equipment   Toileting 1-->assistive equipment   Bathing 1-->assistive equipment   Dressing 0-->independent   Eating 0-->independent   Communication 2-->difficulty understanding (not related to language barrier)  (aphasia, PMH significant for CVA)   Fall history within last six months no   Which of the above functional risks had a recent onset or change? ambulation;transferring   General Information   Onset of Illness/Injury or Date of Surgery - Date 03/01/18   Referring Physician Rashi Julian MD   Patient/Family Goals Statement none stated   Pertinent History of Current Problem (include personal factors and/or comorbidities that impact the POC) Pt is a 89 y/o female admitted 3/1/18 for further evaluation of increased L foot pain, swelling, concern for infection. Initially evaluated in ED 5 days ago, was discharged with antibiotics, swelling and pain persisted, OP MRI was obtained revealing worsening infection. Pt with Cellulitis and myositis of L LE and possible gout of 1st MTP. PMH significant for HTN, PAD, CKD stage 3, DM type 2, and CVA with resultant expressive aphasia.   Precautions/Limitations fall precautions   General Info Comments activity; up with assist   Cognitive Status Examination   Orientation person   Level of Consciousness alert;confused   Follows Commands and Answers Questions 75% of the time;able to follow single-step instructions   Personal Safety and Judgment intact   Memory impaired   Cognitive Comment pt unable to recall PLOF, unclear if limited d/t aphasia  "or cog status   Pain Assessment   Patient Currently in Pain No   Integumentary/Edema   Integumentary/Edema no deficits were identifed   Posture    Posture Forward head position;Protracted shoulders   Range of Motion (ROM)   ROM Quick Adds No deficits were identified   ROM Comment B LEs WFL   Strength   Strength Comments grossly 4/5 B LEs, no difference R<>L   Bed Mobility   Bed Mobility Comments pt completes supine>sit with SBA, increased time and use of bed rail   Transfer Skills   Transfer Comments pt completes sit<>stand with SBA   Gait   Gait Comments pt ambulates x5' with FWW and CGA, slowed pace, increased forward flexed posturing   Balance   Balance Comments no overt LOB or unsteadiness, strong reliance on FWW use    Sensory Examination   Sensory Perception no deficits were identified   Sensory Perception Comments pt denies numbness/tingling   General Therapy Interventions   Planned Therapy Interventions balance training;bed mobility training;gait training;strengthening;transfer training;progressive activity/exercise   Clinical Impression   Criteria for Skilled Therapeutic Intervention yes, treatment indicated   PT Diagnosis difficulty with gait   Influenced by the following impairments decreased functional strength, activity tolerance, balance   Functional limitations due to impairments difficulty with bed mobility, transfers, gait   Clinical Presentation Stable/Uncomplicated   Clinical Presentation Rationale pt with progressing medical stay since admission   Clinical Decision Making (Complexity) Low complexity   Therapy Frequency` daily   Predicted Duration of Therapy Intervention (days/wks) 2-3 days   Anticipated Discharge Disposition Transitional Care Facility   Risk & Benefits of therapy have been explained Yes   Patient, Family & other staff in agreement with plan of care Yes   [facility] AM-PAC TM \"6 Clicks\"   2016, Trustees of [facility], under license to [employer].  All rights " "reserved.   6 Clicks Short Forms Basic Mobility Inpatient Short Form   [facility] AM-PAC  \"6 Clicks\" V.2 Basic Mobility Inpatient Short Form   1. Turning from your back to your side while in a flat bed without using bedrails? 4 - None   2. Moving from lying on your back to sitting on the side of a flat bed without using bedrails? 3 - A Little   3. Moving to and from a bed to a chair (including a wheelchair)? 3 - A Little   4. Standing up from a chair using your arms (e.g., wheelchair, or bedside chair)? 3 - A Little   5. To walk in hospital room? 3 - A Little   6. Climbing 3-5 steps with a railing? 2 - A Lot   Basic Mobility Raw Score (Score out of 24.Lower scores equate to lower levels of function) 18   Total Evaluation Time   Total Evaluation Time (Minutes) 10[SQ1.1]        Revision History        User Key Date/Time User Provider Type Action    > SQ1.1 3/3/2018  1:28 PM Jen Kuhn, PT Physical Therapist Sign            Progress Notes by Gamaliel Madison MD at 3/3/2018 10:16 AM     Author:  Gamaliel Madison MD Service:  Internal Medicine Author Type:  Physician    Filed:  3/3/2018 10:51 AM Date of Service:  3/3/2018 10:16 AM Creation Time:  3/3/2018 10:16 AM    Status:  Addendum :  Gamaliel Madison MD (Physician)         [facility]    Internal Medicine Hospitalist Progress Note  03/03/2018  I evaluated patient on the above date.    Gamaliel Madison Jr., MD  [phone] (p)  Text Page (7 am to 6 pm)      Assessment & Plan[GC1.1]   Ms.[GC1.2] Regla Benavidez is a 89 year-old female with history[GC1.1] including[GC1.2] DM2,[GC1.3] HTN,[GC1.2] CVA with resultant expressive aphasia[GC1.1] and PAD, who was[GC1.2] admitted on 3/1/2018 with persistent leg[GC1.1] cellulitis with pain despite oral antibiotics.[GC1.2]     Cellulitis and myositis of left leg.  Admitted for ongoing leg and foot pain despite oral cephalexin course. MRI 3/1/18 as outpatient[GC1.1] showed[GC1.2] " diffuse subcutaneous and deep muscle edema suggesting cellulitis/myositis, without abscess seen.[GC1.1] Started on cefazolin an admission 3/1 with improvement.  Micro: BC's 3/1 NGTD.  -[GC1.2] D/C[GC1.4] cefazolin (started 3/1).[GC1.2]  - Start Keflex 500 mg TID.[GC1.4]  - Monitor cultures.[GC1.2]    Possible gout[GC1.1].[GC1.3]  MRI 3/1 also showed[GC1.2] focal marginal erosive changes of 1st MTP possibly suggestive of gout[GC1.1]. S[GC1.2]tarted on colchicine by PCP based on MRI findings. Left 1st MTP without tenderness to palpation[GC1.1] presently.  - C[GC1.2]omplete colchicine course as previously prescribed[GC1.1].[GC1.2]    Hypertension (benign essential).  [PTA: furosemide 20 mg BID, spironolactone 12.5 mg daily.]  - Continue spironolactone.[GC1.3]  - Resume furosemide[GC1.5] after d/c[GC1.4].[GC1.5]    Diabetes mellitus type 2[GC1.1] w/o complications[GC1.2].  [PTA: glipizide 10 mg BID,[GC1.3] metformin 500 mg daily,[GC1.5] sitagliptan 50 mg daily.][GC1.3]  HgbA1c 6.9% 2/9/[GC1.1]20[GC1.2]18.[GC1.1]   Recent Labs   Lab Test  03/03/18   0830  03/03/18   0144  03/02/18   2100  03/02/18   1700  03/02/18   1209  03/02/18   1021   03/01/18   2000  02/24/18   1344 12/28/17 11/27/17   GLC   --    --    --    --    --   190*   --   159*  114*  126*  188*   BGM  158*  140*  200*  145*  177*   --    < >   --    --    --    --     < > = values in this interval not displayed.[GC1.6]   - Continue glipizide, sitagliptan.[GC1.3]  - Resume metformin[GC1.5] at home[GC1.7].[GC1.5]  - Continue moderate CHO diet.  - Continue ISS.[GC1.2]    History of CVA with expressive aphasia[GC1.1].[GC1.3]  No acute issues.  -[GC1.1] Continue ASA, simvastatin.[GC1.2]     Peripheral arterial disease  No acute issues.  -[GC1.1] Continue ASA.[GC1.2]       Restless leg syndrome  - Continue prior to admission pramipexole.      Chronic kidney disease, stage III[GC1.1].[GC1.3]  Baseline creatinine 1.1-1.3.[GC1.1] C[GC1.3]reatinine at baseline  -  "Avoid nephrotoxins as able[GC1.1].[GC1.3]     Physical deconditioning[GC1.1].  - PT, OT.[GC1.3]      Prophylaxis.  - PCD's, ambulation.    CODE STATUS:[GC1.1] DNR/DNI.[GC1.3]    Dispo.  -[GC1.1] Pending above.[GC1.3]  - Plan d/c home vs TCU[GC1.4] 1-2d.[GC1.8]    Interval History[GC1.1]   Doing OK. Denies SOB.[GC1.5]    -Data reviewed today: I reviewed all new labs and imaging over the last 24 hours. I personally reviewed[GC1.1] no images or EKG's today[GC1.3].    Physical Exam   Heart Rate: 69, Blood pressure 139/76, pulse 70, temperature 97.4  F (36.3  C), temperature source Oral, resp. rate 16, height 1.575 m (5' 2\"), weight 82 kg (180 lb 12.4 oz), SpO2 93 %, not currently breastfeeding.  Vitals:    03/01/18 1903 03/02/18 0254   Weight: 81.6 kg (180 lb) 82 kg (180 lb 12.4 oz)     Vital Signs with Ranges  Temp:  [97.4  F (36.3  C)-98.6  F (37  C)] 97.4  F (36.3  C)  Heart Rate:  [65-69] 69  Resp:  [16] 16  BP: (126-154)/(58-76) 139/76  SpO2:  [91 %-93 %] 93 %  Patient Vitals for the past 24 hrs:   BP Temp Temp src Heart Rate Resp SpO2   03/03/18 0731 139/76 97.4  F (36.3  C) Oral 69 16 93 %   03/02/18 2209 154/65 98.6  F (37  C) Oral 67 16 91 %   03/02/18 1545 126/58 98.1  F (36.7  C) Oral 65 16 92 %     I/O's Last 24 hours  I/O last 3 completed shifts:  In: 1336.25 [P.O.:775; I.V.:561.25]  Out: -     Constitutional:[GC1.1] Alert, oriented, pleasant.[GC1.5]  Respiratory:[GC1.1] Diminished in bases. No crackles or wheezes.[GC1.5]  Cardiovascular:[GC1.1] RRR no m/r/g.[GC1.5]  GI:[GC1.1] Soft, nt, nd +BS.[GC1.5]  Skin/Integumen:[GC1.1] No significant left lower extremity edema. Both legs tender to palpation.[GC1.5]  Other:        Data     Recent Labs  Lab 03/02/18  1021 03/01/18  2000 02/28/18  1218 02/24/18  1344   WBC 9.3 12.3* 11.7* 14.4*   HGB 11.1* 12.4 12.6 12.5   MCV 91 90 93 90    279 266 246    139  --  139   POTASSIUM 4.4 4.1  --  3.8   CHLORIDE 103 101  --  104   CO2 30 31  --  28   BUN 26 28  " --  27   CR 1.05* 1.15*  --  1.09*   ANIONGAP 5 7  --  7   AAYUSH 8.1* 8.8  --  8.7   * 159*  --  114*     Recent Labs   Lab Test  03/03/18   0830  03/03/18   0144  03/02/18   2100  03/02/18   1700  03/02/18   1209  03/02/18   1021   03/01/18   2000  02/24/18   1344 12/28/17 11/27/17   GLC   --    --    --    --    --   190*   --   159*  114*  126*  188*   BGM  158*  140*  200*  145*  177*   --    < >   --    --    --    --     < > = values in this interval not displayed.         No results found for this or any previous visit (from the past 24 hour(s)).    Medications   All medications were reviewed.      [START ON 3/4/2018] ranitidine  150 mg Oral At Bedtime     spironolactone  12.5 mg Oral Daily     sitagliptin  50 mg Oral Daily     simvastatin  5 mg Oral QPM     pramipexole  0.375 mg Oral At Bedtime     polyethylene glycol  8.5 g Oral Every Other Day     glipiZIDE  10 mg Oral BID AC     colchicine  0.6 mg Oral Daily     cholecalciferol (vitamin D3) tablet 2,000 Units  2,000 Units Oral Daily     aspirin  325 mg Oral Daily     insulin aspart  1-7 Units Subcutaneous TID AC     insulin aspart  1-5 Units Subcutaneous At Bedtime     ceFAZolin  1 g Intravenous Q8H[GC1.1]          Revision History        User Key Date/Time User Provider Type Action    > GC1.8 3/3/2018 10:51 AM Gamaliel Madison MD Physician Addend     GC1.7 3/3/2018 10:46 AM Gamaliel Madison MD Physician Addend     GC1.4 3/3/2018 10:44 AM Gamaliel Madison MD Physician Addend     GC1.6 3/3/2018 10:40 AM Gamaliel Madison MD Physician Sign     GC1.5 3/3/2018 10:38 AM Gamaliel Madison MD Physician      GC1.3 3/3/2018 10:27 AM Gamaliel Madison MD Physician      GC1.2 3/3/2018 10:20 AM Gamaliel Madison MD Physician      GC1.1 3/3/2018 10:16 AM Gamaliel Madison MD Physician             Progress Notes by Autumn Jacobsen RN at 3/2/2018  4:09 PM     Author:  Autumn Jacobsen, RN Service:  (none) Author Type:  Registered  Nurse    Filed:  3/2/2018  4:37 PM Encounter Date:  3/2/2018 Status:  Signed    :  Autumn Jacobsen RN (Registered Nurse)           Received notice that member had an ER to hospital admission for cellulitis. Received a VM from AL nurse Luann re admission. Spoke with AL nurse Luann and we discussed that member may be discharging in a day or two on oral antibiotics. Luann states member has not walked in a week and may need some therapy and is thinking member may need TCU at discharge. Luann states that she had [facility] hold a bed for member in case member needs TCU at discharge. Hospitalist did order a PT consult.     Spoke with son Ed as well and he was wondering about coverage for TCU if member did not have a 3 day hospital stay and we discussed that with her [facility], and being part of [facility], a 3 day hospital stay is not needed. Ed states also that he got the no rinse body wash delivered to his home and that it has been working out well for his mom and wants to continue it monthly. Note sent to CMS to please have the rinse sent to [facility] rather than to son's home.     [facility] -AL at [facility] and that the AL nurse was having the TCU hold a bed for member in case she needs TCU -preferably on a Monday rather than the weekend. Received a call back from JUAN Meeks and discussed that [facility] was holding a bed for member if she needs TCU. Transitions log started.   Autumn Jacobsen RN, PHN, [facility]   [phone][JM1.1]         Revision History        User Key Date/Time User Provider Type Action    > JM1.1 3/2/2018  4:37 PM Autumn Jacobsen RN Registered Nurse Sign            Progress Notes by Jeanna Davison LSW at 3/2/2018  4:23 PM     Author:  Jeanna Davison LSW Service:  Social Work Author Type:      Filed:  3/2/2018  4:26 PM Date of Service:   3/2/2018  4:23 PM Creation Time:  3/2/2018  4:23 PM    Status:  Signed :  Jeanna Davison LSW ()         D: SW following for DC planning. SW reviewed chart. Pt is from [facility].   I: SW received a phone call from ASH Leyva with [employer] [employer], [phone]. She spoke with [facility] and reports they are holding a TCU bed for pt. They typically do not accept pts on the weekends. Referral sent via DOD. PT eval pending.   P: SW will follow.     ANA Barnes, SW  [id number][JJ1.1]       Revision History        User Key Date/Time User Provider Type Action    > JJ1.1 3/2/2018  4:26 PM Jeanna Davison LSW  Sign            Progress Notes by Rashi Julian MD at 3/2/2018  2:55 PM     Author:  Rashi Julian MD Service:  Hospitalist Author Type:  Physician    Filed:  3/2/2018  3:20 PM Date of Service:  3/2/2018  2:55 PM Creation Time:  3/2/2018  2:55 PM    Status:  Signed :  Rashi Julian MD (Physician)         [facility]    Hospitalist Progress Note    Date of Service (when I saw the patient): 03/02/2018    Assessment & Plan   Justusmiguel Benavidez is a 89 year-old female with history of CVA with resultant expressive aphasia, hypertension, peripheral arterial disease, chronic kidney disease stage III, diabetes mellitus type 2 admitted on[IM1.1] 3/1/2018[IM1.2] with persistent leg and foot pain.    Cellulitis and myositis of left leg  Possible gout  Admitted for ongoing leg and foot pain despite oral cephalexin course. MRI[IM1.1] 3/1/18[IM1.2] as outpatient with focal marginal erosive changes of 1st MTP possibly suggestive of gout, as well as diffuse subcutaneous and deep muscle edema suggesting cellulitis/myositis, without abscess seen. Had been started on colchicine by PCP based on MRI findings.   - Superficial changes have essentially resolved on IV antibiotics. Was having some pain earlier[IM1.1]  3/2/2018[IM1.3] per RN, however at present seems improved with minimal tenderness to palpation. Continue IV cefazolin.  - Left 1st MTP without tenderness to palpation or erythema at this point. Will complete colchicine course as previously prescribed   - Discontinue IVF  - Blood cultures NGTD  - WBC resolved to normal     Diabetes mellitus type 2  HgbA1c 6.9% 2/9/18.   - Will continue oral regimen as appears to be eating normally  - Moderate consistent CHO diet ordered  - Blood sugar checks, medium SSI, hypoglycemia protocol ordered    History of CVA with expressive aphasia  No acute issues.  - Resume prior to admission aspirin. Continue prior to admission simvastatin.    Peripheral arterial disease  No acute issues.  - Resume prior to admission aspirin. Continue prior to admission simvastatin.     Hypertension  - Continue prior to admission spironolactone     Restless leg syndrome  - Continue prior to admission pramipexole.     Chronic kidney disease, stage III  Baseline creatinine 1.1-1.3.   - Creatinine at baseline  - Avoid nephrotoxins as able    Physical deconditioning  - Physical therapy consulted     Pain assessment  Current Pain Score 3/2/2018 3/2/2018 3/1/2018   Patient currently in pain? yes yes denies   Pain score (0-10) 5 6 0   Pain location - Foot -   - Alberta is experiencing pain due to left foot cellulitis and possible gout. Pain management was discussed and the plan was created in a collaborative fashion.  Alberta's response to the current recommendations: compliant  - Please see the plan for pain management as documented above    DVT Prophylaxis: Pneumatic Compression Devices  Code Status: DNR/DNI    Disposition: Expected discharge in 1-2 days back on oral antibiotics if ongoing improvement .    Rashi Julian    Interval History   No acute events overnight. Discussed with RN, having more pain this morning although improving throughout the course of the day.     -Data reviewed today: I reviewed  "all new labs and imaging results over the last 24 hours. I personally reviewed no images or EKG's today.    Physical Exam   Temp: 98.1  F (36.7  C) Temp src: Oral BP: 145/71 Pulse: 70 Heart Rate: 74 Resp: 16 SpO2: 98 % O2 Device: Nasal cannula Oxygen Delivery: 1 LPM  Vitals:    03/01/18 1903 03/02/18 0254   Weight: 81.6 kg (180 lb) 82 kg (180 lb 12.4 oz)     Vital Signs with Ranges  Temp:  [97.2  F (36.2  C)-98.1  F (36.7  C)] 98.1  F (36.7  C)  Pulse:  [70-78] 70  Heart Rate:  [74-78] 74  Resp:  [12-16] 16  BP: (135-145)/(47-71) 145/71  SpO2:  [93 %-98 %] 98 %  I/O last 3 completed shifts:  In: 264 [I.V.:264]  Out: -     Constitutional: Well-appearing, NAD  Respiratory: Clear to auscultation bilaterally, good air movement bilaterally  Cardiovascular: RRR, no m/r/g. No peripheral edema.  GI: Soft, non-tender, non-distended. BS normoactive.   Skin/Integumen: Warm, dry. No erythema noted of left lower leg/foot, although with mild edema present and minimal tenderness to palpation.   Neuro: Alert. Expressive aphasia present, although able to provide some history. Answered \"[place]\" when asked where she was. Able to state date by reading from white board. Following commands.      Medications       spironolactone  12.5 mg Oral Daily     sitagliptin  50 mg Oral Daily     simvastatin  5 mg Oral QPM     ranitidine  150 mg Oral BID     pramipexole  0.375 mg Oral At Bedtime     polyethylene glycol  8.5 g Oral Every Other Day     glipiZIDE  10 mg Oral BID AC     colchicine  0.6 mg Oral Daily     [START ON 3/3/2018] cholecalciferol (vitamin D3) tablet 2,000 Units  2,000 Units Oral Daily     aspirin  325 mg Oral Daily     ceFAZolin  1 g Intravenous Q8H       Data     Recent Labs  Lab 03/02/18  1021 03/01/18  2000 02/28/18  1218 02/24/18  1344   WBC 9.3 12.3* 11.7* 14.4*   HGB 11.1* 12.4 12.6 12.5   MCV 91 90 93 90    279 266 246    139  --  139   POTASSIUM 4.4 4.1  --  3.8   CHLORIDE 103 101  --  104   CO2 30 31  --  " 28   BUN 26 28  --  27   CR 1.05* 1.15*  --  1.09*   ANIONGAP 5 7  --  7   AAYUSH 8.1* 8.8  --  8.7   * 159*  --  114*       No results found for this or any previous visit (from the past 24 hour(s)).[IM1.1]       Revision History        User Key Date/Time User Provider Type Action    > IM1.2 3/2/2018  3:20 PM Rashi Julian MD Physician Sign     IM1.3 3/2/2018  3:00 PM Rashi Julian MD Physician      IM1.1 3/2/2018  2:55 PM Rashi Julian MD Physician                   Procedure Notes     No notes of this type exist for this encounter.         Progress Notes - Therapies (Notes from 03/02/18 through 03/05/18)      Progress Notes by Jen Kuhn PT at 3/3/2018  8:30 AM     Author:  Jen Kuhn PT Service:  (none) Author Type:  Physical Therapist    Filed:  3/3/2018  1:28 PM Date of Service:  3/3/2018  8:30 AM Creation Time:  3/3/2018  1:28 PM    Status:  Signed :  Jen Kuhn PT (Physical Therapist)          03/03/18 0800   Quick Adds   Type of Visit Initial PT Evaluation   Living Environment   Lives With alone;facility resident   Living Arrangements independent living facility  ([facility] vs [facility])   Home Accessibility no concerns   Number of Stairs to Enter Home 0   Number of Stairs Within Home 0   Transportation Available family or friend will provide  (pt does not drive at baseline)   Living Environment Comment per chart, Patient was being cared for at home by family since Sat.   Self-Care   Regular Exercise no   Equipment Currently Used at Home walker, rolling;grab bar   Activity/Exercise/Self-Care Comment medication setup/managment, light household cleaning and meals provided at [facility]   Functional Level Prior   Ambulation 1-->assistive equipment   Transferring 1-->assistive equipment   Toileting 1-->assistive equipment   Bathing 1-->assistive equipment   Dressing 0-->independent   Eating 0-->independent   Communication 2-->difficulty understanding (not related  to language barrier)  (aphasia, PMH significant for CVA)   Fall history within last six months no   Which of the above functional risks had a recent onset or change? ambulation;transferring   General Information   Onset of Illness/Injury or Date of Surgery - Date 03/01/18   Referring Physician Rashi Julian MD   Patient/Family Goals Statement none stated   Pertinent History of Current Problem (include personal factors and/or comorbidities that impact the POC) Pt is a 89 y/o female admitted 3/1/18 for further evaluation of increased L foot pain, swelling, concern for infection. Initially evaluated in ED 5 days ago, was discharged with antibiotics, swelling and pain persisted, OP MRI was obtained revealing worsening infection. Pt with Cellulitis and myositis of L LE and possible gout of 1st MTP. PMH significant for HTN, PAD, CKD stage 3, DM type 2, and CVA with resultant expressive aphasia.   Precautions/Limitations fall precautions   General Info Comments activity; up with assist   Cognitive Status Examination   Orientation person   Level of Consciousness alert;confused   Follows Commands and Answers Questions 75% of the time;able to follow single-step instructions   Personal Safety and Judgment intact   Memory impaired   Cognitive Comment pt unable to recall PLOF, unclear if limited d/t aphasia or cog status   Pain Assessment   Patient Currently in Pain No   Integumentary/Edema   Integumentary/Edema no deficits were identifed   Posture    Posture Forward head position;Protracted shoulders   Range of Motion (ROM)   ROM Quick Adds No deficits were identified   ROM Comment B LEs WFL   Strength   Strength Comments grossly 4/5 B LEs, no difference R<>L   Bed Mobility   Bed Mobility Comments pt completes supine>sit with SBA, increased time and use of bed rail   Transfer Skills   Transfer Comments pt completes sit<>stand with SBA   Gait   Gait Comments pt ambulates x5' with FWW and CGA, slowed pace, increased forward  "flexed posturing   Balance   Balance Comments no overt LOB or unsteadiness, strong reliance on FWW use    Sensory Examination   Sensory Perception no deficits were identified   Sensory Perception Comments pt denies numbness/tingling   General Therapy Interventions   Planned Therapy Interventions balance training;bed mobility training;gait training;strengthening;transfer training;progressive activity/exercise   Clinical Impression   Criteria for Skilled Therapeutic Intervention yes, treatment indicated   PT Diagnosis difficulty with gait   Influenced by the following impairments decreased functional strength, activity tolerance, balance   Functional limitations due to impairments difficulty with bed mobility, transfers, gait   Clinical Presentation Stable/Uncomplicated   Clinical Presentation Rationale pt with progressing medical stay since admission   Clinical Decision Making (Complexity) Low complexity   Therapy Frequency` daily   Predicted Duration of Therapy Intervention (days/wks) 2-3 days   Anticipated Discharge Disposition Transitional Care Facility   Risk & Benefits of therapy have been explained Yes   Patient, Family & other staff in agreement with plan of care Yes   [facility] \"6 Clicks\"   2016, Trustees of [facility], under license to [employer].  All rights reserved.   6 Clicks Short Forms Basic Mobility Inpatient Short Form   [facility]  \"6 Clicks\" V.2 Basic Mobility Inpatient Short Form   1. Turning from your back to your side while in a flat bed without using bedrails? 4 - None   2. Moving from lying on your back to sitting on the side of a flat bed without using bedrails? 3 - A Little   3. Moving to and from a bed to a chair (including a wheelchair)? 3 - A Little   4. Standing up from a chair using your arms (e.g., wheelchair, or bedside chair)? 3 - A Little   5. To walk in hospital room? 3 - A Little   6. Climbing 3-5 steps with a railing? 2 - A Lot   Basic " Mobility Raw Score (Score out of 24.Lower scores equate to lower levels of function) 18   Total Evaluation Time   Total Evaluation Time (Minutes) 10[SQ1.1]        Revision History        User Key Date/Time User Provider Type Action    > SQ1.1 3/3/2018  1:28 PM Jen Kuhn, PT Physical Therapist Sign

## 2018-03-01 NOTE — IP AVS SNAPSHOT
"[place] [address] [facility]: [phone]                                              INTERAGENCY TRANSFER FORM - PHYSICIAN ORDERS   3/1/2018                    Hospital Admission Date: 3/1/2018  LYNDA CONWAY   : 1926  Sex: Female        Attending Provider: Asad Ball MD     Allergies:  Cozaar [Losartan Potassium], Hydrochlorothiazide, Hydrocodone    Infection:  None   Service:  HOSPITALIST    Ht:  1.575 m (5' 2\")   Wt:  82 kg (180 lb 12.4 oz)   Admission Wt:  81.6 kg (180 lb)    BMI:  33.06 kg/m 2   BSA:  1.89 m 2            Patient PCP Information     Provider PCP Type    Leo Melgar MD General      ED Clinical Impression     Diagnosis Description Comment Added By Time Added    Myositis of left foot [M60.872] Myositis of left foot [M60.872]  Dorothy Heaton 3/1/2018  9:49 PM      Hospital Problems as of 3/5/2018              Priority Class Noted POA    Type 2 diabetes mellitus without complication (H) Medium  2014 Yes    Expressive aphasia Medium  2014 Yes    PAD (peripheral artery disease) (H) Medium  2018 Yes    * (Principal)Cellulitis left leg Medium  3/1/2018 Unknown    CRF (chronic renal failure), stage 3 (moderate) Medium  3/1/2018 Unknown    Cellulitis, leg Medium  3/2/2018 Yes      Non-Hospital Problems as of 3/5/2018              Priority Class Noted    Branch retinal vein occlusion of left eye Medium  2010    Preventive measure Medium  2013    Cerebral infarction (H) Medium  10/22/2013    Constipation Medium  2013    Hard of hearing Medium  Unknown    Restless legs syndrome Medium  2013    Urinary frequency Medium  2014    Aspiration pneumonia (H) Medium  11/15/2014    ACP (advance care planning) Medium  2014    Hyponatremia Medium  2014    Recurrent UTI Medium  3/18/2015    Low back pain Medium  3/18/2015    Abdominal bloating Medium  10/28/2015    Abnormal weight gain Medium  10/28/2015    Cyst of " left ovary Medium  10/28/2015    Health Care Home Medium  6/3/2016    Prerenal azotemia Medium  2/14/2017    Bilateral leg edema Medium  3/22/2017    Type 2 diabetes mellitus with hyperglycemia, without long-term current use of insulin (H) Medium  7/5/2017    Other vaginitis Medium  7/29/2017    Dermatitis Medium  7/29/2017    Continuous leakage of urine Medium  7/29/2017    Bilateral hearing loss, unspecified hearing loss type Medium  7/29/2017    CRI (chronic renal insufficiency), stage 3 (moderate) Medium  1/27/2018    Impaired fasting glucose Medium  2/9/2018    Nonsmoker Medium  2/9/2018    CKD (chronic kidney disease) stage 3, GFR 30-59 ml/min Medium  2/9/2018    Class 2 obesity due to excess calories with serious comorbidity and body mass index (BMI) of 35.0 to 35.9 in adult Medium  2/9/2018    Abnormal lung sounds- rhonchi Rt base  Medium  2/9/2018    Type 2 diabetes mellitus with stage 3 chronic kidney disease, without long-term current use of insulin (H) Medium  2/9/2018    Left foot pain Medium  2/28/2018      Code Status History     Date Active Date Inactive Code Status Order ID Comments User Context    11/19/2014 12:14 PM 3/2/2018  1:11 AM DNR/DNI [id number]  Jodi Roland MD Outpatient    11/19/2014  7:17 AM 11/19/2014 12:14 PM DNR/DNI [id number]  Jodi Roland MD Outpatient    11/15/2014 11:49 AM 11/19/2014  7:17 AM DNR/DNI [id number]  Jodi Roland MD ED    11/15/2013 11:42 AM 11/15/2014 11:49 AM DNR/DNI [id number]  Kendall Saba MD Outpatient    10/25/2013  2:57 PM 11/15/2013 11:42 AM DNR/DNI [id number]  Kendall Saba MD Inpatient    10/22/2013  2:13 AM 10/25/2013  1:12 PM DNR/DNI [id number]  Maral Garcia, RN Inpatient         Medication Review      CONTINUE these medications which may have CHANGED, or have new prescriptions. If we are uncertain of the size of tablets/capsules you have at home, strength may be listed as something that might have changed.        Dose / Directions  Comments    cephALEXin 500 MG capsule   Commonly known as:  KEFLEX   This may have changed:  when to take this        Dose:  500 mg   Take 1 capsule (500 mg) by mouth 3 times daily for 6 days   Quantity:  21 capsule   Refills:  0          CONTINUE these medications which have NOT CHANGED        Dose / Directions Comments    * ACETAMINOPHEN PO        Dose:  500 mg   Take 500 mg by mouth every 8 hours as needed for pain   Refills:  0        * acetaminophen 325 MG tablet   Commonly known as:  TYLENOL   Used for:  Midline low back pain without sciatica        Dose:  650 mg   Take 2 tablets (650 mg) by mouth At Bedtime Take 2 tablets at bedtime   Quantity:  184 tablet   Refills:  2        aspirin 325 MG tablet   Used for:  Stroke (H)        Dose:  325 mg   Take 1 tablet (325 mg) by mouth daily   Quantity:  120 tablet   Refills:  0        blood glucose monitoring test strip   Commonly known as:  ONETOUCH ULTRA   Used for:  Type 2 diabetes mellitus without complication, without long-term current use of insulin (H)        USE TO TEST ONCE EVERY MORNING AS DIRECTED   Quantity:  100 each   Refills:  11        carbamide peroxide 6.5 % otic solution   Commonly known as:  DEBROX        Dose:  5 drop   Place 5 drops into both ears daily as needed for other   Refills:  0        colchicine 0.6 MG tablet   Commonly known as:  COLCRYS   Used for:  Left foot pain        Take one tablet bid for 2 days, then one per day for 4 more days.   Quantity:  16 tablet   Refills:  0        furosemide 20 MG tablet   Commonly known as:  LASIX   Used for:  Bilateral leg edema        Dose:  20 mg   Take 1 tablet (20 mg) by mouth 2 times daily As of 1/27/18   Quantity:  60 tablet   Refills:  12        glipiZIDE 10 MG tablet   Commonly known as:  GLUCOTROL   Used for:  Type 2 diabetes mellitus with hyperglycemia, without long-term current use of insulin (H)        Dose:  10 mg   Take 1 tablet (10 mg) by mouth 2 times daily (before meals)   Quantity:   60 tablet   Refills:  11    Dosage change       LACTINEX PO        Dose:  1 tablet   Take 1 tablet by mouth 3 times daily 0900; 1300; 1645   Refills:  0        metFORMIN 500 MG tablet   Commonly known as:  GLUCOPHAGE        Dose:  500 mg   Take 500 mg by mouth daily   Quantity:  30 tablet   Refills:  12        * nystatin [id number] UNIT/GM Powd   Commonly known as:  MYCOSTATIN        Apply topically daily   Refills:  0        * nystatin [id number] UNIT/GM Powd   Commonly known as:  MYCOSTATIN        Dose:  1 g   Apply 1 g topically 3 times daily as needed   Quantity:  60 g   Refills:  11        * order for DME   Used for:  Type II or unspecified type diabetes mellitus without mention of complication, not stated as uncontrolled        Equipment being ordered: glucometer kit- brand per insurance coverage Patient prefers brand with DRUM lancets - patient is to test fasting once each morning #30 with 5 refills each of the lancets and test strips   Quantity:  1 Device   Refills:  0        * order for DME   Used for:  Type II or unspecified type diabetes mellitus without mention of complication, not stated as uncontrolled        Equipment being ordered: retractable safety lancets   Quantity:  100 lancet   Refills:  1        * order for DME   Used for:  Urinary frequency        Equipment being ordered: Large Underpads   Quantity:  3 Package   Refills:  6        * order for DME   Used for:  Urinary incontinence        Equipment being ordered: Esthela xtra absorbent large pull ups - Uses 5 per day.   Quantity:  150 Units   Refills:  11        polyethylene glycol Packet   Commonly known as:  MIRALAX/GLYCOLAX        Dose:  8.5 g   Take 8.5 g by mouth every other day   Refills:  0        pramipexole 0.25 MG tablet   Commonly known as:  MIRAPEX   Used for:  Restless legs syndrome        Dose:  0.375 mg   Take 1.5 tablets (0.375 mg) by mouth At Bedtime   Quantity:  45 tablet   Refills:  11    Dosage change       ranitidine 150 MG tablet    Commonly known as:  ZANTAC   Used for:  Gastroesophageal reflux disease without esophagitis        Dose:  150 mg   Take 1 tablet (150 mg) by mouth 2 times daily   Quantity:  60 tablet   Refills:  5        simvastatin 5 MG tablet   Commonly known as:  ZOCOR   Used for:  Stroke (H)        Dose:  5 mg   Take 1 tablet (5 mg) by mouth every evening   Quantity:  30 tablet   Refills:  0        sitagliptin 50 MG tablet   Commonly known as:  JANUVIA   Used for:  Uncontrolled type 2 diabetes mellitus with complication, without long-term current use of insulin (H), Type 2 diabetes mellitus with hyperglycemia, without long-term current use of insulin (H)        Dose:  50 mg   Take 1 tablet (50 mg) by mouth daily   Quantity:  30 tablet   Refills:  11        spironolactone 25 MG tablet   Commonly known as:  ALDACTONE   Used for:  Bilateral leg edema        Dose:  12.5 mg   Take 0.5 tablets (12.5 mg) by mouth daily   Quantity:  30 tablet   Refills:  11        VITAMIN D3 PO        Dose:  2000 Units   Take 2,000 Units by mouth daily   Refills:  0        ZOFRAN PO        Dose:  4 mg   Take 4 mg by mouth every 8 hours as needed for nausea or vomiting   Refills:  0        * Notice:  This list has 8 medication(s) that are the same as other medications prescribed for you. Read the directions carefully, and ask your doctor or other care provider to review them with you.              Further instructions from your care team       Discharge to [facility]  [phone]    After Care     Activity - Up with nursing assistance           Advance Diet as Tolerated       Follow this diet upon discharge: Orders Placed This Encounter      Room Service      Moderate Consistent CHO Diet         General info for SNF       Length of Stay Estimate: Short Term Care: Estimated # of Days <30  Condition at Discharge: Improving  Level of care:skilled   Rehabilitation Potential: Good  Admission H&P remains valid and up-to-date: Yes  Recent Chemotherapy:  N/A  Use Nursing Home Standing Orders: Yes       Mantoux instructions       Give two-step Mantoux (PPD) Per Facility Policy Yes             Referrals     Occupational Therapy Adult Consult       Evaluate and treat as clinically indicated.    Reason:  Weakness/deconditioning       Physical Therapy Adult Consult       Evaluate and treat as clinically indicated.    Reason:  weakness/deconditioning             Follow-Up Appointment Instructions     Future Labs/Procedures    Follow Up and recommended labs and tests     Comments:    Follow up with Nursing home physician.  Follow up with primary care provider after TCU discharge.      Follow-Up Appointment Instructions     Follow Up and recommended labs and tests       Follow up with Nursing home physician.  Follow up with primary care provider after TCU discharge.             Statement of Approval     Ordered          03/05/18 1037  I have reviewed and agree with all the recommendations and orders detailed in this document.  EFFECTIVE NOW     Approved and electronically signed by:  Gamaliel Madison MD

## 2018-03-01 NOTE — IP AVS SNAPSHOT
` ` Patient Information     Patient Name Sex     Regla Benavidez ([id number]) Female 1926       Room Bed    [facility] 6607-02      Patient Demographics     Address Phone E-mail Address    C[address]O MONALISA [facility] [address] [phone] (Home)  [phone] (Work) *Preferred*  [phone] (Mobile) [email or web address]      Patient Ethnicity & Race     Ethnic Group Patient Race    American White      Emergency Contact(s)     Name Relation Home Work Mobile    MAN MONALISA Power of  [phone]    CRISTY BENAVIDEZ Son [phone] NONE [phone]    Obdulio Joyce Daughter [phone]    No name specified Daughter [phone] NONE [phone]      Documents on File        Status Date Received Description       Documents for the Patient    Insurance Card  () 09/15/09 [employer] Card    External Medication Information Consent Accepted () 09/15/09     Face Sheet Received () 09/15/09     Privacy Notice - [facility]  09/15/09     Immunization Record   [facility], Influenza, 10-03-13    Consent for Services - Hospital/Clinic Received () 10/23/13     Consent for EHR Access Received 10/23/13     Patient ID Received 18     [facility] Specified Other       Privacy Notice - [facility] Received 10/23/13     Advance Directives and Living Will Not Received  VALIDATION OF AD-09    Advance Directives and Living Will Received 10/29/13 HEALTH CARE DIRECTIVE-09    External Medication Information Consent Accepted 13     Consent to Communicate   [facility], 13    Insurance Card Received () 13 Medica    HIM LINDSAY Authorization - File Only   My Chart, Proxy Son Monalisa Benavidez, 14    Advance Directives and Living Will Received 14 POLST 14-    Consent for Services - Hospital/Clinic Received () 11/15/14     Consent for Services - Hospital/Clinic       Speech  Therapy Certification Received 12/15/14 Eval only    Business/Insurance/Care Coordination/Health Form - Patient   [employer], Dec 2014 to 2015    Business/Insurance/Care Coordination/Health Form - Patient   Sarbjit Nuñez,  April - 2015    Consent for Services/Privacy Notice - Hospital/Clinic Received () 16     Power of  Received 16 Statutory Short Form Power of  2007    Insurance Card Received 18 MEDICA    Business/Insurance/Care Coordination/Health Form - Patient  17 MEDICA LACTINEX TABLET APPROVAL 17-18    Consent for Services/Privacy Notice - Hospital/Clinic Received 17     Advance Directives and Living Will Received 17 POLST 2017    HIM LINDSAY Authorization - File Only  17 AUTHORIZATION FOR ELECTRONIC COMMUNICATION    Consent to Communicate  17 AUTHORIZATION TO DISCUSS PROTECTED HEALTH INFORMATION    Care Everywhere Prospective Auth Received 10/31/17     HIM LINDSAY Authorization  () 17 Authorization for batch [employer] 2017 #9    Insurance Card Received (Deleted) 03/10/14 Medica    Patient ID Received (Deleted) 11/15/14     Insurance Card  (Deleted) 14 MEDICA INSURANCE CARD    Insurance Card Received (Deleted) 03/18/15 Med Asst    Insurance Card Received (Deleted) 16 Medica       Documents for the Encounter    CMS IM for Patient Signature Received 18       Admission Information     Attending Provider Admitting Provider Admission Type Admission Date/Time    Asad Ball MD McRaith, Joseph Charles, MD Emergency 18  1914    Discharge Date Hospital Service Auth/Cert Status Service Area     Hospitalist Incomplete [facility]    Unit Room/Bed Admission Status        66 MED SPEC UNIT 6607/6607-02 Admission (Confirmed)       Admission     Complaint    Cellulitis, leg      Hospital Account     Name Acct ID Class Status Primary  Coverage    Regla Benavidez [id number] Inpatient Open MEDICA - [place] [facility]            Guarantor Account (for Hospital Account #[id number])     Name Relation to Pt Service Area Active? Acct Type    Regla Benavidez Self FCS Yes Personal/Family    Address Phone          C/O MONALISA BENAVIDEZ  [address] [place] [address] [phone](H)  none(O)              Coverage Information (for Hospital Account #[id number])     F/O Payor/Plan Precert #    MEDICA/MEDICA DUAL SOLUTIONS [facility] [place]     Subscriber Subscriber #    Regla Benavidez [id number]    Address Phone    [address] [address]  [place] [address] [phone]

## 2018-03-01 NOTE — IP AVS SNAPSHOT
[place] [address] [facility]    [address] [place] [address]    Phone:  [phone]                                       After Visit Summary   3/1/2018    Regla Benavidez    MRN: [id number]           After Visit Summary Signature Page     I have received my discharge instructions, and my questions have been answered. I have discussed any challenges I see with this plan with the nurse or doctor.    ..........................................................................................................................................  Patient/Patient Representative Signature      ..........................................................................................................................................  Patient Representative Print Name and Relationship to Patient    ..................................................               ................................................  Date                                            Time    ..........................................................................................................................................  Reviewed by Signature/Title    ...................................................              ..............................................  Date                                                            Time

## 2018-03-01 NOTE — IP AVS SNAPSHOT
"[place] [address] [facility] UN[facility]: [phone]                                              INTERAGENCY TRANSFER FORM - LAB / IMAGING / EKG / EMG RESULTS   3/1/2018                    Hospital Admission Date: 3/1/2018  LYNDA CONWAY   : 1926  Sex: Female        Attending Provider: Asad Ball MD     Allergies:  Cozaar [Losartan Potassium], Hydrochlorothiazide, Hydrocodone    Infection:  None   Service:  HOSPITALIST    Ht:  1.575 m (5' 2\")   Wt:  82 kg (180 lb 12.4 oz)   Admission Wt:  81.6 kg (180 lb)    BMI:  33.06 kg/m 2   BSA:  1.89 m 2            Patient PCP Information     Provider PCP Type    Leo Melgar MD General         Lab Results - 3 Days      Blood culture [[id number]]  Resulted: 18, Result status: Preliminary result    Ordering provider: Criss Ladd MD  18 Resulting lab: [facility]    Specimen Information    Type Source Collected On   Blood Arm, Right 18   Comment:  Right Arm          Components       Value Reference Range Flag Lab   Specimen Description Blood Right Arm      Special Requests Aerobic and anaerobic bottles received   FrStHsLb   Culture Micro No growth after 4 days   75            Blood culture [[id number]]  Resulted: 18, Result status: Preliminary result    Ordering provider: Criss Ladd MD  18 Resulting lab: [facility]    Specimen Information    Type Source Collected On   Blood Arm, Right 18   Comment:  Right Arm          Components       Value Reference Range Flag Lab   Specimen Description Blood Right Arm      Special Requests Aerobic and anaerobic bottles received   FrStHsLb   Culture Micro No growth after 4 days   75            Glucose by meter [[id number]] (Abnormal)  Resulted: 18 0252, Result status: Final result    Ordering provider: Asad Ball MD  18 8177 Resulting " lab: POINT OF CARE TEST, GLUCOSE    Specimen Information    Type Source Collected On     03/05/18 0235          Components       Value Reference Range Flag Lab   Glucose 135 70 - 99 mg/dL H 170            Glucose by meter [[id number]] (Abnormal)  Resulted: 03/04/18 2127, Result status: Final result    Ordering provider: Asad Ball MD  03/04/18 2114 Resulting lab: POINT OF CARE TEST, GLUCOSE    Specimen Information    Type Source Collected On     03/04/18 2114          Components       Value Reference Range Flag Lab   Glucose 150 70 - 99 mg/dL H 170            Glucose by meter [[id number]] (Abnormal)  Resulted: 03/04/18 1709, Result status: Final result    Ordering provider: Asad Ball MD  03/04/18 1657 Resulting lab: POINT OF CARE TEST, GLUCOSE    Specimen Information    Type Source Collected On     03/04/18 1657          Components       Value Reference Range Flag Lab   Glucose 106 70 - 99 mg/dL H 170            Glucose by meter [[id number]] (Abnormal)  Resulted: 03/04/18 1232, Result status: Final result    Ordering provider: Asad Ball MD  03/04/18 1221 Resulting lab: POINT OF CARE TEST, GLUCOSE    Specimen Information    Type Source Collected On     03/04/18 1221          Components       Value Reference Range Flag Lab   Glucose 141 70 - 99 mg/dL H 170            Basic metabolic panel [[id number]] (Abnormal)  Resulted: 03/04/18 0905, Result status: Final result    Ordering provider: Gamaliel Madison MD  03/04/18 0000 Resulting lab: [facility]    Specimen Information    Type Source Collected On   Blood  03/04/18 0825          Components       Value Reference Range Flag Lab   Sodium 139 133 - 144 mmol/L  FrStHsLb   Potassium 4.5 3.4 - 5.3 mmol/L  FrStHsLb   Chloride 104 94 - 109 mmol/L  FrStHsLb   Carbon Dioxide 29 20 - 32 mmol/L  FrStHsLb   Anion Gap 6 3 - 14 mmol/L  FrStHsLb   Glucose 157 70 - 99 mg/dL H FrStHsLb   Urea Nitrogen 21 7 - 30 mg/dL   FrStHsLb   Creatinine 1.00 0.52 - 1.04 mg/dL  FrStHsLb   GFR Estimate 52 >60 mL/min/1.7m2 L FrStHsLb   Comment:  Non  GFR Calc   GFR Estimate If Black 63 >60 mL/min/1.7m2  FrStHsLb   Comment:  African American GFR Calc   Calcium 8.7 8.5 - 10.1 mg/dL  FrStHsLb            CBC with platelets differential [[id number]]  Resulted: 03/04/18 0852, Result status: Final result    Ordering provider: Gamaliel Madison MD  03/04/18 0000 Resulting lab: [facility]    Specimen Information    Type Source Collected On   Blood  03/04/18 0825          Components       Value Reference Range Flag Lab   WBC 8.6 4.0 - 11.0 10e9/L  FrStHsLb   RBC Count 4.42 3.8 - 5.2 10e12/L  FrStHsLb   Hemoglobin 13.0 11.7 - 15.7 g/dL  FrStHsLb   Hematocrit 40.3 35.0 - 47.0 %  FrStHsLb   MCV 91 78 - 100 fl  FrStHsLb   MCH 29.4 26.5 - 33.0 pg  FrStHsLb   MCHC 32.3 31.5 - 36.5 g/dL  FrStHsLb   RDW 14.8 10.0 - 15.0 %  FrStHsLb   Platelet Count 276 150 - 450 10e9/L  FrStHsLb   Diff Method Automated Method   FrStHsLb   % Neutrophils 63.7 %  FrStHsLb   % Lymphocytes 22.4 %  FrStHsLb   % Monocytes 7.4 %  FrStHsLb   % Eosinophils 6.0 %  FrStHsLb   % Basophils 0.2 %  FrStHsLb   % Immature Granulocytes 0.3 %  FrStHsLb   Nucleated RBCs 0 0 /100  FrStHsLb   Absolute Neutrophil 5.5 1.6 - 8.3 10e9/L  FrStHsLb   Absolute Lymphocytes 1.9 0.8 - 5.3 10e9/L  FrStHsLb   Absolute Monocytes 0.6 0.0 - 1.3 10e9/L  FrStHsLb   Absolute Eosinophils 0.5 0.0 - 0.7 10e9/L  FrStHsLb   Absolute Basophils 0.0 0.0 - 0.2 10e9/L  FrStHsLb   Abs Immature Granulocytes 0.0 0 - 0.4 10e9/L  FrStHsLb   Absolute Nucleated RBC 0.0   FrStHsLb            Glucose by meter [[id number]] (Abnormal)  Resulted: 03/04/18 0805, Result status: Final result    Ordering provider: Asad Ball MD  03/04/18 0758 Resulting lab: POINT OF CARE TEST, GLUCOSE    Specimen Information    Type Source Collected On     03/04/18 4278          Components       Value Reference  Range Flag Lab   Glucose 137 70 - 99 mg/dL H 170            Glucose by meter [[id number]] (Abnormal)  Resulted: 03/04/18 0142, Result status: Final result    Ordering provider: Asad Ball MD  03/04/18 0104 Resulting lab: POINT OF CARE TEST, GLUCOSE    Specimen Information    Type Source Collected On     03/04/18 0104          Components       Value Reference Range Flag Lab   Glucose 112 70 - 99 mg/dL H 170            Glucose by meter [[id number]] (Abnormal)  Resulted: 03/03/18 2152, Result status: Final result    Ordering provider: Asad Ball MD  03/03/18 2139 Resulting lab: POINT OF CARE TEST, GLUCOSE    Specimen Information    Type Source Collected On     03/03/18 2139          Components       Value Reference Range Flag Lab   Glucose 103 70 - 99 mg/dL H 170            Glucose by meter [[id number]]  Resulted: 03/03/18 1716, Result status: Final result    Ordering provider: Asad Ball MD  03/03/18 1704 Resulting lab: POINT OF CARE TEST, GLUCOSE    Specimen Information    Type Source Collected On     03/03/18 1704          Components       Value Reference Range Flag Lab   Glucose 85 70 - 99 mg/dL  170            Glucose by meter [[id number]] (Abnormal)  Resulted: 03/03/18 1200, Result status: Final result    Ordering provider: Asad Ball MD  03/03/18 1148 Resulting lab: POINT OF CARE TEST, GLUCOSE    Specimen Information    Type Source Collected On     03/03/18 1148          Components       Value Reference Range Flag Lab   Glucose 194 70 - 99 mg/dL H 170            Glucose by meter [[id number]] (Abnormal)  Resulted: 03/03/18 0846, Result status: Final result    Ordering provider: Asad Ball MD  03/03/18 0830 Resulting lab: POINT OF CARE TEST, GLUCOSE    Specimen Information    Type Source Collected On     03/03/18 0830          Components       Value Reference Range Flag Lab   Glucose 158 70 - 99 mg/dL H 170            Glucose by meter [[id number]]  (Abnormal)  Resulted: 03/03/18 0210, Result status: Final result    Ordering provider: Asad Ball MD  03/03/18 0144 Resulting lab: POINT OF CARE TEST, GLUCOSE    Specimen Information    Type Source Collected On     03/03/18 0144          Components       Value Reference Range Flag Lab   Glucose 140 70 - 99 mg/dL H 170            Glucose by meter [[id number]] (Abnormal)  Resulted: 03/02/18 2121, Result status: Final result    Ordering provider: Asad Ball MD  03/02/18 2100 Resulting lab: POINT OF CARE TEST, GLUCOSE    Specimen Information    Type Source Collected On     03/02/18 2100          Components       Value Reference Range Flag Lab   Glucose 200 70 - 99 mg/dL H 170            Glucose by meter [[id number]] (Abnormal)  Resulted: 03/02/18 1713, Result status: Final result    Ordering provider: Asad Ball MD  03/02/18 1700 Resulting lab: POINT OF CARE TEST, GLUCOSE    Specimen Information    Type Source Collected On     03/02/18 1700          Components       Value Reference Range Flag Lab   Glucose 145 70 - 99 mg/dL H 170            Glucose by meter [[id number]] (Abnormal)  Resulted: 03/02/18 1226, Result status: Final result    Ordering provider: Asad Ball MD  03/02/18 1209 Resulting lab: POINT OF CARE TEST, GLUCOSE    Specimen Information    Type Source Collected On     03/02/18 1209          Components       Value Reference Range Flag Lab   Glucose 177 70 - 99 mg/dL H 170            Basic metabolic panel [[id number]] (Abnormal)  Resulted: 03/02/18 1104, Result status: Final result    Ordering provider: Asad Ball MD  03/02/18 0500 Resulting lab: [facility]    Specimen Information    Type Source Collected On   Blood  03/02/18 1021          Components       Value Reference Range Flag Lab   Sodium 138 133 - 144 mmol/L  FrStHsLb   Potassium 4.4 3.4 - 5.3 mmol/L  FrStHsLb   Chloride 103 94 - 109 mmol/L  FrStHsLb   Carbon Dioxide 30  20 - 32 mmol/L  FrStHsLb   Anion Gap 5 3 - 14 mmol/L  FrStHsLb   Glucose 190 70 - 99 mg/dL H FrStHsLb   Urea Nitrogen 26 7 - 30 mg/dL  FrStHsLb   Creatinine 1.05 0.52 - 1.04 mg/dL H FrStHsLb   GFR Estimate 49 >60 mL/min/1.7m2 L FrStHsLb   Comment:  Non  GFR Calc   GFR Estimate If Black 59 >60 mL/min/1.7m2 L FrStHsLb   Comment:  African American GFR Calc   Calcium 8.1 8.5 - 10.1 mg/dL L FrStHsLb            CBC with platelets [[id number]] (Abnormal)  Resulted: 03/02/18 1045, Result status: Final result    Ordering provider: Asad aBll MD  03/02/18 0500 Resulting lab: [facility]    Specimen Information    Type Source Collected On   Blood  03/02/18 1021          Components       Value Reference Range Flag Lab   WBC 9.3 4.0 - 11.0 10e9/L  FrStHsLb   RBC Count 3.82 3.8 - 5.2 10e12/L  FrStHsLb   Hemoglobin 11.1 11.7 - 15.7 g/dL L FrStHsLb   Hematocrit 34.8 35.0 - 47.0 % L FrStHsLb   MCV 91 78 - 100 fl  FrStHsLb   MCH 29.1 26.5 - 33.0 pg  FrStHsLb   MCHC 31.9 31.5 - 36.5 g/dL  FrStHsLb   RDW 15.0 10.0 - 15.0 %  FrStHsLb   Platelet Count 233 150 - 450 10e9/L  FrStHsLb            Glucose by meter [[id number]] (Abnormal)  Resulted: 03/02/18 0829, Result status: Final result    Ordering provider: Asad Ball MD  03/02/18 0817 Resulting lab: POINT OF CARE TEST, GLUCOSE    Specimen Information    Type Source Collected On     03/02/18 0817          Components       Value Reference Range Flag Lab   Glucose 141 70 - 99 mg/dL H 170            Glucose by meter [[id number]] (Abnormal)  Resulted: 03/02/18 0329, Result status: Final result    Ordering provider: Asad Ball MD  03/02/18 0253 Resulting lab: POINT OF CARE TEST, GLUCOSE    Specimen Information    Type Source Collected On     03/02/18 0253          Components       Value Reference Range Flag Lab   Glucose 150 70 - 99 mg/dL H 170            Testing Performed By     Lab - Abbreviation Name Director Address  Valid Date Range    14 - FrStHsLb [facility] Unknown [address] [place] [address] 05/08/15 1057 - Present    75 - Unknown [facility] Unknown [address] [facility] [address] 01/15/15 1019 - Present    170 - Unknown POINT OF CARE TEST, GLUCOSE Unknown Unknown 10/31/11 1114 - Present            Unresulted Labs     None      Encounter-Level Documents:     There are no encounter-level documents.      Order-Level Documents:     There are no order-level documents.

## 2018-03-01 NOTE — IP AVS SNAPSHOT
` `     [place] [address] [facility]: [phone]            Medication Administration Report for Regla Benavidez as of 03/05/18 1248   Legend:    Given Hold Not Given Due Canceled Entry Other Actions    Time Time (Time) Time  Time-Action       Inactive    Active    Linked        Medications 02/27/18 02/28/18 03/01/18 03/02/18 03/03/18 03/04/18 03/05/18    acetaminophen (TYLENOL) tablet 650 mg  Dose: 650 mg  Freq: EVERY 4 HOURS PRN Route: PO  PRN Reason: mild pain  Start: 03/02/18 0110   Admin Instructions: Alternate ibuprofen (if ordered) with acetaminophen.  Maximum acetaminophen dose from all sources = 75 mg/kg/day not to exceed 4 grams/day.        0933 (650 mg)-Given       1643 (650 mg)-Given       2211 (650 mg)-Given        0602 (650 mg)-Given       1805 (650 mg)-Given             aspirin tablet 325 mg  Dose: 325 mg  Freq: DAILY Route: PO  Start: 03/02/18 1500       1643 (325 mg)-Given        0826 (325 mg)-Given        0800 (325 mg)-Given        1024 (325 mg)-Given           cephALEXin (KEFLEX) capsule 500 mg  Dose: 500 mg  Freq: 3 TIMES DAILY Route: PO  Indications of Use: SKIN AND SOFT TISSUE INFECTION  Start: 03/03/18 1400        1336 (500 mg)-Given       2238 (500 mg)-Given        0801 (500 mg)-Given       1711 (500 mg)-Given       2123 (500 mg)-Given        1024 (500 mg)-Given       [ ] 1600       [ ] 2200           cholecalciferol (vitamin D3) tablet 2,000 Units  Dose: 2,000 Units  Freq: DAILY Route: PO  Start: 03/03/18 0900        0826 (2,000 Units)-Given        0801 (2,000 Units)-Given        1024 (2,000 Units)-Given           colchicine tablet 0.6 mg  Dose: 0.6 mg  Freq: DAILY Route: PO  Start: 03/02/18 1500   End: 03/07/18 0859       1642 (0.6 mg)-Given        0826 (0.6 mg)-Given        0800 (0.6 mg)-Given        1023 (0.6 mg)-Given           glipiZIDE (GLUCOTROL) tablet 10 mg  Dose: 10 mg  Freq: 2 TIMES DAILY BEFORE MEALS Route: PO  Start: 03/02/18 1630   Admin Instructions: Take  before or with meals.        1642 (10 mg)-Given        0826 (10 mg)-Given       1708 (10 mg)-Given        0800 (10 mg)-Given       1712 (10 mg)-Given        0645 (10 mg)-Given       [ ] 1630           glucose 40 % gel 15-30 g  Dose: 15-30 g  Freq: EVERY 15 MIN PRN Route: PO  PRN Reason: low blood sugar  Start: 03/02/18 1502   Admin Instructions: Give 15 g for BG 51 to 69 mg/dL IF patient is conscious and able to swallow. Give 30 g for BG less than or equal to 50 mg/dL IF patient is conscious and able to swallow. Do NOT give glucose gel via enteral tube.  IF patient has enteral tube: give apple juice 120 mL (4 oz or 15 g of CHO) via enteral tube for BG 51 to 69 mg/dL.  Give apple juice 240 mL (8 oz or 30 g of CHO) via enteral tube for BG less than or equal to 50 mg/dL.    ~Oral gel is preferable for conscious and able to swallow patient.   ~IF gel unavailable or patient refuses may provide apple juice 120 mL (4 oz or 15 g of CHO). Document juice on I and O flowsheet.              Or  dextrose 50 % injection 25-50 mL  Dose: 25-50 mL  Freq: EVERY 15 MIN PRN Route: IV  PRN Reason: low blood sugar  Start: 03/02/18 1502   Admin Instructions: Use if have IV access, BG less than 70 mg/dL and meet dose criteria below:  Dose if conscious and alert (or disorientated) and NPO = 25 mL  Dose if unconscious / not alert = 50 mL  Vesicant. For ordered doses up to 25 mg, give IV Push undiluted. Give each 5g over 1 minute.              Or  glucagon injection 1 mg  Dose: 1 mg  Freq: EVERY 15 MIN PRN Route: SC  PRN Reason: low blood sugar  PRN Comment: May repeat x 1 only  Start: 03/02/18 1502   Admin Instructions: May give SQ or IM. ONLY use glucagon IF patient has NO IV access AND is UNABLE to swallow AND blood glucose is LESS than or EQUAL to 50 mg/dL.  Give IV Push over 1 minute. Reconstitute with 1mL sterile water.               insulin aspart (NovoLOG) inj (RAPID ACTING)  Dose: 1-5 Units  Freq: AT BEDTIME Route: SC  Start:  03/02/18 2200   Admin Instructions: MEDIUM INSULIN RESISTANCE DOSING    Do Not give Bedtime Correction Insulin if BG less than  200.   For  - 249 give 1 units.   For  - 299 give 2 units.   For  - 349 give 3 units.   For  -399 give 4 units.   For BG greater than or equal to 400 give 5 units.  Notify provider if glucose greater than or equal to 350 mg/dL after administration of correction dose.  If given at mealtime, must be administered 5 min before meal or immediately after.        2220 (1 Units)-Given [C]        (2238)-Not Given        (2122)-Not Given [C]        [ ] 2200           insulin aspart (NovoLOG) inj (RAPID ACTING)  Dose: 1-7 Units  Freq: 3 TIMES DAILY BEFORE MEALS Route: SC  Start: 03/02/18 1700   Admin Instructions: Correction Scale - MEDIUM INSULIN RESISTANCE DOSING     Do Not give Correction Insulin if Pre-Meal BG less than 140.   For Pre-Meal  - 189 give 1 unit.   For Pre-Meal  - 239 give 2 units.   For Pre-Meal  - 289 give 3 units.   For Pre-Meal  - 339 give 4 units.   For Pre-Meal - 399 give 5 units.   For Pre-Meal -449 give 6 units  For Pre-Meal BG greater than or equal to 450 give 7 units.   To be given with prandial insulin, and based on pre-meal blood glucose.    Notify provider if glucose greater than or equal to 350 mg/dL after administration of correction dose.  If given at mealtime, must be administered 5 min before meal or immediately after.        (1714)-Not Given        (0830)-Not Given [C]       1337 (2 Units)-Given [C]       (1708)-Not Given        (0802)-Not Given       (1235)-Not Given [C]       (1713)-Not Given        (0801)-Not Given       1101 (4 Units)-Given [C]       [ ] 1700           melatonin tablet 1 mg  Dose: 1 mg  Freq: AT BEDTIME PRN Route: PO  PRN Reason: sleep  Start: 03/02/18 0108   Admin Instructions: Do not give unless at least 6 hours of uninterrupted sleep is expected.               naloxone (NARCAN)  injection 0.1-0.4 mg  Dose: 0.1-0.4 mg  Freq: EVERY 2 MIN PRN Route: IV  PRN Reason: opioid reversal  Start: 03/02/18 0108   Admin Instructions: For respiratory rate LESS than or EQUAL to 8.  Partial reversal dose:  0.1 mg titrated q 2 minutes for Analgesia Side Effects Monitoring Sedation Level of 3 (frequently drowsy, arousable, drifts to sleep during conversation).Full reversal dose:  0.4 mg bolus for Analgesia Side Effects Monitoring Sedation Level of 4 (somnolent, minimal or no response to stimulation).  For ordered doses up to 2mg give IVP. Give each 0.4mg over 15 seconds in emergency situations. For non-emergent situations further dilute in 9mL of NS to facilitate titration of response.               ondansetron (ZOFRAN) injection 4 mg  Dose: 4 mg  Freq: EVERY 6 HOURS PRN Route: IV  PRN Reasons: nausea,vomiting  Start: 03/02/18 0110   Admin Instructions: This is Step 1 of nausea and vomiting management.  If nausea not resolved in 15 minutes, go to Step 2 prochlorperazine (COMPAZINE).  Irritant. For ordered doses up to 4 mg, give IV Push undiluted over 2-5 minutes.               ondansetron (ZOFRAN-ODT) ODT tab 4 mg  Dose: 4 mg  Freq: EVERY 8 HOURS PRN Route: PO  PRN Reasons: nausea,vomiting  Start: 03/02/18 0108   Admin Instructions: With dry hands, peel back foil backing and gently remove tablet; do not push oral disintegrating tablet through foil backing; administer immediately on tongue and oral disintegrating tablet dissolves in seconds; then swallow with saliva; liquid not required.               polyethylene glycol (MIRALAX/GLYCOLAX) Packet 17 g  Dose: 17 g  Freq: DAILY PRN Route: PO  PRN Reason: constipation  Start: 03/02/18 0110   Admin Instructions: Give in 8oz of  water, juice, or soda. Hold for loose stools.  This is the second step of a three step constipation treatment.  1 Packet = 17 grams. Mixed prescribed dose in 8 ounces of water. Follow with 8 oz. of water.               polyethylene glycol  (MIRALAX/GLYCOLAX) Packet 8.5 g  Dose: 8.5 g  Freq: EVERY OTHER DAY Route: PO  Start: 03/02/18 0900   Admin Instructions: 1 Packet = 17 grams. Mixed prescribed dose in 8 ounces of water. Follow with 8 oz. of water.        (0841)-Not Given         0801 (8.5 g)-Given            pramipexole (MIRAPEX) tablet 0.375 mg  Dose: 0.375 mg  Freq: AT BEDTIME Route: PO  Start: 03/02/18 2200       2210 (0.375 mg)-Given        2238 (0.375 mg)-Given        2123 (0.375 mg)-Given        [ ] 2200           ranitidine (ZANTAC) tablet 150 mg  Dose: 150 mg  Freq: AT BEDTIME Route: PO  Start: 03/04/18 2200   Admin Instructions: Dose adjusted per renal dosing policy.  Estimated CrCl = <50 mL/min.          2122 (150 mg)-Given        [ ] 2200           simvastatin (ZOCOR) tablet 5 mg  Dose: 5 mg  Freq: EVERY EVENING Route: PO  Start: 03/02/18 2000       2210 (5 mg)-Given        1958 (5 mg)-Given        2122 (5 mg)-Given        [ ] 2000           sitagliptin (JANUVIA) tablet 50 mg  Dose: 50 mg  Freq: DAILY Route: PO  Start: 03/02/18 0900       0841 (50 mg)-Given        0826 (50 mg)-Given        0800 (50 mg)-Given        1023 (50 mg)-Given           spironolactone (ALDACTONE) half-tab 12.5 mg  Dose: 12.5 mg  Freq: DAILY Route: PO  Start: 03/02/18 0900       0840 (12.5 mg)-Given        0826 (12.5 mg)-Given        0800 (12.5 mg)-Given        1024 (12.5 mg)-Given          Discontinued Medications  Medications 02/27/18 02/28/18 03/01/18 03/02/18 03/03/18 03/04/18 03/05/18         Dose: 1 g  Freq: EVERY 8 HOURS Route: IV  Indications of Use: SKIN AND SOFT TISSUE INFECTION  Start: 03/02/18 0600   End: 03/03/18 1043   Admin Instructions: Give IVP over 3-5 minutes.        0629 (1 g)-Given       1342 (1 g)-Given       2219 (1 g)-Given        0604 (1 g)-Given       1043-Med Discontinued           Dose: 15-30 g  Freq: EVERY 15 MIN PRN Route: PO  PRN Reason: low blood sugar  Start: 03/02/18 1456   End: 03/02/18 8497   Admin Instructions: Give 15 g for BG  51 to 69 mg/dL IF patient is conscious and able to swallow. Give 30 g for BG less than or equal to 50 mg/dL IF patient is conscious and able to swallow. Do NOT give glucose gel via enteral tube.  IF patient has enteral tube: give apple juice 120 mL (4 oz or 15 g of CHO) via enteral tube for BG 51 to 69 mg/dL.  Give apple juice 240 mL (8 oz or 30 g of CHO) via enteral tube for BG less than or equal to 50 mg/dL.    ~Oral gel is preferable for conscious and able to swallow patient.   ~IF gel unavailable or patient refuses may provide apple juice 120 mL (4 oz or 15 g of CHO). Document juice on I and O flowsheet.        1557-Med Discontinued         Or    Dose: 25-50 mL  Freq: EVERY 15 MIN PRN Route: IV  PRN Reason: low blood sugar  Start: 03/02/18 1456   End: 03/02/18 1557   Admin Instructions: Use if have IV access, BG less than 70 mg/dL and meet dose criteria below:  Dose if conscious and alert (or disorientated) and NPO = 25 mL  Dose if unconscious / not alert = 50 mL  Vesicant. For ordered doses up to 25 mg, give IV Push undiluted. Give each 5g over 1 minute.        1557-Med Discontinued         Or    Dose: 1 mg  Freq: EVERY 15 MIN PRN Route: SC  PRN Reason: low blood sugar  PRN Comment: May repeat x 1 only  Start: 03/02/18 1456   End: 03/02/18 1557   Admin Instructions: May give SQ or IM. ONLY use glucagon IF patient has NO IV access AND is UNABLE to swallow AND blood glucose is LESS than or EQUAL to 50 mg/dL.  Give IV Push over 1 minute. Reconstitute with 1mL sterile water.        1557-Med Discontinued            Dose: 150 mg  Freq: 2 TIMES DAILY Route: PO  Start: 03/02/18 0900   End: 03/03/18 1015       0841 (150 mg)-Given       2210 (150 mg)-Given        0827 (150 mg)-Given       1015-Med Discontinued           Rate: 75 mL/hr   Freq: CONTINUOUS Route: IV  Start: 03/02/18 0115   End: 03/02/18 1452       0252 ( )-New Bag       1452-Med Discontinued       Medications 02/27/18 02/28/18 03/01/18 03/02/18 03/03/18  03/04/18 03/05/18

## 2018-03-02 ENCOUNTER — CARE COORDINATION (OUTPATIENT)
Dept: GERIATRIC MEDICINE | Facility: CLINIC | Age: 83
End: 2018-03-02

## 2018-03-02 PROBLEM — L03.119 CELLULITIS, LEG: Status: ACTIVE | Noted: 2018-03-02

## 2018-03-02 LAB
ANION GAP SERPL CALCULATED.3IONS-SCNC: 5 MMOL/L (ref 3–14)
BUN SERPL-MCNC: 26 MG/DL (ref 7–30)
CALCIUM SERPL-MCNC: 8.1 MG/DL (ref 8.5–10.1)
CHLORIDE SERPL-SCNC: 103 MMOL/L (ref 94–109)
CO2 SERPL-SCNC: 30 MMOL/L (ref 20–32)
CREAT SERPL-MCNC: 1.05 MG/DL (ref 0.52–1.04)
ERYTHROCYTE [DISTWIDTH] IN BLOOD BY AUTOMATED COUNT: 15 % (ref 10–15)
GFR SERPL CREATININE-BSD FRML MDRD: 49 ML/MIN/1.7M2
GLUCOSE BLDC GLUCOMTR-MCNC: 141 MG/DL (ref 70–99)
GLUCOSE BLDC GLUCOMTR-MCNC: 145 MG/DL (ref 70–99)
GLUCOSE BLDC GLUCOMTR-MCNC: 150 MG/DL (ref 70–99)
GLUCOSE BLDC GLUCOMTR-MCNC: 177 MG/DL (ref 70–99)
GLUCOSE BLDC GLUCOMTR-MCNC: 200 MG/DL (ref 70–99)
GLUCOSE SERPL-MCNC: 190 MG/DL (ref 70–99)
HCT VFR BLD AUTO: 34.8 % (ref 35–47)
HGB BLD-MCNC: 11.1 G/DL (ref 11.7–15.7)
MCH RBC QN AUTO: 29.1 PG (ref 26.5–33)
MCHC RBC AUTO-ENTMCNC: 31.9 G/DL (ref 31.5–36.5)
MCV RBC AUTO: 91 FL (ref 78–100)
PLATELET # BLD AUTO: 233 10E9/L (ref 150–450)
POTASSIUM SERPL-SCNC: 4.4 MMOL/L (ref 3.4–5.3)
RBC # BLD AUTO: 3.82 10E12/L (ref 3.8–5.2)
SODIUM SERPL-SCNC: 138 MMOL/L (ref 133–144)
WBC # BLD AUTO: 9.3 10E9/L (ref 4–11)

## 2018-03-02 PROCEDURE — 25000132 ZZH RX MED GY IP 250 OP 250 PS 637: Performed by: INTERNAL MEDICINE

## 2018-03-02 PROCEDURE — 36415 COLL VENOUS BLD VENIPUNCTURE: CPT | Performed by: INTERNAL MEDICINE

## 2018-03-02 PROCEDURE — 80048 BASIC METABOLIC PNL TOTAL CA: CPT | Performed by: INTERNAL MEDICINE

## 2018-03-02 PROCEDURE — 99232 SBSQ HOSP IP/OBS MODERATE 35: CPT | Performed by: INTERNAL MEDICINE

## 2018-03-02 PROCEDURE — 25000128 H RX IP 250 OP 636: Performed by: INTERNAL MEDICINE

## 2018-03-02 PROCEDURE — 12000000 ZZH R&B MED SURG/OB

## 2018-03-02 PROCEDURE — 85027 COMPLETE CBC AUTOMATED: CPT | Performed by: INTERNAL MEDICINE

## 2018-03-02 PROCEDURE — 00000146 ZZHCL STATISTIC GLUCOSE BY METER IP

## 2018-03-02 PROCEDURE — 27210995 ZZH RX 272: Performed by: INTERNAL MEDICINE

## 2018-03-02 RX ORDER — DEXTROSE MONOHYDRATE 25 G/50ML
25-50 INJECTION, SOLUTION INTRAVENOUS
Status: DISCONTINUED | OUTPATIENT
Start: 2018-03-02 | End: 2018-03-02

## 2018-03-02 RX ORDER — NICOTINE POLACRILEX 4 MG
15-30 LOZENGE BUCCAL
Status: DISCONTINUED | OUTPATIENT
Start: 2018-03-02 | End: 2018-03-02

## 2018-03-02 RX ORDER — NALOXONE HYDROCHLORIDE 0.4 MG/ML
.1-.4 INJECTION, SOLUTION INTRAMUSCULAR; INTRAVENOUS; SUBCUTANEOUS
Status: DISCONTINUED | OUTPATIENT
Start: 2018-03-02 | End: 2018-03-05 | Stop reason: HOSPADM

## 2018-03-02 RX ORDER — ASPIRIN 325 MG
325 TABLET ORAL DAILY
Status: DISCONTINUED | OUTPATIENT
Start: 2018-03-02 | End: 2018-03-05 | Stop reason: HOSPADM

## 2018-03-02 RX ORDER — NICOTINE POLACRILEX 4 MG
15-30 LOZENGE BUCCAL
Status: DISCONTINUED | OUTPATIENT
Start: 2018-03-02 | End: 2018-03-05 | Stop reason: HOSPADM

## 2018-03-02 RX ORDER — ONDANSETRON 4 MG/1
4 TABLET, ORALLY DISINTEGRATING ORAL EVERY 8 HOURS PRN
Status: DISCONTINUED | OUTPATIENT
Start: 2018-03-02 | End: 2018-03-05 | Stop reason: HOSPADM

## 2018-03-02 RX ORDER — SIMVASTATIN 5 MG
5 TABLET ORAL EVERY EVENING
Status: DISCONTINUED | OUTPATIENT
Start: 2018-03-02 | End: 2018-03-05 | Stop reason: HOSPADM

## 2018-03-02 RX ORDER — DEXTROSE MONOHYDRATE 25 G/50ML
25-50 INJECTION, SOLUTION INTRAVENOUS
Status: DISCONTINUED | OUTPATIENT
Start: 2018-03-02 | End: 2018-03-05 | Stop reason: HOSPADM

## 2018-03-02 RX ORDER — GLIPIZIDE 10 MG/1
10 TABLET ORAL
Status: DISCONTINUED | OUTPATIENT
Start: 2018-03-02 | End: 2018-03-05 | Stop reason: HOSPADM

## 2018-03-02 RX ORDER — SODIUM CHLORIDE 9 MG/ML
INJECTION, SOLUTION INTRAVENOUS CONTINUOUS
Status: DISCONTINUED | OUTPATIENT
Start: 2018-03-02 | End: 2018-03-02

## 2018-03-02 RX ORDER — ONDANSETRON 2 MG/ML
4 INJECTION INTRAMUSCULAR; INTRAVENOUS EVERY 6 HOURS PRN
Status: DISCONTINUED | OUTPATIENT
Start: 2018-03-02 | End: 2018-03-05 | Stop reason: HOSPADM

## 2018-03-02 RX ORDER — COLCHICINE 0.6 MG/1
0.6 TABLET ORAL DAILY
Status: DISCONTINUED | OUTPATIENT
Start: 2018-03-02 | End: 2018-03-05 | Stop reason: HOSPADM

## 2018-03-02 RX ORDER — SPIRONOLACTONE 25 MG
12.5 TABLET ORAL DAILY
Status: DISCONTINUED | OUTPATIENT
Start: 2018-03-02 | End: 2018-03-05 | Stop reason: HOSPADM

## 2018-03-02 RX ORDER — POLYETHYLENE GLYCOL 3350 17 G/17G
17 POWDER, FOR SOLUTION ORAL DAILY PRN
Status: DISCONTINUED | OUTPATIENT
Start: 2018-03-02 | End: 2018-03-05 | Stop reason: HOSPADM

## 2018-03-02 RX ORDER — POLYETHYLENE GLYCOL 3350 17 G/17G
8.5 POWDER, FOR SOLUTION ORAL EVERY OTHER DAY
Status: DISCONTINUED | OUTPATIENT
Start: 2018-03-02 | End: 2018-03-05 | Stop reason: HOSPADM

## 2018-03-02 RX ORDER — ONDANSETRON 4 MG/1
4 TABLET, ORALLY DISINTEGRATING ORAL EVERY 6 HOURS PRN
Status: DISCONTINUED | OUTPATIENT
Start: 2018-03-02 | End: 2018-03-02

## 2018-03-02 RX ORDER — ACETAMINOPHEN 325 MG/1
650 TABLET ORAL EVERY 4 HOURS PRN
Status: DISCONTINUED | OUTPATIENT
Start: 2018-03-02 | End: 2018-03-05 | Stop reason: HOSPADM

## 2018-03-02 RX ADMIN — ACETAMINOPHEN 650 MG: 325 TABLET, FILM COATED ORAL at 22:11

## 2018-03-02 RX ADMIN — COLCHICINE 0.6 MG: 0.6 TABLET, FILM COATED ORAL at 16:42

## 2018-03-02 RX ADMIN — GLIPIZIDE 10 MG: 10 TABLET ORAL at 16:42

## 2018-03-02 RX ADMIN — SITAGLIPTIN 50 MG: 50 TABLET, FILM COATED ORAL at 08:41

## 2018-03-02 RX ADMIN — SIMVASTATIN 5 MG: 5 TABLET, FILM COATED ORAL at 22:10

## 2018-03-02 RX ADMIN — CEFAZOLIN SODIUM 1 G: 10 INJECTION, POWDER, FOR SOLUTION INTRAVENOUS at 13:42

## 2018-03-02 RX ADMIN — ACETAMINOPHEN 650 MG: 325 TABLET, FILM COATED ORAL at 16:43

## 2018-03-02 RX ADMIN — RANITIDINE 150 MG: 150 TABLET ORAL at 22:10

## 2018-03-02 RX ADMIN — SODIUM CHLORIDE: 9 INJECTION, SOLUTION INTRAVENOUS at 02:52

## 2018-03-02 RX ADMIN — ASPIRIN 325 MG ORAL TABLET 325 MG: 325 PILL ORAL at 16:43

## 2018-03-02 RX ADMIN — ACETAMINOPHEN 650 MG: 325 TABLET, FILM COATED ORAL at 09:33

## 2018-03-02 RX ADMIN — PRAMIPEXOLE DIHYDROCHLORIDE 0.38 MG: 0.25 TABLET ORAL at 22:10

## 2018-03-02 RX ADMIN — CEFAZOLIN SODIUM 1 G: 10 INJECTION, POWDER, FOR SOLUTION INTRAVENOUS at 22:19

## 2018-03-02 RX ADMIN — RANITIDINE 150 MG: 150 TABLET ORAL at 08:41

## 2018-03-02 RX ADMIN — Medication 12.5 MG: at 08:40

## 2018-03-02 RX ADMIN — CEFAZOLIN SODIUM 1 G: 10 INJECTION, POWDER, FOR SOLUTION INTRAVENOUS at 06:29

## 2018-03-02 NOTE — PROGRESS NOTES
RECEIVING UNIT ED HANDOFF REVIEW    ED Nurse Handoff Report was reviewed by: Suyapa Rodgers on March 1, 2018 at 10:19 PM

## 2018-03-02 NOTE — PHARMACY-ADMISSION MEDICATION HISTORY
Admission medication history interview status for the 3/1/2018  admission is complete. See EPIC admission navigator for prior to admission medications     Medication history source reliability:Good    Actions taken by pharmacist (provider contacted, etc):  Reviewed medication list provided by [facility].      Additional medication history information not noted on PTA med list :None    Medication reconciliation/reorder completed by provider prior to medication history? No    Time spent in this activity: 15 minutes    Prior to Admission medications    Medication Sig Last Dose Taking? Auth Provider   polyethylene glycol (MIRALAX/GLYCOLAX) Packet Take 8.5 g by mouth every other day   at Unknown time Yes Unknown, Entered By History   Lactobacillus (LACTINEX PO) Take 1 tablet by mouth 3 times daily 0900; 1300; 1645 3/1/2018 at 1645 Yes Unknown, Entered By History   nystatin (MYCOSTATIN) 026992 UNIT/GM POWD Apply topically daily 2/28/2018 at 2000 Yes Unknown, Entered By History   ACETAMINOPHEN PO Take 500 mg by mouth every 8 hours as needed for pain  at prn Yes Unknown, Entered By History   Ondansetron HCl (ZOFRAN PO) Take 4 mg by mouth every 8 hours as needed for nausea or vomiting  at prn Yes Reported, Patient   pramipexole (MIRAPEX) 0.25 MG tablet Take 1.5 tablets (0.375 mg) by mouth At Bedtime 2/28/2018 at 2000 Yes Leo Melgar MD   colchicine (COLCRYS) 0.6 MG tablet Take one tablet bid for 2 days, then one per day for 4 more days. 3/1/2018 at 0800 Yes Leo Melgar MD   cephALEXin (KEFLEX) 500 MG capsule Take 1 capsule (500 mg) by mouth 2 times daily for 7 days 3/1/2018 at 0900 Yes Lauren Bray MD   furosemide (LASIX) 20 MG tablet Take 1 tablet (20 mg) by mouth 2 times daily As of 1/27/18 3/1/2018 at 1300 Yes Leo Mlegar MD   spironolactone (ALDACTONE) 25 MG tablet Take 0.5 tablets (12.5 mg) by mouth daily 3/1/2018 at 0900 Yes Leo Melgar MD   metFORMIN (GLUCOPHAGE) 500 MG tablet Take  500 mg by mouth daily  3/1/2018 at 0900 Yes Leo Melgar MD   glipiZIDE (GLUCOTROL) 10 MG tablet Take 1 tablet (10 mg) by mouth 2 times daily (before meals) 3/1/2018 at 1645 Yes Leo Melgar MD   sitagliptin (JANUVIA) 50 MG tablet Take 1 tablet (50 mg) by mouth daily 3/1/2018 at 0900 Yes Leo Melgar MD   nystatin (MYCOSTATIN) 706202 UNIT/GM POWD Apply 1 g topically 3 times daily as needed  at prn Yes Leo Melgar MD   carbamide peroxide (DEBROX) 6.5 % otic solution Place 5 drops into both ears daily as needed for other  at prn Yes Reported, Patient   acetaminophen (TYLENOL) 325 MG tablet Take 2 tablets (650 mg) by mouth At Bedtime Take 2 tablets at bedtime 2/28/2018 at 2000 Yes Leo Melgar MD   ranitidine (ZANTAC) 150 MG tablet Take 1 tablet (150 mg) by mouth 2 times daily 3/1/2018 at 0900 Yes Leo Melgar MD   Cholecalciferol (VITAMIN D3 PO) Take 2,000 Units by mouth daily 3/1/2018 at 0900 Yes Unknown, Entered By History   simvastatin (ZOCOR) 5 MG tablet Take 1 tablet (5 mg) by mouth every evening 2/28/2018 at 2000 Yes Kendall Saba MD   aspirin 325 MG tablet Take 1 tablet (325 mg) by mouth daily 3/1/2018 at 0900 Yes Kendall Saba MD   blood glucose monitoring (ONETOUCH ULTRA) test strip USE TO TEST ONCE EVERY MORNING AS DIRECTED   Leo Melgar MD   ORDER FOR DME Equipment being ordered: Large Underpads   Leo Melgar MD   ORDER FOR DME Equipment being ordered: Esthela xtra absorbent large pull ups - Uses 5 per day.   Leo Melgar MD   ORDER FOR DME Equipment being ordered: retractable safety lancets   Leo Melgar MD   ORDER FOR DME Equipment being ordered: glucometer kit- brand per insurance coverage  Patient prefers brand with DRUM lancets - patient is to test fasting once each morning  #30 with 5 refills each of the lancets and test strips   Leo Melgar MD Carolyn Dahlman, PharmD, BCPS

## 2018-03-02 NOTE — ED NOTES
"[facility]  ED Nurse Handoff Report    ED Chief complaint: Wound Check (left foot infection- MRI today showing cellulitis and MD told pt to come to ED for IV antibiotics. )      ED Diagnosis:   Final diagnoses:   Myositis of left foot       Code Status: MD to address    Allergies:   Allergies   Allergen Reactions     Cozaar [Losartan Potassium] Swelling     Leg swelling. Same from Avapro.     Hydrochlorothiazide Other (See Comments)     hyponatremia     Hydrocodone Nausea and Vomiting     Nausea       Activity level - Baseline/Home:  Independent    Activity Level - Current:   Stand with Assist     Needed?: No    Isolation: No  Infection:  Other, Cellulitis, left foot    Bariatric?: Yes    Vital Signs:   Vitals:    03/01/18 1903 03/01/18 2038   BP: 140/47 135/55   Pulse: 78    Resp: 12    Temp: 97.2  F (36.2  C)    SpO2: 93% 93%   Weight: 81.6 kg (180 lb)    Height: 1.575 m (5' 2\")        Cardiac Rhythm: ,        Pain level:      Is this patient confused?: No    Patient Report: Initial Complaint:  Left foot pain, increasing since Sat.  Focused Assessment:  Regla Benavidez is a 89 yo female.  Presents by wheelchair with family.  Left foot pain, swelling, started on Sat.  Received Keflex, followed up with PMD, MRI today.  Today was called & told to come in for further eval & IV antibiotics.  Patient was being cared for at home by family since Sat.  Left foot swelling & left lower leg swelling.  Family reports the blistering to the inner aspect of the foot has improved, patient is tender to touch & unable to bear weight, normally walks without difficulty.  Taking Tylenol for pain.  Hx of restless legs also which this has worsened also.  Tests Performed:  Labs, Blood cultures.   Abnormal Results:  Refer to EPIC  Treatments provided:  IV insertion.  Morphine 2mg IVP, 2 doses; cefazolin 1 gram; pramipexole 0.375 mg    Family Comments: Son & daughter with patient    OBS brochure/video " discussed/provided to patient: N/A    ED Medications:   Medications   ceFAZolin (ANCEF) 1g in 10 mL SWFI premix for IVP (not administered)   morphine (PF) injection 2 mg (2 mg Intravenous Given 3/1/18 2159)   pramipexole (MIRAPEX) tablet 0.375 mg (not administered)   morphine (PF) injection 2 mg (2 mg Intravenous Given 3/1/18 2037)   ceFAZolin (ANCEF) 1g in 10 mL SWFI premix for IVP (1 g Intravenous Given 3/1/18 2132)       Drips infusing?:  No      ED NURSE PHONE NUMBER: [phone]

## 2018-03-02 NOTE — PROGRESS NOTES
[facility]    Hospitalist Progress Note    Date of Service (when I saw the patient): 03/02/2018    Assessment & Plan   Regla Benavidez is a 89 year-old female with history of CVA with resultant expressive aphasia, hypertension, peripheral arterial disease, chronic kidney disease stage III, diabetes mellitus type 2 admitted on 3/1/2018 with persistent leg and foot pain.    Cellulitis and myositis of left leg  Possible gout  Admitted for ongoing leg and foot pain despite oral cephalexin course. MRI 3/1/18 as outpatient with focal marginal erosive changes of 1st MTP possibly suggestive of gout, as well as diffuse subcutaneous and deep muscle edema suggesting cellulitis/myositis, without abscess seen. Had been started on colchicine by PCP based on MRI findings.   - Superficial changes have essentially resolved on IV antibiotics. Was having some pain earlier 3/2/2018 per RN, however at present seems improved with minimal tenderness to palpation. Continue IV cefazolin.  - Left 1st MTP without tenderness to palpation or erythema at this point. Will complete colchicine course as previously prescribed   - Discontinue IVF  - Blood cultures NGTD  - WBC resolved to normal     Diabetes mellitus type 2  HgbA1c 6.9% 2/9/18.   - Will continue oral regimen as appears to be eating normally  - Moderate consistent CHO diet ordered  - Blood sugar checks, medium SSI, hypoglycemia protocol ordered    History of CVA with expressive aphasia  No acute issues.  - Resume prior to admission aspirin. Continue prior to admission simvastatin.    Peripheral arterial disease  No acute issues.  - Resume prior to admission aspirin. Continue prior to admission simvastatin.     Hypertension  - Continue prior to admission spironolactone     Restless leg syndrome  - Continue prior to admission pramipexole.     Chronic kidney disease, stage III  Baseline creatinine 1.1-1.3.   - Creatinine at baseline  - Avoid nephrotoxins as  able    Physical deconditioning  - Physical therapy consulted     Pain assessment  Current Pain Score 3/2/2018 3/2/2018 3/1/2018   Patient currently in pain? yes yes denies   Pain score (0-10) 5 6 0   Pain location - Foot -   - [place] is experiencing pain due to left foot cellulitis and possible gout. Pain management was discussed and the plan was created in a collaborative fashion.  [place]'s response to the current recommendations: compliant  - Please see the plan for pain management as documented above    DVT Prophylaxis: Pneumatic Compression Devices  Code Status: DNR/DNI    Disposition: Expected discharge in 1-2 days back on oral antibiotics if ongoing improvement .    Rashi Julian    Interval History   No acute events overnight. Discussed with RN, having more pain this morning although improving throughout the course of the day.     -Data reviewed today: I reviewed all new labs and imaging results over the last 24 hours. I personally reviewed no images or EKG's today.    Physical Exam   Temp: 98.1  F (36.7  C) Temp src: Oral BP: 145/71 Pulse: 70 Heart Rate: 74 Resp: 16 SpO2: 98 % O2 Device: Nasal cannula Oxygen Delivery: 1 LPM  Vitals:    03/01/18 1903 03/02/18 0254   Weight: 81.6 kg (180 lb) 82 kg (180 lb 12.4 oz)     Vital Signs with Ranges  Temp:  [97.2  F (36.2  C)-98.1  F (36.7  C)] 98.1  F (36.7  C)  Pulse:  [70-78] 70  Heart Rate:  [74-78] 74  Resp:  [12-16] 16  BP: (135-145)/(47-71) 145/71  SpO2:  [93 %-98 %] 98 %  I/O last 3 completed shifts:  In: 264 [I.V.:264]  Out: -     Constitutional: Well-appearing, NAD  Respiratory: Clear to auscultation bilaterally, good air movement bilaterally  Cardiovascular: RRR, no m/r/g. No peripheral edema.  GI: Soft, non-tender, non-distended. BS normoactive.   Skin/Integumen: Warm, dry. No erythema noted of left lower leg/foot, although with mild edema present and minimal tenderness to palpation.   Neuro: Alert. Expressive aphasia present, although able to provide  "some history. Answered \"[place]\" when asked where she was. Able to state date by reading from white board. Following commands.      Medications       spironolactone  12.5 mg Oral Daily     sitagliptin  50 mg Oral Daily     simvastatin  5 mg Oral QPM     ranitidine  150 mg Oral BID     pramipexole  0.375 mg Oral At Bedtime     polyethylene glycol  8.5 g Oral Every Other Day     glipiZIDE  10 mg Oral BID AC     colchicine  0.6 mg Oral Daily     [START ON 3/3/2018] cholecalciferol (vitamin D3) tablet 2,000 Units  2,000 Units Oral Daily     aspirin  325 mg Oral Daily     ceFAZolin  1 g Intravenous Q8H       Data     Recent Labs  Lab 03/02/18  1021 03/01/18  2000 02/28/18  1218 02/24/18  1344   WBC 9.3 12.3* 11.7* 14.4*   HGB 11.1* 12.4 12.6 12.5   MCV 91 90 93 90    279 266 246    139  --  139   POTASSIUM 4.4 4.1  --  3.8   CHLORIDE 103 101  --  104   CO2 30 31  --  28   BUN 26 28  --  27   CR 1.05* 1.15*  --  1.09*   ANIONGAP 5 7  --  7   AAYUSH 8.1* 8.8  --  8.7   * 159*  --  114*       No results found for this or any previous visit (from the past 24 hour(s)).    "

## 2018-03-02 NOTE — H&P
[facility]    History and Physical  Hospitalist       Date of Admission:  3/1/2018    Assessment & Plan   Regla Benavidez is a 89 year old female who presents with worsening left leg cellulitis after 5 days of Keflex:    Summary:    Principal Problem:    Cellulitis left leg   -- IV ancef (looks like strep infection), monitor progress, WBC in AM  Active Problems:    Type 2 diabetes mellitus without complication (H)   -- glucometer qid    Expressive aphasia    PAD (peripheral artery disease) (H) -- stable (both feet warm)    CRF (chronic renal failure), stage 3 (moderate)     Plan -- As above. Anticipate she may need a TCU (has one at her assisted living at [facility].     DVT Prophylaxis: Pneumatic Compression Devices  Code Status: DNR / DNI    Disposition: Expected discharge in 3 days once cellulitis improved.    Asad Griffith MD  Pager: [phone]  Cell Phone:  [phone]       Primary Care Physician   Leo Melgar    Chief Complaint   Left leg pain and swelling    History is obtained from the patient's son    History of Present Illness   89 year old female with DM type 2 and hx of PAD who presents with redness left leg for 5 days, not improved with Keflex orally, and agreeable to admission for IV antibiotics.  She did go to the clinic today and MRI of left leg showed tissue swelling in skin and muscle.  She can not give any hx 2nd to the aphasia.     Past Medical History    I have reviewed this patient's medical history and updated it with pertinent information if needed.   Past Medical History:   Diagnosis Date     Cholelithiasis 2009    incidental finding     DJD (degenerative joint disease) of lumbar spine 2009     Elevated glucose      Expressive aphasia related to prior stroke      Hard of hearing     hearing aids     HTN (hypertension)      Shingles 2005     Varicose vein of leg        Past Surgical History   I have reviewed this patient's surgical history  and updated it with pertinent information if needed.  Past Surgical History:   Procedure Laterality Date     CATARACT IOL, RT/LT         Prior to Admission Medications   Prior to Admission Medications   Prescriptions Last Dose Informant Patient Reported? Taking?   ACETAMINOPHEN PO  at prn Nursing Home Yes Yes   Sig: Take 500 mg by mouth every 8 hours as needed for pain   Cholecalciferol (VITAMIN D3 PO) 3/1/2018 at 0900 prison Yes Yes   Sig: Take 2,000 Units by mouth daily   Lactobacillus (LACTINEX PO) 3/1/2018 at 1645 prison Yes Yes   Sig: Take 1 tablet by mouth 3 times daily 0900; 1300; 1645   ORDER FOR DME   No No   Sig: Equipment being ordered: glucometer kit- brand per insurance coverage  Patient prefers brand with DRUM lancets - patient is to test fasting once each morning  #30 with 5 refills each of the lancets and test strips   ORDER FOR DME   No No   Sig: Equipment being ordered: retractable safety lancets   ORDER FOR DME   No No   Sig: Equipment being ordered: Large Underpads   ORDER FOR DME   No No   Sig: Equipment being ordered: Esthela xtra absorbent large pull ups - Uses 5 per day.   Ondansetron HCl (ZOFRAN PO)  at prn Nursing Home Yes Yes   Sig: Take 4 mg by mouth every 8 hours as needed for nausea or vomiting   acetaminophen (TYLENOL) 325 MG tablet 2/28/2018 at 2000 prison No Yes   Sig: Take 2 tablets (650 mg) by mouth At Bedtime Take 2 tablets at bedtime   aspirin 325 MG tablet 3/1/2018 at 0900 prison No Yes   Sig: Take 1 tablet (325 mg) by mouth daily   blood glucose monitoring (ONETOUCH ULTRA) test strip   No No   Sig: USE TO TEST ONCE EVERY MORNING AS DIRECTED   carbamide peroxide (DEBROX) 6.5 % otic solution  at prn Nursing Home Yes Yes   Sig: Place 5 drops into both ears daily as needed for other   cephALEXin (KEFLEX) 500 MG capsule 3/1/2018 at 0900 prison No Yes   Sig: Take 1 capsule (500 mg) by mouth 2 times daily for 7 days   colchicine (COLCRYS) 0.6 MG tablet  3/1/2018 at 0800 Nursing Home No Yes   Sig: Take one tablet bid for 2 days, then one per day for 4 more days.   furosemide (LASIX) 20 MG tablet 3/1/2018 at 1300 care home Yes Yes   Sig: Take 1 tablet (20 mg) by mouth 2 times daily As of 1/27/18   glipiZIDE (GLUCOTROL) 10 MG tablet 3/1/2018 at 1645 care home No Yes   Sig: Take 1 tablet (10 mg) by mouth 2 times daily (before meals)   metFORMIN (GLUCOPHAGE) 500 MG tablet 3/1/2018 at 0900 care home Yes Yes   Sig: Take 500 mg by mouth daily    nystatin (MYCOSTATIN) 022389 UNIT/GM POWD  at prn care home No Yes   Sig: Apply 1 g topically 3 times daily as needed   nystatin (MYCOSTATIN) 143167 UNIT/GM POWD 2/28/2018 at 2000 care home Yes Yes   Sig: Apply topically daily   polyethylene glycol (MIRALAX/GLYCOLAX) Packet  at Unknown time Nursing Home Yes Yes   Sig: Take 8.5 g by mouth every other day    pramipexole (MIRAPEX) 0.25 MG tablet 2/28/2018 at 2000 care home No Yes   Sig: Take 1.5 tablets (0.375 mg) by mouth At Bedtime   ranitidine (ZANTAC) 150 MG tablet 3/1/2018 at 0900 care home No Yes   Sig: Take 1 tablet (150 mg) by mouth 2 times daily   simvastatin (ZOCOR) 5 MG tablet 2/28/2018 at 2000 care home No Yes   Sig: Take 1 tablet (5 mg) by mouth every evening   sitagliptin (JANUVIA) 50 MG tablet 3/1/2018 at 0900 care home No Yes   Sig: Take 1 tablet (50 mg) by mouth daily   spironolactone (ALDACTONE) 25 MG tablet 3/1/2018 at 0900 care home Yes Yes   Sig: Take 0.5 tablets (12.5 mg) by mouth daily      Facility-Administered Medications: None     Allergies   Allergies   Allergen Reactions     Cozaar [Losartan Potassium] Swelling     Leg swelling. Same from Avapro.     Hydrochlorothiazide Other (See Comments)     hyponatremia     Hydrocodone Nausea and Vomiting     Nausea       Social History   I have reviewed this patient's social history and updated it with pertinent information if needed. Regla Benavidez  reports that she has never smoked.  She has never used smokeless tobacco. She reports that she does not drink alcohol or use illicit drugs.    Family History   I have reviewed this patient's family history and updated it with pertinent information if needed.   Family History   Problem Relation Age of Onset     DIABETES Brother      Unknown/Adopted Mother      Unknown/Adopted Father      Coronary Artery Disease No family hx of      Hypertension No family hx of      Hyperlipidemia No family hx of      CEREBROVASCULAR DISEASE No family hx of      Breast Cancer No family hx of      Colon Cancer No family hx of      Prostate Cancer No family hx of      Other Cancer No family hx of      Depression No family hx of      Anxiety Disorder No family hx of      MENTAL ILLNESS No family hx of      Substance Abuse No family hx of      Anesthesia Reaction No family hx of      Asthma No family hx of      OSTEOPOROSIS No family hx of      Genetic Disorder No family hx of      Thyroid Disease No family hx of      Obesity No family hx of        Review of Systems   Review of systems not obtained due to patient factors - language barrier  # Pain Assessment:   Current Pain Score 2/24/2018 2/24/2018 10/25/2013   Pain score (0-10) 0 4 2   Pain location - - -   Albermiguel s pain level was assessed and she currently denies pain.      Physical Exam   Temp: 97.2  F (36.2  C)   BP: 135/55 Pulse: 78 Heart Rate: 76 Resp: 12 SpO2: 93 % O2 Device: None (Room air)    Vital Signs with Ranges  Temp:  [97.2  F (36.2  C)] 97.2  F (36.2  C)  Pulse:  [78] 78  Heart Rate:  [76-78] 76  Resp:  [12] 12  BP: (135-140)/(47-55) 135/55  SpO2:  [93 %] 93 %  180 lbs 0 oz    Constitutional: Awake, alert, cooperative, no apparent distress.  Eyes: Conjunctiva and pupils examined and normal.  HEENT: Moist mucous membranes, normal dentition.  Respiratory: Clear to auscultation bilaterally, no crackles or wheezing.  Cardiovascular: Regular rate and rhythm, normal S1 and S2, and no murmur noted.  GI: Soft,  non-distended, non-tender, normal bowel sounds.  Lymph/Hematologic: No anterior cervical or supraclavicular adenopathy.  Skin: mild erythema left foot and lower leg.  Non-tender to palpation.  1+ ankle edema bilaterally  Musculoskeletal: No joint swelling, erythema or tenderness.  Neurologic: Cranial nerves 2-12 intact, normal strength and sensation.  Psychiatric: Alert, some trouble expressing herself.     Data   Data reviewed today:  I personally reviewed no images or EKG's today.    Recent Labs  Lab 03/01/18 2000 02/28/18  1218 02/24/18  1344   WBC 12.3* 11.7* 14.4*   HGB 12.4 12.6 12.5   MCV 90 93 90    266 246     --  139   POTASSIUM 4.1  --  3.8   CHLORIDE 101  --  104   CO2 31  --  28   BUN 28  --  27   CR 1.15*  --  1.09*   ANIONGAP 7  --  7   AAYUSH 8.8  --  8.7   *  --  114*       Imaging:  Recent Results (from the past 24 hour(s))   MR Foot Left w/o Contrast    Narrative    MR FOOT LEFT WITHOUT CONTRAST 3/1/2018 2:19 PM    HISTORY: Left foot pain and swelling; etiology? Differential diagnosis  includes gout and diabetic foot infection.    COMPARISON: X-ray left foot from 2/24/2018.    TECHNIQUE: Routine multiplanar, multisequence imaging was performed.    FINDINGS:   Osseous structures: Prominent first MTP joint degenerative changes and  hallux valgus with some marginal erosive changes and subchondral cyst  formation. This could be degenerative but gout could have a similar  appearance as there is some moderate adjacent soft tissue prominence.  Remaining osseous structures are unremarkable. No evidence of fracture  or osteomyelitis.    Additional findings: Diffuse subcutaneous edema. There is also edema  in the deep musculature of the plantar aspect of the foot. Findings  suggest cellulitis and perhaps myositis as well. No evidence of  abscess or fluid collection.      Impression    IMPRESSION:  1. Somewhat focal marginal erosive changes surrounding the first MTP  joint suggests the  possibility of gout. There is also soft tissue  prominence surrounding the joint supporting this diagnosis. There may  be secondary degenerative changes as well.  2. Diffuse subcutaneous and deep muscle edema suggesting  cellulitis/myositis. No abscess or fluid collection.    REYNALDO BLACKWOOD MD

## 2018-03-02 NOTE — PLAN OF CARE
Problem: Patient Care Overview  Goal: Plan of Care/Patient Progress Review  Outcome: Improving  A&O x 2-3, forgetful, Aphasia, up with assist of one, walker and GB, incontinent at times, vss, on RA sats 96- 98%, given tylenol x 2 for left foot pain with relief, 1+ left foot edema,  Elevated up on pillows, BGMs 140 and 177 and 144 , on oral diabetic meds  and ss, appetite fair, continues on IV abx,  d/c pending progress.

## 2018-03-02 NOTE — ED PROVIDER NOTES
History     Chief Complaint:  Left foot pain    HPI   Regla Benavidez is a 89 year old female with a history of CHF and stroke who presents with concerns for a leg infection. 5 days ago, the patient began to experience left leg swelling and pain and was evaluated here in the ED. Workup was done including ultrasound and X-ray and she was given a dose of antibiotics and discharged with a prescription for Keflex and has been taking those twice daily. The patient's swelling and pain persisted and an outpatient MRI was obtained today revealing worsening infection and the patient returned here for further evaluation and treatment. She denies fever, nausea, or vomiting and family notes no mental status change. The patient is able to ambulate with a walker and assistance though her pain is worsened with bearing weight. She has aphasia from prior stroke.       Allergies:  Cozaar [Losartan Potassium]  Hydrochlorothiazide  Hydrocodone      Medications:    Zofran  Mirapex  Colcrys  Culturelle digestive health  Keflex  Lasix  Aldactone  Glucophage  Glucotrol  Januvia  Mycostatin  Debrox  Florastor  Lactinex  Miralax  Zantac  Senokot  Zocor  Aspirin 325 mg    Past Medical History:    Cholelithiasis  DJD  Elevated glucose  Hard of hearing  HTN  Shingles  Varicose vein of leg    Past Surgical History:    Cataract IOL    Family History:    Diabetes  Coronary arterydisease  HTN  Hyperlipidemia  Cerebrovascular disease  Breast cancer  Colon cancer  Prostate cancer  Other cancer  Depression  Anxiety disorder  Mental illness  Substance abuse  Anesthesia reaction  Asthma  osteoporosis  Genetic disorder  Thyroid disease  Obesity    Social History:  The patient was accompanied to the ED by family.  Smoking Status: never  Smokeless Tobacco: never  Alcohol Use: no    Marital Status:   [5]    Review of Systems   Constitutional: Negative for diaphoresis and fever.   Cardiovascular: Positive for leg swelling.        Left leg edema  "and pain   Gastrointestinal: Negative for nausea and vomiting.   All other systems reviewed and are negative.        Physical Exam   First Vitals:  BP: 140/47  Pulse: 78  Heart Rate: 78  Temp: 97.2  F (36.2  C)  Resp: 12  Height: 157.5 cm (5' 2\")  Weight: 81.6 kg (180 lb)  SpO2: 93 %      Physical Exam  General: Patient is alert and normal appearing.  HEENT: Head atraumatic   Neck: Supple without lymphadenopathy, no meningismus  Chest: Heart regular rate and rhythm.   Lungs: Equal clear to auscultation with no wheeze or rales  Abdomen: Soft, non tender, nondistended, normal bowel sounds  Back: No costovertebral angle tenderness, no midline C, T or L spine tenderness  Neuro: \"jumpy legs\" intermittent spasms to legs  Extremities: No deformities, equal radial and DP pulses. No clubbing, cyanosis.  Left foot and calf edema, no erythema, pain and swelling to left plantar foot and left first MTP joint.    Skin: Warm and dry with dry skin noted to left foot       Emergency Department Course   Laboratory:  Blood culture: in process  CBC: WBC 12.3 (H), Absolute neutrophil 8.8 (H) o/w WNL. ( HGB 12.4, )  BMP: Glucose 159 (H), Creatinine 1.15 (H), GFR estimate 44 (L), o/w WNL   Erythrocyte sedimentation rate auto: 64 (H)  Blood culture: in process  Lactic acid (collected: 2000): 1.6    Interventions:  2037 morphine, 2 mg, IV  2132 Ancef, 1 g, IV  2159 morphine, 2 mg, IV  2246 Mirapex, 0.375 mg, Oral    Emergency Department Course:  Nursing notes and vitals reviewed. I performed an exam of the patient as documented above.     IV inserted. Medicine administered as documented above. Blood drawn. This was sent to the lab for further testing, results above.    2144 I consulted with Dr. Ball of the hospitalist services. They are in agreement to accept the patient for admission.    2146 I updated the patient and informed her of the plan for admission    Findings and plan explained to the Patient who consents to admission. " Discussed the patient with Dr. Ball, who will admit the patient to an [facility] bed for further monitoring, evaluation, and treatment.      Impression & Plan    Medical Decision Making:  patient is a 89-year-old female with left foot pain. Patient was seen here in the emergency department 5 days ago and found to have cellulitis of the plantar aspect of her left foot. She was discharged on Keflex. The erythema and skin changes have improved but she had continued pain, therefore she saw her primary care doctor today and MRI was ordered. MRI resulted showing soft tissue changes consistent with cellulitis and myositis of the left foot and 1st MTP joint. Patient denies fevers, nausea, fatigue. She has a history of restless legs that she takes medications for. She's had difficulty bearing any weight on that left foot since being seen. 5 days ago she had negative x-ray as well as ultrasound which ruled out DVT and fracture. I did not feel a need to repeat those tests today. Patients lactic acid is thankfully negative although her inflammatory markers in her ESR and white blood cell count have increased. Given that she has had some response to oral keflex, I have kept the patient on Ancef IV to treat the cellulitis and myositis. She will need an infectious disease consultation to further determine antibiotic choice. Patient will be admitted under the hospitalist service and all patients questions and concerns have been addressed. Blood cultures are pending at this time. In addition the MRI showed some changes consistent with gout, her treatment for this can be continued in the hospital.    Diagnosis:    ICD-10-CM    1. Myositis of left foot M60.872        Disposition:  Admitted to the [facility]    Kal DIEHL, am serving as a scribe on 3/1/2018 at 9:30 PM to personally document services performed by Criss Ladd MD based on my observations and the provider's statements to me.       Kal Tavares  3/1/2018    EMERGENCY  DEPARTMENT       [facility], Criss Trujillo MD  03/01/18 3458

## 2018-03-02 NOTE — PROGRESS NOTES
Received notice that member had an ER to hospital admission for cellulitis. Received a VM from AL nurse Kong re admission. Spoke with AL nurse Luann and we discussed that member may be discharging in a day or two on oral antibiotics. Luann states member has not walked in a week and may need some therapy and is thinking member may need TCU at discharge. Luann states that she had [facility] hold a bed for member in case member needs TCU at discharge. Hospitalist did order a PT consult.     Spoke with son Ed as well and he was wondering about coverage for TCU if member did not have a 3 day hospital stay and we discussed that with her [facility] insurance, and being part of [facility], a 3 day hospital stay is not needed. Ed states also that he got the no rinse body wash delivered to his home and that it has been working out well for his mom and wants to continue it monthly. Note sent to CMS to please have the rinse sent to [facility] rather than to son's home.    ML for hospital JUAN Campoverde re services that client was getting -AL at [facility] and that the AL nurse was having the TCU hold a bed for member in case she needs TCU -preferably on a Monday rather than the weekend. Received a call back from JUAN Meeks and discussed that [facility] was holding a bed for member if she needs TCU. Transitions log started.   Autumn Jacobsen RN, PHN, [facility], [place]   [phone]

## 2018-03-02 NOTE — PROGRESS NOTES
D: JUAN following for DC planning. JUAN reviewed chart. Pt is from [facility].   I: JUAN received a phone call from ASH Leyva with [facility], [phone]. She spoke with [facility] and reports they are holding a TCU bed for pt. They typically do not accept pts on the weekends. Referral sent via DOD. PT eval pending.   P: JUAN will follow.     ANA Barnes, LGSW  [id number]

## 2018-03-02 NOTE — PLAN OF CARE
Problem: Patient Care Overview  Goal: Plan of Care/Patient Progress Review  Outcome: No Change  Pt A/Ox3, disoriented to time. Forgetful. Aphasia. Assist x1-2 to BSC, fall risk. VSS on 1L NC. No complaints of pain. Will yell out if foot is touched, but no pain at rest. Red/pink on outer side of left foot. Trace edema on left foot. IVF infusing. Incontinent of bladder. Reposition q2h. IV antibiotics. D/C pending progress.

## 2018-03-03 ENCOUNTER — APPOINTMENT (OUTPATIENT)
Dept: PHYSICAL THERAPY | Facility: CLINIC | Age: 83
DRG: 603 | End: 2018-03-03
Attending: INTERNAL MEDICINE
Payer: COMMERCIAL

## 2018-03-03 LAB
GLUCOSE BLDC GLUCOMTR-MCNC: 103 MG/DL (ref 70–99)
GLUCOSE BLDC GLUCOMTR-MCNC: 140 MG/DL (ref 70–99)
GLUCOSE BLDC GLUCOMTR-MCNC: 158 MG/DL (ref 70–99)
GLUCOSE BLDC GLUCOMTR-MCNC: 194 MG/DL (ref 70–99)
GLUCOSE BLDC GLUCOMTR-MCNC: 85 MG/DL (ref 70–99)

## 2018-03-03 PROCEDURE — 25000132 ZZH RX MED GY IP 250 OP 250 PS 637: Performed by: INTERNAL MEDICINE

## 2018-03-03 PROCEDURE — 25000131 ZZH RX MED GY IP 250 OP 636 PS 637: Performed by: INTERNAL MEDICINE

## 2018-03-03 PROCEDURE — 00000146 ZZHCL STATISTIC GLUCOSE BY METER IP

## 2018-03-03 PROCEDURE — 99232 SBSQ HOSP IP/OBS MODERATE 35: CPT | Performed by: INTERNAL MEDICINE

## 2018-03-03 PROCEDURE — 97161 PT EVAL LOW COMPLEX 20 MIN: CPT | Mod: GP

## 2018-03-03 PROCEDURE — 40000193 ZZH STATISTIC PT WARD VISIT

## 2018-03-03 PROCEDURE — 25000128 H RX IP 250 OP 636: Performed by: INTERNAL MEDICINE

## 2018-03-03 PROCEDURE — 97530 THERAPEUTIC ACTIVITIES: CPT | Mod: GP

## 2018-03-03 PROCEDURE — 12000000 ZZH R&B MED SURG/OB

## 2018-03-03 PROCEDURE — 27210995 ZZH RX 272: Performed by: INTERNAL MEDICINE

## 2018-03-03 RX ORDER — COLCHICINE 0.6 MG/1
TABLET ORAL
Qty: 16 TABLET | Refills: 0
Start: 2018-03-03 | End: 2018-03-04

## 2018-03-03 RX ORDER — CEPHALEXIN 500 MG/1
500 CAPSULE ORAL 3 TIMES DAILY
Status: DISCONTINUED | OUTPATIENT
Start: 2018-03-03 | End: 2018-03-05 | Stop reason: HOSPADM

## 2018-03-03 RX ORDER — CEPHALEXIN 500 MG/1
500 CAPSULE ORAL 3 TIMES DAILY
Qty: 21 CAPSULE | Refills: 0 | Status: SHIPPED | OUTPATIENT
Start: 2018-03-03 | End: 2018-03-04

## 2018-03-03 RX ADMIN — COLCHICINE 0.6 MG: 0.6 TABLET, FILM COATED ORAL at 08:26

## 2018-03-03 RX ADMIN — INSULIN ASPART 2 UNITS: 100 INJECTION, SOLUTION INTRAVENOUS; SUBCUTANEOUS at 13:37

## 2018-03-03 RX ADMIN — CHOLECALCIFEROL TAB 25 MCG (1000 UNIT) 2000 UNITS: 25 TAB at 08:26

## 2018-03-03 RX ADMIN — CEPHALEXIN 500 MG: 500 CAPSULE ORAL at 13:36

## 2018-03-03 RX ADMIN — ASPIRIN 325 MG ORAL TABLET 325 MG: 325 PILL ORAL at 08:26

## 2018-03-03 RX ADMIN — SITAGLIPTIN 50 MG: 50 TABLET, FILM COATED ORAL at 08:26

## 2018-03-03 RX ADMIN — GLIPIZIDE 10 MG: 10 TABLET ORAL at 08:26

## 2018-03-03 RX ADMIN — PRAMIPEXOLE DIHYDROCHLORIDE 0.38 MG: 0.25 TABLET ORAL at 22:38

## 2018-03-03 RX ADMIN — Medication 12.5 MG: at 08:26

## 2018-03-03 RX ADMIN — RANITIDINE 150 MG: 150 TABLET ORAL at 08:27

## 2018-03-03 RX ADMIN — ACETAMINOPHEN 650 MG: 325 TABLET, FILM COATED ORAL at 18:05

## 2018-03-03 RX ADMIN — ACETAMINOPHEN 650 MG: 325 TABLET, FILM COATED ORAL at 06:02

## 2018-03-03 RX ADMIN — CEFAZOLIN SODIUM 1 G: 10 INJECTION, POWDER, FOR SOLUTION INTRAVENOUS at 06:04

## 2018-03-03 RX ADMIN — SIMVASTATIN 5 MG: 5 TABLET, FILM COATED ORAL at 19:58

## 2018-03-03 RX ADMIN — CEPHALEXIN 500 MG: 500 CAPSULE ORAL at 22:38

## 2018-03-03 RX ADMIN — GLIPIZIDE 10 MG: 10 TABLET ORAL at 17:08

## 2018-03-03 NOTE — PLAN OF CARE
Problem: Patient Care Overview  Goal: Plan of Care/Patient Progress Review  Outcome: Improving  7P - 7A: Alert and oriented x 2-3, forgetful. Up with 1 walker and gait belt. VSS on room air. Tylenol given twice. Left foot +1 edema.  Incontinent of urine. [place]. , 140

## 2018-03-03 NOTE — PROGRESS NOTES
[facility]    Internal Medicine Hospitalist Progress Note  03/03/2018  I evaluated patient on the above date.    Gamaliel Madison Jr., MD  [phone] (p)  Text Page (7 am to 6 pm)      Assessment & Plan   Ms. Regla Benavidez is a 89 year-old female with history including DM2, HTN, CVA with resultant expressive aphasia and PAD, who was admitted on 3/1/2018 with persistent leg cellulitis with pain despite oral antibiotics.     Cellulitis and myositis of left leg.  Admitted for ongoing leg and foot pain despite oral cephalexin course. MRI 3/1/18 as outpatient showed diffuse subcutaneous and deep muscle edema suggesting cellulitis/myositis, without abscess seen. Started on cefazolin an admission 3/1 with improvement.  Micro: BC's 3/1 NGTD.  - D/C cefazolin (started 3/1).  - Start Keflex 500 mg TID.  - Monitor cultures.    Possible gout.  MRI 3/1 also showed focal marginal erosive changes of 1st MTP possibly suggestive of gout. Started on colchicine by PCP based on MRI findings. Left 1st MTP without tenderness to palpation presently.  - Complete colchicine course as previously prescribed.    Hypertension (benign essential).  [PTA: furosemide 20 mg BID, spironolactone 12.5 mg daily.]  - Continue spironolactone.  - Resume furosemide after d/c.    Diabetes mellitus type 2 w/o complications.  [PTA: glipizide 10 mg BID, metformin 500 mg daily, sitagliptan 50 mg daily.]  HgbA1c 6.9% 2/9/2018.   Recent Labs   Lab Test  03/03/18   0830  03/03/18   0144  03/02/18   2100  03/02/18   1700  03/02/18   1209  03/02/18   1021   03/01/18   2000  02/24/18   1344 12/28/17 11/27/17   GLC   --    --    --    --    --   190*   --   159*  114*  126*  188*   BGM  158*  140*  200*  145*  177*   --    < >   --    --    --    --     < > = values in this interval not displayed.   - Continue glipizide, sitagliptan.  - Resume metformin at home.  - Continue moderate CHO diet.  - Continue ISS.    History of CVA with expressive  "aphasia.  No acute issues.  - Continue ASA, simvastatin.     Peripheral arterial disease  No acute issues.  - Continue ASA.       Restless leg syndrome  - Continue prior to admission pramipexole.      Chronic kidney disease, stage III.  Baseline creatinine 1.1-1.3. Creatinine at baseline  - Avoid nephrotoxins as able.     Physical deconditioning.  - PT, OT.      Prophylaxis.  - PCD's, ambulation.    CODE STATUS: DNR/DNI.    Dispo.  - Pending above.  - Plan d/c home vs TCU 1-2d.    Interval History   Doing OK. Denies SOB.    -Data reviewed today: I reviewed all new labs and imaging over the last 24 hours. I personally reviewed no images or EKG's today.    Physical Exam   Heart Rate: 69, Blood pressure 139/76, pulse 70, temperature 97.4  F (36.3  C), temperature source Oral, resp. rate 16, height 1.575 m (5' 2\"), weight 82 kg (180 lb 12.4 oz), SpO2 93 %, not currently breastfeeding.  Vitals:    03/01/18 1903 03/02/18 0254   Weight: 81.6 kg (180 lb) 82 kg (180 lb 12.4 oz)     Vital Signs with Ranges  Temp:  [97.4  F (36.3  C)-98.6  F (37  C)] 97.4  F (36.3  C)  Heart Rate:  [65-69] 69  Resp:  [16] 16  BP: (126-154)/(58-76) 139/76  SpO2:  [91 %-93 %] 93 %  Patient Vitals for the past 24 hrs:   BP Temp Temp src Heart Rate Resp SpO2   03/03/18 0731 139/76 97.4  F (36.3  C) Oral 69 16 93 %   03/02/18 2209 154/65 98.6  F (37  C) Oral 67 16 91 %   03/02/18 1545 126/58 98.1  F (36.7  C) Oral 65 16 92 %     I/O's Last 24 hours  I/O last 3 completed shifts:  In: 1336.25 [P.O.:775; I.V.:561.25]  Out: -     Constitutional: Alert, oriented, pleasant.  Respiratory: Diminished in bases. No crackles or wheezes.  Cardiovascular: RRR no m/r/g.  GI: Soft, nt, nd +BS.  Skin/Integumen: No significant left lower extremity edema. Both legs tender to palpation.  Other:        Data     Recent Labs  Lab 03/02/18  1021 03/01/18 2000 02/28/18  1218 02/24/18  1344   WBC 9.3 12.3* 11.7* 14.4*   HGB 11.1* 12.4 12.6 12.5   MCV 91 90 93 90    " 279 266 246    139  --  139   POTASSIUM 4.4 4.1  --  3.8   CHLORIDE 103 101  --  104   CO2 30 31  --  28   BUN 26 28  --  27   CR 1.05* 1.15*  --  1.09*   ANIONGAP 5 7  --  7   AAYUSH 8.1* 8.8  --  8.7   * 159*  --  114*     Recent Labs   Lab Test  03/03/18   0830  03/03/18   0144  03/02/18   2100  03/02/18   1700  03/02/18   1209  03/02/18   1021   03/01/18 2000 02/24/18   1344 12/28/17 11/27/17   GLC   --    --    --    --    --   190*   --   159*  114*  126*  188*   BGM  158*  140*  200*  145*  177*   --    < >   --    --    --    --     < > = values in this interval not displayed.         No results found for this or any previous visit (from the past 24 hour(s)).    Medications   All medications were reviewed.      [START ON 3/4/2018] ranitidine  150 mg Oral At Bedtime     spironolactone  12.5 mg Oral Daily     sitagliptin  50 mg Oral Daily     simvastatin  5 mg Oral QPM     pramipexole  0.375 mg Oral At Bedtime     polyethylene glycol  8.5 g Oral Every Other Day     glipiZIDE  10 mg Oral BID AC     colchicine  0.6 mg Oral Daily     cholecalciferol (vitamin D3) tablet 2,000 Units  2,000 Units Oral Daily     aspirin  325 mg Oral Daily     insulin aspart  1-7 Units Subcutaneous TID AC     insulin aspart  1-5 Units Subcutaneous At Bedtime     ceFAZolin  1 g Intravenous Q8H

## 2018-03-03 NOTE — PLAN OF CARE
Problem: Patient Care Overview  Goal: Plan of Care/Patient Progress Review  PT: Orders received, chart reviewed, eval completed and treatment initiated. Pt is a 89 y/o female admitted 3/1/18 for further evaluation of increased L foot pain, swelling, concern for infection. Initially evaluated in ED 5 days ago, was discharged with antibiotics, swelling and pain persisted, OP MRI was obtained revealing worsening infection. Pt with Cellulitis and myositis of L LE and possible gout of 1st MTP. PMH significant for HTN, PAD, CKD stage 3, DM type 2, and CVA with resultant expressive aphasia. Pt unable to provide PLOF, previous living situation, or if family available. Per chart pt is from an [facility], [facility]/ [facility]. After ED admit 2/24/18 pt discharged with family assist, per JUAN family did feel overwhelmed at that time. Family not contacted at this time as pt did not give writer approval to call. PLOF discussed with .    Discharge Planner PT   Patient plan for discharge: per JUAN, pt has TCU bed hold at [facility] but unable to d/c over weekend  Current status: Pt completes bed mobility with SBA and moderate use of bed rail, sit<>stand with close SBA, gait with FWW x20' with CGA. Denies pain throughout but demonstrates facial grimaces. Difficulty with command following and unable to answer questions >75% of time. Pt seated in chair with chair alarm on, needs in reach and breakfast present.  Barriers to return to prior living situation: current level of assist, lives alone, does not have assist for mobility or ADLs  Recommendations for discharge: TCU  Rationale for recommendations: pt currently requiring Ax1, has assist for medication set up, meals and light house cleaning, is otherwise IND; recommend TCU to maximize safety and IND prior to return home       Entered by: Jen Kuhn 03/03/2018 8:24 AM

## 2018-03-03 NOTE — PLAN OF CARE
Problem: Patient Care Overview  Goal: Plan of Care/Patient Progress Review  Outcome: Improving  VSS; LLE 1+ edema o/w cellulitis improving.  Patient is A&Ox2 to self and place; [place]; denies pain/N/V.   and 190; tolerating diet.  Up with assist of 1/walker/GB in room/halls x1; up in chair for meals.  DC home vs TCU 1-2 days.

## 2018-03-03 NOTE — PROGRESS NOTES
03/03/18 0800   Quick Adds   Type of Visit Initial PT Evaluation   Living Environment   Lives With alone;facility resident   Living Arrangements independent living facility  ([facility] vs [facility])   Home Accessibility no concerns   Number of Stairs to Enter Home 0   Number of Stairs Within Home 0   Transportation Available family or friend will provide  (pt does not drive at baseline)   Living Environment Comment per chart, Patient was being cared for at home by family since Sat.   Self-Care   Regular Exercise no   Equipment Currently Used at Home walker, rolling;grab bar   Activity/Exercise/Self-Care Comment medication setup/managment, light household cleaning and meals provided at [facility]   Functional Level Prior   Ambulation 1-->assistive equipment   Transferring 1-->assistive equipment   Toileting 1-->assistive equipment   Bathing 1-->assistive equipment   Dressing 0-->independent   Eating 0-->independent   Communication 2-->difficulty understanding (not related to language barrier)  (aphasia, PMH significant for CVA)   Fall history within last six months no   Which of the above functional risks had a recent onset or change? ambulation;transferring   General Information   Onset of Illness/Injury or Date of Surgery - Date 03/01/18   Referring Physician Rashi Julian MD   Patient/Family Goals Statement none stated   Pertinent History of Current Problem (include personal factors and/or comorbidities that impact the POC) Pt is a 89 y/o female admitted 3/1/18 for further evaluation of increased L foot pain, swelling, concern for infection. Initially evaluated in ED 5 days ago, was discharged with antibiotics, swelling and pain persisted, OP MRI was obtained revealing worsening infection. Pt with Cellulitis and myositis of L LE and possible gout of 1st MTP. PMH significant for HTN, PAD, CKD stage 3, DM type 2, and CVA with resultant expressive aphasia.   Precautions/Limitations fall precautions    General Info Comments activity; up with assist   Cognitive Status Examination   Orientation person   Level of Consciousness alert;confused   Follows Commands and Answers Questions 75% of the time;able to follow single-step instructions   Personal Safety and Judgment intact   Memory impaired   Cognitive Comment pt unable to recall PLOF, unclear if limited d/t aphasia or cog status   Pain Assessment   Patient Currently in Pain No   Integumentary/Edema   Integumentary/Edema no deficits were identifed   Posture    Posture Forward head position;Protracted shoulders   Range of Motion (ROM)   ROM Quick Adds No deficits were identified   ROM Comment B LEs WFL   Strength   Strength Comments grossly 4/5 B LEs, no difference R<>L   Bed Mobility   Bed Mobility Comments pt completes supine>sit with SBA, increased time and use of bed rail   Transfer Skills   Transfer Comments pt completes sit<>stand with SBA   Gait   Gait Comments pt ambulates x5' with FWW and CGA, slowed pace, increased forward flexed posturing   Balance   Balance Comments no overt LOB or unsteadiness, strong reliance on FWW use    Sensory Examination   Sensory Perception no deficits were identified   Sensory Perception Comments pt denies numbness/tingling   General Therapy Interventions   Planned Therapy Interventions balance training;bed mobility training;gait training;strengthening;transfer training;progressive activity/exercise   Clinical Impression   Criteria for Skilled Therapeutic Intervention yes, treatment indicated   PT Diagnosis difficulty with gait   Influenced by the following impairments decreased functional strength, activity tolerance, balance   Functional limitations due to impairments difficulty with bed mobility, transfers, gait   Clinical Presentation Stable/Uncomplicated   Clinical Presentation Rationale pt with progressing medical stay since admission   Clinical Decision Making (Complexity) Low complexity   Therapy Frequency` daily  "  Predicted Duration of Therapy Intervention (days/wks) 2-3 days   Anticipated Discharge Disposition Transitional Care Facility   Risk & Benefits of therapy have been explained Yes   Patient, Family & other staff in agreement with plan of care Yes   [facility] TM \"6 Clicks\"   2016, Trustees of [facility], under license to [employer].  All rights reserved.   6 Clicks Short Forms Basic Mobility Inpatient Short Form   [facility]  \"6 Clicks\" V.2 Basic Mobility Inpatient Short Form   1. Turning from your back to your side while in a flat bed without using bedrails? 4 - None   2. Moving from lying on your back to sitting on the side of a flat bed without using bedrails? 3 - A Little   3. Moving to and from a bed to a chair (including a wheelchair)? 3 - A Little   4. Standing up from a chair using your arms (e.g., wheelchair, or bedside chair)? 3 - A Little   5. To walk in hospital room? 3 - A Little   6. Climbing 3-5 steps with a railing? 2 - A Lot   Basic Mobility Raw Score (Score out of 24.Lower scores equate to lower levels of function) 18   Total Evaluation Time   Total Evaluation Time (Minutes) 10     "

## 2018-03-04 ENCOUNTER — APPOINTMENT (OUTPATIENT)
Dept: PHYSICAL THERAPY | Facility: CLINIC | Age: 83
DRG: 603 | End: 2018-03-04
Payer: COMMERCIAL

## 2018-03-04 LAB
ANION GAP SERPL CALCULATED.3IONS-SCNC: 6 MMOL/L (ref 3–14)
BASOPHILS # BLD AUTO: 0 10E9/L (ref 0–0.2)
BASOPHILS NFR BLD AUTO: 0.2 %
BUN SERPL-MCNC: 21 MG/DL (ref 7–30)
CALCIUM SERPL-MCNC: 8.7 MG/DL (ref 8.5–10.1)
CHLORIDE SERPL-SCNC: 104 MMOL/L (ref 94–109)
CO2 SERPL-SCNC: 29 MMOL/L (ref 20–32)
CREAT SERPL-MCNC: 1 MG/DL (ref 0.52–1.04)
DIFFERENTIAL METHOD BLD: NORMAL
EOSINOPHIL # BLD AUTO: 0.5 10E9/L (ref 0–0.7)
EOSINOPHIL NFR BLD AUTO: 6 %
ERYTHROCYTE [DISTWIDTH] IN BLOOD BY AUTOMATED COUNT: 14.8 % (ref 10–15)
GFR SERPL CREATININE-BSD FRML MDRD: 52 ML/MIN/1.7M2
GLUCOSE BLDC GLUCOMTR-MCNC: 106 MG/DL (ref 70–99)
GLUCOSE BLDC GLUCOMTR-MCNC: 112 MG/DL (ref 70–99)
GLUCOSE BLDC GLUCOMTR-MCNC: 137 MG/DL (ref 70–99)
GLUCOSE BLDC GLUCOMTR-MCNC: 141 MG/DL (ref 70–99)
GLUCOSE BLDC GLUCOMTR-MCNC: 150 MG/DL (ref 70–99)
GLUCOSE SERPL-MCNC: 157 MG/DL (ref 70–99)
HCT VFR BLD AUTO: 40.3 % (ref 35–47)
HGB BLD-MCNC: 13 G/DL (ref 11.7–15.7)
IMM GRANULOCYTES # BLD: 0 10E9/L (ref 0–0.4)
IMM GRANULOCYTES NFR BLD: 0.3 %
LYMPHOCYTES # BLD AUTO: 1.9 10E9/L (ref 0.8–5.3)
LYMPHOCYTES NFR BLD AUTO: 22.4 %
MCH RBC QN AUTO: 29.4 PG (ref 26.5–33)
MCHC RBC AUTO-ENTMCNC: 32.3 G/DL (ref 31.5–36.5)
MCV RBC AUTO: 91 FL (ref 78–100)
MONOCYTES # BLD AUTO: 0.6 10E9/L (ref 0–1.3)
MONOCYTES NFR BLD AUTO: 7.4 %
NEUTROPHILS # BLD AUTO: 5.5 10E9/L (ref 1.6–8.3)
NEUTROPHILS NFR BLD AUTO: 63.7 %
NRBC # BLD AUTO: 0 10*3/UL
NRBC BLD AUTO-RTO: 0 /100
PLATELET # BLD AUTO: 276 10E9/L (ref 150–450)
POTASSIUM SERPL-SCNC: 4.5 MMOL/L (ref 3.4–5.3)
RBC # BLD AUTO: 4.42 10E12/L (ref 3.8–5.2)
SODIUM SERPL-SCNC: 139 MMOL/L (ref 133–144)
WBC # BLD AUTO: 8.6 10E9/L (ref 4–11)

## 2018-03-04 PROCEDURE — 25000132 ZZH RX MED GY IP 250 OP 250 PS 637: Performed by: INTERNAL MEDICINE

## 2018-03-04 PROCEDURE — 12000000 ZZH R&B MED SURG/OB

## 2018-03-04 PROCEDURE — 85025 COMPLETE CBC W/AUTO DIFF WBC: CPT | Performed by: INTERNAL MEDICINE

## 2018-03-04 PROCEDURE — 25000132 ZZH RX MED GY IP 250 OP 250 PS 637

## 2018-03-04 PROCEDURE — 99232 SBSQ HOSP IP/OBS MODERATE 35: CPT | Performed by: INTERNAL MEDICINE

## 2018-03-04 PROCEDURE — 40000193 ZZH STATISTIC PT WARD VISIT

## 2018-03-04 PROCEDURE — 97110 THERAPEUTIC EXERCISES: CPT | Mod: GP

## 2018-03-04 PROCEDURE — 00000146 ZZHCL STATISTIC GLUCOSE BY METER IP

## 2018-03-04 PROCEDURE — 97116 GAIT TRAINING THERAPY: CPT | Mod: GP

## 2018-03-04 PROCEDURE — 36415 COLL VENOUS BLD VENIPUNCTURE: CPT | Performed by: INTERNAL MEDICINE

## 2018-03-04 PROCEDURE — 80048 BASIC METABOLIC PNL TOTAL CA: CPT | Performed by: INTERNAL MEDICINE

## 2018-03-04 RX ORDER — COLCHICINE 0.6 MG/1
TABLET ORAL
Qty: 16 TABLET | Refills: 0 | DISCHARGE
Start: 2018-03-04 | End: 2018-03-07

## 2018-03-04 RX ORDER — CEPHALEXIN 500 MG/1
500 CAPSULE ORAL 3 TIMES DAILY
Qty: 21 CAPSULE | Refills: 0 | DISCHARGE
Start: 2018-03-04 | End: 2019-02-24

## 2018-03-04 RX ADMIN — CEPHALEXIN 500 MG: 500 CAPSULE ORAL at 21:23

## 2018-03-04 RX ADMIN — RANITIDINE 150 MG: 150 TABLET ORAL at 21:22

## 2018-03-04 RX ADMIN — POLYETHYLENE GLYCOL 3350 8.5 G: 17 POWDER, FOR SOLUTION ORAL at 08:01

## 2018-03-04 RX ADMIN — COLCHICINE 0.6 MG: 0.6 TABLET, FILM COATED ORAL at 08:00

## 2018-03-04 RX ADMIN — Medication 12.5 MG: at 08:00

## 2018-03-04 RX ADMIN — GLIPIZIDE 10 MG: 10 TABLET ORAL at 17:12

## 2018-03-04 RX ADMIN — PRAMIPEXOLE DIHYDROCHLORIDE 0.38 MG: 0.25 TABLET ORAL at 21:23

## 2018-03-04 RX ADMIN — CEPHALEXIN 500 MG: 500 CAPSULE ORAL at 08:01

## 2018-03-04 RX ADMIN — CHOLECALCIFEROL TAB 25 MCG (1000 UNIT) 2000 UNITS: 25 TAB at 08:01

## 2018-03-04 RX ADMIN — SIMVASTATIN 5 MG: 5 TABLET, FILM COATED ORAL at 21:22

## 2018-03-04 RX ADMIN — GLIPIZIDE 10 MG: 10 TABLET ORAL at 08:00

## 2018-03-04 RX ADMIN — SITAGLIPTIN 50 MG: 50 TABLET, FILM COATED ORAL at 08:00

## 2018-03-04 RX ADMIN — ASPIRIN 325 MG ORAL TABLET 325 MG: 325 PILL ORAL at 08:00

## 2018-03-04 RX ADMIN — CEPHALEXIN 500 MG: 500 CAPSULE ORAL at 17:11

## 2018-03-04 NOTE — PLAN OF CARE
Discharge Planner PT   Patient plan for discharge: per SW note, pt has a TCU bed hold at [facility]  Current status: Pt demonstrating improved command following today and only requires extra time to respond to questions. Pt performs sit to/from stand from chair with CGA with cues for scooting to edge of bed as pt unsuccessful following multiple attempts. Once improved positioning achieved, pt able to stand with CGA. Pt ambulates with fww and CGA for 100ft with cues for walker proximity and improved upright posture. Pt demonstrates good heel strike and equal step length, however decreased foot clearance.   Barriers to return to prior living situation: lives alone, level of assist, no additional support for mobility or ADLs  Recommendations for discharge: TCU  Rationale for recommendations: Pt is below their functional baseline and would benefit from continued skilled PT intervention at TCU to address their functional limitations and restrictions prior to returning home to maximize their functional potential.       Entered by: Arianna Durán 03/04/2018 2:06 PM

## 2018-03-04 NOTE — PLAN OF CARE
Problem: Patient Care Overview  Goal: Plan of Care/Patient Progress Review  Outcome: No Change  Oriented to self and place, [place]. Hx of expressive aphasia. VSS. MCCANN, LS dim in the bases. +1 edema in L foot. . Ambulates A1 gb/walker. Tolerating mod carb diet. IV SL. Voiding in BR, incontinent at times, AUOP. Plan pending to DC home or TCU on 3/5.

## 2018-03-04 NOTE — PROGRESS NOTES
[facility]    Internal Medicine Hospitalist Progress Note  03/04/2018  I evaluated patient on the above date.    Gamaliel Madison Jr., MD  [phone] (p)  Text Page (7 am to 6 pm)      Assessment & Plan   Ms. Regla Benavidez is a 89 year-old female with history including DM2, HTN, CVA with resultant expressive aphasia and PAD, who was admitted on 3/1/2018 with persistent leg cellulitis with pain despite oral antibiotics.     Cellulitis and myositis of left leg.  Admitted for ongoing leg and foot pain despite oral cephalexin course. MRI 3/1/18 as outpatient showed diffuse subcutaneous and deep muscle edema suggesting cellulitis/myositis, without abscess seen. Started on cefazolin an admission 3/1 with improvement. Antibiotics changed to PO 3/3.  Micro: BC's 3/1 NGTD.  - Continue Keflex 500 mg TID (started 3/3) - plan stop after 3/9.  - Monitor cultures.    Possible gout.  MRI 3/1 also showed focal marginal erosive changes of 1st MTP possibly suggestive of gout. Started on colchicine by PCP based on MRI findings. Left 1st MTP without tenderness to palpation presently.  - Complete colchicine course as previously prescribed.    Hypertension (benign essential).  [PTA: furosemide 20 mg BID, spironolactone 12.5 mg daily.]  - Continue spironolactone.  - Resume furosemide after d/c.    Diabetes mellitus type 2 w/o complications.  [PTA: glipizide 10 mg BID, metformin 500 mg daily, sitagliptan 50 mg daily.]  HgbA1c 6.9% 2/9/2018.   Recent Labs   Lab Test  03/04/18   0825  03/04/18   0745  03/04/18   0104  03/03/18   2139  03/03/18   1704  03/03/18   1148   03/02/18   1021   03/01/18   2000  02/24/18   1344 12/28/17   GLC  157*   --    --    --    --    --    --   190*   --   159*  114*  126*   BGM   --   137*  112*  103*  85  194*   < >   --    < >   --    --    --     < > = values in this interval not displayed.   - Continue glipizide, sitagliptan.  - Resume metformin at home.  - Continue moderate CHO diet.  -  "Continue ISS.    History of CVA with expressive aphasia.  No acute issues.  - Continue ASA, simvastatin.     Peripheral arterial disease  No acute issues.  - Continue ASA.       Restless leg syndrome  - Continue prior to admission pramipexole.      Chronic kidney disease, stage III.  Baseline creatinine 1.1-1.3. Creatinine at baseline  - Avoid nephrotoxins as able.     Physical deconditioning.  - PT, OT.      Prophylaxis.  - PCD's, ambulation.    CODE STATUS: DNR/DNI.    Dispo.  - Pending above.  - Plan d/c to TCU 3/5.    Communication.  - I d/w pt's son 3/3 and with pt's daughter 3/4.      Interval History   Doing OK. Denies pain in foot while walking.    -Data reviewed today: I reviewed all new labs and imaging over the last 24 hours. I personally reviewed no images or EKG's today.    Physical Exam   Heart Rate: 68, Blood pressure 145/67, pulse 68, temperature 98  F (36.7  C), temperature source Oral, resp. rate 16, height 1.575 m (5' 2\"), weight 82 kg (180 lb 12.4 oz), SpO2 91 %, not currently breastfeeding.  Vitals:    03/01/18 1903 03/02/18 0254   Weight: 81.6 kg (180 lb) 82 kg (180 lb 12.4 oz)     Vital Signs with Ranges  Temp:  [98  F (36.7  C)-98.4  F (36.9  C)] 98  F (36.7  C)  Pulse:  [68] 68  Heart Rate:  [66-68] 68  Resp:  [16-18] 16  BP: (123-147)/(44-67) 145/67  SpO2:  [91 %-92 %] 91 %  Patient Vitals for the past 24 hrs:   BP Temp Temp src Pulse Heart Rate Resp SpO2   03/04/18 0746 145/67 98  F (36.7  C) Oral 68 68 16 91 %   03/04/18 0103 147/66 98.1  F (36.7  C) Oral - 66 16 91 %   03/03/18 1930 - - - - - 18 -   03/03/18 1521 123/44 98.4  F (36.9  C) Oral - 67 18 92 %     I/O's Last 24 hours  I/O last 3 completed shifts:  In: 540 [P.O.:540]  Out: -     Constitutional: Alert, oriented, pleasant.  Respiratory: Diminished in bases. No crackles or wheezes.  Cardiovascular: RRR no m/r/g.  GI: Soft, nt, nd +BS.  Skin/Integumen: No significant left lower extremity edema. No left foot MTP erythema or " swelling. Toes tender to palpation.  Other:        Data     Recent Labs  Lab 03/04/18  0825 03/02/18  1021 03/01/18 2000   WBC 8.6 9.3 12.3*   HGB 13.0 11.1* 12.4   MCV 91 91 90    233 279    138 139   POTASSIUM 4.5 4.4 4.1   CHLORIDE 104 103 101   CO2 29 30 31   BUN 21 26 28   CR 1.00 1.05* 1.15*   ANIONGAP 6 5 7   AAYUSH 8.7 8.1* 8.8   * 190* 159*     Recent Labs   Lab Test  03/04/18   0825  03/04/18   0745  03/04/18   0104  03/03/18   2139  03/03/18   1704  03/03/18   1148   03/02/18   1021   03/01/18 2000 02/24/18   1344 12/28/17   GLC  157*   --    --    --    --    --    --   190*   --   159*  114*  126*   BGM   --   137*  112*  103*  85  194*   < >   --    < >   --    --    --     < > = values in this interval not displayed.         No results found for this or any previous visit (from the past 24 hour(s)).    Medications   All medications were reviewed.      ranitidine  150 mg Oral At Bedtime     cephalexin  500 mg Oral TID     spironolactone  12.5 mg Oral Daily     sitagliptin  50 mg Oral Daily     simvastatin  5 mg Oral QPM     pramipexole  0.375 mg Oral At Bedtime     polyethylene glycol  8.5 g Oral Every Other Day     glipiZIDE  10 mg Oral BID AC     colchicine  0.6 mg Oral Daily     cholecalciferol (vitamin D3) tablet 2,000 Units  2,000 Units Oral Daily     aspirin  325 mg Oral Daily     insulin aspart  1-7 Units Subcutaneous TID AC     insulin aspart  1-5 Units Subcutaneous At Bedtime

## 2018-03-04 NOTE — PLAN OF CARE
Problem: Patient Care Overview  Goal: Plan of Care/Patient Progress Review  Outcome: No Change  Alert and oriented but forgetful. Up with 1 assist, GB and walker. VSS on room air. Denies pain. Incontinent of urine. .  Left foot pink. Bilateral lower extremities elevated.

## 2018-03-04 NOTE — DISCHARGE SUMMARY
[facility]  Discharge Summary        Regla Benavidez MRN# [id number]   YOB: 1926 Age: 89 year old     Date of Admission: 3/1/2018  Date of Discharge: 3/5/2018  Admitting Physician: Asad Ball MD  Discharge Physician: Gamaliel Madison MD     Primary Provider: Leo Melgar  Primary Care Physician Phone Number: [phone]         Discharge Diagnoses:   1. Cellulitis and myositis of left leg.  2. Possible gout left foot.  3. Hypertension (benign essential).  4. Physical deconditioning.        Other Chronic Medical Problems:      1. Diabetes mellitus type 2 w/o complications.  2. CVA history.  3. Peripheral arterial disease  4. Restless leg syndrome  5. Chronic kidney disease, stage III.       Allergies:         Allergies   Allergen Reactions     Cozaar [Losartan Potassium] Swelling     Leg swelling. Same from Avapro.     Hydrochlorothiazide Other (See Comments)     hyponatremia     Hydrocodone Nausea and Vomiting     Nausea           Discharge Medications:        Current Discharge Medication List      CONTINUE these medications which have CHANGED    Details   cephALEXin (KEFLEX) 500 MG capsule Take 1 capsule (500 mg) by mouth 3 times daily for 6 days  Qty: 21 capsule, Refills: 0    Associated Diagnoses: Cellulitis of left lower extremity      colchicine (COLCRYS) 0.6 MG tablet Take one tablet bid for 2 days, then one per day for 4 more days.  Qty: 16 tablet, Refills: 0    Associated Diagnoses: Left foot pain         CONTINUE these medications which have NOT CHANGED    Details   polyethylene glycol (MIRALAX/GLYCOLAX) Packet Take 8.5 g by mouth every other day       Lactobacillus (LACTINEX PO) Take 1 tablet by mouth 3 times daily 0900; 1300; 1645      !! nystatin (MYCOSTATIN) 635662 UNIT/GM POWD Apply topically daily      !! ACETAMINOPHEN PO Take 500 mg by mouth every 8 hours as needed for pain      Ondansetron HCl (ZOFRAN PO) Take 4 mg by mouth every 8 hours as  needed for nausea or vomiting      pramipexole (MIRAPEX) 0.25 MG tablet Take 1.5 tablets (0.375 mg) by mouth At Bedtime  Qty: 45 tablet, Refills: 11    Comments: Dosage change  Associated Diagnoses: Restless legs syndrome      furosemide (LASIX) 20 MG tablet Take 1 tablet (20 mg) by mouth 2 times daily As of 1/27/18  Qty: 60 tablet, Refills: 12    Associated Diagnoses: Bilateral leg edema      spironolactone (ALDACTONE) 25 MG tablet Take 0.5 tablets (12.5 mg) by mouth daily  Qty: 30 tablet, Refills: 11    Associated Diagnoses: Bilateral leg edema      metFORMIN (GLUCOPHAGE) 500 MG tablet Take 500 mg by mouth daily   Qty: 30 tablet, Refills: 12      glipiZIDE (GLUCOTROL) 10 MG tablet Take 1 tablet (10 mg) by mouth 2 times daily (before meals)  Qty: 60 tablet, Refills: 11    Comments: Dosage change  Associated Diagnoses: Type 2 diabetes mellitus with hyperglycemia, without long-term current use of insulin (H)      sitagliptin (JANUVIA) 50 MG tablet Take 1 tablet (50 mg) by mouth daily  Qty: 30 tablet, Refills: 11    Associated Diagnoses: Uncontrolled type 2 diabetes mellitus with complication, without long-term current use of insulin (H); Type 2 diabetes mellitus with hyperglycemia, without long-term current use of insulin (H)      !! nystatin (MYCOSTATIN) 646761 UNIT/GM POWD Apply 1 g topically 3 times daily as needed  Qty: 60 g, Refills: 11      carbamide peroxide (DEBROX) 6.5 % otic solution Place 5 drops into both ears daily as needed for other      !! acetaminophen (TYLENOL) 325 MG tablet Take 2 tablets (650 mg) by mouth At Bedtime Take 2 tablets at bedtime  Qty: 184 tablet, Refills: 2    Associated Diagnoses: Midline low back pain without sciatica      ranitidine (ZANTAC) 150 MG tablet Take 1 tablet (150 mg) by mouth 2 times daily  Qty: 60 tablet, Refills: 5    Associated Diagnoses: Gastroesophageal reflux disease without esophagitis      Cholecalciferol (VITAMIN D3 PO) Take 2,000 Units by mouth daily       simvastatin (ZOCOR) 5 MG tablet Take 1 tablet (5 mg) by mouth every evening  Qty: 30 tablet    Associated Diagnoses: Stroke (H)      aspirin 325 MG tablet Take 1 tablet (325 mg) by mouth daily  Qty: 120 tablet    Associated Diagnoses: Stroke (H)      blood glucose monitoring (ONETOUCH ULTRA) test strip USE TO TEST ONCE EVERY MORNING AS DIRECTED  Qty: 100 each, Refills: 11    Associated Diagnoses: Type 2 diabetes mellitus without complication, without long-term current use of insulin (H)      !! ORDER FOR DME Equipment being ordered: Large Underpads  Qty: 3 Package, Refills: 6    Associated Diagnoses: Urinary frequency      !! ORDER FOR DME Equipment being ordered: Esthela xtra absorbent large pull ups - Uses 5 per day.  Qty: 150 Units, Refills: 11    Associated Diagnoses: Urinary incontinence      !! ORDER FOR DME Equipment being ordered: retractable safety lancets  Qty: 100 lancet, Refills: 1    Associated Diagnoses: Type II or unspecified type diabetes mellitus without mention of complication, not stated as uncontrolled      !! ORDER FOR DME Equipment being ordered: glucometer kit- brand per insurance coverage  Patient prefers brand with DRUM lancets - patient is to test fasting once each morning  #30 with 5 refills each of the lancets and test strips  Qty: 1 Device, Refills: 0    Associated Diagnoses: Type II or unspecified type diabetes mellitus without mention of complication, not stated as uncontrolled       !! - Potential duplicate medications found. Please discuss with provider.              Discharge Instructions and Follow-Up:      Follow-up Appointments     Follow Up and recommended labs and tests       Follow up with Nursing home physician.  Follow up with primary care provider after TCU discharge.                          Consultations This Hospital Stay:      None.        Admission History:      Please see the H&P by Asad Ball MD on 3/1/2018 for complete details. Briefly, Ms. Arauz BOLIVAR  Pramod is a 89 year-old female with history including DM2, HTN, CVA with resultant expressive aphasia and PAD, who was admitted on 3/1/2018 with persistent leg cellulitis with pain despite oral antibiotics.        Problem Oriented Hospital Course:      Cellulitis and myositis of left leg.  Admitted for ongoing leg and foot pain despite oral cephalexin course. MRI 3/1/18 as outpatient showed diffuse subcutaneous and deep muscle edema suggesting cellulitis/myositis, without abscess seen. Started on cefazolin an admission 3/1 with improvement. Antibiotics changed to PO 3/3.  Micro: BC's 3/1 NGTD.  - Continue Keflex 500 mg TID (started 3/3) - plan stop after 3/9.     Possible gout.  MRI 3/1 also showed focal marginal erosive changes of 1st MTP possibly suggestive of gout. Started on colchicine by PCP based on MRI findings. Left 1st MTP without tenderness to palpation presently.  - Complete colchicine course as previously prescribed; stop after 3/6.     Hypertension (benign essential).  [PTA: furosemide 20 mg BID, spironolactone 12.5 mg daily.]  - Continue spironolactone and furosemide.     Diabetes mellitus type 2 w/o complications.  [PTA: glipizide 10 mg BID, metformin 500 mg daily, sitagliptan 50 mg daily.]  HgbA1c 6.9% 2/9/2018.   Recent Labs   Lab Test  03/04/18   0825  03/04/18   0745  03/04/18   0104  03/03/18   2139  03/03/18   1704  03/03/18   1148    03/02/18   1021    03/01/18 2000 02/24/18   1344 12/28/17   GLC  157*   --    --    --    --    --    --   190*   --   159*  114*  126*   BGM   --   137*  112*  103*  85  194*   < >   --    < >   --    --    --     < > = values in this interval not displayed.   - Continue glipizide, metformin, sitagliptan.     History of CVA with expressive aphasia.  No acute issues.  - Continue ASA, simvastatin.      Peripheral arterial disease  No acute issues.  - Continue ASA.       Restless leg syndrome  - Continue pramipexole.       Chronic kidney disease, stage  III.  Baseline creatinine 1.1-1.3. Creatinine at baseline this stay.      Physical deconditioning.  - Continue PT, OT.          Code Status:      DNR / DNI        Pending Results:        Unresulted Labs Ordered in the Past 30 Days of this Admission     Date and Time Order Name Status Description    3/1/2018 1944 Blood culture Preliminary     3/1/2018 1944 Blood culture Preliminary                 Discharge Disposition:      Discharged to TCU.        Discharge Time:      Greater than 30 minutes.        Key Imaging Studies, Lab Findings and Procedures/Surgeries:        Results for orders placed or performed during the hospital encounter of 03/01/18   MR Foot Left w/o Contrast    Narrative    MR FOOT LEFT WITHOUT CONTRAST 3/1/2018 2:19 PM    HISTORY: Left foot pain and swelling; etiology? Differential diagnosis  includes gout and diabetic foot infection.    COMPARISON: X-ray left foot from 2/24/2018.    TECHNIQUE: Routine multiplanar, multisequence imaging was performed.    FINDINGS:   Osseous structures: Prominent first MTP joint degenerative changes and  hallux valgus with some marginal erosive changes and subchondral cyst  formation. This could be degenerative but gout could have a similar  appearance as there is some moderate adjacent soft tissue prominence.  Remaining osseous structures are unremarkable. No evidence of fracture  or osteomyelitis.    Additional findings: Diffuse subcutaneous edema. There is also edema  in the deep musculature of the plantar aspect of the foot. Findings  suggest cellulitis and perhaps myositis as well. No evidence of  abscess or fluid collection.      Impression    IMPRESSION:  1. Somewhat focal marginal erosive changes surrounding the first MTP  joint suggests the possibility of gout. There is also soft tissue  prominence surrounding the joint supporting this diagnosis. There may  be secondary degenerative changes as well.  2. Diffuse subcutaneous and deep muscle edema  suggesting  cellulitis/myositis. No abscess or fluid collection.    REYNALDO BLACKWOOD MD

## 2018-03-04 NOTE — PLAN OF CARE
Problem: Patient Care Overview  Goal: Plan of Care/Patient Progress Review  Outcome: Improving  3- 7pm;A&Ox2 to self and place; expressive aphasia,Kashia; DNR/DNI; FR; denies pain; tolerating diet; BGM  83 at supper ; up with assist of 1/walker/GB; VSS; LLE 1+ edema ,given tylenol x 1 for back pain, cellulitis improving. DC home vs TCU 1-2 days.

## 2018-03-04 NOTE — PLAN OF CARE
Problem: Patient Care Overview  Goal: Plan of Care/Patient Progress Review  Outcome: Improving  A&O x 2, forgetful, expressive aphasia  ,up with assist of one, walker and GB vss, on Ra, no c/o of pain,incontinent at times, BGMs 137, 141 and 103, had lunch before checking blood suger, 1+ left foot edema, elevated up on pillows, possible d/c tomorrow to TCU.

## 2018-03-05 ENCOUNTER — APPOINTMENT (OUTPATIENT)
Dept: PHYSICAL THERAPY | Facility: CLINIC | Age: 83
DRG: 603 | End: 2018-03-05
Payer: COMMERCIAL

## 2018-03-05 VITALS
OXYGEN SATURATION: 93 % | BODY MASS INDEX: 33.27 KG/M2 | HEART RATE: 68 BPM | RESPIRATION RATE: 18 BRPM | DIASTOLIC BLOOD PRESSURE: 69 MMHG | TEMPERATURE: 97.8 F | SYSTOLIC BLOOD PRESSURE: 143 MMHG | HEIGHT: 62 IN | WEIGHT: 180.78 LBS

## 2018-03-05 LAB
GLUCOSE BLDC GLUCOMTR-MCNC: 135 MG/DL (ref 70–99)
GLUCOSE BLDC GLUCOMTR-MCNC: 136 MG/DL (ref 70–99)
GLUCOSE BLDC GLUCOMTR-MCNC: 300 MG/DL (ref 70–99)

## 2018-03-05 PROCEDURE — 25000132 ZZH RX MED GY IP 250 OP 250 PS 637: Performed by: INTERNAL MEDICINE

## 2018-03-05 PROCEDURE — 00000146 ZZHCL STATISTIC GLUCOSE BY METER IP

## 2018-03-05 PROCEDURE — 99239 HOSP IP/OBS DSCHRG MGMT >30: CPT | Performed by: INTERNAL MEDICINE

## 2018-03-05 PROCEDURE — 40000193 ZZH STATISTIC PT WARD VISIT: Performed by: PHYSICAL THERAPIST

## 2018-03-05 PROCEDURE — 97116 GAIT TRAINING THERAPY: CPT | Mod: GP | Performed by: PHYSICAL THERAPIST

## 2018-03-05 PROCEDURE — 97530 THERAPEUTIC ACTIVITIES: CPT | Mod: GP | Performed by: PHYSICAL THERAPIST

## 2018-03-05 RX ADMIN — CHOLECALCIFEROL TAB 25 MCG (1000 UNIT) 2000 UNITS: 25 TAB at 10:24

## 2018-03-05 RX ADMIN — ASPIRIN 325 MG ORAL TABLET 325 MG: 325 PILL ORAL at 10:24

## 2018-03-05 RX ADMIN — COLCHICINE 0.6 MG: 0.6 TABLET, FILM COATED ORAL at 10:23

## 2018-03-05 RX ADMIN — Medication 12.5 MG: at 10:24

## 2018-03-05 RX ADMIN — CEPHALEXIN 500 MG: 500 CAPSULE ORAL at 10:24

## 2018-03-05 RX ADMIN — GLIPIZIDE 10 MG: 10 TABLET ORAL at 06:45

## 2018-03-05 RX ADMIN — SITAGLIPTIN 50 MG: 50 TABLET, FILM COATED ORAL at 10:23

## 2018-03-05 RX ADMIN — INSULIN ASPART 4 UNITS: 100 INJECTION, SOLUTION INTRAVENOUS; SUBCUTANEOUS at 11:01

## 2018-03-05 NOTE — PLAN OF CARE
Problem: Patient Care Overview  Goal: Plan of Care/Patient Progress Review  Outcome: Adequate for Discharge Date Met: 03/05/18  Discharged to TCU.  Up with assist 1/walker/GB.  Good intake.  Bg's 138,300.  Lungs diminished. Room air sat 93%.  Transport via HE w/c.

## 2018-03-05 NOTE — PLAN OF CARE
Discharge Planner PT   Patient plan for discharge: TCU today.  Current status: Pt agreeable to participate in therapy session. Pt performed sit <> stand with FWW and CGA, cues provided for hand placement. Pt ambulated 125 feet with FWW and CGA.  Barriers to return to prior living situation: lives alone, level of assist, no additional support for mobility or ADLs  Recommendations for discharge: TCU  Rationale for recommendations: Pt is below their functional baseline and would benefit from continued skilled PT intervention at TCU to address their functional limitations and restrictions prior to returning home to maximize their functional potential.       Entered by: Esther Cortez 03/05/2018 1:07 PM       Physical Therapy Discharge Summary    Reason for therapy discharge:    Discharged to transitional care facility.    Progress towards therapy goal(s). See goals on Care Plan in [facility] electronic health record for goal details.  Goals not met.  Barriers to achieving goals:   discharge from facility.    Therapy recommendation(s):    Continued therapy is recommended.  Rationale/Recommendations:  Pt would benefit from TCU stay in order to increase independence and safety with functional mobility. .

## 2018-03-05 NOTE — PROGRESS NOTES
Received notice that member was discharged to [facility] for rehab today. Called NH and left a message for JUAN Moreno JOHN sharing POC and to be invited to any care conferences. Also spoke with son Ed re the transition. Transition log updated. CMS Angela Benavidez also put the request in for the body wash to be delivered to the AL rather than to son's home.   Autumn Jacobsen RN, PHN, [facility]   [phone]

## 2018-03-05 NOTE — PROGRESS NOTES
PAS-RR    Per [facility] regulation, SW completed and submitted PAS-RR to [place] [employer] Direct Connect via the Senior LinkAge Line.  PAS-RR confirmation # is :               [id number]  Further questions may be directed to [facility] Line at #[phone], option #4 for PAS-RR staff.  Confirmed with Delores at [facility] that they are able to accept patient today. Spoke with son David who requets wheelchair transport. Noted this is typically covered under patient's M[facility]O. Set up [facility] wheelchair ride at 1300. Faxed orders and PAS to [facility].

## 2018-03-05 NOTE — PROGRESS NOTES
"[facility]    Internal Medicine Hospitalist Progress Note  03/05/2018  I evaluated patient on the above date.    Gamaleil Madison Jr., MD  [phone] (p)  Text Page (7 am to 6 pm)      Assessment & Plan   D/C to TCU today 3/5: see d/c summary for diagnoses.    Interval History   Doing OK overall.    -Data reviewed today: I reviewed all new labs and imaging over the last 24 hours. I personally reviewed no images or EKG's today.    Physical Exam   Heart Rate: 64, Blood pressure 143/69, pulse 68, temperature 97.8  F (36.6  C), temperature source Oral, resp. rate 18, height 1.575 m (5' 2\"), weight 82 kg (180 lb 12.4 oz), SpO2 93 %, not currently breastfeeding.  Vitals:    03/01/18 1903 03/02/18 0254   Weight: 81.6 kg (180 lb) 82 kg (180 lb 12.4 oz)     Vital Signs with Ranges  Temp:  [97.8  F (36.6  C)-98.7  F (37.1  C)] 97.8  F (36.6  C)  Heart Rate:  [62-69] 64  Resp:  [16-20] 18  BP: (130-143)/(59-69) 143/69  SpO2:  [92 %-96 %] 93 %  Patient Vitals for the past 24 hrs:   BP Temp Temp src Heart Rate Resp SpO2   03/05/18 0736 143/69 97.8  F (36.6  C) Oral 64 18 93 %   03/05/18 0413 - - - - - 96 %   03/05/18 0359 134/66 98.7  F (37.1  C) Axillary 69 20 92 %   03/04/18 1552 130/59 98.2  F (36.8  C) Oral 62 16 94 %     I/O's Last 24 hours  I/O last 3 completed shifts:  In: 740 [P.O.:740]  Out: -     Constitutional: Alert, oriented, pleasant.  Respiratory:   Cardiovascular:   GI:   Skin/Integumen: No significant left lower extremity edema or erythema. No left foot MTP erythema or swelling.   Other:        Data     Recent Labs  Lab 03/04/18  0825 03/02/18  1021 03/01/18 2000   WBC 8.6 9.3 12.3*   HGB 13.0 11.1* 12.4   MCV 91 91 90    233 279    138 139   POTASSIUM 4.5 4.4 4.1   CHLORIDE 104 103 101   CO2 29 30 31   BUN 21 26 28   CR 1.00 1.05* 1.15*   ANIONGAP 6 5 7   AAYUSH 8.7 8.1* 8.8   * 190* 159*     Recent Labs   Lab Test  03/05/18   0235  03/04/18   2114  03/04/18   1657  03/04/18   " 1221  03/04/18   0825  03/04/18   0745   03/02/18   1021   03/01/18 2000 02/24/18   1344 12/28/17   GLC   --    --    --    --   157*   --    --   190*   --   159*  114*  126*   BGM  135*  150*  106*  141*   --   137*   < >   --    < >   --    --    --     < > = values in this interval not displayed.         No results found for this or any previous visit (from the past 24 hour(s)).    Medications   All medications were reviewed.      ranitidine  150 mg Oral At Bedtime     cephalexin  500 mg Oral TID     spironolactone  12.5 mg Oral Daily     sitagliptin  50 mg Oral Daily     simvastatin  5 mg Oral QPM     pramipexole  0.375 mg Oral At Bedtime     polyethylene glycol  8.5 g Oral Every Other Day     glipiZIDE  10 mg Oral BID AC     colchicine  0.6 mg Oral Daily     cholecalciferol (vitamin D3) tablet 2,000 Units  2,000 Units Oral Daily     aspirin  325 mg Oral Daily     insulin aspart  1-7 Units Subcutaneous TID AC     insulin aspart  1-5 Units Subcutaneous At Bedtime

## 2018-03-05 NOTE — PROVIDER NOTIFICATION
Darleen David nurse called and requested a SLP eval for aphasia as patients daughter is asking.  Text page to Dr. Madison.

## 2018-03-05 NOTE — PLAN OF CARE
Problem: Patient Care Overview  Goal: Plan of Care/Patient Progress Review  Outcome: No Change  Pt slept well. Incontinent of urine overnight. VSS. Did put on 2L nc this shift as sats were in mid to high 80s on RA. L foot is painful with trace edema but no discoloration- elevated. Possible d/c to TCU later today. SL. Will continue to monitor.

## 2018-03-07 ENCOUNTER — TELEPHONE (OUTPATIENT)
Dept: FAMILY MEDICINE | Facility: CLINIC | Age: 83
End: 2018-03-07

## 2018-03-07 LAB
BACTERIA SPEC CULT: NO GROWTH
BACTERIA SPEC CULT: NO GROWTH
Lab: NORMAL
Lab: NORMAL
SPECIMEN SOURCE: NORMAL
SPECIMEN SOURCE: NORMAL

## 2018-03-07 NOTE — TELEPHONE ENCOUNTER
Reason for Call:  Form, our goal is to have forms completed with 72 hours, however, some forms may require a visit or additional information.    Type of letter, form or note:  medical    Who is the form from?: Home care    Where did the form come from: form was faxed in    What clinic location was the form placed at?: [facility]    Where the form was placed: 's Box: Leo Melgar MD    What number is listed as a contact on the form?: [phone]  Phone [phone]       Additional comments: [facility] POLST for m to be signed and faxed back by end of week     Call taken on 3/7/2018 at 11:27 AM by Helene Yu

## 2018-03-08 ENCOUNTER — CARE COORDINATION (OUTPATIENT)
Dept: GERIATRIC MEDICINE | Facility: CLINIC | Age: 83
End: 2018-03-08

## 2018-03-08 ENCOUNTER — TELEPHONE (OUTPATIENT)
Dept: FAMILY MEDICINE | Facility: CLINIC | Age: 83
End: 2018-03-08

## 2018-03-08 NOTE — TELEPHONE ENCOUNTER
Reason for Call:  Form, our goal is to have forms completed with 72 hours, however, some forms may require a visit or additional information.    Type of letter, form or note:  medical    Who is the form from?: Home care    Where did the form come from: form was faxed in    What clinic location was the form placed at?: [facility]    Where the form was placed: 's Box: Leo Melgar MD    What number is listed as a contact on the form?: [phone]  Phone [phone]       Additional comments: [facility]  summary for D/C & ok for PT & OT    Call taken on 3/8/2018 at 10:56 AM by Helene Yu

## 2018-03-08 NOTE — TELEPHONE ENCOUNTER
Reason for Call:  Form, our goal is to have forms completed with 72 hours, however, some forms may require a visit or additional information.    Type of letter, form or note:  medical    Who is the form from?: Home care    Where did the form come from: form was faxed in    What clinic location was the form placed at?: [facility]    Where the form was placed: 's Box: Leo Melgar MD    What number is listed as a contact on the form?: [phone]  Phone [phone]       Additional comments: [facility] Discharge orders     Call taken on 3/8/2018 at 1:33 PM by Helene Yu

## 2018-03-08 NOTE — TELEPHONE ENCOUNTER
Reason for Call:  Form, our goal is to have forms completed with 72 hours, however, some forms may require a visit or additional information.    Type of letter, form or note:  medical    Who is the form from?: Home care    Where did the form come from: form was faxed in    What clinic location was the form placed at?: [facility]    Where the form was placed: 's Box: Leo Melgar MD    What number is listed as a contact on the form?: [phone]  Phone [phone]       Additional comments: [facility] order summary report     Call taken on 3/8/2018 at 10:50 AM by Helene Yu

## 2018-03-08 NOTE — TELEPHONE ENCOUNTER
I had Dr. Murillo sign these as Dr. Melgar is not in the office. I faxed the orders back to [facility] at the number listed below.

## 2018-03-08 NOTE — TELEPHONE ENCOUNTER
Irina from [facility] called requesting discharge orders be signed STAT in order for pt to get her medications. Please have clinic provider review orders and fax to [phone]. Per nurse, orders were faxed yesterday. Please check if orders were already received and signed by PCP. Irina will fax another copy of order now in case another provider will need.

## 2018-03-08 NOTE — PROGRESS NOTES
Received notice that member was discharged back to her AL today. Called [facility] AL nurse to check on member and no answer. ML requesting a call back. Called son Ed and no answer. ML requesting a call back and to see if member had a follow up appt scheduled with her PCP.  Autumn Jacobsen RN, PHN, [facility]   [phone]

## 2018-03-09 ENCOUNTER — TELEPHONE (OUTPATIENT)
Dept: FAMILY MEDICINE | Facility: CLINIC | Age: 83
End: 2018-03-09

## 2018-03-09 NOTE — TELEPHONE ENCOUNTER
Reason for Call:  Form, our goal is to have forms completed with 72 hours, however, some forms may require a visit or additional information.    Type of letter, form or note:  medical    Who is the form from?: Home care    Where did the form come from: form was faxed in    What clinic location was the form placed at?: [facility]    Where the form was placed: 's Box: Leo Melgar MD    What number is listed as a contact on the form?: [phone]  Phone [phone]       Additional comments: [facility] for her skilled stay     Call taken on 3/9/2018 at 2:31 PM by Helene Yu

## 2018-03-09 NOTE — PROGRESS NOTES
Did receive a call back from AL nurse Luann stating that member is doing very well. That member only had rehab for a few days but such a huge difference in member being able to manage better in her apt. Luann states out patient PT was ordered and will be checking to see with therapy if it is still needed.   Spoke with son Ed who states member was walking so well with her walker that he had to keep up with her. Per Ed, member is doing better now that before she went in to the hospital. Discussed making a follow up appt with PCP. Ed typically makes medical appts for member. Transitions log completed.   Autumn Jacobsen RN, PHN, [facility]   [phone]'

## 2018-03-12 ENCOUNTER — TELEPHONE (OUTPATIENT)
Dept: FAMILY MEDICINE | Facility: CLINIC | Age: 83
End: 2018-03-12

## 2018-03-12 NOTE — TELEPHONE ENCOUNTER
Reason for Call:  Form, our goal is to have forms completed with 72 hours, however, some forms may require a visit or additional information.    Type of letter, form or note:  medical    Who is the form from?: Home care    Where did the form come from: form was faxed in    What clinic location was the form placed at?: [facility]    Where the form was placed: 's Box: Leo Melgar MD    What number is listed as a contact on the form?: [phone]  Phone [phone]       Additional comments: [facility]  D/C PT/OT orders ?    Call taken on 3/12/2018 at 3:32 PM by Helene Yu

## 2018-03-13 ENCOUNTER — MEDICAL CORRESPONDENCE (OUTPATIENT)
Dept: HEALTH INFORMATION MANAGEMENT | Facility: CLINIC | Age: 83
End: 2018-03-13

## 2018-03-14 ENCOUNTER — TELEPHONE (OUTPATIENT)
Dept: FAMILY MEDICINE | Facility: CLINIC | Age: 83
End: 2018-03-14

## 2018-03-14 NOTE — TELEPHONE ENCOUNTER
Reason for Call:  Form, our goal is to have forms completed with 72 hours, however, some forms may require a visit or additional information.    Type of letter, form or note:  medical    Who is the form from?: Home care    Where did the form come from: form was faxed in    What clinic location was the form placed at?: [facility]    Where the form was placed: 's Box: Leo Melgar MD    What number is listed as a contact on the form?: [phone]  Phone [phone]       Additional comments: [facility] scheduled medications form    Call taken on 3/14/2018 at 3:22 PM by Helene Yu

## 2018-03-19 ENCOUNTER — TELEPHONE (OUTPATIENT)
Dept: FAMILY MEDICINE | Facility: CLINIC | Age: 83
End: 2018-03-19

## 2018-03-19 NOTE — TELEPHONE ENCOUNTER
Prior Authorization Retail Medication Request    Medication/Dose: colchicine 0.6 mg  ICD code (if different than what is on RX):  Previously Tried and Failed:  Rationale:    Insurance Name: [employer] [facility] [phone]  Insurance ID: [id number]      Pharmacy Information (if different than what is on RX)  Name:  Phone:

## 2018-03-20 NOTE — TELEPHONE ENCOUNTER
Daphney called requesting annual physician orders be signed and faxed back asap to [phone]. Faxed received and placed on PCP's desk.

## 2018-03-21 ENCOUNTER — TELEPHONE (OUTPATIENT)
Dept: FAMILY MEDICINE | Facility: CLINIC | Age: 83
End: 2018-03-21

## 2018-03-21 NOTE — TELEPHONE ENCOUNTER
Our goal is to have forms completed within 72 hours, however some forms may require a visit or additional information.    What clinic location was the form placed at [facility] or [facility]    Who is the form from? Home Care  Where did the form come from? Faxed to clinic   The form was placed in the inbox of Leo Melgar MD      Please fax to [phone]    Additional comments: Physician orders from [facility].    Call take on 3/21/2018 at 11:47 AM by Rosalee Valenzuela

## 2018-03-22 NOTE — TELEPHONE ENCOUNTER
Central Prior Authorization Team   Phone: [phone]      Insurance covers medication as long as it is being run for the brand name Colcrys.  Called to let pharmacy know and they stated that they are not waiting for a prior auth for that medication.  Request must have been sent in error.

## 2018-03-22 NOTE — TELEPHONE ENCOUNTER
Central Prior Authorization Team   Phone: [phone]    PA Initiation    Medication: colchicine 0.6 mg  Insurance Company: Masha Daigle - Phone [phone] Fax [phone]  Pharmacy Filling the Rx: [facility] - [place], [place] - [address]  Filling Pharmacy Phone: [phone]  Filling Pharmacy Fax:    Start Date: 3/22/2018    MANUALLY FAXED REQUEST TO INSURANCE

## 2018-03-26 ENCOUNTER — TELEPHONE (OUTPATIENT)
Dept: FAMILY MEDICINE | Facility: CLINIC | Age: 83
End: 2018-03-26

## 2018-03-26 ENCOUNTER — MEDICAL CORRESPONDENCE (OUTPATIENT)
Dept: HEALTH INFORMATION MANAGEMENT | Facility: CLINIC | Age: 83
End: 2018-03-26

## 2018-03-26 NOTE — TELEPHONE ENCOUNTER
Blanca from [facility] called requesting provider review and sign re certification orders. Per nurse, forms have been faxed several times with no responce. Pt has now discharged and facility needs forms signs STAT. Nurse will fax form to  office for provider review.

## 2018-04-10 ENCOUNTER — OFFICE VISIT (OUTPATIENT)
Dept: FAMILY MEDICINE | Facility: CLINIC | Age: 83
End: 2018-04-10
Payer: COMMERCIAL

## 2018-04-10 VITALS
SYSTOLIC BLOOD PRESSURE: 118 MMHG | WEIGHT: 178 LBS | BODY MASS INDEX: 34.95 KG/M2 | DIASTOLIC BLOOD PRESSURE: 74 MMHG | TEMPERATURE: 98.3 F | OXYGEN SATURATION: 96 % | RESPIRATION RATE: 20 BRPM | HEART RATE: 74 BPM | HEIGHT: 60 IN

## 2018-04-10 DIAGNOSIS — E11.65 TYPE 2 DIABETES MELLITUS WITH HYPERGLYCEMIA, WITHOUT LONG-TERM CURRENT USE OF INSULIN (H): ICD-10-CM

## 2018-04-10 DIAGNOSIS — E79.0 HYPERURICEMIA: Primary | ICD-10-CM

## 2018-04-10 DIAGNOSIS — I73.9 PAD (PERIPHERAL ARTERY DISEASE) (H): ICD-10-CM

## 2018-04-10 PROCEDURE — 99214 OFFICE O/P EST MOD 30 MIN: CPT | Performed by: INTERNAL MEDICINE

## 2018-04-10 NOTE — NURSING NOTE
Chief Complaint   Patient presents with     Hospital F/U       Initial /74 (BP Location: Left arm, Patient Position: Chair, Cuff Size: Adult Large)  Pulse 74  Temp 98.3  F (36.8  C)  Resp 20  Ht 5' (1.524 m)  Wt 178 lb (80.7 kg)  LMP  (LMP Unknown)  SpO2 96%  Breastfeeding? No  BMI 34.76 kg/m2 Estimated body mass index is 34.76 kg/(m^2) as calculated from the following:    Height as of this encounter: 5' (1.524 m).    Weight as of this encounter: 178 lb (80.7 kg).  Medication Reconciliation: complete   Oumou Calzada LPN

## 2018-04-10 NOTE — MR AVS SNAPSHOT
After Visit Summary   4/10/2018    Regla Benavidez    MRN: [id number]           Patient Information     Date Of Birth          1/5/1926        Visit Information        Provider Department      4/10/2018 10:30 AM Leo Melgar MD [facility]        Today's Diagnoses     Hyperuricemia    -  1    Type 2 diabetes mellitus with hyperglycemia, without long-term current use of insulin (H)        PAD (peripheral artery disease) (H)           Follow-ups after your visit        Who to contact     If you have questions or need follow up information about today's clinic visit or your schedule please contact [facility] directly at [phone].  Normal or non-critical lab and imaging results will be communicated to you by MyChart, letter or phone within 4 business days after the clinic has received the results. If you do not hear from us within 7 days, please contact the clinic through GoodChime!hart or phone. If you have a critical or abnormal lab result, we will notify you by phone as soon as possible.  Submit refill requests through [employer]ATIV or call your pharmacy and they will forward the refill request to us. Please allow 3 business days for your refill to be completed.          Additional Information About Your Visit        MyChart Information     CREATIV gives you secure access to your electronic health record. If you see a primary care provider, you can also send messages to your care team and make appointments. If you have questions, please call your primary care clinic.  If you do not have a primary care provider, please call [phone] and they will assist you.        Care EveryWhere ID     This is your Care EveryWhere ID. This could be used by other organizations to access your [place] medical records  [id number]        Your Vitals Were     Pulse Temperature Respirations Height Last Period Pulse Oximetry    74 98.3  F (36.8  C) 20 5' (1.524 m)  (LMP Unknown) 96%    Breastfeeding? BMI (Body Mass Index)                No 34.76 kg/m2           Blood Pressure from Last 3 Encounters:   04/10/18 118/74   03/05/18 143/69   02/28/18 138/68    Weight from Last 3 Encounters:   04/10/18 178 lb (80.7 kg)   03/02/18 180 lb 12.4 oz (82 kg)   02/28/18 180 lb (81.6 kg)              Today, you had the following     No orders found for display       Primary Care Provider Office Phone # Fax #    Leo Melgar [phone]       [address] [facility] [address]        Equal Access to Services     DESI REESE : Hadii violet gottliebo Somarissa, waaxda luqadaha, qaybta kaalmada adeegyada, eriberto priest . So [facility] [phone].    ATENCIÓN: Si habla español, tiene a garzon disposición servicios gratuitos de asistencia lingüística. Llame al [phone].    We comply with applicable federal civil rights laws and [place] laws. We do not discriminate on the basis of race, color, national origin, age, disability, sex, sexual orientation, or gender identity.            Thank you!     Thank you for choosing [facility]  for your care. Our goal is always to provide you with excellent care. Hearing back from our patients is one way we can continue to improve our services. Please take a few minutes to complete the written survey that you may receive in the mail after your visit with us. Thank you!             Your Updated Medication List - Protect others around you: Learn how to safely use, store and throw away your medicines at www.disposemymeds.org.          This list is accurate as of 4/10/18 12:59 PM.  Always use your most recent med list.                   Brand Name Dispense Instructions for use Diagnosis    * ACETAMINOPHEN PO      Take 500 mg by mouth every 8 hours as needed for pain        * acetaminophen 325 MG tablet    TYLENOL    184 tablet    Take 2 tablets (650 mg) by mouth At Bedtime Take 2  tablets at bedtime    Midline low back pain without sciatica       aspirin 325 MG tablet     120 tablet    Take 1 tablet (325 mg) by mouth daily    Stroke (H)       * blood glucose monitoring test strip    ONETOUCH ULTRA    100 each    USE TO TEST ONCE EVERY MORNING AS DIRECTED    Type 2 diabetes mellitus without complication, without long-term current use of insulin (H)       * blood glucose monitoring test strip    no brand specified    100 strip    Use to test blood sugars 1 times daily or as directed    Type 2 diabetes mellitus with hyperglycemia, without long-term current use of insulin (H)       carbamide peroxide 6.5 % otic solution    DEBROX     Place 5 drops into both ears daily as needed for other        furosemide 20 MG tablet    LASIX    60 tablet    Take 1 tablet (20 mg) by mouth 2 times daily As of 1/27/18    Bilateral leg edema       glipiZIDE 10 MG tablet    GLUCOTROL    60 tablet    Take 1 tablet (10 mg) by mouth 2 times daily (before meals)    Type 2 diabetes mellitus with hyperglycemia, without long-term current use of insulin (H)       LACTINEX PO      Take 1 tablet by mouth 3 times daily 0900; 1300; 1645        metFORMIN 500 MG tablet    GLUCOPHAGE    30 tablet    Take 500 mg by mouth daily        * nystatin 295093 UNIT/GM Powd    MYCOSTATIN     Apply topically daily        * nystatin 169315 UNIT/GM Powd    MYCOSTATIN    60 g    Apply 1 g topically 3 times daily as needed        * order for DME     1 Device    Equipment being ordered: glucometer kit- brand per insurance coverage Patient prefers brand with DRUM lancets - patient is to test fasting once each morning #30 with 5 refills each of the lancets and test strips    Type II or unspecified type diabetes mellitus without mention of complication, not stated as uncontrolled       * order for DME     100 lancet    Equipment being ordered: retractable safety lancets    Type II or unspecified type diabetes mellitus without mention of complication,  not stated as uncontrolled       order for DME     3 Package    Equipment being ordered: Large Underpads    Urinary frequency       order for DME     150 Units    Equipment being ordered: Esthela xtra absorbent large pull ups - Uses 5 per day.    Urinary incontinence       polyethylene glycol Packet    MIRALAX/GLYCOLAX     Take 8.5 g by mouth every other day        pramipexole 0.25 MG tablet    MIRAPEX    45 tablet    Take 1.5 tablets (0.375 mg) by mouth At Bedtime    Restless legs syndrome       ranitidine 150 MG tablet    ZANTAC    60 tablet    Take 1 tablet (150 mg) by mouth 2 times daily    Gastroesophageal reflux disease without esophagitis       simvastatin 5 MG tablet    ZOCOR    30 tablet    Take 1 tablet (5 mg) by mouth every evening    Stroke (H)       sitagliptin 50 MG tablet    JANUVIA    30 tablet    Take 1 tablet (50 mg) by mouth daily    Uncontrolled type 2 diabetes mellitus with complication, without long-term current use of insulin (H), Type 2 diabetes mellitus with hyperglycemia, without long-term current use of insulin (H)       spironolactone 25 MG tablet    ALDACTONE    30 tablet    Take 0.5 tablets (12.5 mg) by mouth daily    Bilateral leg edema       VITAMIN D3 PO      Take 2,000 Units by mouth daily        ZOFRAN PO      Take 4 mg by mouth every 8 hours as needed for nausea or vomiting        * Notice:  This list has 8 medication(s) that are the same as other medications prescribed for you. Read the directions carefully, and ask your doctor or other care provider to review them with you.

## 2018-04-10 NOTE — PROGRESS NOTES
SUBJECTIVE:   Regla Benavidez is a 89 year old female who presents to clinic today for the following health issues:          Hospital Follow-up Visit:    Hospital/Nursing Home/IP Rehab Facility: [facility]  Date of Admission: 3/1/18  Date of Discharge: 3/5/2018  Reason(s) for Admission: Cellulitis and Myositis of Left leg            Problems taking medications regularly:  None       Medication changes since discharge: see med list       Problems adhering to non-medication therapy:  None    Summary of hospitalization:  [facility] discharge summary reviewed  Diagnostic Tests/Treatments reviewed.  Follow up needed: yes  Other Healthcare Providers Involved in Patient s Care:         Care Coordination and lives in [facility]  Update since discharge: stable.     Post Discharge Medication Reconciliation: discharge medications reconciled and changed, per note/orders (see AVS).  Plan of care communicated with patient and family     Coding guidelines for this visit:  Type of Medical   Decision Making Face-to-Face Visit       within 7 Days of discharge Face-to-Face Visit        within 14 days of discharge   Moderate Complexity 33075 [id number]   High Complexity 50331 40081                      This patient is here for follow-up, accompanied by her son and her daughter.                     She had an excellent recovery after being hospitalized with cellulitis and possibly gout as well in the left foot.  She is walking without any pain.          We reviewed her elevated uric acid level of 9.3.  If she had gout recently, it was her first documented episode.  They also bring in some data from the [facility] care home, with her blood pressure running in the 130/60 range, heart rate 80 bpm, weight down slightly at 177 pounds, and glucose averaging 150-170 in the morning.    Problem list and histories reviewed & adjusted, as indicated.  Additional history: as  documented    Current Outpatient Prescriptions   Medication Sig Dispense Refill     blood glucose monitoring (NO BRAND SPECIFIED) test strip Use to test blood sugars 1 times daily or as directed 100 strip 3     polyethylene glycol (MIRALAX/GLYCOLAX) Packet Take 8.5 g by mouth every other day        Lactobacillus (LACTINEX PO) Take 1 tablet by mouth 3 times daily 0900; 1300; 1645       nystatin (MYCOSTATIN) 254269 UNIT/GM POWD Apply topically daily       ACETAMINOPHEN PO Take 500 mg by mouth every 8 hours as needed for pain       Ondansetron HCl (ZOFRAN PO) Take 4 mg by mouth every 8 hours as needed for nausea or vomiting       pramipexole (MIRAPEX) 0.25 MG tablet Take 1.5 tablets (0.375 mg) by mouth At Bedtime 45 tablet 11     furosemide (LASIX) 20 MG tablet Take 1 tablet (20 mg) by mouth 2 times daily As of 1/27/18 60 tablet 12     spironolactone (ALDACTONE) 25 MG tablet Take 0.5 tablets (12.5 mg) by mouth daily 30 tablet 11     metFORMIN (GLUCOPHAGE) 500 MG tablet Take 500 mg by mouth daily  30 tablet 12     glipiZIDE (GLUCOTROL) 10 MG tablet Take 1 tablet (10 mg) by mouth 2 times daily (before meals) 60 tablet 11     blood glucose monitoring (ONETOUCH ULTRA) test strip USE TO TEST ONCE EVERY MORNING AS DIRECTED 100 each 11     sitagliptin (JANUVIA) 50 MG tablet Take 1 tablet (50 mg) by mouth daily 30 tablet 11     nystatin (MYCOSTATIN) 549973 UNIT/GM POWD Apply 1 g topically 3 times daily as needed 60 g 11     carbamide peroxide (DEBROX) 6.5 % otic solution Place 5 drops into both ears daily as needed for other       acetaminophen (TYLENOL) 325 MG tablet Take 2 tablets (650 mg) by mouth At Bedtime Take 2 tablets at bedtime 184 tablet 2     ranitidine (ZANTAC) 150 MG tablet Take 1 tablet (150 mg) by mouth 2 times daily 60 tablet 5     ORDER FOR DME Equipment being ordered: Large Underpads 3 Package 6     ORDER FOR DME Equipment being ordered: Esthela xtra absorbent large pull ups - Uses 5 per day. 150 Units 11      Cholecalciferol (VITAMIN D3 PO) Take 2,000 Units by mouth daily       ORDER FOR DME Equipment being ordered: retractable safety lancets 100 lancet 1     ORDER FOR DME Equipment being ordered: glucometer kit- brand per insurance coverage  Patient prefers brand with DRUM lancets - patient is to test fasting once each morning  #30 with 5 refills each of the lancets and test strips 1 Device 0     simvastatin (ZOCOR) 5 MG tablet Take 1 tablet (5 mg) by mouth every evening 30 tablet      aspirin 325 MG tablet Take 1 tablet (325 mg) by mouth daily 120 tablet      Allergies   Allergen Reactions     Cozaar [Losartan Potassium] Swelling     Leg swelling. Same from Avapro.     Hydrochlorothiazide Other (See Comments)     hyponatremia     Hydrocodone Nausea and Vomiting     Nausea     BP Readings from Last 3 Encounters:   04/10/18 118/74   03/05/18 143/69   02/28/18 138/68    Wt Readings from Last 3 Encounters:   04/10/18 178 lb (80.7 kg)   03/02/18 180 lb 12.4 oz (82 kg)   02/28/18 180 lb (81.6 kg)                    Reviewed and updated as needed this visit by clinical staff       Reviewed and updated as needed this visit by Provider         ROS:  CONSTITUTIONAL:NEGATIVE for fever, chills, change in weight  RESP:NEGATIVE for significant cough or SOB  CV: NEGATIVE for chest pain, palpitations or peripheral edema    OBJECTIVE:                                                    /74 (BP Location: Left arm, Patient Position: Chair, Cuff Size: Adult Large)  Pulse 74  Temp 98.3  F (36.8  C)  Resp 20  Ht 5' (1.524 m)  Wt 178 lb (80.7 kg)  LMP  (LMP Unknown)  SpO2 96%  Breastfeeding? No  BMI 34.76 kg/m2  Body mass index is 34.76 kg/(m^2).  GENERAL APPEARANCE: alert and no distress  CV: regular rates and rhythm  NEURO: Normal strength and tone and expressive aphasia  DIABETIC FOOT EXAM: no trophic changes or ulcerative lesions, DP reduced bilateral and PT reduced bilateral                 No swelling or tenderness to  palpation of either foot.  Diagnostic test results:  Results for orders placed or performed during the hospital encounter of 03/01/18   CBC with platelets differential   Result Value Ref Range    WBC 12.3 (H) 4.0 - 11.0 10e9/L    RBC Count 4.15 3.8 - 5.2 10e12/L    Hemoglobin 12.4 11.7 - 15.7 g/dL    Hematocrit 37.3 35.0 - 47.0 %    MCV 90 78 - 100 fl    MCH 29.9 26.5 - 33.0 pg    MCHC 33.2 31.5 - 36.5 g/dL    RDW 15.0 10.0 - 15.0 %    Platelet Count 279 150 - 450 10e9/L    Diff Method Automated Method     % Neutrophils 71.0 %    % Lymphocytes 16.2 %    % Monocytes 8.4 %    % Eosinophils 3.9 %    % Basophils 0.2 %    % Immature Granulocytes 0.3 %    Nucleated RBCs 0 0 /100    Absolute Neutrophil 8.8 (H) 1.6 - 8.3 10e9/L    Absolute Lymphocytes 2.0 0.8 - 5.3 10e9/L    Absolute Monocytes 1.0 0.0 - 1.3 10e9/L    Absolute Eosinophils 0.5 0.0 - 0.7 10e9/L    Absolute Basophils 0.0 0.0 - 0.2 10e9/L    Abs Immature Granulocytes 0.0 0 - 0.4 10e9/L    Absolute Nucleated RBC 0.0    Basic metabolic panel   Result Value Ref Range    Sodium 139 133 - 144 mmol/L    Potassium 4.1 3.4 - 5.3 mmol/L    Chloride 101 94 - 109 mmol/L    Carbon Dioxide 31 20 - 32 mmol/L    Anion Gap 7 3 - 14 mmol/L    Glucose 159 (H) 70 - 99 mg/dL    Urea Nitrogen 28 7 - 30 mg/dL    Creatinine 1.15 (H) 0.52 - 1.04 mg/dL    GFR Estimate 44 (L) >60 mL/min/1.7m2    GFR Estimate If Black 53 (L) >60 mL/min/1.7m2    Calcium 8.8 8.5 - 10.1 mg/dL   Erythrocyte sedimentation rate auto   Result Value Ref Range    Sed Rate 64 (H) 0 - 30 mm/h   Lactic acid whole blood   Result Value Ref Range    Lactic Acid 1.6 0.7 - 2.0 mmol/L   Glucose by meter   Result Value Ref Range    Glucose 150 (H) 70 - 99 mg/dL   CBC with platelets   Result Value Ref Range    WBC 9.3 4.0 - 11.0 10e9/L    RBC Count 3.82 3.8 - 5.2 10e12/L    Hemoglobin 11.1 (L) 11.7 - 15.7 g/dL    Hematocrit 34.8 (L) 35.0 - 47.0 %    MCV 91 78 - 100 fl    MCH 29.1 26.5 - 33.0 pg    MCHC 31.9 31.5 - 36.5 g/dL     RDW 15.0 10.0 - 15.0 %    Platelet Count 233 150 - 450 10e9/L   Basic metabolic panel   Result Value Ref Range    Sodium 138 133 - 144 mmol/L    Potassium 4.4 3.4 - 5.3 mmol/L    Chloride 103 94 - 109 mmol/L    Carbon Dioxide 30 20 - 32 mmol/L    Anion Gap 5 3 - 14 mmol/L    Glucose 190 (H) 70 - 99 mg/dL    Urea Nitrogen 26 7 - 30 mg/dL    Creatinine 1.05 (H) 0.52 - 1.04 mg/dL    GFR Estimate 49 (L) >60 mL/min/1.7m2    GFR Estimate If Black 59 (L) >60 mL/min/1.7m2    Calcium 8.1 (L) 8.5 - 10.1 mg/dL   Glucose by meter   Result Value Ref Range    Glucose 141 (H) 70 - 99 mg/dL   Glucose by meter   Result Value Ref Range    Glucose 177 (H) 70 - 99 mg/dL   Glucose by meter   Result Value Ref Range    Glucose 145 (H) 70 - 99 mg/dL   Glucose by meter   Result Value Ref Range    Glucose 200 (H) 70 - 99 mg/dL   Glucose by meter   Result Value Ref Range    Glucose 140 (H) 70 - 99 mg/dL   Glucose by meter   Result Value Ref Range    Glucose 158 (H) 70 - 99 mg/dL   Glucose by meter   Result Value Ref Range    Glucose 194 (H) 70 - 99 mg/dL   Glucose by meter   Result Value Ref Range    Glucose 85 70 - 99 mg/dL   Glucose by meter   Result Value Ref Range    Glucose 103 (H) 70 - 99 mg/dL   Basic metabolic panel   Result Value Ref Range    Sodium 139 133 - 144 mmol/L    Potassium 4.5 3.4 - 5.3 mmol/L    Chloride 104 94 - 109 mmol/L    Carbon Dioxide 29 20 - 32 mmol/L    Anion Gap 6 3 - 14 mmol/L    Glucose 157 (H) 70 - 99 mg/dL    Urea Nitrogen 21 7 - 30 mg/dL    Creatinine 1.00 0.52 - 1.04 mg/dL    GFR Estimate 52 (L) >60 mL/min/1.7m2    GFR Estimate If Black 63 >60 mL/min/1.7m2    Calcium 8.7 8.5 - 10.1 mg/dL   CBC with platelets differential   Result Value Ref Range    WBC 8.6 4.0 - 11.0 10e9/L    RBC Count 4.42 3.8 - 5.2 10e12/L    Hemoglobin 13.0 11.7 - 15.7 g/dL    Hematocrit 40.3 35.0 - 47.0 %    MCV 91 78 - 100 fl    MCH 29.4 26.5 - 33.0 pg    MCHC 32.3 31.5 - 36.5 g/dL    RDW 14.8 10.0 - 15.0 %    Platelet Count 276  150 - 450 10e9/L    Diff Method Automated Method     % Neutrophils 63.7 %    % Lymphocytes 22.4 %    % Monocytes 7.4 %    % Eosinophils 6.0 %    % Basophils 0.2 %    % Immature Granulocytes 0.3 %    Nucleated RBCs 0 0 /100    Absolute Neutrophil 5.5 1.6 - 8.3 10e9/L    Absolute Lymphocytes 1.9 0.8 - 5.3 10e9/L    Absolute Monocytes 0.6 0.0 - 1.3 10e9/L    Absolute Eosinophils 0.5 0.0 - 0.7 10e9/L    Absolute Basophils 0.0 0.0 - 0.2 10e9/L    Abs Immature Granulocytes 0.0 0 - 0.4 10e9/L    Absolute Nucleated RBC 0.0    Glucose by meter   Result Value Ref Range    Glucose 112 (H) 70 - 99 mg/dL   Glucose by meter   Result Value Ref Range    Glucose 137 (H) 70 - 99 mg/dL   Glucose by meter   Result Value Ref Range    Glucose 141 (H) 70 - 99 mg/dL   Glucose by meter   Result Value Ref Range    Glucose 106 (H) 70 - 99 mg/dL   Glucose by meter   Result Value Ref Range    Glucose 150 (H) 70 - 99 mg/dL   Glucose by meter   Result Value Ref Range    Glucose 135 (H) 70 - 99 mg/dL   Glucose by meter   Result Value Ref Range    Glucose 136 (H) 70 - 99 mg/dL   Glucose by meter   Result Value Ref Range    Glucose 300 (H) 70 - 99 mg/dL   Blood culture   Result Value Ref Range    Specimen Description Blood Right Arm     Special Requests Aerobic and anaerobic bottles received     Culture Micro No growth    Blood culture   Result Value Ref Range    Specimen Description Blood Right Arm     Special Requests Aerobic and anaerobic bottles received     Culture Micro No growth         ASSESSMENT/PLAN:                                                        ICD-10-CM    1. Hyperuricemia E79.0     9.3; COLCRYS IS COVERED,NOT GENERIC COLCHICINE   2. Type 2 diabetes mellitus with hyperglycemia, without long-term current use of insulin (H) E11.65    3. PAD (peripheral artery disease) (H) I73.9        We reviewed and discussed her situation at length.                 We decided not to start allopurinol now, but only if she has recurrent acute  gout.                     We will plan to recheck her labs in the near future and then every 3 months.                  Continue current medications for now.     Follow up with Provider -as needed    Leo Melgar MD  [facility]

## 2018-04-20 ENCOUNTER — TELEPHONE (OUTPATIENT)
Dept: FAMILY MEDICINE | Facility: CLINIC | Age: 83
End: 2018-04-20

## 2018-04-20 NOTE — TELEPHONE ENCOUNTER
Reason for Call:  Form, our goal is to have forms completed with 72 hours, however, some forms may require a visit or additional information.    Type of letter, form or note:  medical    Who is the form from?: Home care    Where did the form come from: form was faxed in    What clinic location was the form placed at?: [facility]    Where the form was placed: 's Box: Leo Melgar MD    What number is listed as a contact on the form?: [phone]  Phone [phone]       Additional comments: [facility]  physician orders    Call taken on 4/20/2018 at 11:14 AM by Helene Yu

## 2018-04-25 RX ORDER — GLUCOSAMINE HCL/CHONDROITIN SU 500-400 MG
CAPSULE ORAL
Qty: 100 EACH | Refills: 3 | Status: SHIPPED | OUTPATIENT
Start: 2018-04-25 | End: 2019-02-24

## 2018-04-25 RX ORDER — LANCETS
EACH MISCELLANEOUS
Qty: 100 EACH | Refills: 3 | Status: SHIPPED | OUTPATIENT
Start: 2018-04-25

## 2018-04-30 ENCOUNTER — TRANSFERRED RECORDS (OUTPATIENT)
Dept: HEALTH INFORMATION MANAGEMENT | Facility: CLINIC | Age: 83
End: 2018-04-30

## 2018-05-03 ENCOUNTER — TRANSFERRED RECORDS (OUTPATIENT)
Dept: HEALTH INFORMATION MANAGEMENT | Facility: CLINIC | Age: 83
End: 2018-05-03

## 2018-05-03 LAB
CREAT SERPL-MCNC: 1.15 MG/DL (ref 0.55–1.02)
GFR SERPL CREATININE-BSD FRML MDRD: 41 ML/MIN/1.73M2
GLUCOSE SERPL-MCNC: 151 MG/DL (ref 70–100)
HBA1C MFR BLD: 6.9 % (ref 4–5.6)
POTASSIUM SERPL-SCNC: 4.3 MMOL/L (ref 3.5–5.2)

## 2018-05-04 ENCOUNTER — TELEPHONE (OUTPATIENT)
Dept: FAMILY MEDICINE | Facility: CLINIC | Age: 83
End: 2018-05-04

## 2018-05-04 NOTE — TELEPHONE ENCOUNTER
Reason for Call:  Form, our goal is to have forms completed with 72 hours, however, some forms may require a visit or additional information.    Type of letter, form or note:  medical    Who is the form from?: Home care    Where did the form come from: form was faxed in    What clinic location was the form placed at?: [facility]    Where the form was placed: 's Box: Leo Melgar MD    What number is listed as a contact on the form?: [phone]  Phone [phone]       Additional comments: [facility]  3 months of levels     Call taken on 5/4/2018 at 4:09 PM by Helene Yu

## 2018-05-25 ENCOUNTER — PATIENT OUTREACH (OUTPATIENT)
Dept: GERIATRIC MEDICINE | Facility: CLINIC | Age: 83
End: 2018-05-25

## 2018-05-25 NOTE — PROGRESS NOTES
[facility] Care Coordination Contact  Member due soon for an annual health risk reassessment. Called son Ed and was able to make an appt to see member on 6/11/18 and Ed said he will be there and let Livananand know about appt as well.  Autumn Jacobsen RN, PHN, [facility]   [phone]

## 2018-06-11 ENCOUNTER — PATIENT OUTREACH (OUTPATIENT)
Dept: GERIATRIC MEDICINE | Facility: CLINIC | Age: 83
End: 2018-06-11

## 2018-06-11 ASSESSMENT — ACTIVITIES OF DAILY LIVING (ADL)
DEPENDENT_IADLS:: CLEANING;COOKING;LAUNDRY;SHOPPING;MEAL PREPARATION;MEDICATION MANAGEMENT;MONEY MANAGEMENT;TRANSPORTATION

## 2018-06-11 NOTE — PROGRESS NOTES
[facility] Care Coordination Contact    [facility] Home Visit Assessment     Home visit for Health Risk Assessment with Regla Benavidez completed on June 11, 2018    Type of residence:: Assisted living  Current living arrangement:: I live in assisted living     Assessment completed with:: Patient, Children (Children Ed and Obdulio present. Spoke with AL nurse Luann before and after visit)    Current Care Plan  Member currently receiving the following home care services:     Member currently receiving the following community resources: DME    Medication Review  Medication reconciliation completed in Epic: Yes  Medication set-up & administration: RN sets up  weekly.  Assisting Living staff administers medications.  Medication understanding concerns (by member, family or CC): No  Medication adherence concerns (by member, family or CC): No    Mental/Behavioral Health   Depression Screening: See PHQ assessment flowsheet.   Mental health DX:: No      No current MH services nor needed per member.    Falls Assessment:   Fallen 2 or more times in the past year?: No   Any fall with injury in the past year?: No    ADL/IADL Dependencies:   Dependent ADLs: Ambulation-walker, Bathing, Grooming, Transfers, Toileting  Dependent IADLs: Cleaning, Cooking, Laundry, Shopping, Meal Preparation, Medication Management, Money Management, Transportation    [facility] sponsored benefits: Shared information re: Silver Sneakers/gym memberships, ASA, Calcium +D.    PCA Assessment completed at visit: Not applicable     Elderly Waiver Eligibility: Yes-will continue on EW    Care Plan & Recommendations: Continue with AL services and DME including incontinent supplies and hearing aid batteries.    See LTCC for detailed assessment information.    Follow-Up Plan: Member informed of future contact, plan to f/u with member with a 6 month telephone assessment.  Contact information shared with member and family, encouraged member to  call with any questions or concerns at any time.    [place] care continuum providers: Please refer to Health Care Home on the Epic Problem List to view this patient's [facility] Care Plan Summary.  Autumn Jacobsen RN, PHN, CCM  [facility]   [phone]

## 2018-06-12 ENCOUNTER — TELEPHONE (OUTPATIENT)
Dept: FAMILY MEDICINE | Facility: CLINIC | Age: 83
End: 2018-06-12

## 2018-06-12 NOTE — TELEPHONE ENCOUNTER
Reason for Call:  Form, our goal is to have forms completed with 72 hours, however, some forms may require a visit or additional information.    Type of letter, form or note:  medical    Who is the form from?: Home care    Where did the form come from: form was faxed in    What clinic location was the form placed at?: [facility]    Where the form was placed: 's Box: Leo Melgar MD    What number is listed as a contact on the form?: [phone]  Phone  [phone]       Additional comments: [facility] medical  hearing aide batteries    Call taken on 6/12/2018 at 11:28 AM by Helene Yu

## 2018-06-15 ENCOUNTER — PATIENT OUTREACH (OUTPATIENT)
Dept: GERIATRIC MEDICINE | Facility: CLINIC | Age: 83
End: 2018-06-15

## 2018-06-15 NOTE — PROGRESS NOTES
[facility] Care Coordination Contact  MMIS completed and entered. Member remains a rate cell B. Contacted [facility] and they will see about sending the no rinse body wash quarterly and sending size 13 hearing aids to member. Also contacted [facility] to see about increasing the EW pullups from an extra 3 pks a month to 4 pks as well as asking about smaller wipes to be sent to member. POC updated and CPS updated. RS tool emailed to Daphney Hou to review prior to uploading to DHS.  Autumn Jacobsen RN, PHN, CCM  [facility]   [phone]

## 2018-06-18 NOTE — PROGRESS NOTES
[facility] Care Coordination Contact  No response from AL nurse Daphney. Called Daphney and no answer. ML that this CM had not heard back from AL nurse and that not too much changed on the RS tool, so was going to have the RS tool uploaded to [facility]. Chart to CMS for processing.  Autumn Jacobsen RN, PHN, CCM  [facility]   [phone]

## 2018-06-20 ENCOUNTER — PATIENT OUTREACH (OUTPATIENT)
Dept: GERIATRIC MEDICINE | Facility: CLINIC | Age: 83
End: 2018-06-20

## 2018-06-20 NOTE — PROGRESS NOTES
[facility] Care Coordination Contact  Received after visit chart from care coordinator.  Completed following tasks: Mailed copy of care plan to client, Updated services in access and Submitted referrals/auths for Assisted Living, Wipes and Body Rinse.     Mailed copy of CL tool to member, faxed copy to AL facility, uploaded into [employer] and submitted authorization to health plan.  Chart was returned to CC.     Angela Benavidez  Case Management Specialist  [facility]  [phone]

## 2018-06-27 NOTE — PROGRESS NOTES
Per [facility], hearing aid batteries were delivered to client on 06/22/2018.     Angela Benavidez  Case Management Specialist  [facility]  [phone]

## 2018-07-20 ENCOUNTER — PATIENT OUTREACH (OUTPATIENT)
Dept: GERIATRIC MEDICINE | Facility: CLINIC | Age: 83
End: 2018-07-20

## 2018-07-20 NOTE — PROGRESS NOTES
[facility] Care Coordination Contact  Late entry from June 11,2018 email: Per Jed at [facility]:              We will request Rx and run through insurance bens.  Set up for quarterly reorder.  Thanks,  --Jed Jacobsen RN, PHN, CCM  [facility]   [phone]

## 2018-07-30 ENCOUNTER — TELEPHONE (OUTPATIENT)
Dept: FAMILY MEDICINE | Facility: CLINIC | Age: 83
End: 2018-07-30

## 2018-07-30 NOTE — TELEPHONE ENCOUNTER
Our goal is to have forms completed within 72 hours, however some forms may require a visit or additional information.    What clinic location was the form placed at [facility] or [facility]     Who is the form from?   Where did the form come from? Faxed to clinic   The form was placed in the inbox of Leo Melgar MD      Please fax to [phone]    Phone number: [phone]    Additional comments: [facility] Halo bed rails bilat  for ,mobility and positioning    Call take on 7/30/2018 at 10:43 AM by Helene Yu

## 2018-08-02 ENCOUNTER — TRANSFERRED RECORDS (OUTPATIENT)
Dept: HEALTH INFORMATION MANAGEMENT | Facility: CLINIC | Age: 83
End: 2018-08-02

## 2018-08-02 LAB
CREAT SERPL-MCNC: 1.23 MG/DL (ref 0.55–1.02)
GFR SERPL CREATININE-BSD FRML MDRD: 38 ML/MIN/1.73M2
GLUCOSE SERPL-MCNC: 134 MG/DL (ref 70–100)
HBA1C MFR BLD: 6.8 % (ref 4–5.6)
POTASSIUM SERPL-SCNC: 4.1 MMOL/L (ref 3.5–5.2)

## 2018-08-03 ENCOUNTER — MEDICAL CORRESPONDENCE (OUTPATIENT)
Dept: HEALTH INFORMATION MANAGEMENT | Facility: CLINIC | Age: 83
End: 2018-08-03

## 2018-08-04 ENCOUNTER — OFFICE VISIT (OUTPATIENT)
Dept: FAMILY MEDICINE | Facility: CLINIC | Age: 83
End: 2018-08-04
Payer: COMMERCIAL

## 2018-08-04 VITALS
DIASTOLIC BLOOD PRESSURE: 64 MMHG | RESPIRATION RATE: 16 BRPM | OXYGEN SATURATION: 96 % | HEART RATE: 70 BPM | WEIGHT: 174 LBS | SYSTOLIC BLOOD PRESSURE: 122 MMHG | TEMPERATURE: 97.7 F | BODY MASS INDEX: 33.98 KG/M2

## 2018-08-04 DIAGNOSIS — R79.89 PRERENAL AZOTEMIA: ICD-10-CM

## 2018-08-04 DIAGNOSIS — E11.65 TYPE 2 DIABETES MELLITUS WITH HYPERGLYCEMIA, WITHOUT LONG-TERM CURRENT USE OF INSULIN (H): ICD-10-CM

## 2018-08-04 DIAGNOSIS — E13.42 DIABETIC POLYNEUROPATHY ASSOCIATED WITH OTHER SPECIFIED DIABETES MELLITUS (H): ICD-10-CM

## 2018-08-04 DIAGNOSIS — M21.962 FOOT DEFORMITY, BILATERAL: ICD-10-CM

## 2018-08-04 DIAGNOSIS — M21.612 BILATERAL BUNIONS: ICD-10-CM

## 2018-08-04 DIAGNOSIS — E79.0 HYPERURICEMIA: ICD-10-CM

## 2018-08-04 DIAGNOSIS — R60.0 BILATERAL LEG EDEMA: ICD-10-CM

## 2018-08-04 DIAGNOSIS — M21.961 FOOT DEFORMITY, BILATERAL: ICD-10-CM

## 2018-08-04 DIAGNOSIS — M79.672 PAIN IN BOTH FEET: Primary | ICD-10-CM

## 2018-08-04 DIAGNOSIS — M21.611 BILATERAL BUNIONS: ICD-10-CM

## 2018-08-04 DIAGNOSIS — N18.30 CKD (CHRONIC KIDNEY DISEASE) STAGE 3, GFR 30-59 ML/MIN (H): ICD-10-CM

## 2018-08-04 DIAGNOSIS — M79.671 PAIN IN BOTH FEET: Primary | ICD-10-CM

## 2018-08-04 PROCEDURE — 99214 OFFICE O/P EST MOD 30 MIN: CPT | Performed by: INTERNAL MEDICINE

## 2018-08-04 RX ORDER — LIDOCAINE 40 MG/G
CREAM TOPICAL
Qty: 133 G | Refills: 11 | Status: ON HOLD | OUTPATIENT
Start: 2018-08-04 | End: 2021-04-28

## 2018-08-04 NOTE — PROGRESS NOTES
SUBJECTIVE:   Regla Benavidez is a 89 year old female who presents to clinic today for the following health issues:      Musculoskeletal problem/pain      Duration: 1-2 weeks    Description  Location: both legs    Intensity:  moderate    Accompanying signs and symptoms: swelling    History  Previous similar problem: YES  Previous evaluation:  yes    Precipitating or alleviating factors:  Trauma or overuse: no   Aggravating factors include: worse in morning and evening    Therapies tried and outcome: nothing                          This patient arrives today from assisted living, with her son and her daughter.               Lately, this patient has C/o some additional symptoms of bilateral foot pain, not just at rest, but now also with weightbearing.             Given her expressive aphasia, it is difficult to get an articulate history.                                                                    Family was concerned given her previous problems with infection and gout.                                          Just today, labs arrived from her assisted living center,       showing her uric acid is still elevated at 10.3.  The hemoglobin A1c was 6.8, the fasting glucose 134.  The electrolytes were normal, the BUN 33, creatinine 1.23.  Blood pressure has been well controlled.  Her weight has declined slightly to 173 pounds.  Problem list and histories reviewed & adjusted, as indicated.  Additional history: as documented    Current Outpatient Prescriptions   Medication Sig Dispense Refill     acetaminophen (TYLENOL) 325 MG tablet Take 2 tablets (650 mg) by mouth At Bedtime Take 2 tablets at bedtime 184 tablet 2     ACETAMINOPHEN PO Take 500 mg by mouth every 8 hours as needed for pain       alcohol swab prep pads Use to swab area of injection/ginger as directed 100 each 3     aspirin 325 MG tablet Take 1 tablet (325 mg) by mouth daily 120 tablet      blood glucose calibration (NO BRAND SPECIFIED) solution Use  to calibrate blood glucose monitor as needed as directed. To accompany: Accu-Chek Safe-T pro plus 1 Bottle 3     blood glucose monitoring (NO BRAND SPECIFIED) test strip Use to test blood sugar 1 times daily or as directed. To accompany: Accu-Chek safety pro plus 100 strip 6     blood glucose monitoring (NO BRAND SPECIFIED) test strip Use to test blood sugars 1 times daily or as directed 100 strip 3     blood glucose monitoring (ONETOUCH ULTRA) test strip USE TO TEST ONCE EVERY MORNING AS DIRECTED 100 each 11     carbamide peroxide (DEBROX) 6.5 % otic solution Place 5 drops into both ears daily as needed for other       Cholecalciferol (VITAMIN D3 PO) Take 2,000 Units by mouth daily       furosemide (LASIX) 20 MG tablet Take 1 tablet (20 mg) by mouth 2 times daily As of 1/27/18 60 tablet 12     glipiZIDE (GLUCOTROL) 10 MG tablet Take 1 tablet (10 mg) by mouth 2 times daily (before meals) 60 tablet 11     Lactobacillus (LACTINEX PO) Take 1 tablet by mouth 3 times daily 0900; 1300; 1645       metFORMIN (GLUCOPHAGE) 500 MG tablet Take 500 mg by mouth daily  30 tablet 12     nystatin (MYCOSTATIN) 628350 UNIT/GM POWD Apply topically daily       nystatin (MYCOSTATIN) 015870 UNIT/GM POWD Apply 1 g topically 3 times daily as needed 60 g 11     Ondansetron HCl (ZOFRAN PO) Take 4 mg by mouth every 8 hours as needed for nausea or vomiting       ORDER FOR DME Equipment being ordered: Large Underpads 3 Package 6     ORDER FOR DME Equipment being ordered: Esthela xtra absorbent large pull ups - Uses 5 per day. 150 Units 11     ORDER FOR DME Equipment being ordered: retractable safety lancets 100 lancet 1     ORDER FOR DME Equipment being ordered: glucometer kit- brand per insurance coverage  Patient prefers brand with DRUM lancets - patient is to test fasting once each morning  #30 with 5 refills each of the lancets and test strips 1 Device 0     polyethylene glycol (MIRALAX/GLYCOLAX) Packet Take 8.5 g by mouth every other day         pramipexole (MIRAPEX) 0.25 MG tablet Take 1.5 tablets (0.375 mg) by mouth At Bedtime 45 tablet 11     ranitidine (ZANTAC) 150 MG tablet Take 1 tablet (150 mg) by mouth 2 times daily 60 tablet 5     simvastatin (ZOCOR) 5 MG tablet Take 1 tablet (5 mg) by mouth every evening 30 tablet      sitagliptin (JANUVIA) 50 MG tablet Take 1 tablet (50 mg) by mouth daily 30 tablet 11     spironolactone (ALDACTONE) 25 MG tablet Take 0.5 tablets (12.5 mg) by mouth daily 30 tablet 11     thin (NO BRAND SPECIFIED) lancets Use to test blood sugar 1 times daily or as directed. To accompany: [employer] safety pro plus 100 each 3     Allergies   Allergen Reactions     Cozaar [Losartan Potassium] Swelling     Leg swelling. Same from Avapro.     Hydrochlorothiazide Other (See Comments)     hyponatremia     Hydrocodone Nausea and Vomiting     Nausea     BP Readings from Last 3 Encounters:   04/10/18 118/74   03/05/18 143/69   02/28/18 138/68    Wt Readings from Last 3 Encounters:   04/10/18 178 lb (80.7 kg)   03/02/18 180 lb 12.4 oz (82 kg)   02/28/18 180 lb (81.6 kg)                    Reviewed and updated as needed this visit by clinical staff       Reviewed and updated as needed this visit by Provider         ROS:  CONSTITUTIONAL:NEGATIVE for fever, chills, change in weight  CV: NEGATIVE for chest pain, palpitations or peripheral edema  NEURO: POSITIVE for gait disturbance    OBJECTIVE:                                                    /64 (Cuff Size: Adult Large)  Pulse 70  Temp 97.7  F (36.5  C) (Tympanic)  Resp 16  Wt 174 lb (78.9 kg)  LMP  (LMP Unknown)  SpO2 96%  BMI 33.98 kg/m2  Body mass index is 33.98 kg/(m^2).  GENERAL APPEARANCE: alert and no distress  RESP: no rales or rhonchi  CV: regular rates and rhythm, normal S1 S2, no S3 or S4, no murmur, click or rub and no leg edema  NEURO: Expressive aphasia, and she uses a walker  DIABETIC FOOT EXAM: She seems to have dysesthesias with light touch over both feet.   "Both feet are warm, pulses are diminished.  She has hammertoes and other deformities.  There are no signs of infection or acute gout.    Diagnostic test results:  See above     ASSESSMENT/PLAN:                                                        ICD-10-CM    1. Pain in both feet M79.671     M79.672    2. Foot deformity, bilateral M21.961 PODIATRY/FOOT & ANKLE SURGERY REFERRAL    M21.962    3. Diabetic polyneuropathy associated with other specified diabetes mellitus (H) E13.42 lidocaine (LMX4) 4 % CREA cream   4. Hyperuricemia E79.0    5. Bilateral leg edema R60.0    6. Prerenal azotemia R79.89    7. CKD (chronic kidney disease) stage 3, GFR 30-59 ml/min N18.3    8. Type 2 diabetes mellitus with hyperglycemia, without long-term current use of insulin (H) E11.65 PODIATRY/FOOT & ANKLE SURGERY REFERRAL   9. Bilateral bunions M21.611 PODIATRY/FOOT & ANKLE SURGERY REFERRAL    M21.612        Summary and implications:  We reviewed her situation at length.               Etiology of her foot pain is likely multifactorial.  I suspect diabetic neuropathy, as well as on a musculoskeletal basis due to her deformities and bunions etc.                                     lab work is reviewed.  We may need to cut back her oral hypoglycemics, especially the glipizide.   Patient Instructions   Today we added lidocaine cream three times per day to both feet.            You could probably benefit from having orthotics and \"diabetic shoes\"; please see podiatry.                                      Leo Melgar MD  [facility]  "

## 2018-08-04 NOTE — PATIENT INSTRUCTIONS
"Today we added lidocaine cream three times per day to both feet.            You could probably benefit from having orthotics and \"diabetic shoes\"; please see podiatry.                                  "

## 2018-08-04 NOTE — MR AVS SNAPSHOT
"              After Visit Summary   8/4/2018    Regla Benavidez    MRN: [id number]           Patient Information     Date Of Birth          1/5/1926        Visit Information        Provider Department      8/4/2018 9:15 AM Leo Melgar MD [facility]        Today's Diagnoses     Hyperuricemia    -  1    Bilateral leg edema        Prerenal azotemia        CKD (chronic kidney disease) stage 3, GFR 30-59 ml/min        Type 2 diabetes mellitus with hyperglycemia, without long-term current use of insulin (H)        Bilateral bunions        Foot deformity, bilateral        Diabetic polyneuropathy associated with other specified diabetes mellitus (H)          Care Instructions    Today we added lidocaine cream three times per day to both feet.            You could probably benefit from having orthotics and \"diabetic shoes\"; please see podiatry.                                          Follow-ups after your visit        Additional Services     PODIATRY/FOOT & ANKLE SURGERY REFERRAL       Your provider has referred you to: [facility]: [facility] [phone]   [email or web address]  [facility]: [facility] [phone]   [email or web address]    Please be aware that coverage of these services is subject to the terms and limitations of your health insurance plan.  Call member services at your health plan with any benefit or coverage questions.      Please bring the following to your appointment:  >>   Any x-rays, CTs or MRIs which have been performed.  Contact the facility where they were done to arrange for  prior to your scheduled appointment.    >>   List of current medications   >>   This referral request   >>   Any documents/labs given to you for this referral                  Who to contact     If you have questions or need follow up information about today's clinic visit or your schedule " please [facility] directly at [phone].  Normal or non-critical lab and imaging results will be communicated to you by MyChart, letter or phone within 4 business days after the clinic has received the results. If you do not hear from us within 7 days, please contact the clinic through [employer] or phone. If you have a critical or abnormal lab result, we will notify you by phone as soon as possible.  Submit refill requests through [employer] or call your pharmacy and they will forward the refill request to us. Please allow 3 business days for your refill to be completed.          Additional Information About Your Visit        [employer] Information     [employer] gives you secure access to your electronic health record. If you see a primary care provider, you can also send messages to your care team and make appointments. If you have questions, please call your primary care clinic.  If you do not have a primary care provider, please call [phone] and they will assist you.        Care EveryWhere ID     This is your Care EveryWhere ID. This could be used by other organizations to access your [place] medical records  [id number]        Your Vitals Were     Pulse Temperature Respirations Last Period Pulse Oximetry BMI (Body Mass Index)    70 97.7  F (36.5  C) (Tympanic) 16 (LMP Unknown) 96% 33.98 kg/m2       Blood Pressure from Last 3 Encounters:   08/04/18 122/64   04/10/18 118/74   03/05/18 143/69    Weight from Last 3 Encounters:   08/04/18 174 lb (78.9 kg)   04/10/18 178 lb (80.7 kg)   03/02/18 180 lb 12.4 oz (82 kg)              We Performed the Following     PODIATRY/FOOT & ANKLE SURGERY REFERRAL          Today's Medication Changes          These changes are accurate as of 8/4/18  9:46 AM.  If you have any questions, ask your nurse or doctor.               Start taking these medicines.        Dose/Directions    lidocaine 4 % Crea cream   Commonly known as:  LMX4   Used for:   Diabetic polyneuropathy associated with other specified diabetes mellitus (H)   Started by:  Leo Melgar MD        Apply to both feet TID   Quantity:  133 g   Refills:  11            Where to get your medicines      These medications were sent to [facility] - [place], [place] - [address]  [address], [place] [address]     Phone:  [phone]     lidocaine 4 % Crea cream                Primary Care Provider Office Phone # Fax #    Leo Melgar [phone]       [address] [facility] [address]        Equal Access to Services     [facility]: Hadii aad ku hadasho Soomaali, waaxda luqadaha, qaybta kaalmada adeegyada, waxay idiin hayaan adechristophe priest . So [facility] [phone].    ATENCIÓN: Si habla español, tiene a garzon disposición servicios gratuitos de asistencia lingüística. [facility] [phone].    We comply with applicable federal civil rights laws and [place] laws. We do not discriminate on the basis of race, color, national origin, age, disability, sex, sexual orientation, or gender identity.            Thank you!     Thank you for choosing [facility]  for your care. Our goal is always to provide you with excellent care. Hearing back from our patients is one way we can continue to improve our services. Please take a few minutes to complete the written survey that you may receive in the mail after your visit with us. Thank you!             Your Updated Medication List - Protect others around you: Learn how to safely use, store and throw away your medicines at www.disposemymeds.org.          This list is accurate as of 8/4/18  9:46 AM.  Always use your most recent med list.                   Brand Name Dispense Instructions for use Diagnosis    * ACETAMINOPHEN PO      Take 500 mg by mouth every 8 hours as needed for pain        * acetaminophen 325 MG tablet    TYLENOL    184 tablet    Take 2 tablets (650 mg) by mouth  At Bedtime Take 2 tablets at bedtime    Midline low back pain without sciatica       alcohol swab prep pads     100 each    Use to swab area of injection/ginger as directed    Type 2 diabetes mellitus with hyperglycemia, without long-term current use of insulin (H)       aspirin 325 MG tablet     120 tablet    Take 1 tablet (325 mg) by mouth daily    Stroke (H)       blood glucose calibration solution    NO BRAND SPECIFIED    1 Bottle    Use to calibrate blood glucose monitor as needed as directed. To accompany: Accu-Chek Safe-T pro plus    Type 2 diabetes mellitus with hyperglycemia, without long-term current use of insulin (H)       * blood glucose monitoring test strip    ONETOUCH ULTRA    100 each    USE TO TEST ONCE EVERY MORNING AS DIRECTED    Type 2 diabetes mellitus without complication, without long-term current use of insulin (H)       * blood glucose monitoring test strip    no brand specified    100 strip    Use to test blood sugars 1 times daily or as directed    Type 2 diabetes mellitus with hyperglycemia, without long-term current use of insulin (H)       * blood glucose monitoring test strip    no brand specified    100 strip    Use to test blood sugar 1 times daily or as directed. To accompany: Accu-Chek safety pro plus    Type 2 diabetes mellitus with hyperglycemia, without long-term current use of insulin (H)       carbamide peroxide 6.5 % otic solution    DEBROX     Place 5 drops into both ears daily as needed for other        furosemide 20 MG tablet    LASIX    60 tablet    Take 1 tablet (20 mg) by mouth 2 times daily As of 1/27/18    Bilateral leg edema       glipiZIDE 10 MG tablet    GLUCOTROL    60 tablet    Take 1 tablet (10 mg) by mouth 2 times daily (before meals)    Type 2 diabetes mellitus with hyperglycemia, without long-term current use of insulin (H)       LACTINEX PO      Take 1 tablet by mouth 3 times daily 0900; 1300; 1645        lidocaine 4 % Crea cream    LMX4    133 g    Apply to  both feet TID    Diabetic polyneuropathy associated with other specified diabetes mellitus (H)       metFORMIN 500 MG tablet    GLUCOPHAGE    30 tablet    Take 500 mg by mouth daily        * nystatin 075564 UNIT/GM Powd    MYCOSTATIN     Apply topically daily        * nystatin 030551 UNIT/GM Powd    MYCOSTATIN    60 g    Apply 1 g topically 3 times daily as needed        * order for DME     1 Device    Equipment being ordered: glucometer kit- brand per insurance coverage Patient prefers brand with DRUM lancets - patient is to test fasting once each morning #30 with 5 refills each of the lancets and test strips    Type II or unspecified type diabetes mellitus without mention of complication, not stated as uncontrolled       * order for DME     100 lancet    Equipment being ordered: retractable safety lancets    Type II or unspecified type diabetes mellitus without mention of complication, not stated as uncontrolled       order for DME     3 Package    Equipment being ordered: Large Underpads    Urinary frequency       order for DME     150 Units    Equipment being ordered: Esthela xtra absorbent large pull ups - Uses 5 per day.    Urinary incontinence       polyethylene glycol Packet    MIRALAX/GLYCOLAX     Take 8.5 g by mouth every other day        pramipexole 0.25 MG tablet    MIRAPEX    45 tablet    Take 1.5 tablets (0.375 mg) by mouth At Bedtime    Restless legs syndrome       ranitidine 150 MG tablet    ZANTAC    60 tablet    Take 1 tablet (150 mg) by mouth 2 times daily    Gastroesophageal reflux disease without esophagitis       simvastatin 5 MG tablet    ZOCOR    30 tablet    Take 1 tablet (5 mg) by mouth every evening    Stroke (H)       sitagliptin 50 MG tablet    JANUVIA    30 tablet    Take 1 tablet (50 mg) by mouth daily    Uncontrolled type 2 diabetes mellitus with complication, without long-term current use of insulin (H), Type 2 diabetes mellitus with hyperglycemia, without long-term current use of  insulin (H)       spironolactone 25 MG tablet    ALDACTONE    30 tablet    Take 0.5 tablets (12.5 mg) by mouth daily    Bilateral leg edema       thin lancets    NO BRAND SPECIFIED    100 each    Use to test blood sugar 1 times daily or as directed. To accompany: Accu-Cheks safety pro plus    Type 2 diabetes mellitus with hyperglycemia, without long-term current use of insulin (H)       VITAMIN D3 PO      Take 2,000 Units by mouth daily        ZOFRAN PO      Take 4 mg by mouth every 8 hours as needed for nausea or vomiting        * Notice:  This list has 9 medication(s) that are the same as other medications prescribed for you. Read the directions carefully, and ask your doctor or other care provider to review them with you.

## 2018-08-06 NOTE — PROGRESS NOTES
[facility] Care Coordination Contact  Received a message from daughter Keshav stating that member was short of incontinent supplies this month. Checked with A Pramod WOLFF and Belle at [facility]. New auth generated and rest of incontinent supplies will be delivered to member today. So member will be getting 14 pkgs of depends monthly-10 under MA and 4 under EW, as well as 2 pkgs of wipes. From [facility] member will be getting body wash monthly and hearing aid batteries quarterly-last got them June and due to get them in Sept. Called Keshav to inform her of this.  Autumn Jacobsen RN, PHN, [facility]   [phone]

## 2018-08-08 ENCOUNTER — TELEPHONE (OUTPATIENT)
Dept: FAMILY MEDICINE | Facility: CLINIC | Age: 83
End: 2018-08-08

## 2018-08-08 NOTE — TELEPHONE ENCOUNTER
Our goal is to have forms completed within 72 hours, however some forms may require a visit or additional information.    What clinic location was the form placed at [facility] or [place].?     Who is the form from?   Where did the form come from? Faxed to clinic   The form was placed in the inbox of Leo Melgar MD      Please fax to [phone]  Phone number: [phone]    Additional comments: [facility]  lido cream not covered by insurance    Call take on 8/8/2018 at 10:35 AM by Helene Yu

## 2018-08-17 NOTE — TELEPHONE ENCOUNTER
Daphney from [facility] would like a call back concerning the lido cream phone # [phone]

## 2018-08-20 NOTE — TELEPHONE ENCOUNTER
Called [place] and tech reports insurance will not cover lidocaine cream 4%. No PA needed. Pharmacist tried checking for other lidocaine forms and is not covered.

## 2018-08-20 NOTE — TELEPHONE ENCOUNTER
The family could purchase some OTC aspercream with lidocaine (4% lidocaine).            Please let them know.

## 2018-08-21 NOTE — TELEPHONE ENCOUNTER
Call back to Daphney at [facility] without answer left message to call back and ask to speak with triage regarding lidocaine cream.

## 2018-08-21 NOTE — TELEPHONE ENCOUNTER
Information to Nursing dept @ [place].  They will contact pt's family to see if they are willing to purchase the aspercream with lidocaine 4%.  She will send over order for signature

## 2018-08-22 ENCOUNTER — PATIENT OUTREACH (OUTPATIENT)
Dept: GERIATRIC MEDICINE | Facility: CLINIC | Age: 83
End: 2018-08-22

## 2018-08-22 NOTE — PROGRESS NOTES
[facility] Care Coordination Contact  Received an email from son Ed to see if Aspercreme with 4% lidocaine is covered. Checked with pharmacy [facility] and it is not covered under MA or EW.  Checked with [facility] and they can get the Aspercreme with 4% lidocaine 2.7 oz per tube with two tube minimum per month. Note routed to PCP in case [facility] contacts PCP for orders.  Autumn Jacobsen RN, PHN, CCM  [facility]   [phone]

## 2018-08-24 ENCOUNTER — TELEPHONE (OUTPATIENT)
Dept: FAMILY MEDICINE | Facility: CLINIC | Age: 83
End: 2018-08-24

## 2018-08-24 NOTE — TELEPHONE ENCOUNTER
Our goal is to have forms completed within 72 hours, however some forms may require a visit or additional information.    What clinic location was the form placed at [facility] or [place].?     Who is the form from?   Where did the form come from? Faxed to clinic   The form was placed in the inbox of Leo Melgar MD      Please fax to [phone]  Phone number: [phone]    Additional comments: [facility]  aspercreme w/ lidocaine rx      Call take on 8/24/2018 at 12:05 PM by Helene Yu

## 2018-08-27 ENCOUNTER — OFFICE VISIT (OUTPATIENT)
Dept: PODIATRY | Facility: CLINIC | Age: 83
End: 2018-08-27
Payer: COMMERCIAL

## 2018-08-27 VITALS
HEIGHT: 60 IN | WEIGHT: 173.4 LBS | DIASTOLIC BLOOD PRESSURE: 68 MMHG | BODY MASS INDEX: 34.04 KG/M2 | SYSTOLIC BLOOD PRESSURE: 124 MMHG

## 2018-08-27 DIAGNOSIS — E11.9 TYPE 2 DIABETES MELLITUS WITHOUT COMPLICATION, WITHOUT LONG-TERM CURRENT USE OF INSULIN (H): Primary | ICD-10-CM

## 2018-08-27 DIAGNOSIS — M79.671 PAIN IN BOTH FEET: ICD-10-CM

## 2018-08-27 DIAGNOSIS — M79.672 PAIN IN BOTH FEET: ICD-10-CM

## 2018-08-27 DIAGNOSIS — I73.9 PAD (PERIPHERAL ARTERY DISEASE) (H): ICD-10-CM

## 2018-08-27 DIAGNOSIS — R09.89 DECREASED PEDAL PULSES: ICD-10-CM

## 2018-08-27 PROCEDURE — 99213 OFFICE O/P EST LOW 20 MIN: CPT | Performed by: PODIATRIST

## 2018-08-27 NOTE — LETTER
2018         RE: Regla Benavidez  C/o David Benavidez  [address]  [place] [address]        Dear Colleague,    Thank you for referring your patient, Regla Benavidez, to the [facility]. Please see a copy of my visit note below.    ASSESSMENT/PLAN:    Encounter Diagnoses   Name Primary?     Type 2 diabetes mellitus without complication, without long-term current use of insulin (H) Yes     Decreased pedal pulses      PAD (peripheral artery disease) (H)      Pain in both feet      No acute findings. No wounds.    I think her foot pain is mechanical and related to her over pronation.  New orthoses and shoes might help relieve this.    Pt is referred to the [facility] for prescription orthoses and extra-depth shoes.    At her last visit on 17 I discussed having her circulation evaluated via STEVE/ US.  I do not see that this was done and her family is interested on proceeding with this.        Weight management plan: Patient was referred to their PCP to discuss a diet and exercise plan.      Bill Garcia DPM, FACFAS, MS    [facility] Department of Podiatry/Foot & Ankle Surgery      ____________________________________________________________________    HPI:       Accompanied by family.     Chief Complaint: reported pain in her arches.   Onset of problem: months  Pain/ discomfort is described as:  Deep ache, throbbing  Ratin/10   Frequency:  daily    The pain is made worse with sitting and at night  Secondary concerns:  Recent hospitalization for cellulitis.  Restless legs.   She has type 2 DM; Last Hgb A1C = 6.8.      Patient Active Problem List   Diagnosis     Preventive measure     Cerebral infarction (H)     Constipation     Hard of hearing     Branch retinal vein occlusion of left eye     Restless legs syndrome     Urinary frequency     Type 2 diabetes mellitus without complication (H)     Expressive aphasia     Aspiration pneumonia (H)      ACP (advance care planning)     Hyponatremia     Recurrent UTI     Low back pain     Abdominal bloating     Abnormal weight gain     Cyst of left ovary     Health Care Home     Prerenal azotemia     Bilateral leg edema     Type 2 diabetes mellitus with hyperglycemia, without long-term current use of insulin (H)     Other vaginitis     Dermatitis     Continuous leakage of urine     Bilateral hearing loss, unspecified hearing loss type     CRI (chronic renal insufficiency), stage 3 (moderate)     PAD (peripheral artery disease) (H)     CKD (chronic kidney disease) stage 3, GFR 30-59 ml/min     Class 2 obesity due to excess calories with serious comorbidity and body mass index (BMI) of 35.0 to 35.9 in adult     Abnormal lung sounds- rhonchi Rt base      Type 2 diabetes mellitus with stage 3 chronic kidney disease, without long-term current use of insulin (H)     Left foot pain     Cellulitis left leg     Cellulitis, leg     Hyperuricemia     Pain in both feet     Foot deformity, bilateral     Diabetic polyneuropathy associated with other specified diabetes mellitus (H)     Bilateral bunions     Past Surgical History:   Procedure Laterality Date     CATARACT IOL, RT/LT       Current Outpatient Prescriptions   Medication Sig Dispense Refill     acetaminophen (TYLENOL) 325 MG tablet Take 2 tablets (650 mg) by mouth At Bedtime Take 2 tablets at bedtime 184 tablet 2     ACETAMINOPHEN PO Take 500 mg by mouth every 8 hours as needed for pain       alcohol swab prep pads Use to swab area of injection/ginger as directed 100 each 3     aspirin 325 MG tablet Take 1 tablet (325 mg) by mouth daily 120 tablet      blood glucose calibration (NO BRAND SPECIFIED) solution Use to calibrate blood glucose monitor as needed as directed. To accompany: Accu-Chek Safe-T pro plus 1 Bottle 3     blood glucose monitoring (NO BRAND SPECIFIED) test strip Use to test blood sugars 1 times daily or as directed 100 strip 3     carbamide peroxide  (DEBROX) 6.5 % otic solution Place 5 drops into both ears daily as needed for other       Cholecalciferol (VITAMIN D3 PO) Take 2,000 Units by mouth daily       furosemide (LASIX) 20 MG tablet Take 1 tablet (20 mg) by mouth 2 times daily As of 1/27/18 60 tablet 12     glipiZIDE (GLUCOTROL) 10 MG tablet Take 1 tablet (10 mg) by mouth 2 times daily (before meals) 60 tablet 11     Lactobacillus (LACTINEX PO) Take 1 tablet by mouth 3 times daily 0900; 1300; 1645       lidocaine (LMX4) 4 % CREA cream Apply to both feet  g 11     metFORMIN (GLUCOPHAGE) 500 MG tablet Take 500 mg by mouth daily  30 tablet 12     nystatin (MYCOSTATIN) 983768 UNIT/GM POWD Apply topically daily       nystatin (MYCOSTATIN) 805745 UNIT/GM POWD Apply 1 g topically 3 times daily as needed 60 g 11     Ondansetron HCl (ZOFRAN PO) Take 4 mg by mouth every 8 hours as needed for nausea or vomiting       ORDER FOR DME Equipment being ordered: Large Underpads 3 Package 6     ORDER FOR DME Equipment being ordered: Esthela xtra absorbent large pull ups - Uses 5 per day. 150 Units 11     ORDER FOR DME Equipment being ordered: retractable safety lancets 100 lancet 1     ORDER FOR DME Equipment being ordered: glucometer kit- brand per insurance coverage  Patient prefers brand with DRUM lancets - patient is to test fasting once each morning  #30 with 5 refills each of the lancets and test strips 1 Device 0     polyethylene glycol (MIRALAX/GLYCOLAX) Packet Take 8.5 g by mouth every other day        pramipexole (MIRAPEX) 0.25 MG tablet Take 1.5 tablets (0.375 mg) by mouth At Bedtime 45 tablet 11     ranitidine (ZANTAC) 150 MG tablet Take 1 tablet (150 mg) by mouth 2 times daily 60 tablet 5     simvastatin (ZOCOR) 5 MG tablet Take 1 tablet (5 mg) by mouth every evening 30 tablet      sitagliptin (JANUVIA) 50 MG tablet Take 1 tablet (50 mg) by mouth daily 30 tablet 11     spironolactone (ALDACTONE) 25 MG tablet Take 0.5 tablets (12.5 mg) by mouth daily 30  "tablet 11     thin (NO BRAND SPECIFIED) lancets Use to test blood sugar 1 times daily or as directed. To accompany: Accu-Cheks safety pro plus 100 each 3     blood glucose monitoring (NO BRAND SPECIFIED) test strip Use to test blood sugar 1 times daily or as directed. To accompany: Accu-Chek safety pro plus (Patient not taking: Reported on 8/4/2018) 100 strip 6     blood glucose monitoring (ONETOUCH ULTRA) test strip USE TO TEST ONCE EVERY MORNING AS DIRECTED (Patient not taking: Reported on 8/4/2018) 100 each 11       ROS:    A 10-point review of systems was performed.  It is positive for that noted in the HPI and as seen below.  All other systems found to be negative.     Numbness in feet?  no   Calf pain with walking? no  Recent foot/ankle injury? no  Weight change  over past 12 months? no  Self perception as overweight? yes  Recent flu-like symptoms? no  Joint pain other than feet ? back    EXAM:    Vitals: /68 (BP Location: Right arm, Patient Position: Chair, Cuff Size: Adult Large)  Ht 5' (1.524 m)  Wt 173 lb 6.4 oz (78.7 kg)  LMP  (LMP Unknown)  BMI 33.86 kg/m2  BMI: Body mass index is 33.86 kg/(m^2).  Height: 5' 0\"    Constitutional/ general:  Pt is in no apparent distress, appears well-nourished.  Cooperative with history and physical exam.     Vascular:  Pedal pulses are not readily palpable bilaterally for both the DP and PT arteries.     Neuro:  Alert and oriented x 3. Coordinated gait.  Light touch sensation is intact to the L4, L5, S1 distributions. No obvious deficits.  No evidence of neurological-based weakness, spasticity, or contracture in the lower extremities.     Derm: Normal texture and turgor.  No erythema, ecchymosis, or cyanosis.  No open lesions.  Protective sensation is intact per [place] Monofilament testing.    Musculoskeletal:    Lower extremity muscle strength is normal.  Decrease in medial longitudinal arch with weight bearing.     Bill Garcia, [place], FACFAS, " MS [place] Department of Podiatry/Foot & Ankle Surgery              Again, thank you for allowing me to participate in the care of your patient.        Sincerely,        Bill Garcia DPM

## 2018-08-27 NOTE — PROGRESS NOTES
ASSESSMENT/PLAN:    Encounter Diagnoses   Name Primary?     Type 2 diabetes mellitus without complication, without long-term current use of insulin (H) Yes     Decreased pedal pulses      PAD (peripheral artery disease) (H)      Pain in both feet      No acute findings. No wounds.    I think her foot pain is mechanical and related to her over pronation.  New orthoses and shoes might help relieve this.    Pt is referred to the [facility] for prescription orthoses and extra-depth shoes.    At her last visit on 17 I discussed having her circulation evaluated via STEVE/ US.  I do not see that this was done and her family is interested on proceeding with this.        Weight management plan: Patient was referred to their PCP to discuss a diet and exercise plan.      Bill Garcia DPM, FACFAS, MS    [facility]      ____________________________________________________________________    HPI:       Accompanied by family.     Chief Complaint: reported pain in her arches.   Onset of problem: months  Pain/ discomfort is described as:  Deep ache, throbbing  Ratin/10   Frequency:  daily    The pain is made worse with sitting and at night  Secondary concerns:  Recent hospitalization for cellulitis.  Restless legs.   She has type 2 DM; Last Hgb A1C = 6.8.      Patient Active Problem List   Diagnosis     Preventive measure     Cerebral infarction (H)     Constipation     Hard of hearing     Branch retinal vein occlusion of left eye     Restless legs syndrome     Urinary frequency     Type 2 diabetes mellitus without complication (H)     Expressive aphasia     Aspiration pneumonia (H)     ACP (advance care planning)     Hyponatremia     Recurrent UTI     Low back pain     Abdominal bloating     Abnormal weight gain     Cyst of left ovary     Health Care Home     Prerenal azotemia     Bilateral leg edema     Type 2 diabetes mellitus with hyperglycemia,  without long-term current use of insulin (H)     Other vaginitis     Dermatitis     Continuous leakage of urine     Bilateral hearing loss, unspecified hearing loss type     CRI (chronic renal insufficiency), stage 3 (moderate)     PAD (peripheral artery disease) (H)     CKD (chronic kidney disease) stage 3, GFR 30-59 ml/min     Class 2 obesity due to excess calories with serious comorbidity and body mass index (BMI) of 35.0 to 35.9 in adult     Abnormal lung sounds- rhonchi Rt base      Type 2 diabetes mellitus with stage 3 chronic kidney disease, without long-term current use of insulin (H)     Left foot pain     Cellulitis left leg     Cellulitis, leg     Hyperuricemia     Pain in both feet     Foot deformity, bilateral     Diabetic polyneuropathy associated with other specified diabetes mellitus (H)     Bilateral bunions     Past Surgical History:   Procedure Laterality Date     CATARACT IOL, RT/LT       Current Outpatient Prescriptions   Medication Sig Dispense Refill     acetaminophen (TYLENOL) 325 MG tablet Take 2 tablets (650 mg) by mouth At Bedtime Take 2 tablets at bedtime 184 tablet 2     ACETAMINOPHEN PO Take 500 mg by mouth every 8 hours as needed for pain       alcohol swab prep pads Use to swab area of injection/ginger as directed 100 each 3     aspirin 325 MG tablet Take 1 tablet (325 mg) by mouth daily 120 tablet      blood glucose calibration (NO BRAND SPECIFIED) solution Use to calibrate blood glucose monitor as needed as directed. To accompany: Accu-Chek Safe-T pro plus 1 Bottle 3     blood glucose monitoring (NO BRAND SPECIFIED) test strip Use to test blood sugars 1 times daily or as directed 100 strip 3     carbamide peroxide (DEBROX) 6.5 % otic solution Place 5 drops into both ears daily as needed for other       Cholecalciferol (VITAMIN D3 PO) Take 2,000 Units by mouth daily       furosemide (LASIX) 20 MG tablet Take 1 tablet (20 mg) by mouth 2 times daily As of 1/27/18 60 tablet 12      glipiZIDE (GLUCOTROL) 10 MG tablet Take 1 tablet (10 mg) by mouth 2 times daily (before meals) 60 tablet 11     Lactobacillus (LACTINEX PO) Take 1 tablet by mouth 3 times daily 0900; 1300; 1645       lidocaine (LMX4) 4 % CREA cream Apply to both feet  g 11     metFORMIN (GLUCOPHAGE) 500 MG tablet Take 500 mg by mouth daily  30 tablet 12     nystatin (MYCOSTATIN) 193804 UNIT/GM POWD Apply topically daily       nystatin (MYCOSTATIN) 437596 UNIT/GM POWD Apply 1 g topically 3 times daily as needed 60 g 11     Ondansetron HCl (ZOFRAN PO) Take 4 mg by mouth every 8 hours as needed for nausea or vomiting       ORDER FOR DME Equipment being ordered: Large Underpads 3 Package 6     ORDER FOR DME Equipment being ordered: Esthela xtra absorbent large pull ups - Uses 5 per day. 150 Units 11     ORDER FOR DME Equipment being ordered: retractable safety lancets 100 lancet 1     ORDER FOR DME Equipment being ordered: glucometer kit- brand per insurance coverage  Patient prefers brand with DRUM lancets - patient is to test fasting once each morning  #30 with 5 refills each of the lancets and test strips 1 Device 0     polyethylene glycol (MIRALAX/GLYCOLAX) Packet Take 8.5 g by mouth every other day        pramipexole (MIRAPEX) 0.25 MG tablet Take 1.5 tablets (0.375 mg) by mouth At Bedtime 45 tablet 11     ranitidine (ZANTAC) 150 MG tablet Take 1 tablet (150 mg) by mouth 2 times daily 60 tablet 5     simvastatin (ZOCOR) 5 MG tablet Take 1 tablet (5 mg) by mouth every evening 30 tablet      sitagliptin (JANUVIA) 50 MG tablet Take 1 tablet (50 mg) by mouth daily 30 tablet 11     spironolactone (ALDACTONE) 25 MG tablet Take 0.5 tablets (12.5 mg) by mouth daily 30 tablet 11     thin (NO BRAND SPECIFIED) lancets Use to test blood sugar 1 times daily or as directed. To accompany: Accu-Cheks safety pro plus 100 each 3     blood glucose monitoring (NO BRAND SPECIFIED) test strip Use to test blood sugar 1 times daily or as directed. To  "accompany: Accu-Chek safety pro plus (Patient not taking: Reported on 8/4/2018) 100 strip 6     blood glucose monitoring (ONETOUCH ULTRA) test strip USE TO TEST ONCE EVERY MORNING AS DIRECTED (Patient not taking: Reported on 8/4/2018) 100 each 11       ROS:    A 10-point review of systems was performed.  It is positive for that noted in the HPI and as seen below.  All other systems found to be negative.     Numbness in feet?  no   Calf pain with walking? no  Recent foot/ankle injury? no  Weight change  over past 12 months? no  Self perception as overweight? yes  Recent flu-like symptoms? no  Joint pain other than feet ? back    EXAM:    Vitals: /68 (BP Location: Right arm, Patient Position: Chair, Cuff Size: Adult Large)  Ht 5' (1.524 m)  Wt 173 lb 6.4 oz (78.7 kg)  LMP  (LMP Unknown)  BMI 33.86 kg/m2  BMI: Body mass index is 33.86 kg/(m^2).  Height: 5' 0\"    Constitutional/ general:  Pt is in no apparent distress, appears well-nourished.  Cooperative with history and physical exam.     Vascular:  Pedal pulses are not readily palpable bilaterally for both the DP and PT arteries.     Neuro:  Alert and oriented x 3. Coordinated gait.  Light touch sensation is intact to the L4, L5, S1 distributions. No obvious deficits.  No evidence of neurological-based weakness, spasticity, or contracture in the lower extremities.     Derm: Normal texture and turgor.  No erythema, ecchymosis, or cyanosis.  No open lesions.  Protective sensation is intact per Newton-Beth Monofilament testing.    Musculoskeletal:    Lower extremity muscle strength is normal.  Decrease in medial longitudinal arch with weight bearing.     Bill Garcia, WESTLEY, FACFAS, MS    [facility] & [facility]le Surgery            "

## 2018-08-27 NOTE — PATIENT INSTRUCTIONS
Thank you for choosing [facility]!    DR. CERDA'S CLINIC LOCATIONS     MONDAY - [place] WEDNESDAY - [place]   [address] [place] [address] [place] [address]   [phone] / [phone] / [phone]       THURSDAY - [place] SCHEDULE SURGERY: [phone]   [address] APPOINTMENTS: [phone]   [place] [address] BILLING QUESTIONS: [phone] / [phone]       [facility] LOCATIONS  [facility]  [address]  [place] [address]  Phone: [phone]  Fax: [phone] [facility]  [address]  [place] [address]  Phone: [phone]   Fax: [phone]   [facility]  [address]  [place] [address]  Phone: [phone]  Fax: [phone] [facility]  [address]  [place] [address]  Phone: [phone]   Fax: [phone]   [facility]   [address]  [place] [address]  Phone: [phone]   Fax: [phone] * Please call any location listed to make an appointment for a casting/fitting. Your referral was sent to their central office and they will all have the order on file.     [facility] SCHEDULING  They should be calling you within 24 hours to schedule your scan.  If not, please call the location discussed at your appointment.    1) [facility]:       [phone]      [address] [place] [address]    2) [facility]:      [phone] [address]      [place] [address]    3) [facility]:       [phone]      [address] [place] [address]    * We will call you with the results. Please allow 24-48 hours for the results to be read and final.              DIABETES AND YOUR FEET  Diabetes can result in several problems in  "the feet including ulcers (open sores) and amputations. Two of the most important reasons why people develop foot problems when they have diabetes is : 1. Neuropathy (loss of feeling)  2. Vascular disease (loss or decrease of blood flow).    Neuropathy is a term used to describe a loss of nerve function.  Patients with diabetes are at risk of developing neuropathy if their sugars continue to run high and are above the normal value. One theory for neuropathy is that the \"extra\" sugar in the body enters the nerves and is broken down. These by-products build up in the nerve causing it to swell and impairing nerve function. Often times, this can be prevented by controlling your sugars, dieting and exercise.    When a person develops neuropathy, they usually begin to feel numbness or tingling in their feet and sometime in their legs.  Other symptoms may include painful burning or hot feet, tingling or feeling like insects or ants are crawling on your feet or legs.  If the diabetes is sever and the sugars run high for long periods of time, neuropathy can also occur in the hands.    Vascular disease  is a term used to describe a loss or decrease in circulation (blood flow). There is a problem in getting blood and oxygen to areas that need it. Similar to neuropathy, sugars can build up in the walls of the arteries (blood vessels) and cause them to become swollen, thickened and hardened. This decreases the amount of blood that can go to an area that needs it. Though this is common in the legs of diabetic patients, it can also affect other arteries (blood vessels) in the body such as in the heart and eyes.    In the legs, vascular disease usually results in cramping. Patients who develop leg cramps after walking the same distance every time (i.e. One block, half a mile, ect.) need to let their doctors know so that their circulation may be checked. Cramps causing severe pain in the feet and/or legs while sleeping and the " cramps go away when you stand or hang your legs off the side of the bed, may also be a sign of poor blood circulation.  Occasional cramping in cold weather or on rare occasions with activity may not be due to poor circulation, but you should inform your doctor.    PREVENTION OF THESE DISEASES  The key to prevention is good blood sugar control. Poor blood sugar control is a big reason many of these problems start. Physical activity (exercise) is a very good way to help decrease your blood sugars. Exercise can lower your blood sugar, blood pressure, and cholesterol. It also reduces your risk for heart disease and stroke, relieves stress, and strengthens your heart, muscles and bones.  In addition, regular activity helps insulin work better, improves your blood circulation, and keeps your joints flexible. If you're trying to lose weight, a combination of exercise and wise food choices can help you reach your target weight and maintain it.      PAIN MANAGEMENT  1.Blood Sugar Control - Most important  2. Medications such as:  Amytriptylline, duloxetine, gabapentin, lyrica, tramadol  3. Nutritional therapy:  Vitamin B6 (100mg daily), Vitamin B12 (75mcg daily), Vitamin D 2000 IU daily), Alpha-Lipoic Acid (600-1800mg daily), Acetyl-L-Carnitine (500-1000mg TID, L-methyl folate (1500mcg daily)    ** Metformin can block Vitamin B6 and B12 so it is important to supplement**    FOOT CARE RECOMMENDATIONS   1. Wash your feet with lukewarm water and a mild soap and then dry them thoroughly, especially between the toes.     2. Examine your feet daily looking for cuts, corns, blisters, cracks, ect, especially after wearing new shoes. Make sure to look between your toes. If you cannot see the bottom of your feet, set a mirror on the floor and hold your foot over it, or ask a spouse, friend or family member to examine your feet for you. Contact your doctor immediately if new problems are noted or if sores are not healing.     3.  Immediately apply moisturizer to the tops and bottoms of your feet, avoiding areas between the toes. Hand lotion (Intesive Care, Christin, Eucerin, Neutrogena, Curel, ect) is sufficient unless your doctor prescribes a medicated lotion. Apply sunscreen to your feet when going swimming outside.     4. Use clean comfortable shoes, wear white socks (if you have any bleeding or drainage, you will see it on white socks). Socks should not have thick seams or cut off the circulation around the leg. Break in new shoes slowly and rotate with older shoes until broken in. Check the inside of your shoes with your hand to look for areas of irritation or objects that may have fallen into your shoes.       5. Keep slippers by the side of your bed for use during the night.     6.  Shoes should be fitted by a professional and should not cause areas of irritation.  Check your feet regularly when wearing a new pair of shoes and replace them as needed.     7.  Talk to your doctor about proper exercise. Exercise and stretching stimulate blood flow to your feet and maintain proper glucose levels.     8.  Monitor your blood glucose level as instructed by your doctor. Notify your doctor immediately if your blood sugar is abnormally high or low.    9. Cut your nails straight across, but then gently round any sharp edges with a cardboard nail file. If you have neuropathy, peripheral vascular disease or cannot see that well to trim your own toenails contact [facility] ([phone]) or [facility] ([phone]).      THINGS TO AVOID DOING   1.  Do not soak your feet if you have an open sore. Use only lukewarm water and always check the temperature with your hand as hot water can easily burn your feet.       2.  Never use a hot water bottle or heating pad on your feet. Also do not apply cold compresses to your feet. With decreased sensation, you could burn or freeze your feet.       3.  Do not apply any of these to your feet:    -  Over the  counter medicine for corns or warts    -  Harsh chemicals like boric acid    -  Do not self-treat corns, cuts, blisters or infections. Always consult your doctor.       4.  Do not wear sandals, slippers or walk barefoot, especially on hot sand or concrete or other harsh surfaces.     5.  If you smoke, stop!!!              BODY MASS INDEX (BMI)  Many things can cause foot and ankle problems. Foot structure, activity level, foot mechanics and injuries are common causes of pain.  One very important issue that often goes unmentioned, is body weight.  Extra weight can cause increased stress on muscles, ligaments, bones and tendons.  Sometimes just a few extra pounds is all it takes to put one over her/his threshold. Without reducing that stress, it can be difficult to alleviate pain. Some people are uncomfortable addressing this issue, but we feel it is important for you to think about it. As Foot &  Ankle specialists, our job is addressing the lower extremity problem and possible causes. Regarding extra body weight, we encourage patients to discuss diet and weight management plans with their primary care doctors. It is this team approach that gives you the best opportunity for pain relief and getting you back on your feet.

## 2018-08-27 NOTE — MR AVS SNAPSHOT
After Visit Summary   8/27/2018    Regla Benavidez    MRN: [id number]           Patient Information     Date Of Birth          1/5/1926        Visit Information        Provider Department      8/27/2018 10:45 AM Bill Cerda DPM [facility]        Care Instructions    Thank you for choosing [facility]!    DR. CERDA'S CLINIC LOCATIONS     MONDAY - [place] WEDNESDAY - [place]   [address] [place] [address] [place] [address]   [phone] / [phone] / [phone]       THURSDAY - [place] SCHEDULE SURGERY: [phone]   [address] APPOINTMENTS: [phone]   [place] [address] BILLING QUESTIONS: [phone] / [phone]       [facility] LOCATIONS  [facility]  [address]  [place] [address]  Phone: [phone]  Fax: [phone] [facility]  [address]  [place] [address]  Phone: [phone]   Fax: [phone]   [facility]  [address]  [place] [address]  Phone: [phone]  Fax: [phone] [facility]  [address]  [place] [address]  Phone: [phone]   Fax: [phone]   [facility]   [address]  [place] [address]  Phone: [phone]   Fax: [phone] * Please call any location listed to make an appointment for a casting/fitting. Your referral was sent to their central office and they will all have the order on file.     [facility] SCHEDULING  They should be calling you within 24 hours to schedule your scan.  If not, please call the location discussed at your appointment.    1) [facility]:       [phone]      [address] [place] [address]    2) [facility]:      [phone] [address] [address]    3) [facility]:       [phone]      [facility] [address] [place] [address]    * We will call you with the results. Please allow 24-48 hours for the results to be read and final.              DIABETES AND YOUR FEET  Diabetes can result in several problems in the feet including ulcers (open sores) and amputations. Two of the most important reasons why people develop foot problems when they have diabetes is : 1. Neuropathy (loss of feeling)  2. Vascular disease (loss or decrease of blood flow).    Neuropathy is a term used to describe a loss of nerve function.  Patients with diabetes are at risk of developing neuropathy if their sugars continue to run high and are above the normal value. One theory for neuropathy is that the \"extra\" sugar in the body enters the nerves and is broken down. These by-products build up in the nerve causing it to swell and impairing nerve function. Often times, this can be prevented by controlling your sugars, dieting and exercise.    When a person develops neuropathy, they usually begin to feel numbness or tingling in their feet and sometime in their legs.  Other symptoms may include painful burning or hot feet, tingling or feeling like insects or ants are crawling on your feet or legs.  If the diabetes is sever and the sugars run high for long periods of time, neuropathy can also occur in the hands.    Vascular disease  is a term used to describe a loss or decrease in circulation (blood flow). There is a problem in getting blood and oxygen to areas that need it. Similar to neuropathy, sugars can build up in the walls of the arteries (blood vessels) and cause them to become swollen, thickened and hardened. This decreases the amount of blood that can go to an area that needs it. Though this is common in the legs of diabetic patients, it can also affect other arteries (blood vessels) in the body such as in the heart and eyes.    In the " legs, vascular disease usually results in cramping. Patients who develop leg cramps after walking the same distance every time (i.e. One block, half a mile, ect.) need to let their doctors know so that their circulation may be checked. Cramps causing severe pain in the feet and/or legs while sleeping and the cramps go away when you stand or hang your legs off the side of the bed, may also be a sign of poor blood circulation.  Occasional cramping in cold weather or on rare occasions with activity may not be due to poor circulation, but you should inform your doctor.    PREVENTION OF THESE DISEASES  The key to prevention is good blood sugar control. Poor blood sugar control is a big reason many of these problems start. Physical activity (exercise) is a very good way to help decrease your blood sugars. Exercise can lower your blood sugar, blood pressure, and cholesterol. It also reduces your risk for heart disease and stroke, relieves stress, and strengthens your heart, muscles and bones.  In addition, regular activity helps insulin work better, improves your blood circulation, and keeps your joints flexible. If you're trying to lose weight, a combination of exercise and wise food choices can help you reach your target weight and maintain it.      PAIN MANAGEMENT  1.Blood Sugar Control - Most important  2. Medications such as:  Amytriptylline, duloxetine, gabapentin, lyrica, tramadol  3. Nutritional therapy:  Vitamin B6 (100mg daily), Vitamin B12 (75mcg daily), Vitamin D 2000 IU daily), Alpha-Lipoic Acid (600-1800mg daily), Acetyl-L-Carnitine (500-1000mg TID, L-methyl folate (1500mcg daily)    ** Metformin can block Vitamin B6 and B12 so it is important to supplement**    FOOT CARE RECOMMENDATIONS   1. Wash your feet with lukewarm water and a mild soap and then dry them thoroughly, especially between the toes.     2. Examine your feet daily looking for cuts, corns, blisters, cracks, ect, especially after wearing new  shoes. Make sure to look between your toes. If you cannot see the bottom of your feet, set a mirror on the floor and hold your foot over it, or ask a spouse, friend or family member to examine your feet for you. Contact your doctor immediately if new problems are noted or if sores are not healing.     3. Immediately apply moisturizer to the tops and bottoms of your feet, avoiding areas between the toes. Hand lotion (Intesive Care, Christin, Eucerin, Neutrogena, Curel, ect) is sufficient unless your doctor prescribes a medicated lotion. Apply sunscreen to your feet when going swimming outside.     4. Use clean comfortable shoes, wear white socks (if you have any bleeding or drainage, you will see it on white socks). Socks should not have thick seams or cut off the circulation around the leg. Break in new shoes slowly and rotate with older shoes until broken in. Check the inside of your shoes with your hand to look for areas of irritation or objects that may have fallen into your shoes.       5. Keep slippers by the side of your bed for use during the night.     6.  Shoes should be fitted by a professional and should not cause areas of irritation.  Check your feet regularly when wearing a new pair of shoes and replace them as needed.     7.  Talk to your doctor about proper exercise. Exercise and stretching stimulate blood flow to your feet and maintain proper glucose levels.     8.  Monitor your blood glucose level as instructed by your doctor. Notify your doctor immediately if your blood sugar is abnormally high or low.    9. Cut your nails straight across, but then gently round any sharp edges with a cardboard nail file. If you have neuropathy, peripheral vascular disease or cannot see that well to trim your own toenails contact [facility] [employer] ([phone]) or [facility] ([phone]).      THINGS TO AVOID DOING   1.  Do not soak your feet if you have an open sore. Use only lukewarm water and always check the  temperature with your hand as hot water can easily burn your feet.       2.  Never use a hot water bottle or heating pad on your feet. Also do not apply cold compresses to your feet. With decreased sensation, you could burn or freeze your feet.       3.  Do not apply any of these to your feet:    -  Over the counter medicine for corns or warts    -  Harsh chemicals like boric acid    -  Do not self-treat corns, cuts, blisters or infections. Always consult your doctor.       4.  Do not wear sandals, slippers or walk barefoot, especially on hot sand or concrete or other harsh surfaces.     5.  If you smoke, stop!!!              BODY MASS INDEX (BMI)  Many things can cause foot and ankle problems. Foot structure, activity level, foot mechanics and injuries are common causes of pain.  One very important issue that often goes unmentioned, is body weight.  Extra weight can cause increased stress on muscles, ligaments, bones and tendons.  Sometimes just a few extra pounds is all it takes to put one over her/his threshold. Without reducing that stress, it can be difficult to alleviate pain. Some people are uncomfortable addressing this issue, but we feel it is important for you to think about it. As Foot &  Ankle specialists, our job is addressing the lower extremity problem and possible causes. Regarding extra body weight, we encourage patients to discuss diet and weight management plans with their primary care doctors. It is this team approach that gives you the best opportunity for pain relief and getting you back on your feet.                Follow-ups after your visit        Who to contact     If you have questions or need follow up information about today's clinic visit or your schedule please contact [facility] directly at [phone].  Normal or non-critical lab and imaging results will be communicated to you by MyChart, letter or phone within 4 business days after the clinic has received  the results. If you do not hear from us within 7 days, please contact the clinic through [employer] or phone. If you have a critical or abnormal lab result, we will notify you by phone as soon as possible.  Submit refill requests through [employer] or call your pharmacy and they will forward the refill request to us. Please allow 3 business days for your refill to be completed.          Additional Information About Your Visit        [employer] Information     [employer] gives you secure access to your electronic health record. If you see a primary care provider, you can also send messages to your care team and make appointments. If you have questions, please call your primary care clinic.  If you do not have a primary care provider, please call [phone] and they will assist you.        Care EveryWhere ID     This is your Care EveryWhere ID. This could be used by other organizations to access your [facility] medical records  [id number]        Your Vitals Were     Height Last Period BMI (Body Mass Index)             5' (1.524 m) (LMP Unknown) 33.86 kg/m2          Blood Pressure from Last 3 Encounters:   08/27/18 124/68   08/04/18 122/64   04/10/18 118/74    Weight from Last 3 Encounters:   08/27/18 173 lb 6.4 oz (78.7 kg)   08/04/18 174 lb (78.9 kg)   04/10/18 178 lb (80.7 kg)              Today, you had the following     No orders found for display       Primary Care Provider Office Phone # Fax #    Leo Melgar [phone]       [address] [facility] [address]        Equal Access to Services     [facility]: Hadii aad ku hadasho Soomaali, waaxda luqadaha, qaybta kaalmada adeegyada, eriberto priest . So [facility] [phone].    ATENCIÓN: Si habla español, tiene a garzon disposición servicios gratuitos de asistencia lingüística. Llame al [phone].    We comply with applicable federal civil rights laws and [place] laws. We do not discriminate on the basis of race,  color, national origin, age, disability, sex, sexual orientation, or gender identity.            Thank you!     Thank you for choosing [facility]  for your care. Our goal is always to provide you with excellent care. Hearing back from our patients is one way we can continue to improve our services. Please take a few minutes to complete the written survey that you may receive in the mail after your visit with us. Thank you!             Your Updated Medication List - Protect others around you: Learn how to safely use, store and throw away your medicines at www.disposemymeds.org.          This list is accurate as of 8/27/18 11:30 AM.  Always use your most recent med list.                   Brand Name Dispense Instructions for use Diagnosis    * ACETAMINOPHEN PO      Take 500 mg by mouth every 8 hours as needed for pain        * acetaminophen 325 MG tablet    TYLENOL    184 tablet    Take 2 tablets (650 mg) by mouth At Bedtime Take 2 tablets at bedtime    Midline low back pain without sciatica       alcohol swab prep pads     100 each    Use to swab area of injection/ginger as directed    Type 2 diabetes mellitus with hyperglycemia, without long-term current use of insulin (H)       aspirin 325 MG tablet     120 tablet    Take 1 tablet (325 mg) by mouth daily    Stroke (H)       blood glucose calibration solution    NO BRAND SPECIFIED    1 Bottle    Use to calibrate blood glucose monitor as needed as directed. To accompany: Accu-Chek Safe-T pro plus    Type 2 diabetes mellitus with hyperglycemia, without long-term current use of insulin (H)       * blood glucose monitoring test strip    ONETOUCH ULTRA    100 each    USE TO TEST ONCE EVERY MORNING AS DIRECTED    Type 2 diabetes mellitus without complication, without long-term current use of insulin (H)       * blood glucose monitoring test strip    no brand specified    100 strip    Use to test blood sugars 1 times daily or as directed    Type 2  diabetes mellitus with hyperglycemia, without long-term current use of insulin (H)       * blood glucose monitoring test strip    no brand specified    100 strip    Use to test blood sugar 1 times daily or as directed. To accompany: Accu-Chek safety pro plus    Type 2 diabetes mellitus with hyperglycemia, without long-term current use of insulin (H)       carbamide peroxide 6.5 % otic solution    DEBROX     Place 5 drops into both ears daily as needed for other        furosemide 20 MG tablet    LASIX    60 tablet    Take 1 tablet (20 mg) by mouth 2 times daily As of 1/27/18    Bilateral leg edema       glipiZIDE 10 MG tablet    GLUCOTROL    60 tablet    Take 1 tablet (10 mg) by mouth 2 times daily (before meals)    Type 2 diabetes mellitus with hyperglycemia, without long-term current use of insulin (H)       LACTINEX PO      Take 1 tablet by mouth 3 times daily 0900; 1300; 1645        lidocaine 4 % Crea cream    LMX4    133 g    Apply to both feet TID    Diabetic polyneuropathy associated with other specified diabetes mellitus (H)       metFORMIN 500 MG tablet    GLUCOPHAGE    30 tablet    Take 500 mg by mouth daily        * nystatin 799234 UNIT/GM Powd    MYCOSTATIN     Apply topically daily        * nystatin 483680 UNIT/GM Powd    MYCOSTATIN    60 g    Apply 1 g topically 3 times daily as needed        * order for DME     1 Device    Equipment being ordered: glucometer kit- brand per insurance coverage Patient prefers brand with DRUM lancets - patient is to test fasting once each morning #30 with 5 refills each of the lancets and test strips    Type II or unspecified type diabetes mellitus without mention of complication, not stated as uncontrolled       * order for DME     100 lancet    Equipment being ordered: retractable safety lancets    Type II or unspecified type diabetes mellitus without mention of complication, not stated as uncontrolled       order for DME     3 Package    Equipment being ordered: Large  Underpads    Urinary frequency       order for DME     150 Units    Equipment being ordered: Esthela xtra absorbent large pull ups - Uses 5 per day.    Urinary incontinence       polyethylene glycol Packet    MIRALAX/GLYCOLAX     Take 8.5 g by mouth every other day        pramipexole 0.25 MG tablet    MIRAPEX    45 tablet    Take 1.5 tablets (0.375 mg) by mouth At Bedtime    Restless legs syndrome       ranitidine 150 MG tablet    ZANTAC    60 tablet    Take 1 tablet (150 mg) by mouth 2 times daily    Gastroesophageal reflux disease without esophagitis       simvastatin 5 MG tablet    ZOCOR    30 tablet    Take 1 tablet (5 mg) by mouth every evening    Stroke (H)       sitagliptin 50 MG tablet    JANUVIA    30 tablet    Take 1 tablet (50 mg) by mouth daily    Uncontrolled type 2 diabetes mellitus with complication, without long-term current use of insulin (H), Type 2 diabetes mellitus with hyperglycemia, without long-term current use of insulin (H)       spironolactone 25 MG tablet    ALDACTONE    30 tablet    Take 0.5 tablets (12.5 mg) by mouth daily    Bilateral leg edema       thin lancets    NO BRAND SPECIFIED    100 each    Use to test blood sugar 1 times daily or as directed. To accompany: Accu-Cheks safety pro plus    Type 2 diabetes mellitus with hyperglycemia, without long-term current use of insulin (H)       VITAMIN D3 PO      Take 2,000 Units by mouth daily        ZOFRAN PO      Take 4 mg by mouth every 8 hours as needed for nausea or vomiting        * Notice:  This list has 9 medication(s) that are the same as other medications prescribed for you. Read the directions carefully, and ask your doctor or other care provider to review them with you.

## 2018-09-11 ENCOUNTER — HOSPITAL ENCOUNTER (OUTPATIENT)
Dept: ULTRASOUND IMAGING | Facility: CLINIC | Age: 83
Discharge: HOME | End: 2018-09-11
Attending: PODIATRIST | Admitting: PODIATRIST
Payer: COMMERCIAL

## 2018-09-11 DIAGNOSIS — I73.9 PAD (PERIPHERAL ARTERY DISEASE) (H): ICD-10-CM

## 2018-09-11 DIAGNOSIS — M79.672 PAIN IN BOTH FEET: ICD-10-CM

## 2018-09-11 DIAGNOSIS — M79.671 PAIN IN BOTH FEET: ICD-10-CM

## 2018-09-11 PROCEDURE — 93922 UPR/L XTREMITY ART 2 LEVELS: CPT

## 2018-09-17 ENCOUNTER — TELEPHONE (OUTPATIENT)
Dept: OTHER | Facility: CLINIC | Age: 83
End: 2018-09-17

## 2018-09-17 DIAGNOSIS — I73.9 PAD (PERIPHERAL ARTERY DISEASE) (H): Primary | ICD-10-CM

## 2018-09-17 NOTE — TELEPHONE ENCOUNTER
Pt referred to [facility] by Bill Garcia DPM  for PAD- non palpable pedal pulses.      US STEVE DOPPLER NO EXERCISE, 1-2 LEVELS,??? BILAT  9/11/2018 1:57 PM      HISTORY: concern for PAD; Pain in both feet; Pain in both feet; PAD  (peripheral artery disease) (H)     COMPARISON: None.     FINDINGS:   The resting right and left ankle-brachial indices are 1.04 and 0.87  respectively. Waveform analysis indicates biphasic waveforms in the  distal tibial arteries.         IMPRESSION: Findings indicate mild to moderate left lower extremity  arterial insufficiency.         Pt needs to be scheduled for consult with vascular medicine.  Will route to scheduling to coordinate an appointment within the next week.    GALEN McadamsN, RN

## 2018-09-17 NOTE — TELEPHONE ENCOUNTER
Left message on home number for patient to call back to schedule consult appointment with vascular medicine.    Aye Fernando MA

## 2018-09-19 NOTE — PROGRESS NOTES
Per [facility], cream was delivered to client on 09/07/2018.     Angela Benavidez  Case Management Specialist  [facility]  [phone]

## 2018-09-25 ENCOUNTER — TELEPHONE (OUTPATIENT)
Dept: FAMILY MEDICINE | Facility: CLINIC | Age: 83
End: 2018-09-25

## 2018-09-25 NOTE — TELEPHONE ENCOUNTER
Our goal is to have forms completed within 72 hours, however some forms may require a visit or additional information.    What clinic location was the form placed at [facility] or [place].?     Who is the form from?   Where did the form come from? Faxed to clinic   The form was placed in the inbox of Leo Melgar MD      Please fax to [phone]  Phone number: [phone]    Additional comments: [facility]  [employer] therapeutic shoes for medicare     Call take on 9/25/2018 at 3:13 PM by Helene Yu

## 2018-10-03 ENCOUNTER — OFFICE VISIT (OUTPATIENT)
Dept: OTHER | Facility: CLINIC | Age: 83
End: 2018-10-03
Attending: PODIATRIST
Payer: COMMERCIAL

## 2018-10-03 VITALS
BODY MASS INDEX: 33.65 KG/M2 | HEART RATE: 70 BPM | WEIGHT: 172.3 LBS | SYSTOLIC BLOOD PRESSURE: 128 MMHG | OXYGEN SATURATION: 94 % | DIASTOLIC BLOOD PRESSURE: 77 MMHG

## 2018-10-03 DIAGNOSIS — I73.9 PAD (PERIPHERAL ARTERY DISEASE) (H): ICD-10-CM

## 2018-10-03 PROCEDURE — G0463 HOSPITAL OUTPT CLINIC VISIT: HCPCS

## 2018-10-03 PROCEDURE — 99214 OFFICE O/P EST MOD 30 MIN: CPT | Mod: ZP | Performed by: INTERNAL MEDICINE

## 2018-10-03 NOTE — PROGRESS NOTES
Vascular Medicine Consultation     Chief Complaint   Absent pulses    Date of Admission:  (Not on file)    Regla Benavidez is a 89 year old female who is here for absent pulses.    Code Status    For full code full code    Reason for Consult   Reason for consult: I was asked by podiatrist to evaluate this patient for absent pulses.    Primary Care Physician   Leo Melgar      History is obtained from the patient    History of Present Illness   Regla Benavidez is a 89 year old female who presents with referred by the podiatrist for absent pulses patient had an STEVE today which was 1.12 on the right side and 0.87 on the left side with triphasic/biphasic wave form pattern, patient has no specific complaints no pain, resting pain, or nocturnal pain, no skin color changes or temperature changes, no weakness only minimal swelling around the ankle both legs as the patient is living in an assisted living and she uses a wheel chair for ambulation every now and then  Her ambulation is very limited  Patient denies any history of chest pain shortness of breath or palpitations    Past Medical History   I have reviewed this patient's medical history and updated it with pertinent information if needed.   Past Medical History:   Diagnosis Date     Cholelithiasis 2009    incidental finding     DJD (degenerative joint disease) of lumbar spine 2009     Elevated glucose      Expressive aphasia related to prior stroke      Hard of hearing     hearing aids     HTN (hypertension)      Shingles 2005     Varicose vein of leg        Past Surgical History   I have reviewed this patient's surgical history and updated it with pertinent information if needed.  Past Surgical History:   Procedure Laterality Date     CATARACT IOL, RT/LT         Prior to Admission Medications   Cannot display prior to admission medications because the patient has not been admitted in this contact.     Allergies   Allergies   Allergen Reactions     Cozaar  [Losartan Potassium] Swelling     Leg swelling. Same from Avapro.     Hydrochlorothiazide Other (See Comments)     hyponatremia     Hydrocodone Nausea and Vomiting     Nausea       Social History   I have reviewed this patient's social history and updated it with pertinent information if needed. Regla Benavidez  reports that she has never smoked. She has never used smokeless tobacco. She reports that she does not drink alcohol or use illicit drugs.    Family History   I have reviewed this patient's family history and updated it with pertinent information if needed.   Family History   Problem Relation Age of Onset     Diabetes Brother      Unknown/Adopted Mother      Unknown/Adopted Father      Coronary Artery Disease No family hx of      Hypertension No family hx of      Hyperlipidemia No family hx of      Cerebrovascular Disease No family hx of      Breast Cancer No family hx of      Colon Cancer No family hx of      Prostate Cancer No family hx of      Other Cancer No family hx of      Depression No family hx of      Anxiety Disorder No family hx of      Mental Illness No family hx of      Substance Abuse No family hx of      Anesthesia Reaction No family hx of      Asthma No family hx of      Osteoporosis No family hx of      Genetic Disorder No family hx of      Thyroid Disease No family hx of      Obesity No family hx of        Review of Systems   The 10 point Review of Systems is negative other than noted in the HPI or here.     Physical Exam       BP: 128/77 Pulse: 70     SpO2: 94 %      Vital Signs with Ranges  Pulse:  [70-71] 70  BP: (128-134)/(73-77) 128/77  SpO2:  [94 %] 94 %  172 lbs 4.8 oz    Constitutional: awake, alert, cooperative, no apparent distress, and appears stated age  Eyes: Lids and lashes normal, pupils equal, round and reactive to light, extra ocular muscles intact, sclera clear, conjunctiva normal  ENT: normocepalic, without obvious abnormality, oropharynx pink and moist  Hematologic /  Lymphatic: no lymphadenopathy  Respiratory: No increased work of breathing, good air exchange, clear to auscultation bilaterally, no crackles or wheezing  Cardiovascular: regular rate and rhythm, normal S1 and S2 and no murmur noted  GI: Normal bowel sounds, soft, non-distended, non-tender  Skin: no redness, warmth, or swelling, no rashes and no lesions  Musculoskeletal: There is no redness, warmth, or swelling of the joints.  Full range of motion noted.  Motor strength is 5 out of 5 all extremities bilaterally.  Tone is normal.  Neurologic: Awake, alert, oriented to name, place and time.  Cranial nerves II-XII are grossly intact.  Motor is 5 out of 5 bilaterally.    Neuropsychiatric:  Normal affect, memory, insight.  Pulses: Could not appreciate DP and PT pulses bilaterally positive femoral pulses and positive right popliteal pulse  . No carotid bruits appreciated.     Data   Most Recent 3 CBC's:  Recent Labs   Lab Test  03/04/18   0825 03/02/18   1021  03/01/18 2000   WBC  8.6  9.3  12.3*   HGB  13.0  11.1*  12.4   MCV  91  91  90   PLT  276  233  279     Most Recent 3 BMP's:  Recent Labs   Lab Test 08/02/18 05/03/18 03/04/18   0825  03/02/18   1021  03/01/18 2000   NA   --    --   139  138  139   POTASSIUM  4.1  4.3  4.5  4.4  4.1   CHLORIDE   --    --   104  103  101   CO2   --    --   29  30  31   BUN   --    --   21  26  28   CR  1.23*  1.15*  1.00  1.05*  1.15*   ANIONGAP   --    --   6  5  7   AAYUSH   --    --   8.7  8.1*  8.8   GLC  134*  151*  157*  190*  159*     Most Recent 2 LFT's:  Recent Labs   Lab Test 10/22/15  03/15/15   1435  11/15/14   0815   AST  25  32  34   ALT  25  39  29   ALKPHOS   --   66  70   BILITOTAL   --   0.6  0.7     Most Recent 3 INR's:No lab results found.  Most Recent TSH and T4:  Recent Labs   Lab Test  02/09/18   1248   TSH  0.77     Most Recent Hemoglobin A1c:  Recent Labs   Lab Test 08/02/18   A1C  6.8*     Most Recent 6 glucoses:  Recent Labs   Lab Test 08/02/18 05/03/18   03/04/18   0825  03/02/18   1021  03/01/18   2000  02/24/18   1344   GLC  134*  151*  157*  190*  159*  114*         Assessment & Plan   (I73.9) PAD (peripheral artery disease) (H)  Comment: Resting STEVE showed normal STEVE on the right lower extremity and moderate PAD left lower extremity with biphasic/triphasic waveform pattern bilaterally no clinical signs or symptoms suggestive of ischemia    Plan: Continue with aspirin 325 mg p.o. daily, target A1c less than 7% Target LDL less than 70      Johnie Morrow MD

## 2018-10-03 NOTE — MR AVS SNAPSHOT
After Visit Summary   10/3/2018    Regla Benavidez    MRN: [id number]           Patient Information     Date Of Birth          1/5/1926        Visit Information        Provider Department      10/3/2018 2:20 PM Johnie Morrow MD [facility] at  [facility] Center      Today's Diagnoses     PAD (peripheral artery disease) (H)           Follow-ups after your visit        Who to contact     If you have questions or need follow up information about today's clinic visit or your schedule please contact [facility] directly at [phone].  Normal or non-critical lab and imaging results will be communicated to you by MyChart, letter or phone within 4 business days after the clinic has received the results. If you do not hear from us within 7 days, please contact the clinic through [employer] or phone. If you have a critical or abnormal lab result, we will notify you by phone as soon as possible.  Submit refill requests through [employer] or call your pharmacy and they will forward the refill request to us. Please allow 3 business days for your refill to be completed.          Additional Information About Your Visit        MyChart Information     [employer] gives you secure access to your electronic health record. If you see a primary care provider, you can also send messages to your care team and make appointments. If you have questions, please call your primary care clinic.  If you do not have a primary care provider, please call [phone] and they will assist you.        Care EveryWhere ID     This is your Care EveryWhere ID. This could be used by other organizations to access your [facility] medical records  [id number]        Your Vitals Were     Pulse Last Period Pulse Oximetry Breastfeeding? BMI (Body Mass Index)       70 (LMP Unknown) 94% No 33.65 kg/m2        Blood Pressure from Last 3 Encounters:   10/03/18 128/77   08/27/18 124/68    08/04/18 122/64    Weight from Last 3 Encounters:   10/03/18 172 lb 4.8 oz (78.2 kg)   08/27/18 173 lb 6.4 oz (78.7 kg)   08/04/18 174 lb (78.9 kg)              Today, you had the following     No orders found for display       Primary Care Provider Office Phone # Fax #    Leo Melgar [phone]       [address] [facility] [address]        Equal Access to Services     [facility]: Hadii aad ku hadasho Soomaali, waaxda luqadaha, qaybta kaalmada adeegyada, waxay josue hayavelinan tracey priest . So [facility] [phone].    ATENCIÓN: Si habla español, tiene a garzon disposición servicios gratuitos de asistencia lingüística. [facility] [phone].    We comply with applicable federal civil rights laws and [place] laws. We do not discriminate on the basis of race, color, national origin, age, disability, sex, sexual orientation, or gender identity.            Thank you!     Thank you for choosing [facility] [place]  for your care. Our goal is always to provide you with excellent care. Hearing back from our patients is one way we can continue to improve our services. Please take a few minutes to complete the written survey that you may receive in the mail after your visit with us. Thank you!             Your Updated Medication List - Protect others around you: Learn how to safely use, store and throw away your medicines at www.disposemymeds.org.          This list is accurate as of 10/3/18  3:23 PM.  Always use your most recent med list.                   Brand Name Dispense Instructions for use Diagnosis    * ACETAMINOPHEN PO      Take 500 mg by mouth every 8 hours as needed for pain        * acetaminophen 325 MG tablet    TYLENOL    184 tablet    Take 2 tablets (650 mg) by mouth At Bedtime Take 2 tablets at bedtime    Midline low back pain without sciatica       alcohol swab prep pads     100 each    Use to swab area of injection/ginger as directed    Type 2 diabetes  mellitus with hyperglycemia, without long-term current use of insulin (H)       aspirin 325 MG tablet     120 tablet    Take 1 tablet (325 mg) by mouth daily    Stroke (H)       blood glucose calibration solution    NO BRAND SPECIFIED    1 Bottle    Use to calibrate blood glucose monitor as needed as directed. To accompany: Accu-Chek Safe-T pro plus    Type 2 diabetes mellitus with hyperglycemia, without long-term current use of insulin (H)       * blood glucose monitoring test strip    ONETOUCH ULTRA    100 each    USE TO TEST ONCE EVERY MORNING AS DIRECTED    Type 2 diabetes mellitus without complication, without long-term current use of insulin (H)       * blood glucose monitoring test strip    no brand specified    100 strip    Use to test blood sugars 1 times daily or as directed    Type 2 diabetes mellitus with hyperglycemia, without long-term current use of insulin (H)       * blood glucose monitoring test strip    no brand specified    100 strip    Use to test blood sugar 1 times daily or as directed. To accompany: Accu-Chek safety pro plus    Type 2 diabetes mellitus with hyperglycemia, without long-term current use of insulin (H)       carbamide peroxide 6.5 % otic solution    DEBROX     Place 5 drops into both ears daily as needed for other        furosemide 20 MG tablet    LASIX    60 tablet    Take 1 tablet (20 mg) by mouth 2 times daily As of 1/27/18    Bilateral leg edema       glipiZIDE 10 MG tablet    GLUCOTROL    60 tablet    Take 1 tablet (10 mg) by mouth 2 times daily (before meals)    Type 2 diabetes mellitus with hyperglycemia, without long-term current use of insulin (H)       LACTINEX PO      Take 1 tablet by mouth 3 times daily 0900; 1300; 1645        lidocaine 4 % Crea cream    LMX4    133 g    Apply to both feet TID    Diabetic polyneuropathy associated with other specified diabetes mellitus (H)       metFORMIN 500 MG tablet    GLUCOPHAGE    30 tablet    Take 500 mg by mouth daily        *  nystatin 079167 UNIT/GM Powd    MYCOSTATIN     Apply topically daily        * nystatin 635714 UNIT/GM Powd    MYCOSTATIN    60 g    Apply 1 g topically 3 times daily as needed        * order for DME     1 Device    Equipment being ordered: glucometer kit- brand per insurance coverage Patient prefers brand with DRUM lancets - patient is to test fasting once each morning #30 with 5 refills each of the lancets and test strips    Type II or unspecified type diabetes mellitus without mention of complication, not stated as uncontrolled       * order for DME     100 lancet    Equipment being ordered: retractable safety lancets    Type II or unspecified type diabetes mellitus without mention of complication, not stated as uncontrolled       order for DME     3 Package    Equipment being ordered: Large Underpads    Urinary frequency       order for DME     150 Units    Equipment being ordered: Esthela xtra absorbent large pull ups - Uses 5 per day.    Urinary incontinence       polyethylene glycol Packet    MIRALAX/GLYCOLAX     Take 8.5 g by mouth every other day        pramipexole 0.25 MG tablet    MIRAPEX    45 tablet    Take 1.5 tablets (0.375 mg) by mouth At Bedtime    Restless legs syndrome       ranitidine 150 MG tablet    ZANTAC    60 tablet    Take 1 tablet (150 mg) by mouth 2 times daily    Gastroesophageal reflux disease without esophagitis       simvastatin 5 MG tablet    ZOCOR    30 tablet    Take 1 tablet (5 mg) by mouth every evening    Stroke (H)       sitagliptin 50 MG tablet    JANUVIA    30 tablet    Take 1 tablet (50 mg) by mouth daily    Uncontrolled type 2 diabetes mellitus with complication, without long-term current use of insulin (H), Type 2 diabetes mellitus with hyperglycemia, without long-term current use of insulin (H)       spironolactone 25 MG tablet    ALDACTONE    30 tablet    Take 0.5 tablets (12.5 mg) by mouth daily    Bilateral leg edema       thin lancets    NO BRAND SPECIFIED    100 each     Use to test blood sugar 1 times daily or as directed. To accompany: Accu-Cheks safety pro plus    Type 2 diabetes mellitus with hyperglycemia, without long-term current use of insulin (H)       VITAMIN D3 PO      Take 2,000 Units by mouth daily        ZOFRAN PO      Take 4 mg by mouth every 8 hours as needed for nausea or vomiting        * Notice:  This list has 9 medication(s) that are the same as other medications prescribed for you. Read the directions carefully, and ask your doctor or other care provider to review them with you.

## 2018-10-03 NOTE — NURSING NOTE
Regla Benavidez is a 89 year old female who presents for:  Chief Complaint   Patient presents with     Consult     Pt referred by Bill Garcia DPM  for PAD- non palpable pedal pulses.        Vitals:    Vitals:    10/03/18 1433 10/03/18 1434   BP: 134/73 128/77   BP Location: Right arm Left arm   Patient Position: Chair Chair   Cuff Size: Adult Large Adult Large   Pulse: 71 70   SpO2: 94%    Weight: 172 lb 4.8 oz (78.2 kg)        BMI:  Estimated body mass index is 33.65 kg/(m^2) as calculated from the following:    Height as of 8/27/18: 5' (1.524 m).    Weight as of this encounter: 172 lb 4.8 oz (78.2 kg).    Pain Score:  Data Unavailable        Ada Arthur MA

## 2018-10-10 ENCOUNTER — TRANSFERRED RECORDS (OUTPATIENT)
Dept: HEALTH INFORMATION MANAGEMENT | Facility: CLINIC | Age: 83
End: 2018-10-10

## 2018-10-11 ENCOUNTER — PATIENT OUTREACH (OUTPATIENT)
Dept: GERIATRIC MEDICINE | Facility: CLINIC | Age: 83
End: 2018-10-11

## 2018-10-24 ENCOUNTER — MYC MEDICAL ADVICE (OUTPATIENT)
Dept: FAMILY MEDICINE | Facility: CLINIC | Age: 83
End: 2018-10-24

## 2018-10-26 ENCOUNTER — MEDICAL CORRESPONDENCE (OUTPATIENT)
Dept: HEALTH INFORMATION MANAGEMENT | Facility: CLINIC | Age: 83
End: 2018-10-26

## 2018-10-29 ENCOUNTER — TELEPHONE (OUTPATIENT)
Dept: OTHER | Facility: CLINIC | Age: 83
End: 2018-10-29

## 2018-11-01 ENCOUNTER — PATIENT OUTREACH (OUTPATIENT)
Dept: GERIATRIC MEDICINE | Facility: CLINIC | Age: 83
End: 2018-11-01

## 2018-11-01 ENCOUNTER — TRANSFERRED RECORDS (OUTPATIENT)
Dept: HEALTH INFORMATION MANAGEMENT | Facility: CLINIC | Age: 83
End: 2018-11-01

## 2018-11-01 LAB
CREAT SERPL-MCNC: 1.18 MG/DL (ref 0.55–1.02)
GFR SERPL CREATININE-BSD FRML MDRD: 40 ML/MIN/1.73M2
GLUCOSE SERPL-MCNC: 128 MG/DL (ref 70–100)
HBA1C MFR BLD: 6.6 % (ref 4–5.6)
POTASSIUM SERPL-SCNC: 4.1 MMOL/L (ref 3.5–5.2)

## 2018-11-01 NOTE — PROGRESS NOTES
[facility] Care Coordination Contact    Per CC request, made contact with [facility] regarding monthly delivery of Pull-ups. Member to receive 16pkgs per month. [facility] showed auth for only 14pkgs. CMS updated auth, resubmitted to health plan, and sent copy to Belle at [facility] (Fax: [phone]). CC notified.    Yee Pop  Care Management Specialist  [facility]  [phone]

## 2018-11-02 ENCOUNTER — PATIENT OUTREACH (OUTPATIENT)
Dept: GERIATRIC MEDICINE | Facility: CLINIC | Age: 83
End: 2018-11-02

## 2018-11-02 NOTE — PROGRESS NOTES
[facility] Care Coordination Contact    Received an email from son Ed who states member got her compression stockings and that she wore them the first day and then refused to wear them the next day. Members daughter will check into it this weekend to see what is going on with that.   Updated Ed about the incontinent supplies being authed for 16 pkgs and he will let this CM know if there are any problems with the delivery.  Autumn Jacobsen RN, PHN, CCM  [facility] Care Coordination  [phone]

## 2018-11-05 ENCOUNTER — TELEPHONE (OUTPATIENT)
Dept: FAMILY MEDICINE | Facility: CLINIC | Age: 83
End: 2018-11-05

## 2018-11-05 NOTE — TELEPHONE ENCOUNTER
Our goal is to have forms completed within 72 hours, however some forms may require a visit or additional information.    What clinic location was the form placed at [facility] or [place].?     Who is the form from?   Where did the form come from? Faxed to clinic   The form was placed in the inbox of Leo Melgar MD      Please fax to [phone]  Phone number: [phone]    Additional comments: [facility] BID orders , and review labs, vital signs, weights    Call take on 11/5/2018 at 10:55 AM by Helene Yu

## 2018-11-05 NOTE — TELEPHONE ENCOUNTER
Our goal is to have forms completed within 72 hours, however some forms may require a visit or additional information.    What clinic location was the form placed at [facility] or [place].?     Who is the form from?   Where did the form come from? Faxed to clinic   The form was placed in the inbox of Leo Melgar MD      Please fax to [phone]  Phone number: [phone]    Additional comments: [facility]  A1c results review    Call take on 11/5/2018 at 4:48 PM by Helene Yu

## 2018-11-07 NOTE — PROGRESS NOTES
[facility] Care Coordination Contact    Order placed with Regla Lynne 1/5/1926 stockings 10/11/2018 Delivered 10/30/18     Yee Pop  Care Management Specialist  [facility]  [phone]

## 2018-11-08 ENCOUNTER — DOCUMENTATION ONLY (OUTPATIENT)
Dept: FAMILY MEDICINE | Facility: CLINIC | Age: 83
End: 2018-11-08

## 2018-11-08 DIAGNOSIS — E11.65 TYPE 2 DIABETES MELLITUS WITH HYPERGLYCEMIA, WITHOUT LONG-TERM CURRENT USE OF INSULIN (H): ICD-10-CM

## 2018-11-08 RX ORDER — GLIPIZIDE 10 MG/1
10 TABLET ORAL DAILY
Qty: 60 TABLET | Refills: 11 | COMMUNITY
Start: 2018-11-08 | End: 2021-07-29

## 2018-11-09 NOTE — PROGRESS NOTES
Labs:      A1C6.6, ; uric acid 9.7  B12 986, lytes nml; 34/1.18.    Plan: decrease glip to 10 mg per day.

## 2018-11-15 NOTE — PROGRESS NOTES
[facility] Care Coordination Contact    Late entry. Received email from AL nurse earlier this week stating member declines to wear her marylu stockings. Also wondering if the aspercream was on auto order or if they needed to call in for getting it. Called [facility] and was told it was on auto order and that member should have received it recently. Emailed AL nurse with this info.  Autumn Jacobsen RN, PHN, [facility] Care Coordination  [phone]

## 2018-11-28 ENCOUNTER — TELEPHONE (OUTPATIENT)
Dept: FAMILY MEDICINE | Facility: CLINIC | Age: 83
End: 2018-11-28

## 2018-11-28 NOTE — TELEPHONE ENCOUNTER
Nurse called from [facility]. Glipizide 10 MG tablet was changed to once daily. Before it was ordered as before meals. Asking if they could give it at 9 AM with her other morning meds AFTER breakfast? Advised that this is probably okay as long as it's at the same time every day. FYI sent to PCP, please advise if this is not okay. Triage to call nurse only if this is not okay.

## 2018-12-07 ENCOUNTER — PATIENT OUTREACH (OUTPATIENT)
Dept: GERIATRIC MEDICINE | Facility: CLINIC | Age: 83
End: 2018-12-07

## 2018-12-10 NOTE — PROGRESS NOTES
[facility] Care Coordination Contact      [facility] Six-Month Telephone Assessment    6 month telephone assessment completed on 12/10/18.    ER visits: No  Hospitalizations: No  TCU stays: No  Significant health status changes: Has some gout  Falls/Injuries: No  ADL/IADL changes: No  Changes in services: No    Caregiver Assessment follow up: na    Goals: See POC in chart for goal progress documentation.     Will see member in 6 months for an annual health risk assessment.   Encouraged member to call CC with any questions or concerns in the meantime.   Autumn Jacobsen RN, PHN, CCM  [facility] Care Coordination  [phone]

## 2018-12-19 ENCOUNTER — TELEPHONE (OUTPATIENT)
Dept: FAMILY MEDICINE | Facility: CLINIC | Age: 83
End: 2018-12-19

## 2018-12-19 DIAGNOSIS — R30.0 DYSURIA: ICD-10-CM

## 2018-12-19 DIAGNOSIS — N39.0 URINARY TRACT INFECTION WITHOUT HEMATURIA, SITE UNSPECIFIED: ICD-10-CM

## 2018-12-19 DIAGNOSIS — R30.0 DYSURIA: Primary | ICD-10-CM

## 2018-12-19 LAB

## 2018-12-19 PROCEDURE — 87088 URINE BACTERIA CULTURE: CPT | Performed by: INTERNAL MEDICINE

## 2018-12-19 PROCEDURE — 81001 URINALYSIS AUTO W/SCOPE: CPT | Performed by: INTERNAL MEDICINE

## 2018-12-19 PROCEDURE — 87086 URINE CULTURE/COLONY COUNT: CPT | Performed by: INTERNAL MEDICINE

## 2018-12-19 PROCEDURE — 87186 SC STD MICRODIL/AGAR DIL: CPT | Performed by: INTERNAL MEDICINE

## 2018-12-19 NOTE — TELEPHONE ENCOUNTER
Pt's daughter Keshav is requesting a standing order for UA/UC. Per daughter, pt gets frequent UTI's. Currently has urinary frequency , back pain ,yellow cloudy urine and foul odor. Please advice on dtr's request.

## 2018-12-19 NOTE — TELEPHONE ENCOUNTER
"Reason for call:  Patient reporting a symptom    Symptom or request: UIT    Duration (how long have symptoms been present): \"few days\"     Have you been treated for this before?     Additional comments:     Phone Number patient can be reached at:  Other phone number:  [phone]    Best Time:  Anytime       Can we leave a detailed message on this number:  YES    Call taken on 12/19/2018 at 9:41 AM by Winifred Crews    "

## 2018-12-21 ENCOUNTER — OFFICE VISIT (OUTPATIENT)
Dept: FAMILY MEDICINE | Facility: CLINIC | Age: 83
End: 2018-12-21
Payer: COMMERCIAL

## 2018-12-21 ENCOUNTER — ANCILLARY PROCEDURE (OUTPATIENT)
Dept: GENERAL RADIOLOGY | Facility: CLINIC | Age: 83
End: 2018-12-21
Attending: FAMILY MEDICINE
Payer: COMMERCIAL

## 2018-12-21 ENCOUNTER — TELEPHONE (OUTPATIENT)
Dept: FAMILY MEDICINE | Facility: CLINIC | Age: 83
End: 2018-12-21

## 2018-12-21 VITALS
DIASTOLIC BLOOD PRESSURE: 58 MMHG | HEIGHT: 60 IN | SYSTOLIC BLOOD PRESSURE: 132 MMHG | RESPIRATION RATE: 16 BRPM | TEMPERATURE: 97.8 F | OXYGEN SATURATION: 95 % | HEART RATE: 72 BPM | WEIGHT: 178.9 LBS | BODY MASS INDEX: 35.12 KG/M2

## 2018-12-21 DIAGNOSIS — N30.00 ACUTE CYSTITIS WITHOUT HEMATURIA: ICD-10-CM

## 2018-12-21 DIAGNOSIS — M54.50 ACUTE BILATERAL LOW BACK PAIN WITHOUT SCIATICA: ICD-10-CM

## 2018-12-21 DIAGNOSIS — M54.50 ACUTE BILATERAL LOW BACK PAIN WITHOUT SCIATICA: Primary | ICD-10-CM

## 2018-12-21 DIAGNOSIS — N18.30 CKD (CHRONIC KIDNEY DISEASE) STAGE 3, GFR 30-59 ML/MIN (H): ICD-10-CM

## 2018-12-21 DIAGNOSIS — E13.42 DIABETIC POLYNEUROPATHY ASSOCIATED WITH OTHER SPECIFIED DIABETES MELLITUS (H): ICD-10-CM

## 2018-12-21 DIAGNOSIS — E11.65 TYPE 2 DIABETES MELLITUS WITH HYPERGLYCEMIA, WITHOUT LONG-TERM CURRENT USE OF INSULIN (H): ICD-10-CM

## 2018-12-21 LAB
BACTERIA SPEC CULT: ABNORMAL
SPECIMEN SOURCE: ABNORMAL

## 2018-12-21 PROCEDURE — 99214 OFFICE O/P EST MOD 30 MIN: CPT | Performed by: FAMILY MEDICINE

## 2018-12-21 PROCEDURE — 72100 X-RAY EXAM L-S SPINE 2/3 VWS: CPT | Mod: FY

## 2018-12-21 RX ORDER — CEFUROXIME AXETIL 250 MG/1
250 TABLET ORAL 2 TIMES DAILY
Qty: 14 TABLET | Refills: 0 | Status: SHIPPED | OUTPATIENT
Start: 2018-12-21 | End: 2019-02-24

## 2018-12-21 ASSESSMENT — MIFFLIN-ST. JEOR: SCORE: 1142.99

## 2018-12-21 NOTE — PROGRESS NOTES
SUBJECTIVE:   Regla Benavidez is a 89 year old female who presents to clinic today for the following health issues:      Back Pain       Duration: Since last Tuesday        Specific cause: following bowel movement    Description:   Location of pain: low back bilateral  Character of pain: sharp  Pain radiation:none  New numbness or weakness in legs, not attributed to pain:  no     Intensity: Currently 8/10, moderate    History:   Pain interferes with job: Not applicable,   History of back problems: previous degenerative joint disease of the lumbar spine  Any previous MRI or X-rays: Yes  Sees a specialist for back pain:  No  Therapies tried without relief: acetaminophen (Tylenol)    Alleviating factors:   Improved by: Nothing      Precipitating factors:  Worsened by: Lifting, Bending, Standing and Sitting    Problem list and histories reviewed & adjusted, as indicated.  Additional history: as documented    Labs reviewed in EPIC    Reviewed and updated as needed this visit by clinical staff  Tobacco  Allergies  Meds  Problems  Med Hx  Surg Hx  Fam Hx  Soc Hx        Reviewed and updated as needed this visit by Provider  Tobacco  Allergies  Meds  Problems  Med Hx  Surg Hx  Fam Hx         ROS:  CONSTITUTIONAL: NEGATIVE for fever, chills, change in weight  INTEGUMENTARY/SKIN: NEGATIVE for worrisome rashes, moles or lesions  EYES: NEGATIVE for vision changes or irritation  ENT/MOUTH: NEGATIVE for ear, mouth and throat problems  RESP: NEGATIVE for significant cough or SOB  CV: NEGATIVE for chest pain, palpitations or peripheral edema  GI: NEGATIVE for nausea, abdominal pain, heartburn, or change in bowel habits  HEME: NEGATIVE for bleeding problems    OBJECTIVE:     /58 (Cuff Size: Adult Large)   Pulse 72   Temp 97.8  F (36.6  C)   Resp 16   Ht 1.524 m (5')   Wt 81.1 kg (178 lb 14.4 oz)   LMP  (LMP Unknown)   SpO2 95%   BMI 34.94 kg/m    Body mass index is 34.94 kg/m .   GENERAL: frail,  "elderly; no acute distress.  Very cooperative and pleasant  EYES: Eyes grossly normal to inspection, PERRL and conjunctivae and sclerae normal  HENT: Nose and mouth without ulcers or lesions  NECK: no adenopathy, no asymmetry, masses, or scars and thyroid normal to palpation  RESP: lungs clear to auscultation - no rales, rhonchi or wheezes  CV: regular rate and rhythm, normal S1 S2, no S3 or S4, no murmur, click or rub, no peripheral edema and peripheral pulses strong  ABDOMEN: soft, nontender, and bowel sounds normal  MS: +tenderness reproducible over lumbar spine with moderate palpation; seems to be hypersensitive at lumbar musculature, both hip joints, and legs (polyneuropathy?)  SKIN: no suspicious lesions or rashes  NEURO: generalized weakness, uses walker for ambulation but needs assistance.  PSYCH: +dementia.      Diagnostic Test Results:  Xray lumbar spine  ASSESSMENT/PLAN:     Problem List Items Addressed This Visit     Low back pain - Primary    Relevant Orders    XR Lumbar Spine 2/3 Views (Completed)    Type 2 diabetes mellitus with hyperglycemia, without long-term current use of insulin (H)    CKD (chronic kidney disease) stage 3, GFR 30-59 ml/min (H)    Diabetic polyneuropathy associated with other specified diabetes mellitus (H)      Other Visit Diagnoses     Acute cystitis without hematuria             Alberta is a 89 y/o female with dementia, CKD, DM and DM polyneuropathy... Here for back pain and recent UTI.    --We reviewed her recent U/A which was positive for a UTI and sensitive to her current antibiotic.  She just picked up the antibiotic and started taking it today.  I encouraged her to continue taking this and to finish it.  Daughter and son are both present here as well and were concerned that Alberta's \"new\" back pain might be related to kidney stones.  I discussed the fact that the hematuria is likely related to the UTI and not necessarily a kidney stone.  The back pain is also not very " typical of a kidney stone, in fact she is quite hypersensitive all over.  Unfortunately, the HPI and PE was fairly limited overall due to pt's dementia so the back pain is difficult to further differentiate.     Pt and family are agreeable to obtain a lumbar xray since it seems that most of her pain is in that area.    Will continue to take Tylenol PRN pain.    Also instructed her to stop Miralax while on the antibiotic for her UTI since it is making her have looser stools. May resume afterwards.      Terri Murillo, DO  [facility]

## 2018-12-21 NOTE — TELEPHONE ENCOUNTER
Called Daughter Keshav and informed her that antibiotic was sent to the pharmacy for patient's UTI.     Left VM on nurse Daphney's phone with information about antibiotic.

## 2018-12-21 NOTE — TELEPHONE ENCOUNTER
Our goal is to have forms completed within 72 hours, however some forms may require a visit or additional information.    What clinic location was the form placed at [facility] or [place].?     Who is the form from?   Where did the form come from? Faxed to clinic   The form was placed in the inbox of Leo Melgar MD      Please fax to [phone]  Phone number: [phone]    Additional comments: [facility] DC compression stockings pt refusses to wear     Call take on 12/21/2018 at 10:03 AM by Helene Yu

## 2018-12-21 NOTE — TELEPHONE ENCOUNTER
Cinthya with [facility] is calling requesting orders so the patient can rec her medication in her assisted living facility. Please fax to [phone].

## 2018-12-22 PROBLEM — R09.89 ABNORMAL LUNG SOUNDS: Status: RESOLVED | Noted: 2018-02-09 | Resolved: 2018-12-22

## 2018-12-27 ENCOUNTER — PATIENT OUTREACH (OUTPATIENT)
Dept: GERIATRIC MEDICINE | Facility: CLINIC | Age: 83
End: 2018-12-27

## 2018-12-27 NOTE — PROGRESS NOTES
[facility] Care Coordination Contact    Received an email from son Ed re PT for member as member had seen a MD re back pain and had asked family to contact provider if they decided to have PT. Called son Ed and he states that they want to go ahead with PT and [facility] and wondered about coverage and if this CM needed to approve. Explained that if they want to go ahead with PT and know where they want to go, to call provider and let them know where to have orders sent to. The PT will then check the insurance and then schedule the visits. This CM does not auth any PT.  Autumn Jacobsen RN, PHN, CCM  [facility] Care Coordination  [phone]

## 2018-12-28 ENCOUNTER — TELEPHONE (OUTPATIENT)
Dept: FAMILY MEDICINE | Facility: CLINIC | Age: 83
End: 2018-12-28

## 2018-12-28 NOTE — TELEPHONE ENCOUNTER
Reason for Call: Request for an order or referral:    Order or referral being requested: PT    Date needed: at your convenience    Has the patient been seen by the PCP for this problem? YES    Additional comments: wants faxed to [facility] fax     Phone number Patient can be reached at:  Other phone number:     Best Time:      Can we leave a detailed message on this number?  YES    Call taken on 12/28/2018 at 2:07 PM by SHILO CAO

## 2018-12-28 NOTE — TELEPHONE ENCOUNTER
Our goal is to have forms completed within 72 hours, however some forms may require a visit or additional information.    What clinic location was the form placed at [facility] or [place].?     Who is the form from?   Where did the form come from? Faxed to clinic   The form was placed in the inbox of Leo Melgar MD      Please fax to [phone]  Phone number: [phone]    Additional comments: [facility]  PT orders     Call take on 12/28/2018 at 2:17 PM by Helene Yu

## 2018-12-28 NOTE — TELEPHONE ENCOUNTER
I have generated a referral in the form of a letter.                           Please send it.

## 2018-12-28 NOTE — LETTER
December 28, 2018      Regla Benavidez  C/O MONALISA BENAVIDEZ  [address]  [place] [address]        Dear ,    We are writing to inform you of your test results.     To Whom It May Concern                I have received a call from this patient's family.                    Apparently the patient has been experiencing back pain, and the family wants her to have some physical therapy. 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              Please evaluate and treat.              If you have any questions or concerns, please call the clinic at the number listed above.       Sincerely,        Leo Melgar MD

## 2019-01-09 ENCOUNTER — TELEPHONE (OUTPATIENT)
Dept: FAMILY MEDICINE | Facility: CLINIC | Age: 84
End: 2019-01-09

## 2019-01-09 NOTE — TELEPHONE ENCOUNTER
Our goal is to have forms completed within 72 hours, however some forms may require a visit or additional information.    What clinic location was the form placed at [facility] or [place].?     Who is the form from?   Where did the form come from? Faxed to clinic   The form was placed in the inbox of Leo Melgar MD      Please fax to [phone]    Phone number: [phone]    [facility] tylenol 325 mg to TID     Call take on 1/9/2019 at 3:55 PM by Helene Yu

## 2019-01-11 ENCOUNTER — TELEPHONE (OUTPATIENT)
Dept: FAMILY MEDICINE | Facility: CLINIC | Age: 84
End: 2019-01-11

## 2019-01-11 DIAGNOSIS — G89.29 CHRONIC MIDLINE LOW BACK PAIN WITHOUT SCIATICA: Primary | ICD-10-CM

## 2019-01-11 DIAGNOSIS — M54.50 CHRONIC MIDLINE LOW BACK PAIN WITHOUT SCIATICA: Primary | ICD-10-CM

## 2019-01-11 RX ORDER — ACETAMINOPHEN 325 MG/1
TABLET ORAL
Qty: 270 TABLET | Refills: 2 | Status: SHIPPED | OUTPATIENT
Start: 2019-01-11 | End: 2021-04-28

## 2019-01-14 ENCOUNTER — TELEPHONE (OUTPATIENT)
Dept: FAMILY MEDICINE | Facility: CLINIC | Age: 84
End: 2019-01-14

## 2019-01-18 ENCOUNTER — PATIENT OUTREACH (OUTPATIENT)
Dept: GERIATRIC MEDICINE | Facility: CLINIC | Age: 84
End: 2019-01-18

## 2019-01-18 NOTE — PROGRESS NOTES
[facility] Care Coordination Contact    Per AL nurse Daphney Hou:  -The physical therapist who is seeing Alberta is recommending that perhaps a Tens unit might be something that would be of benefit for Alberta. I don t have an order for it, but PT believes this has been helpful for her lower back pain more so than the ultrasound that she has done. Just let me know if this is possible with her insurance plan (Ingrid said they are usually around $20). I would then get an order for Dr. Melgar for it for prn use. Thanks, Autumn. Just let me know.Daphney-    Request sent to [facility] to check into coverage for the Tens unit.  Autumn Jacobsen RN, PHN, CCM  [facility] Care Coordination  [phone]

## 2019-01-22 NOTE — PROGRESS NOTES
[facility] Care Coordination Contact    Late entry. Per [facility] on 1/18/19:  We will request Rx and run thru insurance benefits (though these are tough to qualify for).  Has client seen MD for this item (as face to face is required).  Please let me know and we will give it our best shot. Jed    Forwarded this info to AL nurse Shelley and asked her to let this CM know when member has seen PCP for a face to face.   Autumn Jacobsen RN, PHN, CCM  [facility] Care Coordination  [phone]

## 2019-01-23 NOTE — PROGRESS NOTES
[facility] Care Coordination Contact    Called son Ed re the Tens unit and he said that member only used it once and does not want to get a piece of equipment if she will not be using it. Ed states that once member is done with PT in a couple weeks, and has tried it more, and if it helps and is still recommended, then he will schedule a face to face visit with PCP. Updated AL nurse Shelley and [facility] as well.  Autumn Jacobsen RN, PHN, CCM  [facility] Care Coordination  [phone]

## 2019-02-07 ENCOUNTER — TRANSFERRED RECORDS (OUTPATIENT)
Dept: HEALTH INFORMATION MANAGEMENT | Facility: CLINIC | Age: 84
End: 2019-02-07

## 2019-02-07 LAB
CREAT SERPL-MCNC: 1.3 MG/DL (ref 0.55–1.02)
GFR SERPL CREATININE-BSD FRML MDRD: 35 ML/MIN/1.73M2
GLUCOSE SERPL-MCNC: 182 MG/DL (ref 70–100)
POTASSIUM SERPL-SCNC: 4.6 MMOL/L (ref 3.5–5.2)

## 2019-02-13 ENCOUNTER — TELEPHONE (OUTPATIENT)
Dept: FAMILY MEDICINE | Facility: CLINIC | Age: 84
End: 2019-02-13

## 2019-02-13 NOTE — TELEPHONE ENCOUNTER
Called patients daughter Keshav and adviced pt schedule OV with PCP. Dtr states pt is being discharge from PT and therapist recommended pt use Tens unit. After talking with daughter, she agreed to talk with her brother Ed and  ASH Leyva. She would have ASH Leyva contact PCP or triage with specifics of DME order.

## 2019-02-13 NOTE — TELEPHONE ENCOUNTER
Reason for Call: Request for an order or referral:    Order or referral being requested: tens unit    Date needed: as soon as possible    Has the patient been seen by the PCP for this problem? NO    Additional comments: recommended by physical therapist.    Phone number Patient can be reached at:  Other phone number:  [phone]     Best Time:  After 3pm    Can we leave a detailed message on this number?  YES    Call taken on 2/13/2019 at 2:34 PM by ZEE LUEVANO

## 2019-02-18 NOTE — PROGRESS NOTES
[facility] Care Coordination Contact    Received messages from daughter Bismark re the Tens unit for her mom. Per Bismark, member will be ending PT soon and they are recommending a Tens unit for member 2-3 times a week and Bismark said that she will be putting it on her mom so no need to get AL nurse and her staff involved in the training of putting it on her mom. Bismark also said that she

## 2019-02-19 ENCOUNTER — TELEPHONE (OUTPATIENT)
Dept: FAMILY MEDICINE | Facility: CLINIC | Age: 84
End: 2019-02-19

## 2019-02-19 NOTE — TELEPHONE ENCOUNTER
Our goal is to have forms completed within 72 hours, however some forms may require a visit or additional information.    What clinic location was the form placed at [facility] or [place].?     Who is the form from?   Where did the form come from? Faxed to clinic   The form was placed in the inbox of Leo Melgar MD      Please fax to [phone]  Phone number: [phone]    Additional comments: [facility]  CMN for tens unit    Call take on 2/19/2019 at 9:37 AM by Helene Yu

## 2019-02-20 NOTE — PROGRESS NOTES
[facility] Care Coordination Contact    Have been in communication via email and vm with son Ed and vai email with PT Ingrid from [facility] urrutia the tens unit. Forwarded the info on the tens unit that we can get from [facility] and she can work with this. Contacted son Ed via VM to let him know that member has to have a face to face visit as well as working with Ingrid urrutia the tens unit covered by insurance. To call if has questions.  Autumn Jacobsen RN, PHN, [facility] Care Coordination  [phone]

## 2019-02-25 NOTE — PROGRESS NOTES
[facility] Care Coordination Contact    Per [facility], they received info from PCP office and member does not have a qualifying diagnosis for insurance to cover the tens unit. Only options left are under EW or private pay. Under these options, a face to face visit is not needed. Called son Ed to discuss and he would like to proceed with the purchase under EW, even if the same tens unit that the PT is familiar with is not available. The PT said that she can familiarize herself with any tens unit. Email sent to PT Ingrid to check to see how often the electrodes would need to be changed, then will contact [facility] with the ok to go ahead with the tens unit and amount of electrodes needed each month. Will also try to get the round electrodes, rather than the square ones if possible.  Autumn Jacobsen RN, PHN, [facility] Care Coordination  [phone]

## 2019-03-05 ENCOUNTER — TELEPHONE (OUTPATIENT)
Dept: FAMILY MEDICINE | Facility: CLINIC | Age: 84
End: 2019-03-05

## 2019-03-05 NOTE — TELEPHONE ENCOUNTER
Our goal is to have forms completed within 72 hours, however some forms may require a visit or additional information.    What clinic location was the form placed at [facility] or [place].?     Who is the form from?   Where did the form come from? Faxed to clinic   The form was placed in the inbox of Leo Melgar MD      Please fax to [phone]  Phone number: [phone]    Additional comments: [facility] annual med review     Call take on 3/5/2019 at 11:08 AM by Helene Yu

## 2019-03-06 ENCOUNTER — TELEPHONE (OUTPATIENT)
Dept: FAMILY MEDICINE | Facility: CLINIC | Age: 84
End: 2019-03-06

## 2019-03-06 NOTE — TELEPHONE ENCOUNTER
Our goal is to have forms completed within 72 hours, however some forms may require a visit or additional information.    What clinic location was the form placed at [facility] or [place].?     Who is the form from?   Where did the form come from? Faxed to clinic   The form was placed in the inbox of Leo Melgar MD      Please fax to [phone]  Phone number: [phone]    Additional comments: [facility] annual med review    Call take on 3/6/2019 at 10:25 AM by Helene Yu

## 2019-03-28 ENCOUNTER — TELEPHONE (OUTPATIENT)
Dept: FAMILY MEDICINE | Facility: CLINIC | Age: 84
End: 2019-03-28

## 2019-03-28 ENCOUNTER — MEDICAL CORRESPONDENCE (OUTPATIENT)
Dept: HEALTH INFORMATION MANAGEMENT | Facility: CLINIC | Age: 84
End: 2019-03-28

## 2019-03-28 NOTE — TELEPHONE ENCOUNTER
Daughter is requesting an order for: education for patient and family regarding tens unit use.   Would like one visit from PT to eval and treat-      Team, when order written, please fax to [facility] [phone] Attn: Ingrid  Telephone: [phone]                                                                                                                                                                                                                                                                                                      I agree with this order.                 (signed)     Leo Melgar MD

## 2019-03-28 NOTE — LETTER
[facility]  [address]  [place] [address]  [phone]                                                                                                           Regla Benavidez  C/O MONALISA BENAVIDEZ  [address] [address]    March 28, 2019        This is an order for education for patient and family regarding tens unit use.   This approves one visit from physical therapy to evaluate and treat.                                                                                                                                               I agree with this order.    (signed)     Leo Melgar MD        We appreciate the opportunity to participate in your health care.    Sincerely,    Leo Melgar MD

## 2019-03-28 NOTE — PROGRESS NOTES
"[facility] Care Coordination Contact    Late entry. Per Jed at [facility] early March:  Autumn,       I have attached a quote for the TENS and the electrodes.  However, you will notice that I have lined out the words TENS and electrodes and inserted different descriptions.  We are having trouble with [employer].  They want us to use the codes when we bill, even if the item is  (waiver), as here.  But then they will deny as does not meet guidelines or such.  But if we bill , then they will say, \"hey, this is covered\" and deny because we didn't use the proper code.  So we need to bill as  but use a different description.  I know this makes no sense.  But it is the only way we can ge this covered.         On the electrodes, just let me know how often you want us to send out. Jed\"    New auth written and sent in. Checked with [facility] today and the tens unit was sent for delivery to member on Monday 3/25/19. Updated son Ed.  Autumn Jacobsen RN, PHN, [facility] Care Coordination  [phone]    "

## 2019-03-28 NOTE — TELEPHONE ENCOUNTER
Daughter is requesting an order for: education for patient and family regarding tens unit use.   Would like one visit from PT to eval and treat-     Team, when order written, please fax to [facility] [phone] Attn: Ingrid  Telephone: [phone]

## 2019-03-28 NOTE — TELEPHONE ENCOUNTER
Reason for Call:  Other call back    Detailed comments: Hiren Joyce, daughter, returning a call to triage. She requests a call back regarding the Tens Unit eval and treat order.     Phone Number Patient can be reached at:   The daughter Hiren: [phone]    Best Time: any    Can we leave a detailed message on this number? YES    Call taken on 3/28/2019 at 12:16 PM by Santa Hooper

## 2019-04-03 ENCOUNTER — TELEPHONE (OUTPATIENT)
Dept: FAMILY MEDICINE | Facility: CLINIC | Age: 84
End: 2019-04-03

## 2019-04-03 NOTE — TELEPHONE ENCOUNTER
Our goal is to have forms completed within 72 hours, however some forms may require a visit or additional information.    What clinic location was the form placed at [facility] or [place].?     Who is the form from? [facility]  Where did the form come from? Mailed to clinic   The form was placed in the inbox of Leo Melgar MD      Please mail to [facility]    Additional comments: PT orders    Call take on 4/3/2019 at 4:05 PM by Ronda Ramires

## 2019-04-08 ENCOUNTER — PATIENT OUTREACH (OUTPATIENT)
Dept: GERIATRIC MEDICINE | Facility: CLINIC | Age: 84
End: 2019-04-08

## 2019-04-08 NOTE — PROGRESS NOTES
[facility] Care Coordination Contact    Received email request from AL nurse Daphney Hou on member:     Forgot to ask you to hold the Lidocaine ointment for awhile - we have too many now. Touch base with me in another 6 weeks, or I will email you when running low. We just got 2 more today - sorry! Daphney    Request sent to CMS to please hold the lidocaine.  Autumn Jacobsen RN, PHN, [facility] Care Coordination  [phone]

## 2019-04-10 ENCOUNTER — TELEPHONE (OUTPATIENT)
Dept: FAMILY MEDICINE | Facility: CLINIC | Age: 84
End: 2019-04-10

## 2019-04-10 NOTE — TELEPHONE ENCOUNTER
Our goal is to have forms completed within 72 hours, however some forms may require a visit or additional information.    What clinic location was the form placed at [facility] or [place].?     Who is the form from?   Where did the form come from? Faxed to clinic   The form was placed in the inbox of Leo Melgar MD      Please fax to [phone]  Phone number: [phone]    Additional comments: [facility]  med review and lidcaine directions change    Call take on 4/10/2019 at 10:11 AM by Helene Yu

## 2019-04-18 ENCOUNTER — MEDICAL CORRESPONDENCE (OUTPATIENT)
Dept: HEALTH INFORMATION MANAGEMENT | Facility: CLINIC | Age: 84
End: 2019-04-18

## 2019-05-01 ENCOUNTER — MEDICAL CORRESPONDENCE (OUTPATIENT)
Dept: HEALTH INFORMATION MANAGEMENT | Facility: CLINIC | Age: 84
End: 2019-05-01

## 2019-05-02 ENCOUNTER — TRANSFERRED RECORDS (OUTPATIENT)
Dept: HEALTH INFORMATION MANAGEMENT | Facility: CLINIC | Age: 84
End: 2019-05-02

## 2019-05-02 ENCOUNTER — PATIENT OUTREACH (OUTPATIENT)
Dept: GERIATRIC MEDICINE | Facility: CLINIC | Age: 84
End: 2019-05-02

## 2019-05-02 LAB
CREAT SERPL-MCNC: 1.14 MG/DL (ref 0.55–1.02)
GFR SERPL CREATININE-BSD FRML MDRD: 41 ML/MIN/1.73M2
GLUCOSE SERPL-MCNC: 175 MG/DL (ref 70–100)
HBA1C MFR BLD: 7.5 % (ref 0–5.7)
POTASSIUM SERPL-SCNC: 4.2 MMOL/L (ref 3.5–5.1)

## 2019-05-07 ENCOUNTER — PATIENT OUTREACH (OUTPATIENT)
Dept: GERIATRIC MEDICINE | Facility: CLINIC | Age: 84
End: 2019-05-07

## 2019-05-07 NOTE — PROGRESS NOTES
[facility] Care Coordination Contact    Son had asked about the tens pads replacement and per Jed at [facility]:    Since we are running thru waiver bens, we can send out on any schedule you want.  The cost is $30.00 and is already on a monthly order with the no rinse wash.  And this is not over $30, so it doesn't need an auth.    Will have the pads sent out monthly.    Autumn Jacobsen RN, PHN, [facility] Care Coordination  [phone]

## 2019-05-09 ENCOUNTER — TRANSFERRED RECORDS (OUTPATIENT)
Dept: HEALTH INFORMATION MANAGEMENT | Facility: CLINIC | Age: 84
End: 2019-05-09

## 2019-05-16 PROBLEM — E13.42 DIABETIC POLYNEUROPATHY ASSOCIATED WITH OTHER SPECIFIED DIABETES MELLITUS (H): Status: ACTIVE | Noted: 2018-08-04

## 2019-05-21 ENCOUNTER — PATIENT OUTREACH (OUTPATIENT)
Dept: GERIATRIC MEDICINE | Facility: CLINIC | Age: 84
End: 2019-05-21

## 2019-05-21 ASSESSMENT — ACTIVITIES OF DAILY LIVING (ADL)
DEPENDENT_IADLS:: CLEANING;COOKING;LAUNDRY;SHOPPING;MEAL PREPARATION;MEDICATION MANAGEMENT;MONEY MANAGEMENT;TRANSPORTATION;INCONTINENCE

## 2019-05-21 NOTE — PROGRESS NOTES
[facility] Care Coordination Contact    [facility] Home Visit Assessment     Home visit for Health Risk Assessment with Regla Benavidez completed on May 21, 2019    Type of residence:: Assisted living  Current living arrangement:: I live in assisted living     Assessment completed with:: Patient, Children(Son Ed)    Current Care Plan  Member currently receiving the following home care services:     Member currently receiving the following community resources: DME    Medication Review  Medication reconciliation completed in Epic: Yes  Medication set-up & administration: RN set up weekly.  Assisting Living staff administers medications.  Medication Risk Assessment Medication (1 or more, place referral to MTM): N/A: No risk factors identified  MTM Referral Placed: No: Declined    Mental/Behavioral Health   Depression Screening: See PHQ assessment flowsheet.   Mental health DX:: No      Mental Health Diagnosis: No  Mental Health Services: None: No further intervention needed at this time.    Falls Assessment:   Fallen 2 or more times in the past year?: No   Any fall with injury in the past year?: No    ADL/IADL Dependencies:   Dependent ADLs:: Bathing, Incontinence, Toileting  Dependent IADLs:: Cleaning, Cooking, Laundry, Shopping, Meal Preparation, Medication Management, Money Management, Transportation, Incontinence    [facility] sponsored benefits: Shared information re: Silver Sneakers/gym memberships, ASA, Calcium +D.    PCA Assessment completed at visit: Not applicable     Elderly Waiver Eligibility: Yes-will continue on EW    Care Plan & Recommendations: Continue with AL services and DME.    See LTCC for detailed assessment information.    Follow-Up Plan: Member informed of future contact, plan to f/u with member with a 6 month telephone assessment.  Contact information shared with member and family, encouraged member to call with any questions or concerns at any time.    [facility]  continuum providers: Please refer to Health Care Home on the Epic Problem List to view this patient's [facility] Care Plan Summary.  Autumn Jacobsen RN, PHN, [facility] Care Coordination  [phone]

## 2019-06-03 ENCOUNTER — PATIENT OUTREACH (OUTPATIENT)
Dept: GERIATRIC MEDICINE | Facility: CLINIC | Age: 84
End: 2019-06-03

## 2019-06-03 NOTE — LETTER
Ana Paula 3, 2019    Important [employer] Information    LYNDA CONWAY  C/O MONALISA CONWAY  [address] [facility] [place] [address]  Your Care Plan  Dear Alberta,  When we spoke recently, I promised to send you a Care Plan. The plan enclosed is a summary of our discussion. It includes the steps we agreed would help you meet your health goals. In addition, I can help you with:  Nhjblrc-M-WkcaSD  This program is available to members who need a ride to medical and dental visits. To schedule a ride, call [phone] or [phone] (toll free). TTY/TTD: 711. You can call 8 a.m. to 8 p.m. Seven days a week. Access to a representative may be limited at times.    Mediafly   The Mediafly program empowers you to improve your health through education and exercise. To learn more, visit Lessons Only, or call [employer] Service at [phone] (toll free) (TTY:711) from 7 a.m. - 7 p.m. Central Time, Monday-Friday.  Health Care Directive   This form helps you outline your health care wishes. You can request a form from me and I will answer any questions you have before you discuss it with your doctor.   Annual Physical  Take a key step on your path to good health and set up an annual physical at your clinic.  Questions?  Call me at [phone] Monday-Friday between 8am and 5pm.  TTY/TTD: 711. As we discussed, I plan to be in touch with you again in 6 months to follow up via phone.  Sincerely,    Autumn Jacobsen RN    E-mail: [email or web address]  Phone: [phone]      Thrill On          cc: member records                    Civil Rights Notice  Discrimination is against the law. [employer] does not discriminate on the basis of any of the following:    Race    Color    National Origin    Creed    Christianity    Age    Public Assistance Status    Receipt of Health Care Services    Disability (including physical or mental impairment)    Sex (including sex stereotypes and gender  identity)    Marital Status    Political Beliefs    Medical Condition    Genetic Information    Sexual Orientation    Claims Experience    Medical History    Health Status    Auxiliary Aids and Services:  [employer] provides auxiliary aids and services, like qualified interpreters or information in accessible formats, free of charge and in a timely manner, to ensure an equal opportunity to participate in our health care programs. Contact [employer] Customer Service at BringIt/contact medicaid or call [phone] (toll free); TTY:714 or at BringIt/contactPiÃ±ata Labscaid.    Language Assistance Services:  [employer] provides translated documents and spoken language interpreting, free of charge and in a timely manner, when language assistance services are necessary to ensure limited English speakers have meaningful access to our information and services. Contact [employer] at [phone] (toll free); TTY: 376 or BringIt/contactmedicaid.     Civil Rights Complaints  You have the right to file a discrimination complaint if you believe you were treated in a discriminatory way by [employer]. You may contact any of the following four agencies directly to file a discrimination complaint.    U.S. Department of Health and Human Services  Office for Civil Rights (OCR)  You have the right to file a complaint with the OCR, a federal agency, if you believe you have been discriminated against because of any of the following:    Race    Disability    Color    Sex    National Origin    Age      Contact the OCR directly to file a complaint:         Director         U.S. Department of Health and Human Services  Office for Civil Rights         [address] [place] [address], [place] [address]         [phone] (Voice)         [phone] (TDD)         Complaint Portal - https://ocrportal.hhs.gov/ocr/portal/lobby.jsf     [place] Department of Human Rights ([facility])  In [place], you have the right to  file a complaint with the MDHR if you believe you have been discriminated against because of any of the following:      Race    Color    National Origin    Alevism    Creed    Sex    Sexual Orientation    Marital Status    Public Assistance Status    Disability    Contact the MDHR directly to file a complaint:         [place] Department of Human Rights         [facility], [address] [place] [address]         [phone] (voice)          [phone] (toll free)         711 or [phone] ([place] Relay)         [phone] (Fax)         [email or web address] (Email)     [place] Department of Human Services (DHS)  You have the right to file a complaint with [facility] if you believe you have been discriminated against in our health care programs because of any of the following:    Race    Color    National Origin    Creed    Alevism    Age    Public Assistance Status    Receipt of Health Care Services    Disability (including physical or mental impairment)    Sex (including sex stereotypes and gender identity)    Marital Status    Political Beliefs    Medical Condition    Genetic Information    Sexual Orientation    Claims Experience    Medical History    Health Status    Complaints must be in writing and filed within 180 days of the date you discovered the alleged discrimination. The complaint must contain your name and address and describe the discrimination you are complaining about. After we get your complaint, we will review it and notify you in writing about whether we have authority to investigate. If we do, we will investigate the complaint.      [facility] will notify you in writing of the investigation s outcome. You have a right to appeal the outcome if you disagree with the decision. To appeal, you must send a written request to have [facility] review the investigation outcome period. Be brief and state why you disagree with the decision. Include additional information you think is important.       If you file a complaint in this way, the people who work for the agency named in the complaint cannot retaliate against you. This means they cannot punish you in any way for filing a complaint. Filing a complaint in this way does not stop you from seeking out other legal or administration actions.     Contact DHS directly to file a discrimination complaint:        Civil Rights Coordinator        [facility]        Equal Opportunity and Access Division        [address] [address]        [place] [address] [phone] (voice) or use your preferred relay service     [employer] Complaint Notice   You have the right to file a complaint with [employer] if you believe you have been discriminated against because of any of the following:       Medical condition    Health status    Receipt of health care services    Claims experience    Medical history    Genetic information    Disability (including mental or physical impairment)    Marital status    Age    Sex (including sex stereotypes and gender identity)    Sexual orientation    National origin    Race    Color    Hinduism    Creed    Public assistance status    Political beliefs    You can file a complaint and ask for help in filing a complaint in person or by mail, phone, fax, or email at:     [employer] Civil Rights Coordinator  [facility]  [address], [place] [address] [phone] (voice and fax) or TTY:463  Email: [email or web address]    American Indians can continue to use [place] and  Health Services (IHS) clinics. We will not require prior approval or impose any conditions for you to get services at these clinics. For elders age 65 years and older this includes Elderly Waiver (EW) services accessed through the [place]. If a doctor or other provider in a [facility] or IHS clinic refers you to a provider in our network, we will not require you to see your primary care provider prior to the  referral.    For accessible formats of this publication or assistance with equal or access to our services, visit [employer]contactmedicaid, or call [phone] (toll free) or use your preferred relay service.

## 2019-06-03 NOTE — PROGRESS NOTES
[facility] Care Coordination Contact    Received after visit chart from care coordinator.  Completed following tasks: Mailed copy of care plan to client, Updated services in access and Submitted referrals/auths for [facility]: Assisted Living; [facility]: Supplies; [facility]: Supplies  Chart was returned to CC.   Mailed copy of CL tool to member, faxed copy to AL facility, uploaded into Run[employer] and submitted authorization to health plan.   Provider Signature - Full Care Plan:  Member indicates that they would like their POC shared with the following EW providers:  Darleen Rodriguez: Assisted Living.  Letter and full POC faxed to providers for signature.  Order placed with [employer] (p: [phone]; f: [phone]) for Prevail Wipes: 2 pkgs / Chux / Esthela underwear: XL: 10 pkgs.  Order is continuous. Access updated.  As required, authorization submitted to health plan.  Order placed with [employer] (p: [phone]; f: [phone]) for Aspercreme: 2 tubes / Body wash / Hearing aid batteries: Size 13 / Tens round pads.  Order is continuous. Access updated.  As required, authorization submitted to health plan.    Yee Pop  Care Management Specialist  [facility]  [phone]

## 2019-06-03 NOTE — PROGRESS NOTES
[facility] Care Coordination Contact    MMIS completed and entered. Member remains a rate cell B. CPS updated in epic. RS tool and POC completed. Chart to CMS for processing and sending Caregiver assessment and POC to member's son Ed.  Autumn Jacobsen RN, PHN, [facility] Care Coordination  [phone]

## 2019-06-04 NOTE — PROGRESS NOTES
[facility] Care Coordination Contact    Per CC, mailed LTCC caregiver assessment to David Benavidez along with self-addressed, stamped return envelope.    Yee Pop  Care Management Specialist  [facility]  [phone]

## 2019-06-06 ENCOUNTER — MEDICAL CORRESPONDENCE (OUTPATIENT)
Dept: HEALTH INFORMATION MANAGEMENT | Facility: CLINIC | Age: 84
End: 2019-06-06

## 2019-06-06 NOTE — PROGRESS NOTES
[facility] Care Coordination Contact    Received a call from Jelani from billing dept at [facility] at [phone] re RS tool on member for [facility]. She was unable to read the dates and daily amount for member. Gave her the auth dates and amounts. She asked for an auth number and this CM does not have it. Jelani will call [employer] to get this info.  Autumn Jacobsen RN, PHN, [facility] Care Coordination  [phone]

## 2019-06-11 ENCOUNTER — TELEPHONE (OUTPATIENT)
Dept: FAMILY MEDICINE | Facility: CLINIC | Age: 84
End: 2019-06-11

## 2019-06-11 NOTE — TELEPHONE ENCOUNTER
Our goal is to have forms completed within 72 hours, however some forms may require a visit or additional information.    What clinic location was the form placed at [facility] or [place]     Who is the form from?   Where did the form come from? Faxed to clinic   The form was placed in the inbox of Leo Melgar MD      Please fax to [phone]  Phone number: [phone]    Additional comments: [facility]     Call take on 6/11/2019 at 11:17 AM by Helene Yu

## 2019-06-11 NOTE — TELEPHONE ENCOUNTER
Our goal is to have forms completed within 72 hours, however some forms may require a visit or additional information.    What clinic location was the form placed at [facility] or [place].?     Who is the form from?   Where did the form come from? Faxed to clinic   The form was placed in the inbox of Leo Melgar MD      Please fax to [phone]  Phone number: [phone]    Additional comments: [facility]  standing labs    Call take on 6/11/2019 at 11:07 AM by Helene Yu

## 2019-06-12 ENCOUNTER — MEDICAL CORRESPONDENCE (OUTPATIENT)
Dept: HEALTH INFORMATION MANAGEMENT | Facility: CLINIC | Age: 84
End: 2019-06-12

## 2019-06-14 ENCOUNTER — TELEPHONE (OUTPATIENT)
Dept: FAMILY MEDICINE | Facility: CLINIC | Age: 84
End: 2019-06-14

## 2019-06-14 RX ORDER — LACTOBACILLUS ACIDOPH-L.BULGARICUS 1 MILLION CELL CHEWABLE TABLET 1MM CELL
TABLET,CHEWABLE ORAL 3 TIMES DAILY
Status: CANCELLED | OUTPATIENT
Start: 2019-06-14

## 2019-06-14 NOTE — TELEPHONE ENCOUNTER
PRIOR AUTHORIZATION DENIED    Medication: LACTINEX - DENIED THROUGH MED PART D    Denial Date: 6/14/2019    Denial Rational: OTC PRODUCTS ARE EXCLUDED FROM MEDICARE PART D COVERAGE.        Appeal Information: IF THE PROVIDER WOULD LIKE APPEAL THIS DENIAL, PLEASE HAVE THEM PROVIDE A LETTER OF MEDICAL NECESSITY ALONG WITH ANY DOCUMENTATION THAT STATES THERAPIES TRIED/OUTCOMES. ONCE IT HAS BEEN PLACED IN THE PATIENT'S CHART, PLEASE NOTIFY THE PA TEAM ONCE IT HAS BEEN COMPLETED AND WE CAN INITIATE THE APPEAL ON BEHALF OF THE PROVIDER AND PATIENT.

## 2019-06-14 NOTE — TELEPHONE ENCOUNTER
I have reviewed the [place]  report for this patient.  Pattern of use has been stable/acceptable.   Prior Authorization Approval    Authorization Effective Date: 3/16/2019  Authorization Expiration Date: 6/13/2020  Medication: Lactobacillus (LACTINEX PO) - APPROVED   Approved Dose/Quantity:   Reference #: [id number]   Insurance Company: [employer] - Phone [phone] Fax [phone]  Expected CoPay:       CoPay Card Available:      Foundation Assistance Needed:    Which Pharmacy is filling the prescription (Not needed for infusion/clinic administered): [facility] - [place], [place] - [address]  Pharmacy Notified: Yes  Patient Notified: Yes

## 2019-06-14 NOTE — TELEPHONE ENCOUNTER
CENTRAL PRIOR AUTHORIZATION  [phone]    PA Initiation    Medication: Lactobacillus (LACTINEX PO)  Insurance Company: [employer] - Phone [phone] Fax [phone]  Pharmacy Filling the Rx: [facility] - [place] - [address]  Filling Pharmacy Phone: [phone]  Filling Pharmacy Fax:    Start Date: 6/14/2019

## 2019-06-14 NOTE — TELEPHONE ENCOUNTER
Pt's son is wondering if possible to backdate to 3/27 and be reimbursed for past refills?     Prior Authorization Retail Medication Request    Medication/Dose: Lactobacillus (LACTINEX PO)  ICD code (if different than what is on RX):    Previously Tried and Failed:    Rationale:      Insurance Name:    Insurance ID:        Pharmacy Information (if different than what is on RX)  Name:    Phone:

## 2019-06-14 NOTE — TELEPHONE ENCOUNTER
Reason for Call:  Medication or medication refill:    Do you use a [facility]?  Name of the pharmacy and phone number for the current request:  [facility]     Name of the medication requested: Lactobacillus (LACTINEX PO)    Other request: patient needs a prior authorization even thou medication is over the counter.  She has the dual insurance.  And for elderly waiver.  Last prior authorization  3/27/19    Can we leave a detailed message on this number? Call son   107    Phone number patient can be reached at:  X [phone]    Best Time: today    Call taken on 2019 at 9:41 AM by KALLIE BOSS

## 2019-06-24 ENCOUNTER — PATIENT OUTREACH (OUTPATIENT)
Dept: GERIATRIC MEDICINE | Facility: CLINIC | Age: 84
End: 2019-06-24

## 2019-06-24 NOTE — PROGRESS NOTES
[facility] Care Coordination Contact    Received caregiver assessment back from son. Son reports he is now retired and helps member at least once a week with MD appts, insurance issues and health care needs. He is not requesting any caregiver support information at this time.   Autumn Jacobsen RN, PHN, [facility] Care Coordination  [phone]

## 2019-07-01 ENCOUNTER — TELEPHONE (OUTPATIENT)
Dept: FAMILY MEDICINE | Facility: CLINIC | Age: 84
End: 2019-07-01

## 2019-07-01 NOTE — TELEPHONE ENCOUNTER
Reason for Call:  Other Assisted living care report     Detailed comments: ASH Hernández from [facility] called to say on June 14 morning medication was not taken.     Betty did an assessment, and patient is doing OK.    Phone Number Patient can be reached at: Betty at [facility] if there are any questions:  [phone]    Best Time:any    Can we leave a detailed message on this number? YES    Call taken on 7/1/2019 at 11:19 AM by Santa Hooper

## 2019-07-09 NOTE — PROGRESS NOTES
[facility] Care Coordination Contact    2nd Attempt: Signed Letter not received from [facility], resent per process.     Bryanna Bear  Care Management Specialist  [facility]  [phone]

## 2019-08-01 ENCOUNTER — TRANSFERRED RECORDS (OUTPATIENT)
Dept: HEALTH INFORMATION MANAGEMENT | Facility: CLINIC | Age: 84
End: 2019-08-01

## 2019-08-01 LAB
CREAT SERPL-MCNC: 1.14 MG/DL (ref 0.55–1.02)
GFR SERPL CREATININE-BSD FRML MDRD: 41 ML/MIN/1.73M2
GLUCOSE SERPL-MCNC: 125 MG/DL (ref 70–100)
HBA1C MFR BLD: 7.6 % (ref 0–5.7)
POTASSIUM SERPL-SCNC: 4.7 MMOL/L (ref 3.5–5.1)

## 2019-08-02 ENCOUNTER — TELEPHONE (OUTPATIENT)
Dept: FAMILY MEDICINE | Facility: CLINIC | Age: 84
End: 2019-08-02

## 2019-08-02 NOTE — TELEPHONE ENCOUNTER
Reason for Call:  Other call back    Detailed comments: Betty from [facility] where the patient stays called and stated that the patient had lab work done at the facility but she has questions for a triage nurse before she faxes the results over to the clinic.     Phone Number Patient can be reached at: Other phone number: [phone]    Best Time: Any    Can we leave a detailed message on this number? Not Applicable    Call taken on 8/2/2019 at 11:19 AM by Esther Zhou

## 2019-08-02 NOTE — TELEPHONE ENCOUNTER
Betty from [facility] will fax pt's lab results. She asked that results be reviewed by PCP or covering provider and advice on further follow up/ tx plan.     Patient care team, please locate fax and let Betty know when received. Also give labs to one of the providers for review. Thank you!

## 2019-08-02 NOTE — TELEPHONE ENCOUNTER
Call was placed to [place] with provider message shared.     Lab results were placed in PCP box for review on return to clinic.

## 2019-08-02 NOTE — TELEPHONE ENCOUNTER
Just looks like stable CKD, slightly elevated uric acid, and blood sugar of 125 mg/dL.    I would say to just follow up with her PCP about these lab results.    Unless she is having issues with gout (which can present with high uric acid) then needs to be seen for that.      --Dr. Murillo

## 2019-08-06 NOTE — TELEPHONE ENCOUNTER
Faxed back to [facility] [phone] today forms that MLO needed to review and signed with orders for decreasing Spironolactone to 12.5mg every other day.  Oumou Calzada LPN

## 2019-08-07 ENCOUNTER — TELEPHONE (OUTPATIENT)
Dept: FAMILY MEDICINE | Facility: CLINIC | Age: 84
End: 2019-08-07

## 2019-08-07 NOTE — TELEPHONE ENCOUNTER
Sandra from [facility] called. They have received a result for A1C 7.6 from [facility] today. Will also fax results to [facility] clinic. FYI sent to PCP.

## 2019-08-22 ENCOUNTER — TELEPHONE (OUTPATIENT)
Dept: FAMILY MEDICINE | Facility: CLINIC | Age: 84
End: 2019-08-22

## 2019-08-22 DIAGNOSIS — R60.0 BILATERAL LEG EDEMA: ICD-10-CM

## 2019-08-22 RX ORDER — SPIRONOLACTONE 25 MG/1
12.5 TABLET ORAL EVERY OTHER DAY
Qty: 30 TABLET | Refills: 11 | COMMUNITY
Start: 2019-08-22 | End: 2021-07-21

## 2019-08-22 NOTE — TELEPHONE ENCOUNTER
"See the 8/2/19 phone call:                   \"Faxed back to [facility] [phone] today forms that MLO needed to review and signed with orders for decreasing Spironolactone to 12.5mg every other day.\"  Oumou Calzada LPN                                         Please let them know.     "

## 2019-08-22 NOTE — TELEPHONE ENCOUNTER
Betty from [facility] called to clarify directions for spironolactone 25 mg tablet. Should pt be taking Spironolactone to 12.5mg every other day or 0.5 tablets (12.5 mg) by mouth daily ? Betty is requesting a call back with providers response.

## 2019-08-23 NOTE — TELEPHONE ENCOUNTER
Left message with Betty at [facility] informing her that the spironolactone dose had been decreased to 12.5 mg every other day: [phone]    Also re-faxed med list to [phone].

## 2019-08-27 ENCOUNTER — MEDICAL CORRESPONDENCE (OUTPATIENT)
Dept: HEALTH INFORMATION MANAGEMENT | Facility: CLINIC | Age: 84
End: 2019-08-27

## 2019-09-01 ENCOUNTER — MEDICAL CORRESPONDENCE (OUTPATIENT)
Dept: HEALTH INFORMATION MANAGEMENT | Facility: CLINIC | Age: 84
End: 2019-09-01

## 2019-09-27 ENCOUNTER — TELEPHONE (OUTPATIENT)
Dept: FAMILY MEDICINE | Facility: CLINIC | Age: 84
End: 2019-09-27

## 2019-09-27 NOTE — TELEPHONE ENCOUNTER
Our goal is to have forms completed within 72 hours, however some forms may require a visit or additional information.    What clinic location was the form placed at [facility] or [place].?     Who is the form from?   Where did the form come from? Faxed to clinic   The form was placed in the inbox of Leo Melgar MD      Please fax to [phone]  Phone number: [phone]    Additional comments: apa medical hearing battery # 13    Call take on 9/27/2019 at 11:55 AM by Helene Yu

## 2019-10-08 NOTE — TELEPHONE ENCOUNTER
Faxed back w/ correct DX code to state bilateral hearing loss H91.93 and MLO initialed and dated 10/8/19

## 2019-10-11 ENCOUNTER — MYC MEDICAL ADVICE (OUTPATIENT)
Dept: FAMILY MEDICINE | Facility: CLINIC | Age: 84
End: 2019-10-11

## 2019-10-11 DIAGNOSIS — K21.9 GASTROESOPHAGEAL REFLUX DISEASE, ESOPHAGITIS PRESENCE NOT SPECIFIED: Primary | ICD-10-CM

## 2019-10-11 RX ORDER — FAMOTIDINE 20 MG/1
20 TABLET, FILM COATED ORAL 2 TIMES DAILY
Qty: 180 TABLET | Refills: 4 | Status: SHIPPED | OUTPATIENT
Start: 2019-10-11

## 2019-10-11 NOTE — TELEPHONE ENCOUNTER
I have sent an Rx to her pharmacy for a medication called famotidine (Pepcid) to replace ranitidine.                                 Please let the NH staff know.

## 2019-10-14 NOTE — TELEPHONE ENCOUNTER
Betty returning call. Relayed new medication change. She is requesting new medication rx and updated medication list.     Requested documents faxed to [phone]. No further action needed.

## 2019-10-14 NOTE — TELEPHONE ENCOUNTER
[facility] Contact (Betty)    Attempt # 1  [phone]    Was call answered?  No.  Left message on voicemail with information to call triage back.    Upon callback, relay provider message below.

## 2019-10-22 ENCOUNTER — PATIENT OUTREACH (OUTPATIENT)
Dept: GERIATRIC MEDICINE | Facility: CLINIC | Age: 84
End: 2019-10-22

## 2019-10-22 NOTE — PROGRESS NOTES
"[facility] Care Coordination Contact    Received an email from son Ed re podiatry visits for member:     \"Reference Podiatrist  visits to [facility] every 60 days to inspect mom s feet and trim her toenails as well as those of some of the other residents. And, the attached Notice of Denial of Payment notice from Medic.    Last July 6th I began to inquire as to her next scheduled appointment as she had missed the expected approximate June 30th appointment. And, Betty Reid advised me that they were having issues, which were not described, with [facility] and were looking for a new provider. Then apparently [facility] was selected, performed the service on July 31st, and are due back this coming October 30th which is now 90 days from the last date of service.    Then on October 18th , 78 days after date of service, I receive a notice of denial from [facility].   Can you help by sorting out if this service can be covered by Medicare, [employer], or Medical Assistance? If, is there a possibility of obtaining approval for [facility] to perform the service at her residence? And, how I should proceed?  Thanks,  Ed\"    Checked the attached denial letter and the podiatry visit for 7/31/19 was denied due to [facility] being an out of network provider.   Called [facility] and spoke with nurse Betty ADAN to see if there were any other podiatrists that came to [facility]. Betty said that [facility] was the only podiatrists that came out to see members. Betty gave this writer the number for the  at [facility] at [phone].    Called [facility] and she said that they were a fairly new podiatry provider in [place] and were in the process of becoming an in network provider with many health plans including Medica, and would be rebilling some of the visits that they have already provided to members. Radha is not sure how far back they can go for billing the visits, but are hoping " "it is to the July 31 visit with member. Explained that member has MA through [employer] and that unless member was told upfront that it was not covered by insurance and that member signed something to state that they know it is not covered by insurance and would privately pay, then member would not be responsible to cover this visit. Radha states she will call this writer back once she finds out more about how far back the rebilling will go,  but that member can go ahead with the next podiatry appt unless member was put on a 'do not treat\" list. Radha states that [employer] will be paying for future visits.   Called son Ed and ML requesting a call back.     Autumn Jacobsen RN, PHN, [facility] Coordination  [phone]    "

## 2019-10-23 NOTE — PROGRESS NOTES
[facility] Care Coordination Contact    Son Ed called this writer back so was able to explain what was going on with the podiatrist. Ed was glad to hear this. Ed wanted the name and number of [facility] so gave this to him.  Autumn Jacobsen RN, PHN, [facility] Coordination  [phone]

## 2019-10-23 NOTE — PROGRESS NOTES
[facility] Care Coordination Contact    Received a message from Radha at [phone] with podiatry clinic that sees member at the [facility]. Per Radha, [employer] is going back to 8/1/19 to pay bills. Radha said she spoke with her manager Juan and the company will take care of the bills previous to this so member will not have to pay for the July podiatry visit out of pocket. Member to disregard the bill if she gets one. To call Radha if this writer has any questions.   Autumn Jacobsen RN, PHN, [facility] Care Coordination  [phone]

## 2019-11-04 ENCOUNTER — HEALTH MAINTENANCE LETTER (OUTPATIENT)
Age: 84
End: 2019-11-04

## 2019-11-08 ENCOUNTER — TELEPHONE (OUTPATIENT)
Dept: FAMILY MEDICINE | Facility: CLINIC | Age: 84
End: 2019-11-08

## 2019-11-08 NOTE — TELEPHONE ENCOUNTER
Reason for Call:  Other call back    Detailed comments: Betty a nurse at [facility] where the patient lives called and stated that the patient recently had lab work done there and she would be faxing the results in for Dr. Melgar to review. She stated that the patients family is requesting that [facility] be notified once Dr. Melgar gets a chance to take a look at results from the lab work so they can let the family know that he reviewed the results.     Phone Number Patient can be reached at: Other phone number: [phone]    Best Time: Any    Can we leave a detailed message on this number? YES    Call taken on 11/8/2019 at 10:35 AM by Esther Zhou

## 2019-11-08 NOTE — TELEPHONE ENCOUNTER
Our goal is to have forms completed within 72 hours, however some forms may require a visit or additional information.    What clinic location was the form placed at [facility] or [place].?     Who is the form from?   Where did the form come from? Faxed to clinic   The form was placed in the inbox of Leo Melgar MD      Please fax to [phone]  Phone number: [phone]    Additional comments: [facility]  lab results MD to let them know if any new orders     Call take on 11/8/2019 at 11:37 AM by Helene Yu

## 2019-11-08 NOTE — TELEPHONE ENCOUNTER
Noted.   Currently waiting for faxed lab results. Will need to be placed in PCP inbox.     Sending to PCP just as an FYI

## 2019-11-12 NOTE — TELEPHONE ENCOUNTER
Faxed these forms to # [phone] [facility] today.  Oumou Calzada LPN

## 2019-11-14 ENCOUNTER — TRANSFERRED RECORDS (OUTPATIENT)
Dept: HEALTH INFORMATION MANAGEMENT | Facility: CLINIC | Age: 84
End: 2019-11-14

## 2019-11-14 LAB — RETINOPATHY: NEGATIVE

## 2019-11-20 ENCOUNTER — PATIENT OUTREACH (OUTPATIENT)
Dept: GERIATRIC MEDICINE | Facility: CLINIC | Age: 84
End: 2019-11-20

## 2019-11-20 NOTE — PROGRESS NOTES
[facility] Care Coordination Contact      [facility] Six-Month Telephone Assessment    6 month telephone assessment completed on 11/20/2019 with son Ed per member request, due to aphasia on part of member where it makes it hard for her to talk on the telephone.  Saw Eye MD last week.    ER visits: No  Hospitalizations: No  TCU stays: No  Significant health status changes: none  Falls/Injuries: No  ADL/IADL changes: No  Changes in services: No    Caregiver Assessment follow up:  na    Goals: See POC in chart for goal progress documentation. Started new podiatry visits with new company that comes to [place].    Will see member in 6 months for an annual health risk assessment.   Encouraged member to call CC with any questions or concerns in the meantime.   Autumn Jacobsen RN, PHN, CCM  [facility] Care Coordination  [phone]

## 2019-11-26 ENCOUNTER — OFFICE VISIT (OUTPATIENT)
Dept: FAMILY MEDICINE | Facility: CLINIC | Age: 84
End: 2019-11-26
Payer: COMMERCIAL

## 2019-11-26 VITALS
RESPIRATION RATE: 20 BRPM | BODY MASS INDEX: 36.32 KG/M2 | WEIGHT: 185 LBS | OXYGEN SATURATION: 95 % | SYSTOLIC BLOOD PRESSURE: 142 MMHG | HEIGHT: 60 IN | DIASTOLIC BLOOD PRESSURE: 70 MMHG | HEART RATE: 75 BPM

## 2019-11-26 DIAGNOSIS — I73.9 PAD (PERIPHERAL ARTERY DISEASE) (H): ICD-10-CM

## 2019-11-26 DIAGNOSIS — E66.01 MORBID OBESITY (H): ICD-10-CM

## 2019-11-26 DIAGNOSIS — E11.22 TYPE 2 DIABETES MELLITUS WITH STAGE 3 CHRONIC KIDNEY DISEASE, WITHOUT LONG-TERM CURRENT USE OF INSULIN (H): ICD-10-CM

## 2019-11-26 DIAGNOSIS — R47.01 EXPRESSIVE APHASIA: ICD-10-CM

## 2019-11-26 DIAGNOSIS — E13.42 DIABETIC POLYNEUROPATHY ASSOCIATED WITH OTHER SPECIFIED DIABETES MELLITUS (H): ICD-10-CM

## 2019-11-26 DIAGNOSIS — I10 HYPERTENSION GOAL BP (BLOOD PRESSURE) < 140/80: ICD-10-CM

## 2019-11-26 DIAGNOSIS — G25.81 RESTLESS LEGS SYNDROME: ICD-10-CM

## 2019-11-26 DIAGNOSIS — L30.4 INTERTRIGO: ICD-10-CM

## 2019-11-26 DIAGNOSIS — H91.93 BILATERAL HEARING LOSS, UNSPECIFIED HEARING LOSS TYPE: ICD-10-CM

## 2019-11-26 DIAGNOSIS — Z00.00 ENCOUNTER FOR MEDICARE ANNUAL WELLNESS EXAM: Primary | ICD-10-CM

## 2019-11-26 DIAGNOSIS — N18.30 TYPE 2 DIABETES MELLITUS WITH STAGE 3 CHRONIC KIDNEY DISEASE, WITHOUT LONG-TERM CURRENT USE OF INSULIN (H): ICD-10-CM

## 2019-11-26 DIAGNOSIS — N18.30 CKD (CHRONIC KIDNEY DISEASE) STAGE 3, GFR 30-59 ML/MIN (H): ICD-10-CM

## 2019-11-26 PROCEDURE — 99397 PER PM REEVAL EST PAT 65+ YR: CPT | Performed by: INTERNAL MEDICINE

## 2019-11-26 RX ORDER — NYSTATIN 100000 U/G
CREAM TOPICAL
Qty: 60 G | Refills: 11 | Status: SHIPPED | OUTPATIENT
Start: 2019-11-26 | End: 2020-05-20

## 2019-11-26 ASSESSMENT — ACTIVITIES OF DAILY LIVING (ADL)
CURRENT_FUNCTION: BATHING REQUIRES ASSISTANCE
CURRENT_FUNCTION: LAUNDRY REQUIRES ASSISTANCE
CURRENT_FUNCTION: HOUSEWORK REQUIRES ASSISTANCE
CURRENT_FUNCTION: MEDICATION ADMINISTRATION REQUIRES ASSISTANCE
CURRENT_FUNCTION: MONEY MANAGEMENT REQUIRES ASSISTANCE
CURRENT_FUNCTION: SHOPPING REQUIRES ASSISTANCE
CURRENT_FUNCTION: TRANSPORTATION REQUIRES ASSISTANCE
CURRENT_FUNCTION: PREPARING MEALS REQUIRES ASSISTANCE

## 2019-11-26 ASSESSMENT — MIFFLIN-ST. JEOR: SCORE: 1165.65

## 2019-11-26 NOTE — PATIENT INSTRUCTIONS
Today we switched from Nystatin powder to the cream.             You are planning a hearing evaluation.

## 2019-11-26 NOTE — PROGRESS NOTES
"SUBJECTIVE:   Regla Benavidez is a 89 year old female who presents for Preventive Visit.      Are you in the first 12 months of your Medicare coverage?  No    Healthy Habits:     In general, how would you rate your overall health?  Good    Frequency of exercise:  1 day/week    Duration of exercise:  Less than 15 minutes    Do you usually eat at least 4 servings of fruit and vegetables a day, include whole grains    & fiber and avoid regularly eating high fat or \"junk\" foods?  Yes    Taking medications regularly:  Yes    Medication side effects:  None    Ability to successfully perform activities of daily living:  Transportation requires assistance, shopping requires assistance, preparing meals requires assistance, housework requires assistance, bathing requires assistance, laundry requires assistance, medication administration requires assistance and money management requires assistance    Home Safety:  No safety concerns identified    Hearing Impairment:  No hearing concerns    In the past 6 months, have you been bothered by leaking of urine? Yes    In general, how would you rate your overall mental or emotional health?  Good      PHQ-2 Total Score: 0    Additional concerns today:  No    Do you feel safe in your environment? Yes    Have you ever done Advance Care Planning? (For example, a Health Directive, POLST, or a discussion with a medical provider or your loved ones about your wishes): Yes, advance care planning is on file.      Fall risk     No falls within last year  Cognitive Screening   1) Repeat 0 items (Leader, Season, Table)    2) Clock draw: abnormal  3) 3 item recall: Recalls 0 objects  Results: 0 items recalled: COGNITIVE IMPAIRMENT LESS LIKELY    Mini-CogTM Copyright HERBERTH Morales. Licensed by the author for use in [facility]; reprinted with permission (dae@.[email or web address]). All rights reserved.      Do you have sleep apnea, excessive snoring or daytime drowsiness?: no    Reviewed and updated as " "needed this visit by clinical staff  Tobacco  Allergies  Meds  Med Hx  Surg Hx  Fam Hx  Soc Hx        Reviewed and updated as needed this visit by Provider        Social History     Tobacco Use     Smoking status: Never Smoker     Smokeless tobacco: Never Used   Substance Use Topics     Alcohol use: No     If you drink alcohol do you typically have >3 drinks per day or >7 drinks per week? No    Alcohol Use 11/26/2019   Prescreen: >3 drinks/day or >7 drinks/week? Not Applicable   Prescreen: >3 drinks/day or >7 drinks/week? -           This patient is here with her son and her daughter.      She still lives in assisted living.       They decided to bring her in for a \"wellness visit\".  They do not want to address other medical issues today.           I do that via phone and via fax with the nursing staff at the assisted living facility.  This patient has labs drawn there every 3 months to monitor her various health conditions.               There is a new head nurse at the facility now.                                                  Their only real request is that we switch from nystatin powder to nystatin cream.     Current providers sharing in care for this patient include:   Patient Care Team:  Leo Melgar MD as PCP - General (Internal Medicine)  Autumn Jacobsen, ASH as Lead Care Coordinator  Heidi Durand as Other (see comments)  Puja Perez Elizabeth Ann, DO as Assigned PCP    The following health maintenance items are reviewed in Epic and correct as of today:  Health Maintenance   Topic Date Due     MARYANN ASSESSMENT  01/05/1926     EYE EXAM  01/05/1926     PHQ-9  01/05/1926     ZOSTER IMMUNIZATION (1 of 2) 01/05/1976     ALT  10/22/2016     BMP  09/04/2018     MEDICARE ANNUAL WELLNESS VISIT  01/27/2019     LIPID  02/09/2019     MICROALBUMIN  02/09/2019     CBC  03/04/2019     DIABETIC FOOT EXAM  08/04/2019     A1C  02/01/2020     TSH W/FREE T4 REFLEX  02/09/2020     FALL RISK ASSESSMENT  " 05/21/2020     DTAP/TDAP/TD IMMUNIZATION (2 - Td) 11/08/2020     HF ACTION PLAN  02/09/2021     ADVANCE CARE PLANNING  07/13/2022     INFLUENZA VACCINE  Completed     PNEUMOCOCCAL IMMUNIZATION 65+ LOW/MEDIUM RISK  Completed     IPV IMMUNIZATION  Aged Out     MENINGITIS IMMUNIZATION  Aged Out     Patient Active Problem List    Diagnosis Date Noted     PAD (peripheral artery disease) (H) 02/09/2018     Priority: High     Type 2 diabetes mellitus with stage 3 chronic kidney disease, without long-term current use of insulin (H) 02/09/2018     Priority: High     Type 2 diabetes mellitus with hyperglycemia, without long-term current use of insulin (H) 07/05/2017     Priority: High     Hyponatremia 12/20/2014     Priority: High     With acute illness 11/14; HCTZ stopped.                Later,furosemide and spironolactone added; in 5/17, 36/1.18, Na+ 137,K+4.5, glucose 257       Expressive aphasia 07/02/2014     Priority: High     Cerebral infarction (H) 10/22/2013     Priority: High     10/21/13;L frontal, MCA ; see hosp () and rehab (Dr. Saba) reports.            Has severe expressive aphasia  Diagnosis updated by automated process. Provider to review and confirm.       Obesity (BMI 35.0-39.9) with comorbidity (H) 11/26/2019     Priority: Medium     Intertrigo 11/26/2019     Priority: Medium     Hypertension goal BP (blood pressure) < 140/80 11/25/2019     Priority: Medium     Pain in both feet 08/04/2018     Priority: Medium     Foot deformity, bilateral 08/04/2018     Priority: Medium     Diabetic polyneuropathy associated with other specified diabetes mellitus (H) 08/04/2018     Priority: Medium     B12 452 in 5/19       Bilateral bunions 08/04/2018     Priority: Medium     Hyperuricemia 04/10/2018     Priority: Medium     9.3; COLCRYS IS COVERED,NOT GENERIC COLCHICINE  10.5 in 5/18       Cellulitis, leg 03/02/2018     Priority: Medium     Cellulitis left leg 03/01/2018     Priority: Medium     Left foot pain  02/28/2018     Priority: Medium     Recent Results (from the past 24 hour(s))   MR Foot Left w/o Contrast    Narrative    MR FOOT LEFT WITHOUT CONTRAST 3/1/2018 2:19 PM    HISTORY: Left foot pain and swelling; etiology? Differential diagnosis  includes gout and diabetic foot infection.    COMPARISON: X-ray left foot from 2/24/2018.    TECHNIQUE: Routine multiplanar, multisequence imaging was performed.    FINDINGS:   Osseous structures: Prominent first MTP joint degenerative changes and  hallux valgus with some marginal erosive changes and subchondral cyst  formation. This could be degenerative but gout could have a similar  appearance as there is some moderate adjacent soft tissue prominence.  Remaining osseous structures are unremarkable. No evidence of fracture  or osteomyelitis.    Additional findings: Diffuse subcutaneous edema. There is also edema  in the deep musculature of the plantar aspect of the foot. Findings  suggest cellulitis and perhaps myositis as well. No evidence of  abscess or fluid collection.      Impression    IMPRESSION:  1. Somewhat focal marginal erosive changes surrounding the first MTP  joint suggests the possibility of gout. There is also soft tissue  prominence surrounding the joint supporting this diagnosis. There may  be secondary degenerative changes as well.  2. Diffuse subcutaneous and deep muscle edema suggesting  cellulitis/myositis. No abscess or fluid collection.    REYNALDO BLACKWOOD MD        Uric acid 9.3       CKD (chronic kidney disease) stage 3, GFR 30-59 ml/min (H) 02/09/2018     Priority: Medium     Class 2 obesity due to excess calories with serious comorbidity and body mass index (BMI) of 35.0 to 35.9 in adult 02/09/2018     Priority: Medium     Other vaginitis 07/29/2017     Priority: Medium     due to urinary incontinence       Dermatitis 07/29/2017     Priority: Medium     skin folds       Continuous leakage of urine 07/29/2017     Priority: Medium     Bilateral hearing  "loss, unspecified hearing loss type 2017     Priority: Medium     Bilateral leg edema 2017     Priority: Medium     Prerenal azotemia 2017     Priority: Medium     Abdominal bloating 10/28/2015     Priority: Medium     CT neg except L ovarian cyst; CA-125= 3.    The other labs ok; see office visit note 10/21/15       Abnormal weight gain 10/28/2015     Priority: Medium     Cyst of left ovary 10/28/2015     Priority: Medium     Complex on CT and US: 6x4 cm in 11/15; d/w her son and daughter; given her age and comorbidities and lack of symptoms, and a low Ca 125 level, further work-up was not done.       Recurrent UTI 2015     Priority: Medium     See urology note 12/15; cysto neg; PVR low; \"consider a prophylactic antibiotic\"       Chronic midline low back pain without sciatica 2015     Priority: Medium     Worse in ;              HERE IS THE SPINE XRAY REPORT:           IMPRESSION: Mild to moderate scoliotic curvature of the lumbar spine,  apex left. No compression deformity or acute fracture. Anterior and  right lateral osteophytes in the mid to lower lumbar spine. Degree of  this severe degenerative change has progressed since previous exam.       Restless legs syndrome 2013     Priority: Medium     Hard of hearing      Priority: Medium     hearing aids       Constipation 2013     Priority: Medium     Branch retinal vein occlusion of left eye 2010     Priority: Medium     Rx Avastin in 2011       Health Care Home 2016     Priority: Low     [facility] Care Coordinator  Autumn Jacobsen RN  [phone]    [facility] CARE PLAN SUMMARY    Client Name:  Justusmiguel Benavidez  Address:  C/O David Benavidez-son  [address] [address]    Regla Morejon [facility]  [address]  [place] [address] Phone: [phone]  But call son Melchor or daughter Obdulio  [phone] ext [phone] (work-preferred)     :  1926 " Date of Assessment:  5/21/19   Health Plan:  [facility]  Health Plan Number: [id number] Medical Assistance Number: [id number]  Financial Worker: Alexis Abarca   Case #:     FVP :  Autumn Jacobsen RN CM Phone:  [phone]  CM Fax:  [phone]   FVP Enrollment Date: 6/1/16 Case Mix:  D  Rate Cell:  B    Waiver Type:  EW   Emergency Contact: Melchor Benavidez-son/POA   W Phone: [phone]-preferred.   Home [phone]  Secondary: Obdulio Joyce-daughter- [phone] or [phone]  Or  Neftali Benavidez-son [phone] Language:  English  :  No   Health Care Agent/POA: Son David Benavidez and then son Neftali Benavidez and Obdulio Joyce-daughter Advanced Directives/Living Will:  Yes   Primary Care Clinic/Phone/Fax:  [facility]/(p) [phone], (f) [phone] Primary Dx: Expressive Aphasia R47.01   Secondary Dx:  DM E11.9   Primary Physician:  Leo Melgar   Height: 5' 0''   Weight: 178 lbs   Specialty Physician:   Audiologist:  Has hearing aids, appt 8/16/17   Eye Care Provider:  Has glasses and hx bilateral cataract surgery. [facility]. Last appt 10/10/18 Dental Care Provider:  Own teeth-appt every 6 months at [facility]-last 12/5/18  [facility] [phone]   Other:        [facility] CURRENT SERVICES SUMMARY  Equipment owned/DME history: toilet safety frame, transfer bath bench, bed rails, walker, lift chair    SERVICE TYPE/PROVIDER NAME/PHONE AUTH DATE FREQUENCY Units OR $ Amt DESCRIPTION   Medical Transportation: [id number] [phone] 6/1/19-5/31/20 As needed na Medial rides   Assisted Living: [facility] [phone] 24 hr Customized Living Fax: [phone] 6/1/19-5/31/20 Daily See RS/[facility] Assisted living services   [facility] [phone]  [phone]  6/1/19-5/31/20 Monthly      Monthly Wipes-2 Pkgs $11.50/pkg    EW-$141.60 Prevail wipes, chux, 10 pks of XL  Esthela underwear 48591    EW-6 extra Pkgs Esthela pullups at 23.60 ea = $141.60   [facility]: [phone] 6/1/19-5/31/20 Monthly EW 2 tubes at $15/tube    EW-$39  MA-$42.88  $30 Aspercreme with lidocaine 4%  2.7 oz tubes    No rinse body wash-2 bottles/m  Hearing aid batteries Size 13  Tens electrode adhesive rounds     * For ADC please select ADC provider and EW Transportation in order to process auth           ACP (advance care planning) 11/17/2014     Priority: Low     Advance Care Planning 6/12/2017: ACP Review of ACP with client and family. Reviewed advanced care plan and POLST and all remain current.  Regla Benavidez has an up to date advance care plan on file.  Added by Autumn Jacobsen  Advance Care Planning 6/16/2016: ACP Review of ACP with client and family.  Reviewed advance care plan and POLST with client and it still reflects client's wishes.  Regla Benavidez has an up to date advance care plan on file.  Added by Autunm Jacobsen  Advance Care Planning:   Receipt of ACP document:  Received: POLST which was signed and dated by provider on 2/11/14.  Document previously scanned on 4/8/14.  Order reviewed and found to be valid.  Code Status reflects choices in most recent ACP document.  Confirmed/documented designated decision maker(s). See permanent comments section of demographics in clinical tab. View document(s) and details by clicking on code status. Added by Ashanti Benavidez on 11/17/2014.  Advance Care Planning: Receipt of ACP document:  Received: Health Care Directive which was witnessed or notarized on 9/30/09.  Document previously scanned on 10/29/13.  Validation form completed and  scanned.  Code Status reflects choices in most recent ACP document has a POLST .  Confirmed/documented designated decision maker(s). See permanent comments section of demographics in clinical tab. View document(s) and details by clicking on code status. Added by Ashanti Benavidez on 11/17/2014.           Preventive measure  09/11/2013     Priority: Low     [facility] B[facility] UNDER THE 9/11/13 NOTE  Colonoscopy 11/01; mammogram 6/00    [facility] [phone]       Past Surgical History:   Procedure Laterality Date     CATARACT IOL, RT/LT         BP Readings from Last 3 Encounters:   11/26/19 (!) 142/70   12/21/18 132/58   10/03/18 128/77    Wt Readings from Last 3 Encounters:   11/26/19 83.9 kg (185 lb)   12/21/18 81.1 kg (178 lb 14.4 oz)   10/03/18 78.2 kg (172 lb 4.8 oz)                  Current Outpatient Medications   Medication Sig Dispense Refill     acetaminophen (TYLENOL) 325 MG tablet Take 2 tablets in AM, 2 tabs at bedtime and also 2 tabs  tablet 2     aspirin 325 MG tablet Take 1 tablet (325 mg) by mouth daily 120 tablet      blood glucose calibration (NO BRAND SPECIFIED) solution Use to calibrate blood glucose monitor as needed as directed. To accompany: Accu-Chek Safe-T pro plus 1 Bottle 3     blood glucose monitoring (NO BRAND SPECIFIED) test strip Use to test blood sugar 1 times daily or as directed. To accompany: Accu-Chek safety pro plus 100 strip 6     carbamide peroxide (DEBROX) 6.5 % otic solution Place 5 drops into both ears daily as needed for other       Cholecalciferol (VITAMIN D3 PO) Take 2,000 Units by mouth daily       famotidine (PEPCID) 20 MG tablet Take 1 tablet (20 mg) by mouth 2 times daily 180 tablet 4     furosemide (LASIX) 20 MG tablet Take 1 tablet (20 mg) by mouth 2 times daily As of 1/27/18 60 tablet 12     glipiZIDE (GLUCOTROL) 10 MG tablet Take 1 tablet (10 mg) by mouth daily 60 tablet 11     Lactobacillus (LACTINEX PO) Take 1 tablet by mouth 3 times daily 0900; 1300; 1645       lidocaine (LMX4) 4 % CREA cream Apply to both feet  g 11     metFORMIN (GLUCOPHAGE) 500 MG tablet Take 500 mg by mouth daily  30 tablet 12     nystatin (MYCOSTATIN) 466118 UNIT/GM POWD Apply 1 g topically 3 times daily as needed 60 g 11     Ondansetron HCl (ZOFRAN PO) Take 4 mg by mouth every 8 hours as  needed for nausea or vomiting       polyethylene glycol (MIRALAX/GLYCOLAX) Packet Take 8.5 g by mouth every other day        pramipexole (MIRAPEX) 0.25 MG tablet Take 1.5 tablets (0.375 mg) by mouth At Bedtime 45 tablet 11     simvastatin (ZOCOR) 5 MG tablet Take 1 tablet (5 mg) by mouth every evening 30 tablet      sitagliptin (JANUVIA) 50 MG tablet Take 1 tablet (50 mg) by mouth daily 30 tablet 11     spironolactone (ALDACTONE) 25 MG tablet Take 0.5 tablets (12.5 mg) by mouth every other day 30 tablet 11     thin (NO BRAND SPECIFIED) lancets Use to test blood sugar 1 times daily or as directed. To accompany: Hemp 4 [place] safety pro plus 100 each 3     UNABLE TO FIND MEDICATION NAME: Aspercreme with lidocaine 0.4% 2 x daily to feet       Recent Labs   Lab Test 08/01/19 05/02/19 11/01/18 02/09/18  1248  10/22/15  03/15/15  1435  11/15/14  0815 09/04/14  10/22/13  0745   A1C 7.6* 7.5*  --  6.6*   < > 6.9*   < >  --    < >  --    < >  --  6.6*   < >  --    LDL  --   --   --   --   --  49  --   --   --   --   --   --  69  --  114   HDL  --   --   --   --   --  46*  --   --   --   --   --   --  64  --  38*   TRIG  --   --   --   --   --  135  --   --   --   --   --   --  98  --  92   ALT  --   --   --   --   --   --   --  25  --  39  --  29  --   --  67*   CR 1.14* 1.14*   < > 1.18*   < >  --    < > 0.9   < > 1.02   < > 0.70 1.1   < > 0.89   GFRESTIMATED 41* 41   < > 40*   < >  --    < > 58   < > 51*   < > 78 48   < > 60*   GFRESTBLACK 48* 48   < > 46*   < >  --    < >  --    < > 62   < > >90   GFR Calc   58   < > 73   POTASSIUM 4.7 4.2   < > 4.1   < >  --    < > 4.6   < > 3.9   < > 4.2  --    < > 3.1*   TSH  --   --   --   --   --  0.77  --  1.98  --   --   --   --   --   --  0.45    < > = values in this interval not displayed.          Review of Systems this patient has expressive a aphasia so the review of systems is difficult.  Most of this history is from the family.  CONSTITUTIONAL:weight  gain  EYES: She had a recent eye exam  ENT/MOUTH: She has bilateral hearing aids but her hearing seems to be worse and they are planning a reevaluation  RESP:NEGATIVE for significant cough or SOB  CV: POSITIVE for chronic but minimal leg edema and NEGATIVE for chest pain/chest pressure  GI: NEGATIVE for hematochezia and melena  NEURO: gait disturbance    OBJECTIVE:   BP (!) 142/70 (BP Location: Right arm, Patient Position: Chair, Cuff Size: Adult Large)   Pulse 75   Resp 20   Ht 1.524 m (5')   Wt 83.9 kg (185 lb)   LMP  (LMP Unknown)   SpO2 95%   Breastfeeding No   BMI 36.13 kg/m   Estimated body mass index is 36.13 kg/m  as calculated from the following:    Height as of this encounter: 1.524 m (5').    Weight as of this encounter: 83.9 kg (185 lb).  Physical Exam patient declined to undress  GENERAL APPEARANCE: alert, no distress and over weight  HENT: ear canals and TM's normal and nose and mouth without ulcers or lesions  RESP: no rales or rhonchi  CV: regular rates and rhythm, normal S1 S2, no S3 or S4, no murmur, click or rub and pitting B/L LE edema to trace bilaterally                      diabetic foot exam: No lesions, feet are warm, pulses diminished  ABDOMEN: soft, nontender, without hepatosplenomegaly or masses, obese and protuberant  NEURO: gait abnormal ; she uses a walker and expressive aphasia    Diagnostic Test Results:  none     ASSESSMENT / PLAN:   Alberta was seen today for physical.    Diagnoses and all orders for this visit:    Encounter for Medicare annual wellness exam    Type 2 diabetes mellitus with stage 3 chronic kidney disease, without long-term current use of insulin (H)    Diabetic polyneuropathy associated with other specified diabetes mellitus (H)    CKD (chronic kidney disease) stage 3, GFR 30-59 ml/min (H)    Restless legs syndrome    Expressive aphasia    Bilateral hearing loss, unspecified hearing loss type    PAD (peripheral artery disease) (H)    Hypertension goal BP  (blood pressure) < 140/80    Intertrigo  -     nystatin (MYCOSTATIN) 340438 UNIT/GM external cream; APPLY TO INGUINAL AREAS AND UNDER THE BREASTS BID.    Morbid obesity (H)             Summary and implications: Overall she is stable.                We will continue to monitor her quarterly lab results, with vital signs, weight, daily glucose, etc.  Patient Instructions   Today we switched from Nystatin powder to the cream.             You are planning a hearing evaluation.       Return in about 1 year (around 11/26/2020) for yearly wellness visit, diabetes follow up, follow up of several issues.      COUNSELING:  Reviewed preventive health counseling, as reflected in patient instructions  Special attention given to:       Regular exercise       Healthy diet/nutrition    Estimated body mass index is 36.13 kg/m  as calculated from the following:    Height as of this encounter: 1.524 m (5').    Weight as of this encounter: 83.9 kg (185 lb).    Weight management plan: Discussed healthy diet and exercise guidelines     reports that she has never smoked. She has never used smokeless tobacco.      Appropriate preventive services were discussed with this patient, including applicable screening as appropriate for cardiovascular disease, diabetes, osteopenia/osteoporosis, and glaucoma.  As appropriate for age/gender, discussed screening for colorectal cancer, prostate cancer, breast cancer, and cervical cancer. Checklist reviewing preventive services available has been given to the patient.    Reviewed patients plan of care and provided an AVS. The Basic Care Plan (routine screening as documented in Health Maintenance) for Alberta meets the Care Plan requirement. This Care Plan has been established and reviewed with the Patient.    Counseling Resources:  ATP IV Guidelines  Pooled Cohorts Equation Calculator  Breast Cancer Risk Calculator  FRAX Risk Assessment  ICSI Preventive Guidelines  Dietary Guidelines for Americans,  2010  USDA's MyPlate  ASA Prophylaxis  Lung CA Screening    Leo Melgar MD  [facility]

## 2019-12-06 ENCOUNTER — TELEPHONE (OUTPATIENT)
Dept: FAMILY MEDICINE | Facility: CLINIC | Age: 84
End: 2019-12-06

## 2019-12-06 NOTE — TELEPHONE ENCOUNTER
Betty with [facility] called back:    Verbal okay was given to Betty over her voicemail. She was told to call the clinic if there are any questions.

## 2019-12-06 NOTE — TELEPHONE ENCOUNTER
Additional comments: Betty with [facility] called and stated that the they needs a verbal order from Dr. Melgar stating that it is okay for the patient to go see a Dentist.                            OK for this; signed      Leo Melgar MD

## 2019-12-06 NOTE — TELEPHONE ENCOUNTER
Reason for Call: Request for an order or referral:    Order or referral being requested: Dental Order    Date needed: as soon as possible    Has the patient been seen by the PCP for this problem? YES    Additional comments: Betty with [facility] called and stated that the they needs a verbal order from Dr. Melgar stating that it is okay for the patient to go see a Dentist.     Phone number Patient can be reached at:  Other phone number: [phone]    Best Time:  Any    Can we leave a detailed message on this number?  YES    Call taken on 12/6/2019 at 8:43 AM by Esther Rios

## 2019-12-09 ENCOUNTER — TELEPHONE (OUTPATIENT)
Dept: FAMILY MEDICINE | Facility: CLINIC | Age: 84
End: 2019-12-09

## 2019-12-09 NOTE — TELEPHONE ENCOUNTER
Our goal is to have forms completed within 72 hours, however some forms may require a visit or additional information.    What clinic location was the form placed at [facility] or [place].?     Who is the form from?   Where did the form come from? Faxed to clinic   The form was placed in the inbox of Leo Melgar MD      Please fax to [phone]  Phone number: [phone]    Additional comments: [facility] living  dentist orders     Call take on 12/9/2019 at 4:10 PM by Helene Yu

## 2020-01-06 ENCOUNTER — TELEPHONE (OUTPATIENT)
Dept: FAMILY MEDICINE | Facility: CLINIC | Age: 85
End: 2020-01-06

## 2020-01-06 NOTE — TELEPHONE ENCOUNTER
Follow up call to [facility]    Fell on right side today at 4:05pm. No redness noted. Rates pain 5-5 (not 10) pain.   Full ROM   No pain with walking   Low grade temp 99.0 (taken after fall)  162/74- right after the fall    Did not hit her head.     She was carrying too many things in her hand while trying to walk with her walking and fell.     No further concerns. Will continue to monitor at this time and update clinic if needed.

## 2020-01-06 NOTE — TELEPHONE ENCOUNTER
Reason for Call:  Other    Patient update    Detailed comments   Betty from [facility] called to inform us that patient fell today at 4:05 PM    She fell in her bedroom   Her pain is 5/5   She is walking OK    Phone Number Patient can be reached at: Other phone number:    Betty at [phone]  Best Time: anytime    Can we leave a detailed message on this number? YES    Call taken on 1/6/2020 at 4:39 PM by ÓSCAR CLARK

## 2020-01-22 ENCOUNTER — TELEPHONE (OUTPATIENT)
Dept: FAMILY MEDICINE | Facility: CLINIC | Age: 85
End: 2020-01-22

## 2020-01-22 NOTE — TELEPHONE ENCOUNTER
Reason for Call:  Form, our goal is to have forms completed with 72 hours, however, some forms may require a visit or additional information.    Type of letter, form or note:  medical    Who is the form from?: Home care    Where did the form come from: form was faxed in    What clinic location was the form placed at?: [facility]    Where the form was placed: Given to physician    What number is listed as a contact on the form?: Fax: [phone]       Additional comments: [facility] Residents- Home Care MD Orders    Call taken on 1/22/2020 at 9:24 AM by Esther Rios

## 2020-02-05 ENCOUNTER — OFFICE VISIT (OUTPATIENT)
Dept: FAMILY MEDICINE | Facility: CLINIC | Age: 85
End: 2020-02-05
Payer: COMMERCIAL

## 2020-02-05 VITALS
HEIGHT: 60 IN | HEART RATE: 76 BPM | WEIGHT: 187 LBS | RESPIRATION RATE: 14 BRPM | BODY MASS INDEX: 36.71 KG/M2 | TEMPERATURE: 97.8 F | SYSTOLIC BLOOD PRESSURE: 134 MMHG | OXYGEN SATURATION: 94 % | DIASTOLIC BLOOD PRESSURE: 62 MMHG

## 2020-02-05 DIAGNOSIS — N18.30 TYPE 2 DIABETES MELLITUS WITH STAGE 3 CHRONIC KIDNEY DISEASE, WITHOUT LONG-TERM CURRENT USE OF INSULIN (H): ICD-10-CM

## 2020-02-05 DIAGNOSIS — R60.0 BILATERAL LEG EDEMA: ICD-10-CM

## 2020-02-05 DIAGNOSIS — E13.42 DIABETIC POLYNEUROPATHY ASSOCIATED WITH OTHER SPECIFIED DIABETES MELLITUS (H): Primary | ICD-10-CM

## 2020-02-05 DIAGNOSIS — M79.671 PAIN IN BOTH FEET: ICD-10-CM

## 2020-02-05 DIAGNOSIS — R47.01 EXPRESSIVE APHASIA: ICD-10-CM

## 2020-02-05 DIAGNOSIS — H91.93 BILATERAL HEARING LOSS, UNSPECIFIED HEARING LOSS TYPE: ICD-10-CM

## 2020-02-05 DIAGNOSIS — E11.22 TYPE 2 DIABETES MELLITUS WITH STAGE 3 CHRONIC KIDNEY DISEASE, WITHOUT LONG-TERM CURRENT USE OF INSULIN (H): ICD-10-CM

## 2020-02-05 DIAGNOSIS — M79.672 PAIN IN BOTH FEET: ICD-10-CM

## 2020-02-05 DIAGNOSIS — I73.9 PAD (PERIPHERAL ARTERY DISEASE) (H): ICD-10-CM

## 2020-02-05 DIAGNOSIS — E66.01 MORBID OBESITY (H): ICD-10-CM

## 2020-02-05 PROCEDURE — 99214 OFFICE O/P EST MOD 30 MIN: CPT | Performed by: INTERNAL MEDICINE

## 2020-02-05 RX ORDER — GABAPENTIN 100 MG/1
100 CAPSULE ORAL AT BEDTIME
Qty: 30 CAPSULE | Refills: 11 | Status: SHIPPED | OUTPATIENT
Start: 2020-02-05 | End: 2020-08-26

## 2020-02-05 ASSESSMENT — MIFFLIN-ST. JEOR: SCORE: 1169.73

## 2020-02-05 NOTE — PATIENT INSTRUCTIONS
Today we added gapapentin 100 mg at bedtime , and we increased the aspercream with lidocaine to three times per day.

## 2020-02-05 NOTE — PROGRESS NOTES
"Subjective     Regla Benavidez is a 89 year old female who presents to clinic today for the following health issues:    HPI   Musculoskeletal problem/pain      Duration: X7 days    Description  Location: Bilateral foot pain    Intensity:  moderate    Accompanying signs and symptoms: swelling    History  Previous similar problem: YES  Previous evaluation:  none    Precipitating or alleviating factors:  Trauma or overuse: no   Aggravating factors include: walking    Therapies tried and outcome: nothing    Here with her dtr and her son,from assisted living. Hx difficult given her expressive aphasia.           The daughter does most of the communicating with the patient.                 The patient has apparently been complaining that it feels like \"bugs are biting my feet\".                 She is felt to have peripheral neuropathy, and uses Aspercreme with lidocaine twice per day.  Family and patient state that nurses do apply this twice daily.                 She is known to also have PAD, and has a history of cellulitis, plus chronic peripheral edema.   She is due for her follow-up lab work tomorrow at the assisted living facility.     Daughter states this patient has not been careful with her intake of sweets lately.    Current Outpatient Medications   Medication Sig Dispense Refill     acetaminophen (TYLENOL) 325 MG tablet Take 2 tablets in AM, 2 tabs at bedtime and also 2 tabs  tablet 2     aspirin 325 MG tablet Take 1 tablet (325 mg) by mouth daily 120 tablet      blood glucose calibration (NO BRAND SPECIFIED) solution Use to calibrate blood glucose monitor as needed as directed. To accompany: Accu-Chek Safe-T pro plus 1 Bottle 3     blood glucose monitoring (NO BRAND SPECIFIED) test strip Use to test blood sugar 1 times daily or as directed. To accompany: Accu-Chek safety pro plus 100 strip 6     carbamide peroxide (DEBROX) 6.5 % otic solution Place 5 drops into both ears daily as needed for other       " Cholecalciferol (VITAMIN D3 PO) Take 2,000 Units by mouth daily       famotidine (PEPCID) 20 MG tablet Take 1 tablet (20 mg) by mouth 2 times daily 180 tablet 4     furosemide (LASIX) 20 MG tablet Take 1 tablet (20 mg) by mouth 2 times daily As of 1/27/18 60 tablet 12     gabapentin (NEURONTIN) 100 MG capsule Take 1 capsule (100 mg) by mouth At Bedtime 30 capsule 11     glipiZIDE (GLUCOTROL) 10 MG tablet Take 1 tablet (10 mg) by mouth daily 60 tablet 11     Lactobacillus (LACTINEX PO) Take 1 tablet by mouth 3 times daily 0900; 1300; 1645       lidocaine (LMX4) 4 % CREA cream Apply to both feet  g 11     metFORMIN (GLUCOPHAGE) 500 MG tablet Take 500 mg by mouth daily  30 tablet 12     nystatin (MYCOSTATIN) 879256 UNIT/GM external cream APPLY TO INGUINAL AREAS AND UNDER THE BREASTS BID. 60 g 11     Ondansetron HCl (ZOFRAN PO) Take 4 mg by mouth every 8 hours as needed for nausea or vomiting       polyethylene glycol (MIRALAX/GLYCOLAX) Packet Take 8.5 g by mouth every other day        pramipexole (MIRAPEX) 0.25 MG tablet Take 1.5 tablets (0.375 mg) by mouth At Bedtime 45 tablet 11     simvastatin (ZOCOR) 5 MG tablet Take 1 tablet (5 mg) by mouth every evening 30 tablet      sitagliptin (JANUVIA) 50 MG tablet Take 1 tablet (50 mg) by mouth daily 30 tablet 11     spironolactone (ALDACTONE) 25 MG tablet Take 0.5 tablets (12.5 mg) by mouth every other day 30 tablet 11     thin (NO BRAND SPECIFIED) lancets Use to test blood sugar 1 times daily or as directed. To accompany: [employer] safety pro plus 100 each 3     UNABLE TO FIND MEDICATION NAME: Aspercreme with lidocaine 0.4% 2 x daily to feet       Allergies   Allergen Reactions     Cozaar [Losartan Potassium] Swelling     Leg swelling. Same from Avapro.     Hydrochlorothiazide Other (See Comments)     hyponatremia     Hydrocodone Nausea and Vomiting     Nausea     BP Readings from Last 3 Encounters:   02/05/20 134/62   11/26/19 (!) 142/70   12/21/18 132/58    Wt  Readings from Last 3 Encounters:   02/05/20 84.8 kg (187 lb)   11/26/19 83.9 kg (185 lb)   12/21/18 81.1 kg (178 lb 14.4 oz)                    Patient Active Problem List    Diagnosis Date Noted     PAD (peripheral artery disease) (H) 02/09/2018     Priority: High     Type 2 diabetes mellitus with stage 3 chronic kidney disease, without long-term current use of insulin (H) 02/09/2018     Priority: High     Type 2 diabetes mellitus with hyperglycemia, without long-term current use of insulin (H) 07/05/2017     Priority: High     Hyponatremia 12/20/2014     Priority: High     With acute illness 11/14; HCTZ stopped.                Later,furosemide and spironolactone added; in 5/17, 36/1.18, Na+ 137,K+4.5, glucose 257       Expressive aphasia 07/02/2014     Priority: High     Cerebral infarction (H) 10/22/2013     Priority: High     10/21/13;L frontal, MCA ; see hosp () and rehab (Dr. Saba) reports.            Has severe expressive aphasia  Diagnosis updated by automated process. Provider to review and confirm.       Obesity (BMI 35.0-39.9) with comorbidity (H) 11/26/2019     Priority: Medium     Intertrigo 11/26/2019     Priority: Medium     Hypertension goal BP (blood pressure) < 140/80 11/25/2019     Priority: Medium     Pain in both feet 08/04/2018     Priority: Medium     Foot deformity, bilateral 08/04/2018     Priority: Medium     Diabetic polyneuropathy associated with other specified diabetes mellitus (H) 08/04/2018     Priority: Medium     B12 452 in 5/19       Bilateral bunions 08/04/2018     Priority: Medium     Hyperuricemia 04/10/2018     Priority: Medium     9.3; COLCRYS IS COVERED,NOT GENERIC COLCHICINE  10.5 in 5/18       Cellulitis, leg 03/02/2018     Priority: Medium     Cellulitis left leg 03/01/2018     Priority: Medium     Left foot pain 02/28/2018     Priority: Medium     Recent Results (from the past 24 hour(s))   MR Foot Left w/o Contrast    Narrative    MR FOOT LEFT WITHOUT CONTRAST  3/1/2018 2:19 PM    HISTORY: Left foot pain and swelling; etiology? Differential diagnosis  includes gout and diabetic foot infection.    COMPARISON: X-ray left foot from 2/24/2018.    TECHNIQUE: Routine multiplanar, multisequence imaging was performed.    FINDINGS:   Osseous structures: Prominent first MTP joint degenerative changes and  hallux valgus with some marginal erosive changes and subchondral cyst  formation. This could be degenerative but gout could have a similar  appearance as there is some moderate adjacent soft tissue prominence.  Remaining osseous structures are unremarkable. No evidence of fracture  or osteomyelitis.    Additional findings: Diffuse subcutaneous edema. There is also edema  in the deep musculature of the plantar aspect of the foot. Findings  suggest cellulitis and perhaps myositis as well. No evidence of  abscess or fluid collection.      Impression    IMPRESSION:  1. Somewhat focal marginal erosive changes surrounding the first MTP  joint suggests the possibility of gout. There is also soft tissue  prominence surrounding the joint supporting this diagnosis. There may  be secondary degenerative changes as well.  2. Diffuse subcutaneous and deep muscle edema suggesting  cellulitis/myositis. No abscess or fluid collection.    REYNALDO BLACKWOOD MD        Uric acid 9.3       CKD (chronic kidney disease) stage 3, GFR 30-59 ml/min (H) 02/09/2018     Priority: Medium     Class 2 obesity due to excess calories with serious comorbidity and body mass index (BMI) of 35.0 to 35.9 in adult 02/09/2018     Priority: Medium     Other vaginitis 07/29/2017     Priority: Medium     due to urinary incontinence       Dermatitis 07/29/2017     Priority: Medium     skin folds       Continuous leakage of urine 07/29/2017     Priority: Medium     Bilateral hearing loss, unspecified hearing loss type 07/29/2017     Priority: Medium     Bilateral leg edema 03/22/2017     Priority: Medium     Prerenal azotemia  "2017     Priority: Medium     Abdominal bloating 10/28/2015     Priority: Medium     CT neg except L ovarian cyst; CA-125= 3.    The other labs ok; see office visit note 10/21/15       Abnormal weight gain 10/28/2015     Priority: Medium     Cyst of left ovary 10/28/2015     Priority: Medium     Complex on CT and US: 6x4 cm in 11/15; d/w her son and daughter; given her age and comorbidities and lack of symptoms, and a low Ca 125 level, further work-up was not done.       Recurrent UTI 2015     Priority: Medium     See urology note 12/15; cysto neg; PVR low; \"consider a prophylactic antibiotic\"       Chronic midline low back pain without sciatica 2015     Priority: Medium     Worse in ;              HERE IS THE SPINE XRAY REPORT:           IMPRESSION: Mild to moderate scoliotic curvature of the lumbar spine,  apex left. No compression deformity or acute fracture. Anterior and  right lateral osteophytes in the mid to lower lumbar spine. Degree of  this severe degenerative change has progressed since previous exam.       Restless legs syndrome 2013     Priority: Medium     Hard of hearing      Priority: Medium     hearing aids       Constipation 2013     Priority: Medium     Branch retinal vein occlusion of left eye 2010     Priority: Medium     Rx Avastin in 2011       Health Care Home 2016     Priority: Low     [facility] Care Coordinator  Autumn Jacobsen, RN  [phone]    [facility] CARE PLAN SUMMARY    Client Name:  Regla Benavidez  Address:  C/O David Benavidze-son  [address] [place] [id number]    Regla Morejon [address]  [address]  [place] [address] Phone: [phone]  But call son Ed or daughter Obdulio  [phone] ext [phone] (work-preferred)     :  1926 Date of Assessment:  19   Health Plan:  [facility]  Health [place] Number: [id number] Medical Assistance Number: [id number]  Financial " Worker: [facility]   Case #:     FVP :  Autumn Jacobsen RN CM Phone:  [phone]  CM Fax:  [phone]   FVP Enrollment Date: 6/1/16 Case Mix:  D  Rate Cell:  B    Waiver Type:  EW   Emergency Contact: Melchor Benavidez-son/POA   W Phone: [phone].   Home [phone]  Secondary: Obdulio Joyce-daughter- [phone] or [phone]  Or  Neftali Benavidez-son [phone] Language:  English  :  No   Health Care Agent/POA: Son David Benavidez and then son Neftali Benavidez and Obdulio Joyce-daughter Advanced Directives/Living Will:  Yes   Primary Care Clinic/Phone/Fax:  [facility]/(p) [phone], (f) [phone] Primary Dx: Expressive Aphasia R47.01   Secondary Dx:  DM E11.9   Primary Physician:  Leo Melgar   Height: 5' 0''   Weight: 178 lbs   Specialty Physician:   Audiologist:  Has hearing aids, appt 8/16/17   Eye Care Provider:  Has glasses and hx bilateral cataract surgery. [facility]. Last appt 10/10/18 Dental Care Provider:  Own teeth-appt every 6 months at [facility]-last 12/5/18  [facility] [phone]   Other:        [facility] CURRENT SERVICES SUMMARY  Equipment owned/DME history: toilet safety frame, transfer bath bench, bed rails, walker, lift chair    SERVICE TYPE/PROVIDER NAME/PHONE AUTH DATE FREQUENCY Units OR $ Amt DESCRIPTION   Medical Transportation: [facility] [phone] 6/1/19-5/31/20 As needed na Medial rides   Assisted Living: [facility] [phone] 24 hr Customized Living Fax: [phone] 6/1/19-5/31/20 Daily See RS/AL [facility] Assisted living services   DME-[facility] [phone]  [phone]  6/1/19-5/31/20 Monthly      Monthly Wipes-2 Pkgs $11.50/pkg    EW-$141.60 Prevail wipes, chux, 10 pks of XL Esthela underwear 70054    EW-6 extra Pkgs Esthela pullups at 23.60 ea = $141.60   [facility]: [phone] 6/1/19-5/31/20 Monthly EW 2 tubes at  $15/tube    EW-$39  MA-$42.88  $30 Aspercreme with lidocaine 4%  2.7 oz tubes    No rinse body wash-2 bottles/m  Hearing aid batteries Size 13  Tens electrode adhesive rounds     * For ADC please select ADC provider and EW Transportation in order to process auth           ACP (advance care planning) 11/17/2014     Priority: Low     Advance Care Planning 6/12/2017: ACP Review of ACP with client and family. Reviewed advanced care plan and POLST and all remain current.  Justusmiguel LUTZ Pramod has an up to date advance care plan on file.  Added by Autumn Jacobsen  Advance Care Planning 6/16/2016: ACP Review of ACP with client and family.  Reviewed advance care plan and POLST with client and it still reflects client's wishes.  Justusmiguel Benavidez has an up to date advance care plan on file.  Added by Autumn Jacobsen  Advance Care Planning:   Receipt of ACP document:  Received: POLST which was signed and dated by provider on 2/11/14.  Document previously scanned on 4/8/14.  Order reviewed and found to be valid.  Code Status reflects choices in most recent ACP document.  Confirmed/documented designated decision maker(s). See permanent comments section of demographics in clinical tab. View document(s) and details by clicking on code status. Added by Ashanti Benavidez on 11/17/2014.  Advance Care Planning: Receipt of ACP document:  Received: Health Care Directive which was witnessed or notarized on 9/30/09.  Document previously scanned on 10/29/13.  Validation form completed and  scanned.  Code Status reflects choices in most recent ACP document has a POLST .  Confirmed/documented designated decision maker(s). See permanent comments section of demographics in clinical tab. View document(s) and details by clicking on code status. Added by Ashanti Benavidez on 11/17/2014.           Preventive measure 09/11/2013     Priority: Low     APRIMA DATA BASE UNDER THE 9/11/13 NOTE  Colonoscopy 11/01; mammogram 6/00    [facility] [phone]          Reviewed and updated as needed this visit by Provider         Review of Systems   ROS COMP: CONSTITUTIONAL:NEGATIVE  for chills and fever   CV: NEGATIVE for chest pain/chest pressure and palpitations  MUSCULOSKELETAL: NEGATIVE for injury to her feet  NEURO: POSITIVE for gait disturbance and expressive aphasia      Objective    /62 (Patient Position: Sitting, Cuff Size: Adult Regular)   Pulse 76   Temp 97.8  F (36.6  C) (Tympanic)   Resp 14   Ht 1.524 m (5')   Wt 84.8 kg (187 lb)   LMP  (LMP Unknown)   SpO2 94%   BMI 36.52 kg/m    Body mass index is 36.52 kg/m .  Physical Exam   GENERAL APPEARANCE: alert, no distress and over weight  CV: regular rates and rhythm and pitting B/L LE edema to 1+  MS: peripheral pulses decreased left foot and no signs of cellulitis  NEURO: gait abnormal ; she uses a walker and expressive aphasia  DIABETIC FOOT EXAM: no trophic changes or ulcerative lesions, DP reduced bilaterally and PT reduced bilaterally    Diagnostic Test Results:  none         Assessment & Plan     Alberta was seen today for musculoskeletal problem.    Diagnoses and all orders for this visit:    Diabetic polyneuropathy associated with other specified diabetes mellitus (H)  -     gabapentin (NEURONTIN) 100 MG capsule; Take 1 capsule (100 mg) by mouth At Bedtime    Pain in both feet    Bilateral leg edema    Bilateral hearing loss, unspecified hearing loss type    Type 2 diabetes mellitus with stage 3 chronic kidney disease, without long-term current use of insulin (H)    Morbid obesity (H)    Expressive aphasia    PAD (peripheral artery disease) (H)           Summary and implications:  We reviewed multiple issues.           We reviewed all of the issues on the diagnoses list.                 I do not find signs of gout or cellulitis.     She is likely dealing with symptoms of diabetic neuropathy.          I will review the lab work when it is available later this week.  Discussed with patient and  family.  Patient Instructions   Today we added gapapentin 100 mg at bedtime , and we increased the aspercream with lidocaine to three times per day.       Return in about 3 months (around 5/5/2020).    Leo Melgar MD  [facility]

## 2020-02-06 ENCOUNTER — TRANSFERRED RECORDS (OUTPATIENT)
Dept: HEALTH INFORMATION MANAGEMENT | Facility: CLINIC | Age: 85
End: 2020-02-06

## 2020-02-06 LAB
CREAT SERPL-MCNC: 1.09 MG/DL (ref 0.55–1.02)
GFR SERPL CREATININE-BSD FRML MDRD: 43 ML/MIN/1.73M2
GLUCOSE SERPL-MCNC: 230 MG/DL (ref 70–100)
HBA1C MFR BLD: 8.3 % (ref 0–5.7)
POTASSIUM SERPL-SCNC: 4.2 MMOL/L (ref 3.5–5.1)

## 2020-02-16 ENCOUNTER — HEALTH MAINTENANCE LETTER (OUTPATIENT)
Age: 85
End: 2020-02-16

## 2020-03-02 ENCOUNTER — TELEPHONE (OUTPATIENT)
Dept: FAMILY MEDICINE | Facility: CLINIC | Age: 85
End: 2020-03-02

## 2020-03-02 NOTE — TELEPHONE ENCOUNTER
Reason for Call:  Form, our goal is to have forms completed with 72 hours, however, some forms may require a visit or additional information.    Type of letter, form or note:  medical    Who is the form from?: Pharm    Where did the form come from: form was faxed in    What clinic location was the form placed at?: [facility]    Where the form was placed: Given to physician    What number is listed as a contact on the form?: [phone] (P)  [phone]       Additional comments: [facility]: Physician Orders Med List    Call taken on 3/2/2020 at 11:24 AM by Esther Rios

## 2020-03-03 ENCOUNTER — MEDICAL CORRESPONDENCE (OUTPATIENT)
Dept: HEALTH INFORMATION MANAGEMENT | Facility: CLINIC | Age: 85
End: 2020-03-03

## 2020-03-05 ENCOUNTER — NURSE TRIAGE (OUTPATIENT)
Dept: FAMILY MEDICINE | Facility: CLINIC | Age: 85
End: 2020-03-05

## 2020-03-05 DIAGNOSIS — N39.0 RECURRENT UTI: Primary | ICD-10-CM

## 2020-03-05 NOTE — TELEPHONE ENCOUNTER
"Daughter, Keshav, calling in.     States over the past week she has noticed increased confusion with pt. Today as she was helping bathe pt, she noticed cloudy, foul smelling urine. -flank pain, - hematuria, -fevers, -n/v/d. Ambulates steadily. Assisted living does pt meds, so meds have not been missed by pt.     States that in the past, pt has had standing order for urine sample.    Wondering if PCP would be able to put in a UA for daughter to bring sample in, or if PCP would like to have pt seen in clinic/UC    Additional Information    Negative: Shock suspected (e.g., cold/pale/clammy skin, too weak to stand, low BP, rapid pulse)    Negative: Sounds like a life-threatening emergency to the triager    Negative: Unable to urinate (or only a few drops) and bladder feels very full    Negative: Vomiting    Negative: Patient sounds very sick or weak to the triager    Negative: Severe pain with urination    Negative: Fever > 100.5 F (38.1 C)    Negative: Side (flank) or lower back pain present    Negative: Taking antibiotic > 24 hours for UTI and fever persists    Negative: Taking antibiotic > 3 days for UTI and painful urination not improved    Negative: Unusual vaginal discharge    Negative: > 2 UTIs in last year    Negative: Patient is worried about sexually transmitted disease (STD)    Age > 50 years    Answer Assessment - Initial Assessment Questions  1. SEVERITY: \"How bad is the pain?\"  (e.g., Scale 1-10; mild, moderate, or severe)    - MILD (1-3): complains slightly about urination hurting    - MODERATE (4-7): interferes with normal activities      - SEVERE (8-10): excruciating, unwilling or unable to urinate because of the pain       No pain    2. FREQUENCY: \"How many times have you had painful urination today?\"       Can not tell    3. PATTERN: \"Is pain present every time you urinate or just sometimes?\"       Pt still urinates    4. ONSET: \"When did the painful urination start?\"       Confusion noted starting " "last week, foul smelling urine noticed yesterday    5. FEVER: \"Do you have a fever?\" If so, ask: \"What is your temperature, how was it measured, and when did it start?\"      No    6. PAST UTI: \"Have you had a urine infection before?\" If so, ask: \"When was the last time?\" and \"What happened that time?\"       Yes    7. CAUSE: \"What do you think is causing the painful urination?\"  (e.g., UTI, scratch, Herpes sore)      UTI    8. OTHER SYMPTOMS: \"Do you have any other symptoms?\" (e.g., flank pain, vaginal discharge, genital sores, urgency, blood in urine)      No    9. PREGNANCY: \"Is there any chance you are pregnant?\" \"When was your last menstrual period?\"      n/a    Protocols used: URINATION PAIN - FEMALE-A-[place]      "

## 2020-03-05 NOTE — TELEPHONE ENCOUNTER
Okay to bring in urine sample.  I have generated a lab order.             Please let them know.

## 2020-03-05 NOTE — TELEPHONE ENCOUNTER
RN called back to pt daughter.    Relayed provider message. States understanding. States she will drop off sample in the morning.    No further action needed at this time.

## 2020-03-06 DIAGNOSIS — N39.0 RECURRENT UTI: ICD-10-CM

## 2020-03-06 LAB
ALBUMIN UR-MCNC: NEGATIVE MG/DL
APPEARANCE UR: CLEAR
BILIRUB UR QL STRIP: NEGATIVE
COLOR UR AUTO: YELLOW
GLUCOSE UR STRIP-MCNC: NEGATIVE MG/DL
HGB UR QL STRIP: NEGATIVE
KETONES UR STRIP-MCNC: NEGATIVE MG/DL
LEUKOCYTE ESTERASE UR QL STRIP: ABNORMAL
NITRATE UR QL: NEGATIVE
NON-SQ EPI CELLS #/AREA URNS LPF: ABNORMAL /LPF
PH UR STRIP: 5.5 PH (ref 5–7)
RBC #/AREA URNS AUTO: ABNORMAL /HPF
SOURCE: ABNORMAL
SP GR UR STRIP: 1.01 (ref 1–1.03)
UROBILINOGEN UR STRIP-ACNC: 0.2 EU/DL (ref 0.2–1)
WBC #/AREA URNS AUTO: ABNORMAL /HPF

## 2020-03-06 PROCEDURE — 87088 URINE BACTERIA CULTURE: CPT | Performed by: INTERNAL MEDICINE

## 2020-03-06 PROCEDURE — 87086 URINE CULTURE/COLONY COUNT: CPT | Performed by: INTERNAL MEDICINE

## 2020-03-06 PROCEDURE — 81001 URINALYSIS AUTO W/SCOPE: CPT | Performed by: INTERNAL MEDICINE

## 2020-03-06 PROCEDURE — 87186 SC STD MICRODIL/AGAR DIL: CPT | Performed by: INTERNAL MEDICINE

## 2020-03-08 LAB
BACTERIA SPEC CULT: ABNORMAL
BACTERIA SPEC CULT: ABNORMAL
SPECIMEN SOURCE: ABNORMAL

## 2020-03-08 RX ORDER — CEFPODOXIME PROXETIL 100 MG/1
100 TABLET, FILM COATED ORAL 2 TIMES DAILY
Qty: 10 TABLET | Refills: 0 | Status: SHIPPED | OUTPATIENT
Start: 2020-03-08 | End: 2020-08-23

## 2020-03-08 NOTE — PROGRESS NOTES
Results for orders placed or performed in visit on 03/06/20   UA reflex to Microscopic     Status: Abnormal   Result Value Ref Range    Color Urine Yellow     Appearance Urine Clear     Glucose Urine Negative NEG^Negative mg/dL    Bilirubin Urine Negative NEG^Negative    Ketones Urine Negative NEG^Negative mg/dL    Specific Gravity Urine 1.015 1.003 - 1.035    Blood Urine Negative NEG^Negative    pH Urine 5.5 5.0 - 7.0 pH    Protein Albumin Urine Negative NEG^Negative mg/dL    Urobilinogen Urine 0.2 0.2 - 1.0 EU/dL    Nitrite Urine Negative NEG^Negative    Leukocyte Esterase Urine Moderate (A) NEG^Negative    Source Midstream Urine    Urine Microscopic     Status: Abnormal   Result Value Ref Range    WBC Urine 25-50 (A) OTO5^0 - 5 /HPF    RBC Urine 2-5 (A) OTO2^O - 2 /HPF    Squamous Epithelial /LPF Urine Many (A) FEW^Few /LPF   Urine Culture Aerobic Bacterial     Status: Abnormal    Specimen: Midstream Urine   Result Value Ref Range    Specimen Description Midstream Urine     Culture Micro 50,000 to 100,000 colonies/mL  Escherichia coli   (A)     Culture Micro       <10,000 colonies/mL  urogenital lynn  Susceptibility testing not routinely done         Susceptibility    Escherichia coli - AMMON     AMPICILLIN >=32 Resistant ug/mL     CEFAZOLIN* <=4 Sensitive ug/mL      * Cefazolin AMMON breakpoints are for the treatment of uncomplicated urinary tract infections.  For the treatment of systemic infections, please contact the laboratory for additional testing.     CEFOXITIN <=4 Sensitive ug/mL     CEFTAZIDIME <=1 Sensitive ug/mL     CEFTRIAXONE <=1 Sensitive ug/mL     CIPROFLOXACIN >=4 Resistant ug/mL     GENTAMICIN <=1 Sensitive ug/mL     LEVOFLOXACIN >=8 Resistant ug/mL     NITROFURANTOIN <=16 Sensitive ug/mL     TOBRAMYCIN <=1 Sensitive ug/mL     Trimethoprim/Sulfa <=1/19 Sensitive ug/mL     AMPICILLIN/SULBACTAM >=32 Resistant ug/mL     Piperacillin/Tazo <=4 Sensitive ug/mL     CEFEPIME <=1 Sensitive ug/mL     Rx  cefpodoxime

## 2020-03-09 NOTE — TELEPHONE ENCOUNTER
Left voice message for daughter Keshav informing her Rx for abx was sent for treatment of UTI. She may call triage back if further questions.

## 2020-03-16 ENCOUNTER — TELEPHONE (OUTPATIENT)
Dept: FAMILY MEDICINE | Facility: CLINIC | Age: 85
End: 2020-03-16

## 2020-03-16 DIAGNOSIS — R30.0 DYSURIA: Primary | ICD-10-CM

## 2020-03-16 NOTE — TELEPHONE ENCOUNTER
Pt's daughter is requesting a repeat UA/UC to make sure UTI has cleared. No other new symptoms. Dtr asked that order include note urine collection will be with use of hat. Order can be faxed to [phone] with Attn Betty Reid.

## 2020-03-16 NOTE — TELEPHONE ENCOUNTER
Ordered UA/UC per provider direction. Faxed to [facility].    Patient Contact    Attempt # 1    Was call answered?  No.  Left message on voicemail with information to call triage back.    On call back, relay that order for UA/UC was faxed to [facility] Attn: Betty.

## 2020-03-16 NOTE — TELEPHONE ENCOUNTER
The following request is ok.              Please take care of this.  Pt's daughter is requesting a repeat UA/UC to make sure UTI has cleared. No other new symptoms. Dtr asked that order include note urine collection will be with use of hat. Order can be faxed to [phone] with Attn Betty Reid.         Documentation       Esther Rios routed conversation to [facility] Triage 1 hour ago (12:10 PM)       Esther Rios 1 hour ago (12:10 PM)          Reason for Call: Request for an order or referral:     Order or referral being requested: Order for Urine Collection     Date needed: as soon as possible     Has the patient been seen by the PCP for this problem? YES     Additional comments: The patient's daughter called and stated that the patient home care [facility]'s Nurse Betty Reid needs an order for a urine collection for because there is symptoms of a UTI. The order can be faxed to [phone] with Saúl Reid     Phone number Patient can be reached at:  Other phone number:  [phone]

## 2020-03-16 NOTE — TELEPHONE ENCOUNTER
Reason for Call: Request for an order or referral:    Order or referral being requested: Order for Urine Collection    Date needed: as soon as possible    Has the patient been seen by the PCP for this problem? YES    Additional comments: The patient's daughter called and stated that the patient home care [facility]'s Nurse Betty Reid needs an order for a urine collection for because there is symptoms of a UTI. The order can be faxed to [phone] with Attn Betty Reid    Phone number Patient can be reached at:  Other phone number:  [phone]    Best Time:  Any    Can we leave a detailed message on this number?  YES    Call taken on 3/16/2020 at 12:08 PM by Esther Rios

## 2020-03-17 DIAGNOSIS — R30.0 DYSURIA: ICD-10-CM

## 2020-03-17 DIAGNOSIS — N39.0 RECURRENT UTI: Primary | ICD-10-CM

## 2020-03-17 LAB
ALBUMIN UR-MCNC: NEGATIVE MG/DL
APPEARANCE UR: CLEAR
BACTERIA #/AREA URNS HPF: ABNORMAL /HPF
BILIRUB UR QL STRIP: NEGATIVE
COLOR UR AUTO: YELLOW
GLUCOSE UR STRIP-MCNC: 250 MG/DL
HGB UR QL STRIP: NEGATIVE
KETONES UR STRIP-MCNC: NEGATIVE MG/DL
LEUKOCYTE ESTERASE UR QL STRIP: ABNORMAL
NITRATE UR QL: POSITIVE
NON-SQ EPI CELLS #/AREA URNS LPF: ABNORMAL /LPF
PH UR STRIP: 5.5 PH (ref 5–7)
RBC #/AREA URNS AUTO: ABNORMAL /HPF
SOURCE: ABNORMAL
SP GR UR STRIP: 1.02 (ref 1–1.03)
UROBILINOGEN UR STRIP-ACNC: 0.2 EU/DL (ref 0.2–1)
WBC #/AREA URNS AUTO: ABNORMAL /HPF

## 2020-03-17 PROCEDURE — 87086 URINE CULTURE/COLONY COUNT: CPT | Performed by: INTERNAL MEDICINE

## 2020-03-17 PROCEDURE — 81001 URINALYSIS AUTO W/SCOPE: CPT | Performed by: INTERNAL MEDICINE

## 2020-03-18 ENCOUNTER — TELEPHONE (OUTPATIENT)
Dept: FAMILY MEDICINE | Facility: CLINIC | Age: 85
End: 2020-03-18

## 2020-03-18 LAB
BACTERIA SPEC CULT: NORMAL
SPECIMEN SOURCE: NORMAL

## 2020-03-18 NOTE — TELEPHONE ENCOUNTER
Good plan.                   You can also let them know that her recent urine culture was negative for UTI.                It just showed some skin and/or vaginal bacteria that were washed into the urine cup.

## 2020-03-18 NOTE — TELEPHONE ENCOUNTER
Reason for Call:  Other call back    Detailed comments: Betty with [facility] long term home care called to report that she checked Alberta's skin due to recent antibiotics.    Deep redness in the areas of:  Left hip fold,  Right hip,  Abdominal,  Groin,  Right breast.     Betty will be using Nystatin cream 100,000 units 2 x per day,  morning and before she leaves work. Cinrachel will do this for three days and contact us if further instructions are needed.    Phone Number Patient can be reached at:n/a  Betty: [phone] for any questions.  No return call necessary.    Best Time: any    Can we leave a detailed message on this number? YES    Call taken on 3/18/2020 at 3:51 PM by Santa Hooper

## 2020-03-25 NOTE — TELEPHONE ENCOUNTER
Reason for Call:  Other call back    Detailed comments: ASH Shelley, from [facility] called asking the Tylenol be scheduled rather than PRN.    Phone Number Patient can be reached at:  Daphney: [phone]    Best Time:any    Can we leave a detailed message on this number? YES    Call taken on 1/11/2019 at 8:50 AM by Santa Hooper      
Rx changed to that and generated, printed to be faxed as requested  
Rx faxed to Daphney. Fax: [phone].  
Yes, ASH Shelley is requesting written order for tylenol 325 mg tablet. Order can be faxed to [phone].Attn Daphney.    Order should say provider approves scheduled morning dose for acetaminophen (TYLENOL) 325 MG tablet-Take 2 tablets AM, PRN and HS.   
Yes, that is fine.  Do they need an order?  
normal...

## 2020-03-30 ENCOUNTER — PATIENT OUTREACH (OUTPATIENT)
Dept: GERIATRIC MEDICINE | Facility: CLINIC | Age: 85
End: 2020-03-30

## 2020-03-30 NOTE — PROGRESS NOTES
[facility] Care Coordination Contact    Son Ed requested to have the TENS electrode pads on hold until further notice. Due to COVID 19, visitors are restricted at the AL so family is not able to visit their mom and put the pads on, and son reports staff is not trained to put them on and they have no time to do it either. Contacted [facility] and put the electrode pads on hold.  Autumn Jacobsen RN, PHN, [facility] Care Coordination  [phone]

## 2020-04-14 ENCOUNTER — MEDICAL CORRESPONDENCE (OUTPATIENT)
Dept: HEALTH INFORMATION MANAGEMENT | Facility: CLINIC | Age: 85
End: 2020-04-14

## 2020-04-20 NOTE — PROGRESS NOTES
[facility] Care Coordination Contact    Contacted son Ed about scheduling an annual health risk reassessment visit with member telephonically due to COVID 19 pandemic and restrictions on visitors at the AL. Was able to make an appt for telephone assessment for 4/22/20.  Autumn Jacobsen RN, PHN, [facility] Care Coordination  [phone]

## 2020-04-22 ENCOUNTER — PATIENT OUTREACH (OUTPATIENT)
Dept: GERIATRIC MEDICINE | Facility: CLINIC | Age: 85
End: 2020-04-22

## 2020-04-22 NOTE — PROGRESS NOTES
[facility] Care Coordination Contact    [facility] Home Visit Assessment     Home visit for Health Risk Assessment with Regla Benavidez completed on April 22, 2020    Type of residence:: Assisted living  Current living arrangement:: I live in assisted living     Assessment completed with:: Patient, Children(Due to COVID 19 pandemic and restrictions on visitors at the AL, visit was done via conference call with son Melchor, daughter Obdulio, member, and this writer.)    Current Care Plan  Member currently receiving the following home care services:     Member currently receiving the following community resources: DME    Medication Review  Medication reconciliation completed in Epic: Yes  Medication set-up & administration: RN set up weekly.  Assisting Living staff administers medications.  Medication Risk Assessment Medication (1 or more, place referral to MTM):   MTM Referral Placed: No: Declines    Mental/Behavioral Health   Depression Screening: See PHQ assessment flowsheet.   Mental health DX:: No      Mental Health Diagnosis: No  Mental Health Services: None: No further intervention needed at this time.    Falls Assessment:   Fallen 2 or more times in the past year?: No   Any fall with injury in the past year?: No    ADL/IADL Dependencies:   Dependent ADLs:: Ambulation-walker, Bathing, Dressing, Grooming, Incontinence, Positioning, Transfers, Toileting  Dependent IADLs:: Cleaning, Cooking, Laundry, Shopping, Meal Preparation, Transportation, Medication Management, Incontinence, Money Management    [facility] sponsored benefits: Shared information re: Silver Sneakers/gym memberships, ASA, Calcium +D.                                    PCA Assessment completed at visit: Not applicable     Elderly Waiver Eligibility: Yes-will continue on EW    Care Plan & Recommendations: Continue AL services, DME including incontinent products, hearing aid batteries.     See LTCC for detailed assessment  information.    Follow-Up Plan: Member informed of future contact, plan to f/u with member with a 6 month telephone assessment.  Contact information shared with member and family, encouraged member to call with any questions or concerns at any time.      [place] care continuum providers: Please refer to Health Care Home on the Epic Problem List to view this patient's [facility] Care Plan Summary.  Autumn Jacobsen RN, PHN, [facility] Care Coordination  [phone]

## 2020-04-28 ENCOUNTER — PATIENT OUTREACH (OUTPATIENT)
Dept: GERIATRIC MEDICINE | Facility: CLINIC | Age: 85
End: 2020-04-28

## 2020-04-28 NOTE — PROGRESS NOTES
[facility] Care Coordination Contact    MMIS completed and entered. Member is a rate cell B. RS tool completed as well as POC. CPS updated. Daughter had asked about no rinse shampoo caps and checked with [facility]. They need to have a specific one listed from the website. ML with family requesting if they had a specific one in mind.  Autumn Jacobsen RN, PHN, [facility] Care Coordination  [phone]

## 2020-04-29 ENCOUNTER — PATIENT OUTREACH (OUTPATIENT)
Dept: GERIATRIC MEDICINE | Facility: CLINIC | Age: 85
End: 2020-04-29

## 2020-04-29 NOTE — PROGRESS NOTES
[facility] Care Coordination Contact    Received after visit chart from care coordinator.  Completed following tasks: Mailed copy of care plan to client, Updated services in access and Submitted referrals/auths for asisted living and EW supplies through [employer].  Chart was returned to CC.   , Mailed copy of CL tool to member, faxed copy to AL facility, uploaded into [employer] and submitted authorization to health plan.  , Provider Signature - Full Care Plan:  Member indicates that they would like their POC shared with the following EW providers:  [facility].  Letter and full POC faxed to providers for signature.    Ingrid Lezama  Care Management Specialist  [facility]  [phone]

## 2020-04-29 NOTE — LETTER
April 29, 2020    Important [employer] Information    LYNDA CONWAY  C/O MONALISA CONWAY   [address] [facility] [place] [address]  Your Care Plan  Dear Alberta,  When we spoke recently, I promised to send you a Care Plan. The plan enclosed is a summary of our discussion. It includes the steps we agreed would help you meet your health goals. In addition, I can help you with:  [employer]  This program is available to members who need a ride to medical and dental visits. To schedule a ride, call [phone] or [phone] (toll free). TTY/TTD: 711. You can call 8 a.m. to 8 p.m. Seven days a week. Access to a representative may be limited at times.    [employer]   The [employer] program empowers you to improve your health through education and exercise. To learn more, visit [employer], or call [employer] at [phone] (toll free) (TTY:711) from 7 a.m. - 7 p.m. Central Time, Monday-Friday.  Health Care Directive   This form helps you outline your health care wishes. You can request a form from me and I will answer any questions you have before you discuss it with your doctor.   Annual Physical  Take a key step on your path to good health and set up an annual physical at your clinic.  Questions?  Call me at [phone] Monday-Friday between 8am and 5pm.  TTY/TTD: 711. As we discussed, I plan to be in touch with you again in 6 months to follow up via phone.  Sincerely,    Autumn Jacobsen RN    E-mail: [email or web address]  Phone: [phone]      [employer]          cc: member records                    Civil Rights Notice  Discrimination is against the law. [employer] does not discriminate on the basis of any of the following:    Race    Color    National Origin    Creed    Yazdanism    Age    Public Assistance Status    Receipt of Health Care Services    Disability (including physical or mental impairment)    Sex (including sex stereotypes and gender  identity)    Marital Status    Political Beliefs    Medical Condition    Genetic Information    Sexual Orientation    Claims Experience    Medical History    Health Status    Auxiliary Aids and Services:  [employer] provides auxiliary aids and services, like qualified interpreters or information in accessible formats, free of charge and in a timely manner, to ensure an equal opportunity to participate in our health care programs. Contact [employer] Customer Service at [facility]/contact medicaid or call [phone] (toll free); TTY:710 or at [facility]/contactCityNewscaid.    Language Assistance Services:  [employer] provides translated documents and spoken language interpreting, free of charge and in a timely manner, when language assistance services are necessary to ensure limited English speakers have meaningful access to our information and services. Contact [employer] at [phone] (toll free); TTY: 173 or [facility]/contactmedicaid.     Civil Rights Complaints  You have the right to file a discrimination complaint if you believe you were treated in a discriminatory way by [employer]. You may contact any of the following four agencies directly to file a discrimination complaint.    U.S. Department of Health and Human Services  Office for Civil Rights (OCR)  You have the right to file a complaint with the OCR, a federal agency, if you believe you have been discriminated against because of any of the following:    Race    Disability    Color    Sex    National Origin    Age      Contact the OCR directly to file a complaint:         Director         U.S. Department of Health and Human Services  Office for Civil Rights         [address] [place] [address], [place] [address]         [phone] (Voice)         [phone] (TDD)         Complaint Portal - https://ocrportal.hhs.gov/ocr/portal/lobby.jsf     [place] Department of Human Rights (Formerly [facility])  In [place], you have the right to  file a complaint with the MDHR if you believe you have been discriminated against because of any of the following:      Race    Color    National Origin    Hinduism    Creed    Sex    Sexual Orientation    Marital Status    Public Assistance Status    Disability    Contact the MDHR directly to file a complaint:         [place] Department of Human Rights         [facility], [address] [place] [address]         [phone] (voice)          [phone] (toll free)         711 or [phone] ([place] Relay)         [phone] (Fax)         [email or web address] (Email)     [place] Department of Human Services (DHS)  You have the right to file a complaint with [facility] if you believe you have been discriminated against in our health care programs because of any of the following:    Race    Color    National Origin    Creed    Hinduism    Age    Public Assistance Status    Receipt of Health Care Services    Disability (including physical or mental impairment)    Sex (including sex stereotypes and gender identity)    Marital Status    Political Beliefs    Medical Condition    Genetic Information    Sexual Orientation    Claims Experience    Medical History    Health Status    Complaints must be in writing and filed within 180 days of the date you discovered the alleged discrimination. The complaint must contain your name and address and describe the discrimination you are complaining about. After we get your complaint, we will review it and notify you in writing about whether we have authority to investigate. If we do, we will investigate the complaint.      [facility] will notify you in writing of the investigation s outcome. You have a right to appeal the outcome if you disagree with the decision. To appeal, you must send a written request to have [facility] review the investigation outcome period. Be brief and state why you disagree with the decision. Include additional information you think is important.       If you file a complaint in this way, the people who work for the agency named in the complaint cannot retaliate against you. This means they cannot punish you in any way for filing a complaint. Filing a complaint in this way does not stop you from seeking out other legal or administration actions.     Contact DHS directly to file a discrimination complaint:        Civil Rights Coordinator        [facility]        Equal Opportunity and Access Division        [address] [address]        [place] [address] [phone] (voice) or use your preferred relay service     [employer] Complaint Notice   You have the right to file a complaint with [employer] if you believe you have been discriminated against because of any of the following:       Medical condition    Health status    Receipt of health care services    Claims experience    Medical history    Genetic information    Disability (including mental or physical impairment)    Marital status    Age    Sex (including sex stereotypes and gender identity)    Sexual orientation    National origin    Race    Color    Baptism    Creed    Public assistance status    Political beliefs    You can file a complaint and ask for help in filing a complaint in person or by mail, phone, fax, or email at:     [employer] Civil Rights Coordinator  [facility]  [address], [place] [address] [phone] (voice and fax) or TTY:195  Email: [email or web address]    American Indians can continue to use [place] and  Health Services (IHS) clinics. We will not require prior approval or impose any conditions for you to get services at these clinics. For elders age 65 years and older this includes Elderly Waiver (EW) services accessed through the [place]. If a doctor or other provider in a [place] or IHS clinic refers you to a provider in our network, we will not require you to see your primary care provider prior to the  referral.    For accessible formats of this publication or assistance with equal or access to our services, visit [employer]/contactmedicaid, or call [phone] (toll free) or use your preferred relay service.

## 2020-04-29 NOTE — PROGRESS NOTES
[facility] Care Coordination Contact    Daughter had requested no rinse shower caps so that member can keep her hearing aids in when getting her hair washed. [facility] needed to have a specific kind from the internet as they do not carry them in stock.   Called [facility] and they have some a limited stock and are able to ship to member. Received quote from [facility] and sent to CMS to please put in an auth for 8 of them. Let son Ed know this and to please let this writer know when or if she needs more of them.  Autumn Jacobsen RN, PHN, [facility] Coordination  [phone]

## 2020-04-30 NOTE — PROGRESS NOTES
[facility] Care Coordination Contact    Order placed with [facility] (p: [phone]; f: [phone]) for shower caps--8 count rinse free.  Order placed on 04/30/2020. Access updated.  As required, authorization submitted to health plan.    Ingrid Lezama  Care Management Specialist  [facility]  [phone]

## 2020-05-07 ENCOUNTER — TRANSFERRED RECORDS (OUTPATIENT)
Dept: HEALTH INFORMATION MANAGEMENT | Facility: CLINIC | Age: 85
End: 2020-05-07

## 2020-05-07 LAB
CREAT SERPL-MCNC: 1.12 MG/DL (ref 0.55–1.02)
GFR SERPL CREATININE-BSD FRML MDRD: 42 ML/MIN/1.73M2
GLUCOSE SERPL-MCNC: 244 MG/DL (ref 70–100)
HBA1C MFR BLD: 8.8 % (ref 0–5.7)
POTASSIUM SERPL-SCNC: 4.2 MMOL/L (ref 3.5–5.1)

## 2020-05-14 ENCOUNTER — MEDICAL CORRESPONDENCE (OUTPATIENT)
Dept: HEALTH INFORMATION MANAGEMENT | Facility: CLINIC | Age: 85
End: 2020-05-14

## 2020-05-19 NOTE — PROGRESS NOTES
[facility] Care Coordination Contact    Received emails from daughter Obdulio and Romelia from [facility] about the Aspercream for member and shower caps.  is running out of the Aspercream before time for it to be sent again.  Called and spoke with Romelia at [facility]. Was told that she had talked with the daughter earlier and that  gets the Aspercream delivered quarterly and last delivery was in March and is due for June.  is running our of Aspercream and would like to increase it up from 2 tubes to 3 tubes per month. Member also would like to get the no rinse shower caps-8 caps per package and 2 packages every 3 months. Quote was sent to this writer.  Called [facility] and they said they never received an auth for the shower caps, even thought it had been sent so cancelled the order for shower caps with [facility] and will have it sent from [facility]. [facility] quote for above was sent to this writer and request sent to CMS to please put an auth in.  Autumn Jacobsen RN, PHN, CCM  [facility] Coordination  [phone]

## 2020-05-20 ENCOUNTER — TELEPHONE (OUTPATIENT)
Dept: FAMILY MEDICINE | Facility: CLINIC | Age: 85
End: 2020-05-20

## 2020-05-20 DIAGNOSIS — L30.4 INTERTRIGO: ICD-10-CM

## 2020-05-20 RX ORDER — NYSTATIN 100000 U/G
CREAM TOPICAL
Qty: 120 G | Refills: 8 | Status: SHIPPED | OUTPATIENT
Start: 2020-05-20 | End: 2020-12-18

## 2020-05-20 NOTE — TELEPHONE ENCOUNTER
Prior Authorization Approval    Authorization Effective Date: 4/20/2020  Authorization Expiration Date: 6/19/2020  Medication: nystatin (MYCOSTATIN) 520613 UNIT/GM external cream  Approved Dose/Quantity:    Reference #:     Insurance Company: [employer] - Phone [phone] Fax [phone]  Expected CoPay:       CoPay Card Available:      Foundation Assistance Needed:    Which Pharmacy is filling the prescription (Not needed for infusion/clinic administered): [facility] - [place], [place] - [address]  Pharmacy Notified: Yes  Patient Notified: Yes  **Instructed pharmacy to notify patient when script is ready to /ship.**

## 2020-05-20 NOTE — TELEPHONE ENCOUNTER
Central Prior Authorization Team   Phone: [phone]      PA Initiation    Medication: nystatin (MYCOSTATIN) 869703 UNIT/GM external cream  Insurance Company: [employer] - Phone [phone] Fax [phone]  Pharmacy Filling the Rx: [facility] - [place], [place] - [address]  Filling Pharmacy Phone: [phone]  Filling Pharmacy Fax:    Start Date: 5/20/2020

## 2020-05-20 NOTE — TELEPHONE ENCOUNTER
Prior Authorization Retail Medication Request    Medication/Dose: nystatin (MYCOSTATIN) 460288 UNIT/GM external cream   ICD code (if different than what is on RX):     Previously Tried and Failed:     Rationale:       Insurance Name:     Insurance ID:         Pharmacy Information (if different than what is on RX)  Name:     Phone:     CMM KEY:  [id number]

## 2020-05-20 NOTE — TELEPHONE ENCOUNTER
Reason for Call:  Other call back    Detailed comments: the patient daughter called and stated that the patient has Aspercreme that is applied 3 times per day but the patient only wants to do it 2 times per day.  They are wondering if they can get an order to apply it two times per day instead of 3.  They also would like a call back to discuss this.     Phone Number Patient can be reached at: Other phone number:  [phone]    Best Time: Any    Can we leave a detailed message on this number? YES    Call taken on 5/20/2020 at 12:42 PM by Esther Rios

## 2020-05-20 NOTE — TELEPHONE ENCOUNTER
Daughter calling.  Per nurse, running out of cream before refill is due.  Requesting larger quantity.  Refill sent.

## 2020-05-22 NOTE — PROGRESS NOTES
[facility] Care Coordination Contact    Order placed with [facility] (p: [phone]; f: [phone]) for shampoo caps (2) and an extra tube of Aspercreme.  Order placed on 05/22/2020. Access updated.  As required, authorization submitted to health plan. Added DME supplies to tracking spreadsheet.    Ingrid Lezama  Care Management Specialist  [facility]  [phone]

## 2020-05-26 DIAGNOSIS — E11.65 TYPE 2 DIABETES MELLITUS WITH HYPERGLYCEMIA, WITHOUT LONG-TERM CURRENT USE OF INSULIN (H): Primary | ICD-10-CM

## 2020-05-26 NOTE — TELEPHONE ENCOUNTER
Reason for Call:  Medication or medication refill:    Do you use a [facility]?  Name of the pharmacy and phone number for the current request:      Name of the medication requested: needs new order for one tough ultra-hers is not working    Other request: needs today    Can we leave a detailed message on this number? YES    Phone number patient can be reached at: Other phone number:      Best Time:     Call taken on 5/26/2020 at 12:20 PM by SHILO CAO

## 2020-05-27 NOTE — TELEPHONE ENCOUNTER
Left voice message asking Betty to call triage back. On call back, please get more details regarding order requested: reason for order and fax were order needs to be sent.

## 2020-05-28 ENCOUNTER — TELEPHONE (OUTPATIENT)
Dept: FAMILY MEDICINE | Facility: CLINIC | Age: 85
End: 2020-05-28

## 2020-05-28 RX ORDER — BLOOD-GLUCOSE METER
EACH MISCELLANEOUS
Qty: 1 KIT | Refills: 1 | Status: SHIPPED | OUTPATIENT
Start: 2020-05-28

## 2020-05-28 NOTE — TELEPHONE ENCOUNTER
PRIOR AUTHORIZATION DENIED    Medication: Lactobacillus (LACTINEX PO)    Denial Date: 5/28/2020    Denial Rational:  Per insurance, medication is excluded from patient's benefit plan and will not be covered. Review and appeal are not available because of this exclusion.          Appeal Information:  N/A

## 2020-05-28 NOTE — TELEPHONE ENCOUNTER
Prior Authorization Retail Medication Request    Medication/Dose: Lactobacillus (LACTINEX PO)  ICD code (if different than what is on RX):  R19.7  Previously Tried and Failed:    Rationale:      Insurance Name:  [employer]    Insurance ID:  [id number]        Pharmacy Information (if different than what is on RX)  Name:  [facility]   Phone:  [phone]      The patient's son David would like a call once this has been taken care of.

## 2020-05-28 NOTE — TELEPHONE ENCOUNTER
Routing refill request to provider for review/approval because:  Drug not active on patient's medication list    Pt needs a new monitor as hers broke

## 2020-06-08 ENCOUNTER — PATIENT OUTREACH (OUTPATIENT)
Dept: GERIATRIC MEDICINE | Facility: CLINIC | Age: 85
End: 2020-06-08

## 2020-06-08 NOTE — PROGRESS NOTES
[facility] Care Coordination Contact    Son Melchor sent a copy of the invoice from [employer] on what member got delivered this month with a request to contact [employer]. Called [employer] and member only got one Aspercreme and was supposed to get 3 of them quarterly. Member also only got 2 pkgs of no rinse shampoo caps and there was only 1 cap per package. This writer was told that there were 8 in a package.     Called [employer] and spoke with Jody. She said that the person who originally gave this writer the quote for the shower caps, got it mixed up with wipes, which has 8 in a package. The shower caps only come one in a package. Also discussed that member only got one Aspercreme instead of the 3 quarterly so they will send out a new quote. New quote received and request sent to CMS to please generate a new auth for the shower caps and ordering 13 of them quarerly. They will also send out the remaining Aspercreme too.  Autumn Jacobsen RN, PHN, [facility] Care Coordination  Bkkvaq-[phone]. cell-[phone]

## 2020-06-08 NOTE — PROGRESS NOTES
[facility] Care Coordination Contact    Received a message from Berenice from [facility] asking about the auth for the EW supply of depends and wipes. Checked with CMS A Pince and auth was submitted to [employer] April 29 for this and [employer] does not send letters. Called Berenice at [phone] ext [phone] and no answer. ML that the auth was submitted to [employer] - auth number is [id number], and she can contact her [employer] rep with this auth number.  Autumn Jacobsen RN, PHN, [facility] Care Coordination  Ajdnfl-[phone]. cell-[phone]

## 2020-06-10 ENCOUNTER — MYC MEDICAL ADVICE (OUTPATIENT)
Dept: FAMILY MEDICINE | Facility: CLINIC | Age: 85
End: 2020-06-10

## 2020-06-10 DIAGNOSIS — R14.0 ABDOMINAL BLOATING: ICD-10-CM

## 2020-06-10 DIAGNOSIS — K59.00 CONSTIPATION, UNSPECIFIED CONSTIPATION TYPE: Primary | ICD-10-CM

## 2020-06-10 DIAGNOSIS — Z29.9 PREVENTIVE MEASURE: ICD-10-CM

## 2020-06-10 RX ORDER — LACTOBACILLUS ACIDOPHILUS 500MM CELL
1 CAPSULE ORAL 2 TIMES DAILY
Qty: 60 CAPSULE | Refills: 11 | Status: SHIPPED | OUTPATIENT
Start: 2020-06-10 | End: 2021-02-10

## 2020-06-10 NOTE — TELEPHONE ENCOUNTER
Patient Contact    Attempt # 1    Was call answered?  No.  Left message on voicemail with information to call me back.    On call back:    -It appears PA on 05/28 was denied.  -Please find out more info from pt about if she has had a PA in the past and what steps were taken

## 2020-06-10 NOTE — TELEPHONE ENCOUNTER
"David, patient son calling back    1) Patient has been on medication back since 2017- has been taking it for the last 4 years.    2) an OTC med? \"Anti-diarrheal med\". This has been working for her.  Because patient is in assisted living, family is not allowed to bring this in for her. Medication will have to be prescribed by primary in order for staff to administer. [employer] approved it in the past. ( see  4/17/2018 [employer] approval in media file).   [employer] no longer uses [employer] and now uses [facility] for PA needs). Triage reviewed PA denial with son d/t \"exclusion\".      PCP please advise if willing to send in an alternative Lactobacillus (probiotic supplement)?    Would Floranex or Culture be appropriate? Per son he is ok with a generic form. Patient is ok with swallowing capsules and tablets- would prefer this versus a chewable. FYI she is NOT lactose intolerant. Please MyChart back with answer.      Also, FYI- Per up to date:    Tablet, chewable, Oral:  Dialyvite Probiotic: L. acidophilus and Bifidobacterium lactis 10 billion cells [orange cream flavor]  Lactinex: L. acidophilus and L. bulgaricus 1 million live cells [gluten free; contains calcium 5.2 mg/4 tablets, lactose 960 mg/4 tablets, potassium 20 mg/4 tablets, sodium 5.6 mg/4 tablets, and sucrose 500 sucrose/4 tablets; contains whey, evaporated milk, and soy peptone]  Pedia-Lax Probiotic Yums: L. reuteri 100 million organisms [dye free, gluten free, sugar free; contains milk protein; strawberry flavor]  ReZyst IM: L. acidophilus and Bifidobacterium 150 mg [3 billion lives cells; berry flavor] [DSC]          Cinda BOSCH BSN, RN, PHN            "

## 2020-06-15 NOTE — TELEPHONE ENCOUNTER
Daughter calling into clinic.  Needing new probiotic order faxed to assisted living.  Printed and faxed    Betty nurse at assisted living F; [phone]

## 2020-06-16 ENCOUNTER — TELEPHONE (OUTPATIENT)
Dept: FAMILY MEDICINE | Facility: CLINIC | Age: 85
End: 2020-06-16

## 2020-06-16 DIAGNOSIS — K59.00 CONSTIPATION, UNSPECIFIED CONSTIPATION TYPE: ICD-10-CM

## 2020-06-16 DIAGNOSIS — Z29.9 PREVENTIVE MEASURE: ICD-10-CM

## 2020-06-16 DIAGNOSIS — R14.0 ABDOMINAL BLOATING: ICD-10-CM

## 2020-06-16 NOTE — TELEPHONE ENCOUNTER
See note below - acidophilus lactobacillus capsules not covered by insurance. Nursing Home requesting updated rx for pharmacy (she has already updated their med list).     Can you please send new rx for acidophilus tabs? Many in database, unsure which one to pend.

## 2020-06-16 NOTE — TELEPHONE ENCOUNTER
Reason for Call:  Formulary issue    Detailed comments: Patient's insurance doesn't cover the capsules. Can we please send over a new prescription that reflects tablets.    Phone Number Patient can be reached at: [phone]    Best Time: Anytime    Can we leave a detailed message on this number? YES    Call taken on 6/16/2020 at 1:12 PM by Edison Francis

## 2020-07-01 ENCOUNTER — MEDICAL CORRESPONDENCE (OUTPATIENT)
Dept: HEALTH INFORMATION MANAGEMENT | Facility: CLINIC | Age: 85
End: 2020-07-01

## 2020-07-02 ENCOUNTER — TELEPHONE (OUTPATIENT)
Dept: FAMILY MEDICINE | Facility: CLINIC | Age: 85
End: 2020-07-02

## 2020-07-02 NOTE — TELEPHONE ENCOUNTER
Reason for Call:  Form, our goal is to have forms completed with 72 hours, however, some forms may require a visit or additional information.    Type of letter, form or note:  medical    Who is the form from?: Home care    Where did the form come from: form was faxed in    What clinic location was the form placed at?: [facility]  Providers name Leo Melgar MD  Where the form was placed: [place]    What number is listed as a contact on the form?: [phone]  Phone Number [phone]       Additional comments: [facility] covid testing needs MD sig    Call taken on 7/2/2020 at 9:02 AM by Helene Yu

## 2020-07-06 ENCOUNTER — TELEPHONE (OUTPATIENT)
Dept: FAMILY MEDICINE | Facility: CLINIC | Age: 85
End: 2020-07-06

## 2020-07-06 DIAGNOSIS — H61.23 BILATERAL IMPACTED CERUMEN: Primary | ICD-10-CM

## 2020-07-06 NOTE — TELEPHONE ENCOUNTER
Call from Keshav - she states that the nurse isn't there today. Requesting we fax order on Wednesday so the nurse can receive it. Clarified her request and this is what she thinks would be best. She would like to discuss with the nurse at the facility if this medication is okay with the nurse.     Triage: Postponing encounter - please contact daughter, Keshav before faxing over rx to [facility].

## 2020-07-06 NOTE — TELEPHONE ENCOUNTER
Please advice on request. Should pt schedule a virtual visit? Debrox order pended if appropriate.     [facility] fax [phone]

## 2020-07-06 NOTE — LETTER
Re: Regla Benavidez  01/05/1926    To nursing staff/to whom it may concern:    Please apply debrox in both ears bid for 7 days, and then do an ear wash.                      Leo Melgar MD

## 2020-07-06 NOTE — TELEPHONE ENCOUNTER
Reason for Call:  Other prescription    Detailed comments: The patient's daughter called and stated that the patient is needing an ear wash done but she is in an assisted living home and they are restricting patient's from going in and out unless absolutely necessary. She is wondering if Dr. Melgar would be willing to prescribe an ear drop to help break up the built up ear wash?  If Dr. Melgar prescribes an ear drop we'll need to fax an order to her assisted living home giving the okay to administer.      Please call the daughter when this is done to get the assisted living information.     Phone Number Patient can be reached at: Other phone number:  [phone]    Best Time:     Can we leave a detailed message on this number? YES    Call taken on 7/6/2020 at 2:58 PM by Esther Rios

## 2020-07-06 NOTE — TELEPHONE ENCOUNTER
Rx sent.                    Send this to the assisted living center:           Please apply debrox in both ears bid for 7 days, and then do an ear wash.               (Signed)         Leo Melgar MD

## 2020-07-07 NOTE — TELEPHONE ENCOUNTER
Betty from [facility] called and stated that she needs an order for this medication to be administered for the patient.  The order can be faxed to [phone].  She will not administer the medication until she speaks with the patient's daughter but she just wants the order on file.

## 2020-07-10 ENCOUNTER — TELEPHONE (OUTPATIENT)
Dept: FAMILY MEDICINE | Facility: CLINIC | Age: 85
End: 2020-07-10

## 2020-07-10 DIAGNOSIS — K59.00 CONSTIPATION, UNSPECIFIED CONSTIPATION TYPE: ICD-10-CM

## 2020-07-10 DIAGNOSIS — R14.0 ABDOMINAL BLOATING: ICD-10-CM

## 2020-07-10 DIAGNOSIS — Z29.9 PREVENTIVE MEASURE: ICD-10-CM

## 2020-07-10 NOTE — TELEPHONE ENCOUNTER
PRIOR AUTHORIZATION DENIED    Medication: Lactobacillus (ACIDOPHILUS) 0.5 MG TABS    Denial Date: 7/10/2020    Denial Rational:  Per insurance, medication is excluded from patient's benefit plan and will not be covered. Review and appeal are not available because of this exclusion. I called insurnace and it's an OTC medication which is not covered.           Appeal Information:  N/A

## 2020-07-10 NOTE — TELEPHONE ENCOUNTER
The patient daughter called and stated that a prior authorization needs to be done for this medication.  She would like a call from the prior authorization team to discuss this.     Keshav Joyce  [phone]      Prior Authorization Retail Medication Request    Medication/Dose: Lactobacillus (ACIDOPHILUS) 0.5 MG TABS  ICD code (if different than what is on RX):  K59.00 R14.0 Z29.9  Previously Tried and Failed:    Rationale:      Insurance Name:  [employer]    Insurance ID:  [id number]        Pharmacy Information (if different than what is on RX)  Name:  [facility] - [place], [place] - [address]   Phone:  [phone]

## 2020-07-15 NOTE — TELEPHONE ENCOUNTER
Keshav called again to inform us that she spoke with [employer] and they will be faxing us a formulary exception form that needs to be completed and faxed back as urgent. She is requesting a follow up call once this has been completed.

## 2020-07-15 NOTE — TELEPHONE ENCOUNTER
Daughter Keshav called back regarding this case. Expressed confusion and frustration with back-forth phone calls between pharmacy, insurance and PA team. She is not sure why medication was covered last year and not this year. I tried explaining to her that insurance benefits can change at any given moment and that reason for denial this time was due to a plan benefit exclusion. According to plan, no review or appeal id available so unfortunately, there is nothing more we can do. Daughter insisted on getting details regarding previous approval and details on this denial which I provided and stated she would be calling the insurance company and pharmacy back and if she is still not able to get anywhere, she will call us back.    Mary Beth Eugene PA Team

## 2020-07-15 NOTE — TELEPHONE ENCOUNTER
Patient's daughter Keshav has called back a few times today asking for more details regarding this case and previous case that was approved. I did provide her with a timeline of dates and reference numbers. Sent an email to leadership as an FYI, as daughter mentioned if she has to call back, she will want to speak with management.    Mary Beth QUINTERO Team

## 2020-07-16 NOTE — TELEPHONE ENCOUNTER
I have filled in the appeal form, please fax back to the concerned office.     Thank you,  Thu Coats MD on 7/16/2020 at 5:20 PM

## 2020-07-19 NOTE — PROGRESS NOTES
[facility] Care Coordination Contact    [employer] had been having auth issues with [employer] for the shower caps and aspercreme. Auth had been submitted by CMS and was on file with [employer] so not sure why APA was having issues. Nikki from [facility] finally was able to confirm with [employer] that:a that the auth has been entered into the intake system today (7/14/20).  I need to allow 24-48 hours for the auth to get crossed over into claims.  Once that is done I will get the order set up to send to patient. Thank you.  Nikki Jacobsen RN, PHN, [facility] Care Coordination  Vdibgz-[phone]. [phone]

## 2020-07-20 NOTE — PROGRESS NOTES
[facility] Care Coordination Contact    Received a message from Eric from [facility] at [phone] asking if an auth was in place for the pullups and wipes. Checked and an auth was submitted to the health plan for the wipes an pullups effective 5/1/20 x 1 yr. Called Eric back and no answer. ML with this info and to call back if continues to have questions or has issues.  Autumn Jacobsen RN, PHN, [facility] Care Coordination  Atapuz-[phone]. cell-[phone]

## 2020-07-21 ENCOUNTER — TELEPHONE (OUTPATIENT)
Dept: FAMILY MEDICINE | Facility: CLINIC | Age: 85
End: 2020-07-21

## 2020-07-21 NOTE — TELEPHONE ENCOUNTER
Prior Authorization Retail Medication Request    Medication/Dose: lactinex chewable tabs    ICD code (if different than what is on RX):     Previously Tried and Failed:     Rationale:       Insurance Name:     Insurance ID:         Pharmacy Information (if different than what is on RX)  Name:   [facility]   Phone:  [phone]'

## 2020-07-29 ENCOUNTER — TELEPHONE (OUTPATIENT)
Dept: FAMILY MEDICINE | Facility: CLINIC | Age: 85
End: 2020-07-29

## 2020-07-29 NOTE — TELEPHONE ENCOUNTER
Can not find the appeal form and this has not been scanned into chart.  A denial was received on 7/28/2020.  Daughter is going to want to know what was put in the appeal form.  Need to try to find this and call Daughter on 7/30/2020.

## 2020-07-29 NOTE — TELEPHONE ENCOUNTER
Reason for call:  Other   Patient called regarding (reason for call): call back  Additional comments: Prior Auth needed. Daughter Hiren needs to know if the supporting information needed for the latest appeal submitted. What was submitted?     Phone number to reach patient:  Other phone number: [phone]    Best Time:  any    Can we leave a detailed message on this number?  YES    Travel screening: Not Applicable

## 2020-07-29 NOTE — TELEPHONE ENCOUNTER
Appeal was submitted by the clinic not the PA team. Please reach out to the clinic for any updates. Thank you.

## 2020-07-30 NOTE — TELEPHONE ENCOUNTER
Called pt's daughter back and let her know that this was denied and I let her know that we sent in the form that states that pt tolerates this formula of medication and we are requesting that this be approved.  Pt's daughter will call [facility] to see if there are any formulary alternatives.

## 2020-08-03 NOTE — PROGRESS NOTES
[facility] Care Coordination Contact    No response from Eric from [facility] so called her again and no answer. ML asking if she got the auth for the pullups and wipes and to call this writer to let me know as we have been having issues with [employer] and auths for other things. Auth number [id number].  Apparently [employer] changed their system for auths.  New auth was submitted by CMS for body wash today.  Auths for the aspercreme and shower caps have been submitted and auth number per Kimi Youssef from [employer] is [id number]. APA has been informed of this auth number a few times and has been having issues with finding the auth.  Autumn Jacobsen RN, PHN, [facility] Care Coordination  Lyhomk-[phone]. Zcys-[phone]

## 2020-08-04 NOTE — PROGRESS NOTES
[facility] Care Coordination Contact    Received a call from Eric from [facility] stating that they did get the auths in place for the chux, wipes and pullups from [employer] and all is well with the auths.  Autumn Jacobsen RN, PHN, [facility] Care Coordination  Dvzoof-[phone]. [phone]

## 2020-08-06 ENCOUNTER — TRANSFERRED RECORDS (OUTPATIENT)
Dept: HEALTH INFORMATION MANAGEMENT | Facility: CLINIC | Age: 85
End: 2020-08-06

## 2020-08-06 LAB
CREAT SERPL-MCNC: 1.17 MG/DL (ref 0.55–1.02)
GFR SERPL CREATININE-BSD FRML MDRD: 40 ML/MIN/1.73M2
GLUCOSE SERPL-MCNC: 168 MG/DL (ref 70–100)
HBA1C MFR BLD: 8.2 % (ref 0–5.7)
POTASSIUM SERPL-SCNC: 4.4 MMOL/L (ref 3.5–5.1)

## 2020-08-10 ENCOUNTER — TELEPHONE (OUTPATIENT)
Dept: FAMILY MEDICINE | Facility: CLINIC | Age: 85
End: 2020-08-10

## 2020-08-10 NOTE — TELEPHONE ENCOUNTER
Reason for Call:  Form, our goal is to have forms completed with 72 hours, however, some forms may require a visit or additional information.    Type of letter, form or note:  medical    Who is the form from?: Home care    Where did the form come from: form was faxed in    What clinic location was the form placed at?: [facility]    Where the form was placed: mlo Box/Folder    What number is listed as a contact on the form?: [phone] (P) [phone]       Additional comments: [facility]: Lab results    Call taken on 8/10/2020 at 3:32 PM by Esther Rios

## 2020-08-12 ENCOUNTER — TELEPHONE (OUTPATIENT)
Dept: FAMILY MEDICINE | Facility: CLINIC | Age: 85
End: 2020-08-12

## 2020-08-12 DIAGNOSIS — N39.0 RECURRENT UTI: ICD-10-CM

## 2020-08-12 DIAGNOSIS — N39.0 RECURRENT UTI: Primary | ICD-10-CM

## 2020-08-12 LAB
ALBUMIN UR-MCNC: NEGATIVE MG/DL
APPEARANCE UR: ABNORMAL
BACTERIA #/AREA URNS HPF: ABNORMAL /HPF
BILIRUB UR QL STRIP: NEGATIVE
COLOR UR AUTO: YELLOW
GLUCOSE UR STRIP-MCNC: 500 MG/DL
HGB UR QL STRIP: NEGATIVE
KETONES UR STRIP-MCNC: NEGATIVE MG/DL
LEUKOCYTE ESTERASE UR QL STRIP: ABNORMAL
NITRATE UR QL: POSITIVE
NON-SQ EPI CELLS #/AREA URNS LPF: ABNORMAL /LPF
PH UR STRIP: 6 PH (ref 5–7)
RBC #/AREA URNS AUTO: ABNORMAL /HPF
SOURCE: ABNORMAL
SP GR UR STRIP: 1.01 (ref 1–1.03)
UROBILINOGEN UR STRIP-ACNC: 1 EU/DL (ref 0.2–1)
WBC #/AREA URNS AUTO: >100 /HPF

## 2020-08-12 PROCEDURE — 87086 URINE CULTURE/COLONY COUNT: CPT | Performed by: INTERNAL MEDICINE

## 2020-08-12 PROCEDURE — 81001 URINALYSIS AUTO W/SCOPE: CPT | Performed by: INTERNAL MEDICINE

## 2020-08-12 PROCEDURE — 87088 URINE BACTERIA CULTURE: CPT | Performed by: INTERNAL MEDICINE

## 2020-08-12 PROCEDURE — 87186 SC STD MICRODIL/AGAR DIL: CPT | Performed by: INTERNAL MEDICINE

## 2020-08-12 NOTE — TELEPHONE ENCOUNTER
Keshav, daughter of patient calling.    Pt has chronic UTIs. Daughter is currently a caregiver.  Reports foul smelling urine, cloudy.  No other sx from pt.    Daughter states that due to chronic UTIs that usually do not have sx, she is requesting a standing order for UA.

## 2020-08-12 NOTE — TELEPHONE ENCOUNTER
RN called back to daughter.  Relayed provider message.  Daughter states understanding.  No further action needed at this time.

## 2020-08-12 NOTE — TELEPHONE ENCOUNTER
Reason for Call:  Form, our goal is to have forms completed with 72 hours, however, some forms may require a visit or additional information.    Type of letter, form or note:  medical    Who is the form from?: DMe (if other please explain)    Where did the form come from: form was faxed in    What clinic location was the form placed at?: [facility]    Where the form was placed: MLO Box/Folder    What number is listed as a contact on the form?: [phone] (P) [phone]       Additional comments: APA: Battery Hearing # 13( 8/pk)    Call taken on 8/12/2020 at 2:19 PM by Esther Rios

## 2020-08-13 NOTE — PROGRESS NOTES
[facility] Care Coordination Contact    Daughter Hiren contacted this writer via email asking about the auths for the DME for next month delivery as she is worried that the orders from [facility] and [facility] will be wrong.   Have been working with Ingrid Lezama CMS and both [facility] and [facility] about the auths as [employer] had changed their way of doing auths and there have been issues.  Per Eric at [facility], auth situation has been cleared up for the Chux, pullups and wipes. Member is currently getting 6 pks of chux and Hiren is asking for 3 more pkg of chux per month under EW. Obtained carson for this from Eric from [facility] and request sent to CMS to please put an auth in.  Called and spoke with Delores from [facility] and they got auths for the shower caps, aspercreme, and hearing aid batteries. Still need auth for the no rinse body wash and Tens electrode adhesive rounds-even though they are on hold for now. Request sent to CMS to please check into this.   Contacted Hiren with this update.  Autumn Jacobsen RN, PHN, CCM  [facility] Care Coordination  Taxvtt-[phone]. cell-[phone]

## 2020-08-14 ENCOUNTER — TELEPHONE (OUTPATIENT)
Dept: FAMILY MEDICINE | Facility: CLINIC | Age: 85
End: 2020-08-14

## 2020-08-14 LAB
BACTERIA SPEC CULT: ABNORMAL
Lab: ABNORMAL
SPECIMEN SOURCE: ABNORMAL

## 2020-08-14 RX ORDER — CEFPODOXIME PROXETIL 100 MG/1
100 TABLET, FILM COATED ORAL 2 TIMES DAILY
Qty: 14 TABLET | Refills: 0 | Status: SHIPPED | OUTPATIENT
Start: 2020-08-14 | End: 2021-01-22

## 2020-08-14 NOTE — TELEPHONE ENCOUNTER
Reach out made to Betty.   Provider message shared with patient.   Order needs to be faxed to patient.   Fax: [phone]    Medication order was printed and faxed to Betty.     No further follow up needed.

## 2020-08-14 NOTE — TELEPHONE ENCOUNTER
Reason for Call:  Other Call back results    Detailed comments: The patient's daughter, Keshav called and stated that she would like a nurse to call her back with the patient results from a recent UA/UC.  She stated that the Nurse from the patient nursing home called her inquiring about the results and there is a disconnect with communication between the clinic, the nursing home, and the family.     Phone Number Patient can be reached at: Other phone number:  [phone]    Best Time: Any    Can we leave a detailed message on this number? YES    Call taken on 8/14/2020 at 12:44 PM by Esther Rios

## 2020-08-14 NOTE — TELEPHONE ENCOUNTER
Spoke with Hiren, patients daughter. (CTC on file)    Reviewed lab results.     No further follow up needed.

## 2020-08-14 NOTE — TELEPHONE ENCOUNTER
Betty from [facility] returned Triage call.  No nurses were available at the time.  She would like a call back before 2 pm if possible otherwise she will be back on Monday.

## 2020-08-14 NOTE — PROGRESS NOTES
Results for orders placed or performed in visit on 08/12/20   UA reflex to Microscopic     Status: Abnormal   Result Value Ref Range    Color Urine Yellow     Appearance Urine Cloudy     Glucose Urine 500 (A) NEG^Negative mg/dL    Bilirubin Urine Negative NEG^Negative    Ketones Urine Negative NEG^Negative mg/dL    Specific Gravity Urine 1.015 1.003 - 1.035    Blood Urine Negative NEG^Negative    pH Urine 6.0 5.0 - 7.0 pH    Protein Albumin Urine Negative NEG^Negative mg/dL    Urobilinogen Urine 1.0 0.2 - 1.0 EU/dL    Nitrite Urine Positive (A) NEG^Negative    Leukocyte Esterase Urine Small (A) NEG^Negative    Source Midstream Urine    Urine Microscopic     Status: Abnormal   Result Value Ref Range    WBC Urine >100 (A) OTO5^0 - 5 /HPF    RBC Urine O - 2 OTO2^O - 2 /HPF    Squamous Epithelial /LPF Urine Moderate (A) FEW^Few /LPF    Bacteria Urine Many (A) NEG^Negative /HPF   Urine Culture Aerobic Bacterial     Status: Abnormal (Preliminary result)    Specimen: Midstream Urine   Result Value Ref Range    Specimen Description Midstream Urine     Special Requests Midstream Urine     Culture Micro 10,000 to 50,000 colonies/mL  Escherichia coli   (A)        Susceptibility    Escherichia coli - AMMON     AMPICILLIN >=32 Resistant ug/mL     CEFAZOLIN* <=4 Sensitive ug/mL      * Cefazolin AMMON breakpoints are for the treatment of uncomplicated urinary tract infections.  For the treatment of systemic infections, please contact the laboratory for additional testing.     CEFOXITIN <=4 Sensitive ug/mL     CEFTAZIDIME <=1 Sensitive ug/mL     CEFTRIAXONE <=1 Sensitive ug/mL     CIPROFLOXACIN >=4 Resistant ug/mL     GENTAMICIN <=1 Sensitive ug/mL     LEVOFLOXACIN >=8 Resistant ug/mL     NITROFURANTOIN <=16 Sensitive ug/mL     TOBRAMYCIN <=1 Sensitive ug/mL     Trimethoprim/Sulfa >=16/304 Resistant ug/mL     AMPICILLIN/SULBACTAM >=32 Resistant ug/mL     Piperacillin/Tazo <=4 Sensitive ug/mL     CEFEPIME <=1 Sensitive ug/mL     R to  quinolones and TMP/sulfa and ampicillin.          Rx cefpodoxime.

## 2020-08-14 NOTE — TELEPHONE ENCOUNTER
Patient Contact (Darleen David and David POA)     Attempt # 1    Was call answered?  No.  Left message on voicemail with information to call the clinic back to discuss lab results and provider message.     On call back: Please share PCP message.

## 2020-08-14 NOTE — TELEPHONE ENCOUNTER
Betty from [facility] called requesting a copy of patient recent labs UA/UC. Labs dated 08/12/2020 were faxed to [phone].

## 2020-08-19 ENCOUNTER — OFFICE VISIT (OUTPATIENT)
Dept: URGENT CARE | Facility: URGENT CARE | Age: 85
End: 2020-08-19
Payer: COMMERCIAL

## 2020-08-19 ENCOUNTER — TELEPHONE (OUTPATIENT)
Dept: FAMILY MEDICINE | Facility: CLINIC | Age: 85
End: 2020-08-19

## 2020-08-19 VITALS
OXYGEN SATURATION: 94 % | DIASTOLIC BLOOD PRESSURE: 63 MMHG | RESPIRATION RATE: 20 BRPM | HEART RATE: 73 BPM | SYSTOLIC BLOOD PRESSURE: 134 MMHG | TEMPERATURE: 96.2 F

## 2020-08-19 DIAGNOSIS — M79.89 LEFT LEG SWELLING: Primary | ICD-10-CM

## 2020-08-19 PROCEDURE — 99213 OFFICE O/P EST LOW 20 MIN: CPT | Performed by: FAMILY MEDICINE

## 2020-08-19 NOTE — TELEPHONE ENCOUNTER
Reason for Call:  Other call back    Detailed comments: Patient was in urgent care and needs follow up.   Will not disclose any further information.  Just wants to talk with the doctor about her mothers neuropathy.    Phone Number Patient can be reached at: Other phone number:  Richard Maradiaga, [phone]    Best Time: any time after 8am    Can we leave a detailed message on this number? YES    Call taken on 8/19/2020 at 4:20 PM by Rose Mitchell

## 2020-08-19 NOTE — PROGRESS NOTES
[facility] Care Coordination Contact    Called [facility] today just to see what is going out when and if they were able to get all the auths. Per Nikki, [employer] changed their auth process which made it to a not as user friendly as it was before to the DME providers.    So, per Nikki at [facility]:  -No rinse body wash-2 bottles-still need an auth, but she will send it out this week (early).  -Aspercreme-3 tubes going out later this week (early).  -Shower caps-now all 52 of them are going out this week too, so that will take care of at least a year or more.  -Hearing aid batteries-4 pkg will be going out Sept 3.  -Tens electrode adhesive rounds-currently on hold.  Autumn Jacobsen RN, PHN, CCM  [facility] Care Coordination  Lexqdd-[phone]. Wwlh-[phone]

## 2020-08-19 NOTE — PATIENT INSTRUCTIONS
Continue giving lasix as prescribed      Keep an eye on the leg swelling if this gets worse let's have her be re-evaluated right away

## 2020-08-19 NOTE — PROGRESS NOTES
Subjective:   Regla Benavidez is a 89 year old female who presents for   Chief Complaint   Patient presents with     Urgent Care     swelling with pain in legs that was noticed today.      Pain of left leg going into her distal thigh and radiating to the groin. Left foot is slightly swollen. No reported falls/injuries/trauma. Uncertain if there is trly discomfort in this region given she is groaning when palpating both legs. No obvious impairments seen from her baseline which would suggest significant pain.     Walking seems to help with the discomfort.   No complaints earlier in the week regarding this discomfort Hx of stroke causing expressive aphasia.    Accompanied by two of her children, they see her about once a week.    Takes lasix for edema.    No hx of DVTs.    Patient Active Problem List    Diagnosis Date Noted     Obesity (BMI 35.0-39.9) with comorbidity (H) 11/26/2019     Priority: Medium     Intertrigo 11/26/2019     Priority: Medium     Hypertension goal BP (blood pressure) < 140/80 11/25/2019     Priority: Medium     Pain in both feet 08/04/2018     Priority: Medium     Foot deformity, bilateral 08/04/2018     Priority: Medium     Diabetic polyneuropathy associated with other specified diabetes mellitus (H) 08/04/2018     Priority: Medium     B12 452 in 5/19       Bilateral bunions 08/04/2018     Priority: Medium     Hyperuricemia 04/10/2018     Priority: Medium     9.3; COLCRYS IS COVERED,NOT GENERIC COLCHICINE  10.5 in 5/18       Cellulitis, leg 03/02/2018     Priority: Medium     Cellulitis left leg 03/01/2018     Priority: Medium     Left foot pain 02/28/2018     Priority: Medium     Recent Results (from the past 24 hour(s))   MR Foot Left w/o Contrast    Narrative    MR FOOT LEFT WITHOUT CONTRAST 3/1/2018 2:19 PM    HISTORY: Left foot pain and swelling; etiology? Differential diagnosis  includes gout and diabetic foot infection.    COMPARISON: X-ray left foot from 2/24/2018.    TECHNIQUE:  Routine multiplanar, multisequence imaging was performed.    FINDINGS:   Osseous structures: Prominent first MTP joint degenerative changes and  hallux valgus with some marginal erosive changes and subchondral cyst  formation. This could be degenerative but gout could have a similar  appearance as there is some moderate adjacent soft tissue prominence.  Remaining osseous structures are unremarkable. No evidence of fracture  or osteomyelitis.    Additional findings: Diffuse subcutaneous edema. There is also edema  in the deep musculature of the plantar aspect of the foot. Findings  suggest cellulitis and perhaps myositis as well. No evidence of  abscess or fluid collection.      Impression    IMPRESSION:  1. Somewhat focal marginal erosive changes surrounding the first MTP  joint suggests the possibility of gout. There is also soft tissue  prominence surrounding the joint supporting this diagnosis. There may  be secondary degenerative changes as well.  2. Diffuse subcutaneous and deep muscle edema suggesting  cellulitis/myositis. No abscess or fluid collection.    REYNALDO BLCAKWOOD MD        Uric acid 9.3       PAD (peripheral artery disease) (H) 02/09/2018     Priority: Medium     CKD (chronic kidney disease) stage 3, GFR 30-59 ml/min (H) 02/09/2018     Priority: Medium     Class 2 obesity due to excess calories with serious comorbidity and body mass index (BMI) of 35.0 to 35.9 in adult 02/09/2018     Priority: Medium     Type 2 diabetes mellitus with stage 3 chronic kidney disease, without long-term current use of insulin (H) 02/09/2018     Priority: Medium     Other vaginitis 07/29/2017     Priority: Medium     due to urinary incontinence       Dermatitis 07/29/2017     Priority: Medium     skin folds       Continuous leakage of urine 07/29/2017     Priority: Medium     Bilateral hearing loss, unspecified hearing loss type 07/29/2017     Priority: Medium     Type 2 diabetes mellitus with hyperglycemia, without  long-term current use of insulin (H) 2017     Priority: Medium     Bilateral leg edema 2017     Priority: Medium     Prerenal azotemia 2017     Priority: Medium     Health Care Home 2016     Priority: Medium     [facility] Care Coordinator  Autumn Jacobsen RN  [phone]    [facility] CARE PLAN SUMMARY    Client Name:  Regla Benavidez  Address:  C/O David Benavidez-son  [address] [place] [address]    Regla Benavidez  [facility]  [address] [place] [address]  [id number]  [place] [address] Phone: [phone]  But call son Ed or daughter Obdulio  [phone] ext [phone] (work-preferred)  AL nurse-Betty Reid [phone]  [email or web address]   :  1926 Date of Assessment: 20 via telephone due to COVID 19   [place]  [place] [facility] [place]: [id number] Medical Assistance Number: [id number]  Financial Worker: [facility]   Case #:     FVP :  Autumn Jacobsen RN CM Phone:  [phone]  CM Fax:  [phone]   [facility] Enrollment Date: 16 Case Mix:  D  Rate Cell:  B    Waiver Type:  EW   Emergency Contact: Melchor Pramod-son/POA   W Phone: [phone] x [phone] or home [phone]  Melchor rPamod <[email or web address]>  Secondary: Obdulio Joyce-daughter-   [phone] or [phone]   Ehsan Moser [email or web address]> Or  Neftali Benavidez-son [phone] Language:  English  :  No   Health Care Agent/POA: Son David Benavidez and then son Neftali Benavidez and Obdulio Joyce-daughter Advanced Directives/Living Will:  Yes-HCD and POLST   Primary Care Clinic/Phone/Fax:  [facility]/(p) [phone], (f) [phone] Primary Dx: Expressive Aphasia R47.01   Secondary Dx:  DM E11.9   Primary Physician:  Leo Melgar   Height: 5' 0''   Weight: 187 lbs   Specialty Physician:   Audiologist:  Has hearing aids, appt 2019   Eye Care Provider:  Has glasses and hx bilateral cataract surgery. [facility]  [facility]. Last appt 11/14/19 Dental Care Provider:  Own teeth-appt every 6 months at [facility]-last 1/2020  [facility]: [facility] [phone]   Other:        [facility] CURRENT SERVICES SUMMARY  Equipment owned/DME history: toilet safety frame, transfer bath bench, bed rails, walker, lift chair    SERVICE TYPE/PROVIDER NAME/PHONE AUTH DATE FREQUENCY Units OR $ Amt DESCRIPTION   Medical Transportation: [facility] [phone] 5/1/20-4/30/21 As needed na Medical rides   Assisted Living: [facility] [phone] 24 hr Customized Living Fax: [phone] 5/1/20-4/30/21 Daily See [facility] Assisted living services  100.71/d   [facility] [phone]  [phone]  5/1/20-4/30/21           Monthly        Monthly       EW-$23   MA  EW-$21.30  [place]    EW-$141.60   Prevail wipes-2 Pkgs  Chux-1 case=6 pkgs of 10 (max)  Chux- 3 pkgs   Esthela Pullups- 10 pks of [id number]    EW-6 extra Pkgs Estehla pullups at 23.60 ea    [facility]: [phone] 5/1/20-4/30/21 Quarterly           EW-3 tubes at $15/tube ea    EW-$42.90  MA  EW-$30  EW-$416 Aspercreme with lidocaine 4%  2.7 oz tubes    No rinse body wash-2 bottles/m  Hearing aid batteries Size 13- 4 pkg  Tens electrode adhesive rounds   52 no rinse shower caps/Qtr     * For ADC please select ADC provider and EW Transportation in order to process auth         Abdominal bloating 10/28/2015     Priority: Medium     CT neg except L ovarian cyst; CA-125= 3.    The other labs ok; see office visit note 10/21/15       Abnormal weight gain 10/28/2015     Priority: Medium     Cyst of left ovary 10/28/2015     Priority: Medium     Complex on CT and US: 6x4 cm in 11/15; d/w her son and daughter; given her age and comorbidities and lack of symptoms, and a low Ca 125 level, further work-up was not done.       Recurrent UTI 03/18/2015     Priority: Medium     See urology note 12/15; cysto neg; PVR low; \"consider a prophylactic " "antibiotic\"       Chronic midline low back pain without sciatica 03/18/2015     Priority: Medium     Worse in 12/18;              HERE IS THE SPINE XRAY REPORT:           IMPRESSION: Mild to moderate scoliotic curvature of the lumbar spine,  apex left. No compression deformity or acute fracture. Anterior and  right lateral osteophytes in the mid to lower lumbar spine. Degree of  this severe degenerative change has progressed since previous exam.       Hyponatremia 12/20/2014     Priority: Medium     With acute illness 11/14; HCTZ stopped.                Later,furosemide and spironolactone added; in 5/17, 36/1.18, Na+ 137,K+4.5, glucose 257       ACP (advance care planning) 11/17/2014     Priority: Medium     Advance Care Planning 6/12/2017: ACP Review of ACP with client and family. Reviewed advanced care plan and POLST and all remain current.  Regla Benavidez has an up to date advance care plan on file.  Added by Autumn Jacobsen  Advance Care Planning 6/16/2016: ACP Review of ACP with client and family.  Reviewed advance care plan and POLST with client and it still reflects client's wishes.  Regla Benavidez has an up to date advance care plan on file.  Added by Autumn Jacobsen  Advance Care Planning:   Receipt of ACP document:  Received: POLST which was signed and dated by provider on 2/11/14.  Document previously scanned on 4/8/14.  Order reviewed and found to be valid.  Code Status reflects choices in most recent ACP document.  Confirmed/documented designated decision maker(s). See permanent comments section of demographics in clinical tab. View document(s) and details by clicking on code status. Added by Ashanti Benavidez on 11/17/2014.  Advance Care Planning: Receipt of ACP document:  Received: Health Care Directive which was witnessed or notarized on 9/30/09.  Document previously scanned on 10/29/13.  Validation form completed and  scanned.  Code Status reflects choices in most recent ACP document has a POLST .  " Confirmed/documented designated decision maker(s). See permanent comments section of demographics in clinical tab. View document(s) and details by clicking on code status. Added by Ashanti Benavidez on 11/17/2014.           Expressive aphasia 07/02/2014     Priority: Medium     Restless legs syndrome 11/27/2013     Priority: Medium     Hard of hearing      Priority: Medium     hearing aids       Constipation 11/18/2013     Priority: Medium     Cerebral infarction (H) 10/22/2013     Priority: Medium     10/21/13;L frontal, MCA ; see hosp () and rehab (Dr. Saba) reports.            Has severe expressive aphasia  Diagnosis updated by automated process. Provider to review and confirm.       Preventive measure 09/11/2013     Priority: Medium     APRIMA DATA BASE UNDER THE 9/11/13 NOTE  Colonoscopy 11/01; mammogram 6/00    [facility] [phone]       Branch retinal vein occlusion of left eye 11/26/2010     Priority: Medium     Rx Avastin in 2011         Current Outpatient Medications   Medication     acetaminophen (TYLENOL) 325 MG tablet     aspirin 325 MG tablet     cefpodoxime (VANTIN) 100 MG tablet     Cholecalciferol (VITAMIN D3 PO)     famotidine (PEPCID) 20 MG tablet     furosemide (LASIX) 20 MG tablet     gabapentin (NEURONTIN) 100 MG capsule     glipiZIDE (GLUCOTROL) 10 MG tablet     Lactobacillus (LACTINEX PO)     metFORMIN (GLUCOPHAGE) 500 MG tablet     nystatin (MYCOSTATIN) 620002 UNIT/GM external cream     Ondansetron HCl (ZOFRAN PO)     polyethylene glycol (MIRALAX/GLYCOLAX) Packet     pramipexole (MIRAPEX) 0.25 MG tablet     simvastatin (ZOCOR) 5 MG tablet     sitagliptin (JANUVIA) 50 MG tablet     spironolactone (ALDACTONE) 25 MG tablet     UNABLE TO FIND     Acidophilus Lactobacillus CAPS     blood glucose calibration (NO BRAND SPECIFIED) solution     blood glucose monitoring (NO BRAND SPECIFIED) test strip     blood glucose monitoring (ONE TOUCH ULTRA 2) meter device kit     cefpodoxime  (VANTIN) 100 MG tablet     Lactobacillus (ACIDOPHILUS) 0.5 MG TABS     lidocaine (LMX4) 4 % CREA cream     thin (NO BRAND SPECIFIED) lancets     No current facility-administered medications for this visit.      ROS:  As above per HPI    Objective:   /63   Pulse 73   Temp 96.2  F (35.7  C) (Temporal)   Resp 20   LMP  (LMP Unknown)   SpO2 94% , There is no height or weight on file to calculate BMI.  Gen:  NAD, well-nourished, sitting in chair comfortably  HEENT: EOMI, sclera anicteric, Head normocephalic, ; nares patent; moist mucous membranes  Neck: trachea midline, no thyromegaly  CV:  Hemodynamically stable  Pulm:  no increased work of breathing   Skin: no obvious rashes or abnormalities  Psych: Euthymic, linear thoughts, normal rate of speech  Extrem: L foot trace edema, no swelling of legs  L foot: 2+ dorsal pedal pulse  Gait: stable with no antalgia, uses FWW  L leg: able to extend to 165 degrees  R leg: able to extend to 165 degrees    No results found for any visits on 08/19/20.    Assessment & Plan:   Regla Benavidez, 89 year old female who presents with:    Left leg swelling  No specific area of discomfort when palpating her feet and her legs she will moan 'ow' when pressing on both legs, no particular area. Concern for CHF/DVT is low at this time given her history. I recommended close monitoring to see if this worsens/improves - return as needed. Continue lasix at present dose     Babar Petersen MD   [facility]    The use of Dragon[employer] dictation services may have been used to construct the content in this note; any grammatical or spelling errors are non-intentional. Please contact the author of this note directly if you are in need of any clarification.

## 2020-08-20 NOTE — TELEPHONE ENCOUNTER
Patient Contact- Hiren, daughter    Attempt # 1    Was call answered?  No.  Left message on voicemail with information to call me back.    On call back:    -Pt has appt on 08/26  -Does anything need to be addressed before then?

## 2020-08-21 NOTE — TELEPHONE ENCOUNTER
"RN called back to daughter.    Daughter is wondering if pt needs to come in on 08/26.   \"I want to talk to him directly. Every time I give a message it always gets changed to something else\".    RN advised that PCP is currently not in clinic and that RN can take a message to give to PCP to review.    Hiren refuses to elaborate further at this time. Asks that PCP call her directly at [phone]  "

## 2020-08-24 NOTE — TELEPHONE ENCOUNTER
Hiren called the clinic requesting triage to call Denise Hernández to explain pt is having a F2F OV [phone]. Done.

## 2020-08-24 NOTE — TELEPHONE ENCOUNTER
She went to urgent care but no diagnostic testing was done.                She should have a venous doppler US to look for DVT, and an STEVE to see if her PAD has become severe enough to cause rest pain.                  I can order these tests instead of having an appt on 8/26, if the family agrees.            Please let them know. \

## 2020-08-24 NOTE — TELEPHONE ENCOUNTER
Pt's daughter Hiren was called with providers message. States she prefers to discuss test during clinic visit and have provider assess leg first. Dtr. wants to make sure pain is not related to diabetes or just from being sedentary. They will keep appt as scheduled 08/26.

## 2020-08-24 NOTE — TELEPHONE ENCOUNTER
Patient Contact    Called patient's daughter. She states that she wants [place] to come in for an in-person visit so Dr. Melgar can palpate her foot/leg and advise on next steps. They trust Dr. Melgar's opinion because he is familiar with her care.     According to daughter, nurse Betty at the [facility] wanted her to be seen in urgent care due to leg swelling and leg pain. However, it sounds like she is not wanting her to come in for OV now since she has already been assessed. She is now 'requiring approval from Dr. Melgar' that it is necessary for her to come in to clinic for a visit (due to COVID guidelines). Provider to advise if okay for in-person visit.     Triage: Call Betty at [facility] to give approval for in-person OV.

## 2020-08-26 ENCOUNTER — OFFICE VISIT (OUTPATIENT)
Dept: FAMILY MEDICINE | Facility: CLINIC | Age: 85
End: 2020-08-26
Payer: COMMERCIAL

## 2020-08-26 VITALS
BODY MASS INDEX: 36.33 KG/M2 | SYSTOLIC BLOOD PRESSURE: 130 MMHG | DIASTOLIC BLOOD PRESSURE: 64 MMHG | TEMPERATURE: 97.8 F | WEIGHT: 186 LBS | HEART RATE: 69 BPM | OXYGEN SATURATION: 96 %

## 2020-08-26 DIAGNOSIS — M79.605 PAIN IN BOTH LOWER EXTREMITIES: ICD-10-CM

## 2020-08-26 DIAGNOSIS — G89.29 CHRONIC PAIN OF LEFT KNEE: Primary | ICD-10-CM

## 2020-08-26 DIAGNOSIS — M79.604 PAIN IN BOTH LOWER EXTREMITIES: ICD-10-CM

## 2020-08-26 DIAGNOSIS — E13.42 DIABETIC POLYNEUROPATHY ASSOCIATED WITH OTHER SPECIFIED DIABETES MELLITUS (H): ICD-10-CM

## 2020-08-26 DIAGNOSIS — M25.562 CHRONIC PAIN OF LEFT KNEE: Primary | ICD-10-CM

## 2020-08-26 PROCEDURE — 99214 OFFICE O/P EST MOD 30 MIN: CPT | Performed by: INTERNAL MEDICINE

## 2020-08-26 RX ORDER — GABAPENTIN 100 MG/1
100 CAPSULE ORAL 2 TIMES DAILY
Qty: 60 CAPSULE | Refills: 11 | Status: SHIPPED | OUTPATIENT
Start: 2020-08-26

## 2020-08-26 NOTE — PROGRESS NOTES
Subjective     Regla Benavidez is a 89 year old female who presents to clinic today for the following health issues:    HPI       Here from the NH for evaluation of leg sx.                 Here with her son and her dtr.            This pt has expressive aphasia.                      Her leg sx are poorly localized; lower leg, knee,hip.     ? Some relief with standing up.            She has some chronic edema.                        Using aspercream with lidocaine bid.           APAP 650 mg QID scheduled.           Gabapentin 100 mg hs.                      Also known to have DJD lumbar spine.                                   STEVE 9/18:   IMPRESSION: Findings indicate mild to moderate left lower extremity  arterial insufficiency.                     L leg  Venous doppler neg in 2015.            Prescription Medications as of 8/26/2020       Rx Number Disp Refills Start End Last Dispensed Date Next Fill Date Owning Pharmacy    acetaminophen (TYLENOL) 325 MG tablet  270 tablet 2 1/11/2019    [facility] - [place], [place] - [address]    Sig: Take 2 tablets in AM, 2 tabs at bedtime and also 2 tabs PRN    Class: Local Print    Acidophilus Lactobacillus CAPS  60 capsule 11 6/10/2020    [facility] - [place], [place] - [address]    Sig: Take 1 capsule by mouth 2 times daily    Class: E-Prescribe    Route: Oral    aspirin 325 MG tablet  120 tablet  11/15/2013        Sig: Take 1 tablet (325 mg) by mouth daily    Class: Transitional    Route: Oral    blood glucose calibration (NO BRAND SPECIFIED) solution  1 Bottle 3 4/25/2018        Sig: Use to calibrate blood glucose monitor as needed as directed. To accompany: Accu-Chek Safe-T pro plus    Class: Local Print    blood glucose monitoring (NO BRAND SPECIFIED) test strip  100 strip 6 4/25/2018        Sig: Use to test blood sugar 1 times daily or as directed. To accompany: Accu-Chek safety pro plus    Class: Local Print    blood glucose monitoring (ONE  TOUCH ULTRA 2) meter device kit  1 kit 1 5/28/2020    [facility] - [place], [place] - [address] [facility] [address]     Sig: Use to test blood sugar 1 times daily or as directed.    Class: E-Prescribe    Notes to Pharmacy: Or as covered by insurance    Cholecalciferol (VITAMIN D3 PO)            Sig: Take 2,000 Units by mouth daily    Class: Historical    Route: Oral    famotidine (PEPCID) 20 MG tablet  180 tablet 4 10/11/2019    [facility] - [facility] [address]    Sig: Take 1 tablet (20 mg) by mouth 2 times daily    Class: E-Prescribe    Notes to Pharmacy: This replaces ranitidine.    Route: Oral    furosemide (LASIX) 20 MG tablet  60 tablet 12 1/27/2018    [facility] - [place], [place] - [address]    Sig: Take 1 tablet (20 mg) by mouth 2 times daily As of 1/27/18    Class: Historical    Route: Oral    gabapentin (NEURONTIN) 100 MG capsule  30 capsule 11 2/5/2020    [facility] - [place], [place] - [address]    Sig: Take 1 capsule (100 mg) by mouth At Bedtime    Class: E-Prescribe    Route: Oral    glipiZIDE (GLUCOTROL) 10 MG tablet  60 tablet 11 11/8/2018        Sig: Take 1 tablet (10 mg) by mouth daily    Class: Historical    Notes to Pharmacy: As of 11/8/18    Route: Oral    Lactobacillus (ACIDOPHILUS) 0.5 MG TABS  90 tablet 11 6/16/2020    [facility] - [place], [place] - [address]    Sig: Take 1 tablet (0.5 mg) by mouth 3 times daily    Class: E-Prescribe    Route: Oral    Lactobacillus (LACTINEX PO)            Sig: Take 1 tablet by mouth 3 times daily 0900; 1300; 1645    Class: Historical    Route: Oral    lidocaine (LMX4) 4 % CREA cream  133 g 11 8/4/2018    [facility] - [place], [place] - [address] [facility] [address]     Sig: Apply to both feet TID    Class: E-Prescribe    metFORMIN (GLUCOPHAGE) 500 MG tablet  30 tablet 12 1/27/2018    [facility] - [place], [place] - [address]    Sig: Take 500 mg by mouth daily     Class: Historical    Route: Oral    nystatin  (MYCOSTATIN) 186335 UNIT/GM external cream  120 g 8 5/20/2020    [facility] - [place], [place] - [address] [facility] [address]    Sig: APPLY TO INGUINAL AREAS AND UNDER THE BREASTS BID.    Class: E-Prescribe    Ondansetron HCl (ZOFRAN PO)            Sig: Take 4 mg by mouth every 8 hours as needed for nausea or vomiting    Class: Historical    Route: Oral    polyethylene glycol (MIRALAX/GLYCOLAX) Packet            Sig: Take 8.5 g by mouth every other day     Class: Historical    Route: Oral    pramipexole (MIRAPEX) 0.25 MG tablet  45 tablet 11 2/28/2018    [facility] - [place], [place] - [address] [facility] [address]    Sig: Take 1.5 tablets (0.375 mg) by mouth At Bedtime    Class: E-Prescribe    Notes to Pharmacy: Dosage change    Route: Oral    simvastatin (ZOCOR) 5 MG tablet  30 tablet  11/15/2013        Sig: Take 1 tablet (5 mg) by mouth every evening    Class: Transitional    Route: Oral    sitagliptin (JANUVIA) 50 MG tablet  30 tablet 11 11/14/2017    [facility] - [place], [place] - [address] [facility] [address]    Sig: Take 1 tablet (50 mg) by mouth daily    Class: E-Prescribe    Route: Oral    spironolactone (ALDACTONE) 25 MG tablet  30 tablet 11 8/22/2019        Sig: Take 0.5 tablets (12.5 mg) by mouth every other day    Class: Historical    Route: Oral    thin (NO BRAND SPECIFIED) lancets  100 each 3 4/25/2018        Sig: Use to test blood sugar 1 times daily or as directed. To accompany: Accu-Cheks safety pro plus    Class: Local Print    UNABLE TO FIND            Sig: MEDICATION NAME: Aspercreme with lidocaine 0.4% 2 x daily to feet    Class: Historical    cefpodoxime (VANTIN) 100 MG tablet  14 tablet 0 8/14/2020    [facility] - [place], [place] - [address] [facility] [address]    Sig: Take 1 tablet (100 mg) by mouth 2 times daily    Class: E-Prescribe    Route: Oral                                          Review of Systems   CONSTITUTIONAL:NEGATIVE for fever, chills, change in weight  MUSCULOSKELETAL: NEGATIVE for Hx fracture       Wt  Readings from Last 4 Encounters:   08/26/20 84.4 kg (186 lb)   02/05/20 84.8 kg (187 lb)   11/26/19 83.9 kg (185 lb)   12/21/18 81.1 kg (178 lb 14.4 oz)       Objective    /64 (Cuff Size: Adult Large)   Pulse 69   Temp 97.8  F (36.6  C) (Tympanic)   Wt 84.4 kg (186 lb)   LMP  (LMP Unknown)   SpO2 96%   BMI 36.33 kg/m    There is no height or weight on file to calculate BMI.  Physical Exam   GENERAL APPEARANCE: alert, no distress and over weight  CV: regular rates and rhythm and pitting B/L LE edema to 1+   MS: her legs in general are sensitive to palpation.            Some tenderness medial L knee.           ROM hips grossly ok.                 NEURO: expressive aphasia  DIABETIC FOOT EXAM: DP reduced bilaterally, PT reduced bilaterally and feet and toes are normally warm.        No signs of cellulitis.                   Assessment & Plan     Alberta was seen today for musculoskeletal problem.    Diagnoses and all orders for this visit:    Chronic pain of left knee  -     diclofenac (VOLTAREN) 1 % topical gel; Apply 2 g topically 3 times daily To left knee    Diabetic polyneuropathy associated with other specified diabetes mellitus (H)  -     gabapentin (NEURONTIN) 100 MG capsule; Take 1 capsule (100 mg) by mouth 2 times daily    Pain in both lower extremities         BMI:   Estimated body mass index is 36.33 kg/m  as calculated from the following:    Height as of 2/5/20: 1.524 m (5').    Weight as of this encounter: 84.4 kg (186 lb).   Weight management plan: Discussed healthy diet and exercise guidelines        Summary and implications:  We reviewed multiple issues.           We reviewed all of the issues on the diagnoses list.         No signs of critical limb ischemia, or cellulitis.    She has some chronic edema.                   She had DJD lumbar spine; perhaps knees espec L also.                     Will add voltaren gel L knee; incr shanice to bid.             Consider imaging L knee.  D/w pt,  son, and dtr.               Instructions for the NH.       25 minute visit,over half counseling and coordination of care         There are no Patient Instructions on file for this visit.    No follow-ups on file.    Leo Melgar MD  [facility]

## 2020-08-31 NOTE — PROGRESS NOTES
[facility] Care Coordination Contact    Per Jed at [facility], Shampoo caps and aspercreme w/ lidocaine was delivered.  Regla Benavidez [birth year] Shampoo caps (13) and aspercreme w/ lidocaine (3) 5/22/2020 DELIVERED       Ingrid Lezama  Care Management Specialist  [facility]  [phone]

## 2020-09-21 NOTE — PROGRESS NOTES
Per Jed at [facility], body wash was delivered.    Regla Benavidez 1/5/1926 Body wash (2) 8/3/2020 DELIVERED     Ingrid Lezama  Care Management Specialist  [facility]  [phone]

## 2020-09-29 ENCOUNTER — TELEPHONE (OUTPATIENT)
Dept: FAMILY MEDICINE | Facility: CLINIC | Age: 85
End: 2020-09-29

## 2020-09-29 ENCOUNTER — MEDICAL CORRESPONDENCE (OUTPATIENT)
Dept: HEALTH INFORMATION MANAGEMENT | Facility: CLINIC | Age: 85
End: 2020-09-29

## 2020-09-29 NOTE — TELEPHONE ENCOUNTER
Reason for Call:  Form, our goal is to have forms completed with 72 hours, however, some forms may require a visit or additional information.    Type of letter, form or note:  medical    Who is the form from?: Home care    Where did the form come from: form was faxed in    What clinic location was the form placed at?: [facility]    Where the form was placed: B Box/Folder    What number is listed as a contact on the form?: [phone] (P) [phone]       Additional comments: [facility]: Patient will be tested for COVID-19 on 10/1/2020.     Call taken on 9/29/2020 at 2:33 PM by Esther Rios

## 2020-10-01 DIAGNOSIS — L60.3 NAIL DYSTROPHY: ICD-10-CM

## 2020-10-01 DIAGNOSIS — E13.42 DIABETIC POLYNEUROPATHY ASSOCIATED WITH OTHER SPECIFIED DIABETES MELLITUS (H): Primary | ICD-10-CM

## 2020-10-27 ENCOUNTER — PATIENT OUTREACH (OUTPATIENT)
Dept: GERIATRIC MEDICINE | Facility: CLINIC | Age: 85
End: 2020-10-27

## 2020-10-27 NOTE — PROGRESS NOTES
[facility] Care Coordination Contact    Member due for 6 month telephone assessment. Member has expressive aphasia and requests calls go to children. Called daughter Obdulio and no answer. ML requesting a call back.  Autumn Jacobsen RN, PHN, [facility] Care Coordination  [phone]. cell-[phone]

## 2020-10-29 NOTE — PROGRESS NOTES
[facility] Care Coordination Contact      [facility] Six-Month Telephone Assessment    6 month telephone assessment completed on 10/29/20 with daughter Bismark.    ER visits: No  Hospitalizations: No  TCU stays: No  Significant health status changes: none.   Falls/Injuries: No  ADL/IADL changes: No  Changes in services: No    Caregiver Assessment follow up:  na    Goals: See POC in chart for goal progress documentation. Daughter reports that the staff coming to the AL for podiatry did not do a very good job last time and now they are not coming into the facility at all due to 50 cases of COVID in the AL/NH. Per daughter, only 2 cases were on the AL side and the rest on the NH and staff side. Daughter plans on taking member out for nail care when it is safe to do so.  Daughter also states that she is now having [facility] drop off the incontinent products at daughter's home and daughter is marking the boxes and then bringing them into the AL.      Will see member in 6 months for an annual health risk assessment.   Encouraged member to call CC with any questions or concerns in the meantime.   Autumn Jacobsen RN, PHN, CCM  [facility] Care Coordination  Ldgpcb-[phone]. Nzpw-[phone]

## 2020-11-02 ENCOUNTER — PATIENT OUTREACH (OUTPATIENT)
Dept: GERIATRIC MEDICINE | Facility: CLINIC | Age: 85
End: 2020-11-02

## 2020-11-02 NOTE — PROGRESS NOTES
"[facility] Care Coordination Contact     Received email from daughter Obdulio:    \"I have updated Alberta s address to mine so I m not having so much difficulty identifying if the order arrived.  [facility] uses UPS and a drop ship method- meaning the order is to go out Dec 2 but is likely to come out in pieces throughout the week.     Now that the waivers & insurance are all updated, doctors order(s) in place and I ve confirmed her order at my address hopefully it goes smoothly.     Alberta s next order:    Due out on DECEMBER 2nd, 2020  3 Aspercream w/Lidocane  2 Body bath no rinse bottles   4 pks batteries ( hearing aids)  and Electrode pads (E-stim/Tens)    Michelle will be sending you an email regarding the release from hold of tens unit pads. I ll need them when I m able to work with Alberta again on her physical, speech, occupational therapies and   ADL s ( activities of daily living).     Also, I asked if Alberta could get an extra Aspercream sooner as well as with her Dec order   due to the fact that it was never delivered with previous order & supervisor Michelle said she ll process it with insurance & get it out to my address.\"  Per Romelia at [facility],: \"just wanted to let you know we got a call from Alberta s daughter requesting more aspercreme; the timing is a bit off since her previous order didn t go out until the end of August due to when we received the auth from [employer]. Attached is the quote to send out 2 more until she gets her December shipment; we woulnd t need an auth from [employer] for this item. Is it ok to send out this one extra order? Also, I just wanted to let you know she also had the tens electrodes added back to the order; this will go out again in December. This is also $30 so we don t need an auth from [employer]. Please let me know if you have any questions.  Thanks, Romelia Green  [phone] [email or web address]     Gave ok for above to " Romelia.  Autumn Jacobsen RN, PHN, [facility] Coordination  Fdhsrr-[phone]. Tfae-[phone]

## 2020-11-05 ENCOUNTER — TRANSFERRED RECORDS (OUTPATIENT)
Dept: HEALTH INFORMATION MANAGEMENT | Facility: CLINIC | Age: 85
End: 2020-11-05

## 2020-11-05 LAB
CREAT SERPL-MCNC: 1.15 MG/DL (ref 0.55–1.02)
GFR SERPL CREATININE-BSD FRML MDRD: 41 ML/MIN/1.73M2
GLUCOSE SERPL-MCNC: 157 MG/DL (ref 70–100)
HBA1C MFR BLD: 8 % (ref 0–5.7)
POTASSIUM SERPL-SCNC: 3.8 MMOL/L (ref 3.5–5.1)

## 2020-11-10 ENCOUNTER — PATIENT OUTREACH (OUTPATIENT)
Dept: GERIATRIC MEDICINE | Facility: CLINIC | Age: 85
End: 2020-11-10

## 2020-11-10 NOTE — PROGRESS NOTES
[facility] Care Coordination Contact    Received a call from member's son Ed stating that they got a letter about Dr Melgar's clinic closing and want to follow him to [facility] when he moves to this clinic in December.   Ed also states Obdulio's email changed to [email or web address]. Ed has no cell phone and will not have computer access for a few weeks and wanted to be sure this writer can contact Obdulio as needed.  Autumn Jacobsen RN, PHN, [facility] Care Coordination  Dkoybq-[phone]. Irme-[phone]

## 2020-11-12 ENCOUNTER — TELEPHONE (OUTPATIENT)
Dept: FAMILY MEDICINE | Facility: CLINIC | Age: 85
End: 2020-11-12

## 2020-11-12 NOTE — TELEPHONE ENCOUNTER
Reason for Call:  Form, our goal is to have forms completed with 72 hours, however, some forms may require a visit or additional information.    Type of letter, form or note:  medical    Who is the form from?: Patient    Where did the form come from: form was faxed in    What clinic location was the [facility]?: [facility]  Providers name Leo Melgar MD  Where the form was placed: [facility]    What number is listed as a contact on the form?: [phone]  Phone Number [phone]       Additional comments: [facility] lab results please respond     Call taken on 11/12/2020 at 12:08 PM by Helene uY

## 2020-11-16 ENCOUNTER — OFFICE VISIT (OUTPATIENT)
Dept: FAMILY MEDICINE | Facility: CLINIC | Age: 85
End: 2020-11-16
Payer: COMMERCIAL

## 2020-11-16 VITALS
WEIGHT: 185 LBS | HEART RATE: 67 BPM | DIASTOLIC BLOOD PRESSURE: 68 MMHG | BODY MASS INDEX: 36.13 KG/M2 | SYSTOLIC BLOOD PRESSURE: 112 MMHG | OXYGEN SATURATION: 95 %

## 2020-11-16 DIAGNOSIS — E11.22 TYPE 2 DIABETES MELLITUS WITH STAGE 3B CHRONIC KIDNEY DISEASE, WITHOUT LONG-TERM CURRENT USE OF INSULIN (H): Primary | ICD-10-CM

## 2020-11-16 DIAGNOSIS — N39.0 RECURRENT UTI: ICD-10-CM

## 2020-11-16 DIAGNOSIS — N18.32 TYPE 2 DIABETES MELLITUS WITH STAGE 3B CHRONIC KIDNEY DISEASE, WITHOUT LONG-TERM CURRENT USE OF INSULIN (H): Primary | ICD-10-CM

## 2020-11-16 DIAGNOSIS — R60.0 BILATERAL LEG EDEMA: ICD-10-CM

## 2020-11-16 DIAGNOSIS — G25.81 RESTLESS LEGS SYNDROME: ICD-10-CM

## 2020-11-16 PROCEDURE — 99213 OFFICE O/P EST LOW 20 MIN: CPT | Performed by: INTERNAL MEDICINE

## 2020-11-16 NOTE — PROGRESS NOTES
Subjective     Regla Benavidez is a 89 year old female who presents to clinic today for the following health issues:    HPI         Concern - swelling  Onset: ongoing  Description: swelling in left foot but check both.  Discuss Gabapentin.  Wax in ears.  Possible UTI.  Unable to get urine sample at this time  Intensity: moderate  Progression of Symptoms:  same  Accompanying Signs & Symptoms: none  Previous history of similar problem: none  Precipitating factors:        Worsened by: none  Alleviating factors:        Improved by: none  Therapies tried and outcome:  none             Here from [facility] with her dtr and a son.                     They want her ears, legs,feet, and urine checked.                            Labs from last wk were stable.            Will be scanned.                                                                                                                                                                                                                                Current Outpatient Medications   Medication Sig Dispense Refill     acetaminophen (TYLENOL) 325 MG tablet Take 2 tablets in AM, 2 tabs at bedtime and also 2 tabs  tablet 2     aspirin 325 MG tablet Take 1 tablet (325 mg) by mouth daily 120 tablet      famotidine (PEPCID) 20 MG tablet Take 1 tablet (20 mg) by mouth 2 times daily 180 tablet 4     furosemide (LASIX) 20 MG tablet Take 1 tablet (20 mg) by mouth 2 times daily As of 1/27/18 60 tablet 12     gabapentin (NEURONTIN) 100 MG capsule Take 1 capsule (100 mg) by mouth 2 times daily 60 capsule 11     glipiZIDE (GLUCOTROL) 10 MG tablet Take 1 tablet (10 mg) by mouth daily 60 tablet 11     Lactobacillus (ACIDOPHILUS) 0.5 MG TABS Take 1 tablet (0.5 mg) by mouth 3 times daily 90 tablet 11     Lactobacillus (LACTINEX PO) Take 1 tablet by mouth 3 times daily 0900; 1300; 1645       lidocaine (LMX4) 4 % CREA cream Apply to both feet  g 11     metFORMIN  (GLUCOPHAGE) 500 MG tablet Take 500 mg by mouth daily  30 tablet 12     nystatin (MYCOSTATIN) 983808 UNIT/GM external cream APPLY TO INGUINAL AREAS AND UNDER THE BREASTS BID. 120 g 8     polyethylene glycol (MIRALAX/GLYCOLAX) Packet Take 8.5 g by mouth every other day        pramipexole (MIRAPEX) 0.25 MG tablet Take 1.5 tablets (0.375 mg) by mouth At Bedtime 45 tablet 11     simvastatin (ZOCOR) 5 MG tablet Take 1 tablet (5 mg) by mouth every evening 30 tablet      sitagliptin (JANUVIA) 50 MG tablet Take 1 tablet (50 mg) by mouth daily 30 tablet 11     spironolactone (ALDACTONE) 25 MG tablet Take 0.5 tablets (12.5 mg) by mouth every other day 30 tablet 11     UNABLE TO FIND MEDICATION NAME: Aspercreme with lidocaine 0.4% 2 x daily to feet       Acidophilus Lactobacillus CAPS Take 1 capsule by mouth 2 times daily 60 capsule 11     blood glucose calibration (NO BRAND SPECIFIED) solution Use to calibrate blood glucose monitor as needed as directed. To accompany: Accu-Chek Safe-T pro plus 1 Bottle 3     blood glucose monitoring (NO BRAND SPECIFIED) test strip Use to test blood sugar 1 times daily or as directed. To accompany: Accu-Chek safety pro plus 100 strip 6     blood glucose monitoring (ONE TOUCH ULTRA 2) meter device kit Use to test blood sugar 1 times daily or as directed. 1 kit 1     cefpodoxime (VANTIN) 100 MG tablet Take 1 tablet (100 mg) by mouth 2 times daily (Patient not taking: Reported on 8/26/2020) 14 tablet 0     Cholecalciferol (VITAMIN D3 PO) Take 2,000 Units by mouth daily       diclofenac (VOLTAREN) 1 % topical gel Apply 2 g topically 3 times daily To left knee 100 g 11     Ondansetron HCl (ZOFRAN PO) Take 4 mg by mouth every 8 hours as needed for nausea or vomiting       thin (NO BRAND SPECIFIED) lancets Use to test blood sugar 1 times daily or as directed. 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  Physical Exam   GENERAL APPEARANCE: alert and no distress  HENT: trace cerumen  CV: regular rates and rhythm and pitting B/L LE edema to trace to 1+; stable  NEURO: gait abnormal ;uses a walker and expressive aphasia  DIABETIC FOOT EXAM: no trophic changes or ulcerative lesions, DP reduced bilaterally and PT reduced bilaterally            Assessment & Plan     Encounter Diagnoses   Name Primary?     Type 2 diabetes mellitus with stage 3b chronic kidney disease, without long-term current use of insulin (H) Yes     Recurrent UTI      Restless legs syndrome      Bilateral leg edema              Summary and implications:   stable problems.             Stable labs.   No med changes today.   Patient Instructions   Keep taking the same medications.        Return in about 6 months (around 5/16/2021) for follow up of several issues.    Leo Melgar MD  [facility]"

## 2020-11-18 ENCOUNTER — TELEPHONE (OUTPATIENT)
Dept: FAMILY MEDICINE | Facility: CLINIC | Age: 85
End: 2020-11-18

## 2020-11-18 NOTE — TELEPHONE ENCOUNTER
Prior Authorization Approval    Authorization Effective Date: 10/19/2020  Authorization Expiration Date: 11/18/2021  Medication: nystatin (MYCOSTATIN) [id number] UNIT/GM external cream- APPROVED   Approved Dose/Quantity:   Reference #:     Insurance Company: [employer] - Phone [phone] Fax [phone]  Expected CoPay:       CoPay Card Available:      Foundation Assistance Needed:    Which Pharmacy is filling the prescription (Not needed for infusion/clinic administered): [facility] - [place], [place] - [address]  Pharmacy Notified: Yes  Patient Notified:  **Instructed pharmacy to notify patient when script is ready to /ship.**

## 2020-11-18 NOTE — TELEPHONE ENCOUNTER
Central Prior Authorization Team  Phone: [phone]    PA Initiation    Medication: nystatin (MYCOSTATIN) 370867 UNIT/GM external cream  Insurance Company: [employer] - Phone [phone] Fax [phone]  Pharmacy Filling the Rx: [facility] - [place], [place] - [address]  Filling Pharmacy Phone: [phone]  Filling Pharmacy Fax:    Start Date: 11/18/2020

## 2020-12-15 ENCOUNTER — NURSE TRIAGE (OUTPATIENT)
Dept: NURSING | Facility: CLINIC | Age: 85
End: 2020-12-15

## 2020-12-15 DIAGNOSIS — L30.4 INTERTRIGO: ICD-10-CM

## 2020-12-15 NOTE — TELEPHONE ENCOUNTER
Called to request for a prescription for nystatin cream and order for the nursing home.  Caller also requested for patient's primary provider's nurse to call her back.  This triage nurse will route this encounter to the provider  Reny Hutton RN    Additional Information    Negative: Drug overdose and triager unable to answer question    Negative: Caller requesting information unrelated to medicine    Negative: Caller requesting a prescription for Strep throat and has a positive culture result    Negative: Rash while taking a medication or within 3 days of stopping it    Negative: Immunization reaction suspected    Negative: Asthma and having symptoms of asthma (cough, wheezing, etc.)    Negative: Breastfeeding questions about mother's medicines and diet    Negative: MORE THAN A DOUBLE DOSE of a prescription or over-the-counter (OTC) drug    Negative: DOUBLE DOSE (an extra dose or lesser amount) of over-the-counter (OTC) drug and any symptoms (e.g., dizziness, nausea, pain, sleepiness)    Negative: DOUBLE DOSE (an extra dose or lesser amount) of prescription drug and any symptoms (e.g., dizziness, nausea, pain, sleepiness)    Negative: Took another person's prescription drug    Negative: DOUBLE DOSE (an extra dose or lesser amount) of prescription drug and NO symptoms (Exception: a double dose of antibiotics)    Negative: Diabetes drug error or overdose (e.g., took wrong type of insulin or took extra dose)    Negative: Caller has medication question about med not prescribed by PCP and triager unable to answer question (e.g., compatibility with other med, storage)    Negative: Caller has NON-URGENT medication question about med that PCP prescribed and triager unable to answer question    Caller requesting a NON-URGENT new prescription or refill and triager unable to refill per department policy     New order and prescription for nystatin cream    Protocols used: MEDICATION QUESTION CALL-A-[place]

## 2020-12-18 RX ORDER — NYSTATIN 100000 U/G
CREAM TOPICAL
Qty: 120 G | Refills: 8 | Status: SHIPPED | OUTPATIENT
Start: 2020-12-18 | End: 2022-01-01

## 2020-12-18 NOTE — TELEPHONE ENCOUNTER
Spoke with daughter Quyen.   Need follow-up on request for refill of Nystatin cream.     Please forward to covering provider as needed.    Thank you,   Lindy Gimenez, Triage Nurse Advisor Supervisor 12/18/20

## 2020-12-18 NOTE — TELEPHONE ENCOUNTER
Medication request; forwarding to PCP triage team.    Tj Landa, RN  Clinic Care Coordinator  [facility]  Ph: [phone]

## 2020-12-22 ENCOUNTER — TELEPHONE (OUTPATIENT)
Dept: FAMILY MEDICINE | Facility: CLINIC | Age: 85
End: 2020-12-22

## 2020-12-22 NOTE — TELEPHONE ENCOUNTER
Faxed electronically signed nystatin order to assisted living. Informed daughter Sarah    Cinda URIARTE, RN, PHN

## 2020-12-22 NOTE — TELEPHONE ENCOUNTER
Reason for Call:  Other prescription    Detailed comments: Daughter is calling requesting for order of Nystatin to be sent to Betty Reid at [facility] [phone] is fax number. Thank you. Would like order and Rx faxed both over today please.     Phone Number Patient can be reached at: Other phone number:  [phone]    Best Time: before 10 am or after 4 pm    Can we leave a detailed message on this number? YES    Call taken on 12/22/2020 at 8:07 AM by Sue Austin

## 2020-12-22 NOTE — TELEPHONE ENCOUNTER
Reason for Call:  Other call back    Detailed comments: Pt's Daughter returning phone call and would like a phone call back regarding medication.     Phone Number Patient can be reached at: [phone]    Best Time: anytime    Can we leave a detailed message on this number? YES    Call taken on 12/22/2020 at 3:21 PM by Asad Butler

## 2020-12-24 ENCOUNTER — NURSE TRIAGE (OUTPATIENT)
Dept: NURSING | Facility: CLINIC | Age: 85
End: 2020-12-24

## 2020-12-24 NOTE — TELEPHONE ENCOUNTER
Patient has a wound on the right shin that is 0.5 x 0.6    This was noticed today.    This was noticed by Home care nurse    It looks like a cut - it is open and looks OK.    The nurses cleaned the cut and applied bacitracin.    The patient is diabetic so they wanted you aware.    Betty Reid Nurse Administer at [facility]    [phone] is her phone number    [phone] is her fax number    The clinic needs to call the nursing home on Monday to verify that they got this message.    Jeanna Rayo RN  [place] Nurse Advisor  2:06 PM  12/24/2020    Additional Information    Negative: [1] Major bleeding (e.g., actively dripping or spurting) AND [2] can't be stopped    Negative: Amputation    Negative: Shock suspected (e.g., cold/pale/clammy skin, too weak to stand, low BP, rapid pulse)    Negative: [1] Knife wound (or other possibly deep cut) AND [2] to chest, abdomen, back, neck, or head    Negative: [1] Cutter (self-mutilator) AND [2] suicidal or out-of-control    Negative: Sounds like a life-threatening emergency to the triager    Negative: [1] Bleeding AND [2] won't stop after 10 minutes of direct pressure (using correct technique)    Negative: Skin is split open or gaping (or length > 1/2 inch or 12 mm on the skin, 1/4 inch or 6 mm on the face)    Negative: [1] Deep cut AND [2] can see bone or tendons    Negative: Sensation of something in the wound (i.e., retained object in wound)    Negative: [1] Dirt in the wound AND [2] not removed with 15 minutes of scrubbing    Negative: Wound causes numbness (i.e., loss of sensation)    Negative: Wound causes weakness (i.e., decreased ability to move hand, finger, toe)    Negative: [1] SEVERE pain AND [2] not improved 2 hours after pain medicine    Negative: [1] Looks infected AND [2] large red area (>2 inches or 5 cm) or streak    Negative: [1] Fever AND [2] bright red area or streak    Negative: Suspicious history for the injury    Negative: [1] Looks  infected (spreading redness, pus) AND [2] no fever    Negative: No prior tetanus shots (or is not fully vaccinated)    Negative: [1] Last tetanus shot > 5 years ago AND [2] DIRTY cut    Negative: [1] Last tetanus shot > 10 years ago AND [2] CLEAN cut    Negative: [1] After 14 days AND [2] wound isn't healed    Negative: [1] Has diabetes (diabetes mellitus) AND [2] minor cut or scratch on foot    Negative: [1] Cutter (self-mutilator) AND [2] stable (i.e., not suicidal, not out of control)    Protocols used: CUTS AND [id number]

## 2021-01-15 ENCOUNTER — HEALTH MAINTENANCE LETTER (OUTPATIENT)
Age: 86
End: 2021-01-15

## 2021-01-20 ENCOUNTER — TELEPHONE (OUTPATIENT)
Dept: INTERNAL MEDICINE | Facility: CLINIC | Age: 86
End: 2021-01-20

## 2021-01-20 DIAGNOSIS — N39.0 RECURRENT UTI: ICD-10-CM

## 2021-01-20 LAB
ALBUMIN UR-MCNC: NEGATIVE MG/DL
APPEARANCE UR: ABNORMAL
BACTERIA #/AREA URNS HPF: ABNORMAL /HPF
BILIRUB UR QL STRIP: NEGATIVE
COLOR UR AUTO: YELLOW
GLUCOSE UR STRIP-MCNC: NEGATIVE MG/DL
HGB UR QL STRIP: NEGATIVE
KETONES UR STRIP-MCNC: NEGATIVE MG/DL
LEUKOCYTE ESTERASE UR QL STRIP: ABNORMAL
NITRATE UR QL: NEGATIVE
NON-SQ EPI CELLS #/AREA URNS LPF: ABNORMAL /LPF
PH UR STRIP: 5 PH (ref 5–7)
RBC #/AREA URNS AUTO: ABNORMAL /HPF
SOURCE: ABNORMAL
SP GR UR STRIP: 1.02 (ref 1–1.03)
UROBILINOGEN UR STRIP-ACNC: 0.2 EU/DL (ref 0.2–1)
WBC #/AREA URNS AUTO: ABNORMAL /HPF

## 2021-01-20 PROCEDURE — 87186 SC STD MICRODIL/AGAR DIL: CPT | Performed by: INTERNAL MEDICINE

## 2021-01-20 PROCEDURE — 81001 URINALYSIS AUTO W/SCOPE: CPT | Performed by: INTERNAL MEDICINE

## 2021-01-20 PROCEDURE — 87088 URINE BACTERIA CULTURE: CPT | Performed by: INTERNAL MEDICINE

## 2021-01-20 PROCEDURE — 87086 URINE CULTURE/COLONY COUNT: CPT | Performed by: INTERNAL MEDICINE

## 2021-01-20 NOTE — TELEPHONE ENCOUNTER
Reason for Call:  Other prescription and results    Detailed comments: When the lab results come in please call daughter at [phone] to discuss and inform of any medication that is being ordered.     If medication is ordered please send to pharmacy on file, send doctor orders to Betty Reid the nurse her fax is - [phone]    Phone Number Patient can be reached at: Other phone number:  [phone]    Best Time: any    Can we leave a detailed message on this number? YES    Call taken on 1/20/2021 at 11:12 AM by Julien Desai

## 2021-01-21 LAB
BACTERIA SPEC CULT: ABNORMAL
Lab: ABNORMAL
SPECIMEN SOURCE: ABNORMAL

## 2021-01-22 RX ORDER — CEFPODOXIME PROXETIL 100 MG/1
100 TABLET, FILM COATED ORAL 2 TIMES DAILY
Qty: 10 TABLET | Refills: 0 | Status: SHIPPED | OUTPATIENT
Start: 2021-01-22 | End: 2021-01-27

## 2021-01-22 NOTE — PROGRESS NOTES
Reason for Call:  Other prescription and results     Detailed comments: When the lab results come in please call daughter at [phone] to discuss and inform of any medication that is being ordered.      If medication is ordered please send to pharmacy on file, send doctor orders to Betty Reid the nurse her fax is - [phone]     Phone Number Patient can be reached at: Other phone number:  [phone]                                                                                                                                                                                                                                                                                 Rx sent.

## 2021-02-04 ENCOUNTER — TRANSFERRED RECORDS (OUTPATIENT)
Dept: HEALTH INFORMATION MANAGEMENT | Facility: CLINIC | Age: 86
End: 2021-02-04

## 2021-02-04 LAB
CREAT SERPL-MCNC: 1.03 MG/DL (ref 0.55–1.02)
GFR SERPL CREATININE-BSD FRML MDRD: 46 ML/MIN/1.73M2
GLUCOSE SERPL-MCNC: 121 MG/DL (ref 70–100)
HBA1C MFR BLD: 7.8 % (ref 0–5.7)
POTASSIUM SERPL-SCNC: 3.9 MMOL/L (ref 3.5–5.1)

## 2021-02-07 ENCOUNTER — MYC MEDICAL ADVICE (OUTPATIENT)
Dept: INTERNAL MEDICINE | Facility: CLINIC | Age: 86
End: 2021-02-07

## 2021-02-08 NOTE — TELEPHONE ENCOUNTER
MD please write a discontinuation order for this supplement so it can be faxed to the assisted living .Delma Mirza RN

## 2021-02-08 NOTE — TELEPHONE ENCOUNTER
Please cancel the requested medications as described below:      Dear Dr. Melgar, Please issue a medication order to discontinue Alberta's use of Lactobacillus (Lactinex) and fax it to Betty Reid RN at [facility] [phone]. Her insurance will no longer cover this product and we switched some time ago to supplementing her diet with two servings of Activia yogurt. Sincerely David Russo and POBOLIVAR.     Leo Melgar MD

## 2021-02-24 ENCOUNTER — MYC MEDICAL ADVICE (OUTPATIENT)
Dept: INTERNAL MEDICINE | Facility: CLINIC | Age: 86
End: 2021-02-24

## 2021-02-26 ENCOUNTER — TELEPHONE (OUTPATIENT)
Dept: INTERNAL MEDICINE | Facility: CLINIC | Age: 86
End: 2021-02-26

## 2021-02-26 NOTE — TELEPHONE ENCOUNTER
"Darleen Peña Betty called     Ph: [phone]    Pt has weekly weights and vitals     Reviewed \"way back\" to check why they are doing this and cannot determine why     Weight says report if 3 lb over - \"three pounds over what?\"   Do they need to continue doing? Why are doing?     Normally if they have weekly vitals there are certain parameters to follow - no information about why and parameters     Also, she is faxing orders to sign - LUCIE LINN RN    "

## 2021-02-26 NOTE — TELEPHONE ENCOUNTER
She has a long history of fluid retention and is taking diuretics.      They can cut back VS and weights to monthly.                         You can print this and send it.                            Signed Leo Melgar MD

## 2021-03-24 ENCOUNTER — PATIENT OUTREACH (OUTPATIENT)
Dept: GERIATRIC MEDICINE | Facility: CLINIC | Age: 86
End: 2021-03-24

## 2021-03-24 SDOH — HEALTH STABILITY: PHYSICAL HEALTH: ON AVERAGE, HOW MANY DAYS PER WEEK DO YOU ENGAGE IN MODERATE TO STRENUOUS EXERCISE (LIKE A BRISK WALK)?: 0 DAYS

## 2021-03-24 SDOH — ECONOMIC STABILITY: FOOD INSECURITY: WITHIN THE PAST 12 MONTHS, THE FOOD YOU BOUGHT JUST DIDN'T LAST AND YOU DIDN'T HAVE MONEY TO GET MORE.: NEVER TRUE

## 2021-03-24 SDOH — ECONOMIC STABILITY: TRANSPORTATION INSECURITY
IN THE PAST 12 MONTHS, HAS LACK OF TRANSPORTATION KEPT YOU FROM MEETINGS, WORK, OR FROM GETTING THINGS NEEDED FOR DAILY LIVING?: NO

## 2021-03-24 SDOH — SOCIAL STABILITY: SOCIAL NETWORK: HOW OFTEN DO YOU ATTENT MEETINGS OF THE CLUB OR ORGANIZATION YOU BELONG TO?: NEVER

## 2021-03-24 SDOH — SOCIAL STABILITY: SOCIAL NETWORK
IN A TYPICAL WEEK, HOW MANY TIMES DO YOU TALK ON THE PHONE WITH FAMILY, FRIENDS, OR NEIGHBORS?: MORE THAN THREE TIMES A WEEK

## 2021-03-24 SDOH — SOCIAL STABILITY: SOCIAL NETWORK
DO YOU BELONG TO ANY CLUBS OR ORGANIZATIONS SUCH AS CHURCH GROUPS UNIONS, FRATERNAL OR ATHLETIC GROUPS, OR SCHOOL GROUPS?: NO

## 2021-03-24 SDOH — SOCIAL STABILITY: SOCIAL NETWORK: ARE YOU MARRIED, WIDOWED, DIVORCED, SEPARATED, NEVER MARRIED, OR LIVING WITH A PARTNER?: WIDOWED

## 2021-03-24 SDOH — ECONOMIC STABILITY: TRANSPORTATION INSECURITY
IN THE PAST 12 MONTHS, HAS THE LACK OF TRANSPORTATION KEPT YOU FROM MEDICAL APPOINTMENTS OR FROM GETTING MEDICATIONS?: NO

## 2021-03-24 SDOH — SOCIAL STABILITY: SOCIAL NETWORK: HOW OFTEN DO YOU GET TOGETHER WITH FRIENDS OR RELATIVES?: THREE TIMES A WEEK

## 2021-03-24 SDOH — ECONOMIC STABILITY: FOOD INSECURITY: WITHIN THE PAST 12 MONTHS, YOU WORRIED THAT YOUR FOOD WOULD RUN OUT BEFORE YOU GOT MONEY TO BUY MORE.: NEVER TRUE

## 2021-03-24 NOTE — PROGRESS NOTES
[facility] Care Coordination Contact    [facility] Home Visit Assessment     Home visit for Health Risk Assessment with Regla Benavidez completed on March 24, 2021. Visit was done via telephone due to COVID 19 and restrictions on home visits at this time.    Type of residence:: Assisted living  Current living arrangement:: I live in assisted living     Assessment completed with:: Patient, Children(Daughter Bismark). Son Ed was out of town    Current Care Plan  Member currently receiving the following home care services:     Member currently receiving the following community resources: DME    Medication Review  Medication reconciliation completed in Epic: No-have not gotten a list from AL nurse.  Medication set-up & administration: RN set up weekly.  Assisting Living staff administers medications.  Medication Risk Assessment Medication (1 or more, place referral to MTM): N/A: No risk factors identified  MTM Referral Placed: No: declined    Mental/Behavioral Health   Depression Screening:   PHQ-2 Total Score (Adult) - Positive if 3 or more points; Administer PHQ-9 if positive: 0  Mental health DX:: No        Falls Assessment:   Fallen 2 or more times in the past year?: No   Any fall with injury in the past year?: No    ADL/IADL Dependencies:   Dependent ADLs:: Ambulation-walker, Bathing, Dressing, Grooming, Incontinence, Transfers, Toileting  Dependent IADLs:: Cleaning, Cooking, Laundry, Shopping, Meal Preparation, Medication Management, Money Management, Transportation, Incontinence    [facility] Health Plan sponsored benefits: Shared information re: Silver Sneakers/gym memberships, ASA, Calcium +D.    PCA Assessment completed at visit: Not Applicable     Elderly Waiver Eligibility: Yes-will continue on EW    Care Plan & Recommendations: Continue AL services and DME.    See LTCC for detailed assessment information.    Follow-Up Plan: Member informed of future contact, plan to f/u with member with a 6 month  telephone assessment.  Contact information shared with member and family, encouraged member to call with any questions or concerns at any time.    [facility] continuum providers: Please refer to Health Care Home on the [facility] Problem List to view this patient's [facility] Care Plan Summary.  Autumn Jacobsen RN, PHN, [facility] Care Coordination  Kdafzg-[phone]. cell-[phone]

## 2021-04-09 ENCOUNTER — PATIENT OUTREACH (OUTPATIENT)
Dept: GERIATRIC MEDICINE | Facility: CLINIC | Age: 86
End: 2021-04-09

## 2021-04-09 NOTE — PROGRESS NOTES
[facility] Care Coordination Contact    Received after visit chart from care coordinator.  Completed following tasks: Mailed copy of care plan to client and Submitted referrals/auths for supplies from [facility] and [facility].  , Mailed copy of CL tool to member, faxed copy to AL facility, and submitted authorization to health plan.   and Provider Signature - Full Care Plan:  Member indicates that they would like their POC shared with the following EW providers:  Darleen Deluna.  Letter and full POC faxed to providers for signature.     Member was mailed a copy of the POC signature sheet to sign and return mail with a SASE.  This assessment was completed telephonically due to Covid-19.    Ingrid Lezama  Care Management Specialist  [facility]  [phone]

## 2021-04-09 NOTE — PROGRESS NOTES
[facility] Care Coordination Contact    MMIS completed and entered. Member remains a rate cell B. RS tool completed in [place] [employer] and was sent to AL nurse Rosalee ADAN to review and she approved the services as is. POC completed. Stopped no rinse shower caps per daughter's request as daughter is able to go in and help bathe member again. Increased aspercreme per member request to 4 tubes per month as member runs out each month. Verified DME changes with [facility]. Chart to CMS to process and send POC to daughter this time as son was out of town for the visit.   Autumn Jacobsen RN, PHN, [facility] Care Coordination  Aanruu-[phone]. [phone]

## 2021-04-09 NOTE — LETTER
April 9, 2021    Important [employer] Information    LYNDA CONWAY  C/O MONALISA CONWAY  [address] [place] [address]  Your Care Plan  Dear Alberta,  When we spoke recently, I promised to send you a Care Plan. The plan enclosed is a summary of our discussion. It includes the steps we agreed would help you meet your health goals. In addition, I can help you with:  Fxfjeql-A-PtxsVC  This program is available to members who need a ride to medical and dental visits. To schedule a ride, call [phone] or [phone] (toll free). TTY/TTD: 711. You can call 8 a.m. to 8 p.m. Seven days a week. Access to a representative may be limited at times.    Sharewire   The Sharewire program empowers you to improve your health through education and exercise. To learn more, visit [employer], or call "[employer]er Service at [phone] (toll free) (TTY:711) from 7 a.m. - 7 p.m. Central Time, Monday-Friday.  Health Care Directive   This form helps you outline your health care wishes. You can request a form from me and I will answer any questions you have before you discuss it with your doctor.   Annual Physical  Take a key step on your path to good health and set up an annual physical at your clinic.  Questions?  Call me at [phone] Monday-Friday between 8am and 5pm.  TTY/TTD: 711. As we discussed, I plan to be in touch with you again in 6 months to follow up via phone.  Sincerely,    Autumn Jacobsen RN    E-mail: [email or web address]  Phone: [phone]      [employer]          cc: member records                    Civil Rights Notice  Discrimination is against the law. [employer] does not discriminate on the basis of any of the following:    Race    Color    National Origin    Creed    Holiness    Age    Public Assistance Status    Receipt of Health Care Services    Disability (including physical or mental impairment)    Sex (including sex stereotypes and gender  identity)    Marital Status    Political Beliefs    Medical Condition    Genetic Information    Sexual Orientation    Claims Experience    Medical History    Health Status    Auxiliary Aids and Services:  [employer] provides auxiliary aids and services, like qualified interpreters or information in accessible formats, free of charge and in a timely manner, to ensure an equal opportunity to participate in our health care programs. Contact [employer] Customer Service at [employer]/contact medicaid or call [phone] (toll free); TTY:717 or at [employer]/contactLIFE SPAN labscaid.    Language Assistance Services:  [employer] provides translated documents and spoken language interpreting, free of charge and in a timely manner, when language assistance services are necessary to ensure limited English speakers have meaningful access to our information and services. Contact [employer] at [phone] (toll free); TTY: 170 or [email or web address].     Civil Rights Complaints  You have the right to file a discrimination complaint if you believe you were treated in a discriminatory way by [employer]. You may contact any of the following four agencies directly to file a discrimination complaint.    U.S. Department of Health and Human Services  Office for Civil Rights (OCR)  You have the right to file a complaint with the OCR, a federal agency, if you believe you have been discriminated against because of any of the following:    Race    Disability    Color    Sex    National Origin    Age      Contact the OCR directly to file a complaint:         Director         U.S. Department of Health and Human Services  Office for Civil Rights         [address] [place] [address], [place] [address]         [phone] (Voice)         [phone] (TDD)         Complaint Portal - https://ocrportal.hhs.gov/ocr/portal/lobby.jsf     [place] Department of Human Rights ([facility])  In [place], you have the right to  file a complaint with the MDHR if you believe you have been discriminated against because of any of the following:      Race    Color    National Origin    Sikh    Creed    Sex    Sexual Orientation    Marital Status    Public Assistance Status    Disability    Contact the MDHR directly to file a complaint:         [place] Department of Human Rights         [facility], [address] [place] [address]         [phone] (voice)          [phone] (toll free)         711 or [phone] ([place] Relay)         [phone] (Fax)         [email or web address]. (Email)     [place] Department of Human Services (DHS)  You have the right to file a complaint with [facility] if you believe you have been discriminated against in our health care programs because of any of the following:    Race    Color    National Origin    Creed    Sikh    Age    Public Assistance Status    Receipt of Health Care Services    Disability (including physical or mental impairment)    Sex (including sex stereotypes and gender identity)    Marital Status    Political Beliefs    Medical Condition    Genetic Information    Sexual Orientation    Claims Experience    Medical History    Health Status    Complaints must be in writing and filed within 180 days of the date you discovered the alleged discrimination. The complaint must contain your name and address and describe the discrimination you are complaining about. After we get your complaint, we will review it and notify you in writing about whether we have authority to investigate. If we do, we will investigate the complaint.      [facility] will notify you in writing of the investigation s outcome. You have a right to appeal the outcome if you disagree with the decision. To appeal, you must send a written request to have [facility] review the investigation outcome period. Be brief and state why you disagree with the decision. Include additional information you think is important.       If you file a complaint in this way, the people who work for the agency named in the complaint cannot retaliate against you. This means they cannot punish you in any way for filing a complaint. Filing a complaint in this way does not stop you from seeking out other legal or administration actions.     Contact DHS directly to file a discrimination complaint:        Civil Rights Coordinator        [facility] Department of Human Services        Equal Opportunity and Access Division        [address] [address]        [place] [address] [phone] (voice) or use your preferred relay service     [employer] Complaint Notice   You have the right to file a complaint with [employer] if you believe you have been discriminated against because of any of the following:       Medical condition    Health status    Receipt of health care services    Claims experience    Medical history    Genetic information    Disability (including mental or physical impairment)    Marital status    Age    Sex (including sex stereotypes and gender identity)    Sexual orientation    National origin    Race    Color    Scientology    Creed    Public assistance status    Political beliefs    You can file a complaint and ask for help in filing a complaint in person or by mail, phone, fax, or email at:     [employer] Civil Rights Coordinator  [facility] [email or web address] [facility]  [address], [place] [address] [phone] (voice and fax) or TTY:223  Email: [email or web address]    American Indians can continue to use [place] and  Health Services (IHS) clinics. We will not require prior approval or impose any conditions for you to get services at these clinics. For elders age 65 years and older this includes Elderly Waiver (EW) services accessed through the [place]. If a doctor or other provider in a [place] or IHS clinic refers you to a provider in our network, we will not require you to see your primary care provider prior to the  referral.    For accessible formats of this publication or assistance with equal or access to our services, visit [email or web address], or call [phone] (toll free) or use your preferred relay service.

## 2021-04-28 ENCOUNTER — HOSPITAL ENCOUNTER (OUTPATIENT)
Facility: CLINIC | Age: 86
Setting detail: OBSERVATION
Discharge: HOME | End: 2021-04-29
Attending: EMERGENCY MEDICINE | Admitting: HOSPITALIST
Payer: COMMERCIAL

## 2021-04-28 ENCOUNTER — TELEPHONE (OUTPATIENT)
Dept: INTERNAL MEDICINE | Facility: CLINIC | Age: 86
End: 2021-04-28

## 2021-04-28 ENCOUNTER — APPOINTMENT (OUTPATIENT)
Dept: GENERAL RADIOLOGY | Facility: CLINIC | Age: 86
End: 2021-04-28
Attending: EMERGENCY MEDICINE
Payer: COMMERCIAL

## 2021-04-28 DIAGNOSIS — N39.0 URINARY TRACT INFECTION WITHOUT HEMATURIA, SITE UNSPECIFIED: ICD-10-CM
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
## 2021-04-28 DIAGNOSIS — M25.551 HIP PAIN, RIGHT: Primary | ICD-10-CM

## 2021-04-28 DIAGNOSIS — R53.81 PHYSICAL DECONDITIONING: ICD-10-CM

## 2021-04-28 LAB
ALBUMIN UR-MCNC: NEGATIVE MG/DL
ANION GAP SERPL CALCULATED.3IONS-SCNC: 2 MMOL/L (ref 3–14)
APPEARANCE UR: CLEAR
APTT PPP: 25 SEC (ref 22–37)
BACTERIA #/AREA URNS HPF: ABNORMAL /HPF
BASOPHILS # BLD AUTO: 0 10E9/L (ref 0–0.2)
BASOPHILS NFR BLD AUTO: 0.4 %
BILIRUB UR QL STRIP: NEGATIVE
BUN SERPL-MCNC: 39 MG/DL (ref 7–30)
CALCIUM SERPL-MCNC: 9.2 MG/DL (ref 8.5–10.1)
CHLORIDE SERPL-SCNC: 107 MMOL/L (ref 94–109)
CO2 SERPL-SCNC: 30 MMOL/L (ref 20–32)
COLOR UR AUTO: ABNORMAL
CREAT SERPL-MCNC: 1.21 MG/DL (ref 0.52–1.04)
DIFFERENTIAL METHOD BLD: NORMAL
EOSINOPHIL # BLD AUTO: 0.4 10E9/L (ref 0–0.7)
EOSINOPHIL NFR BLD AUTO: 3.9 %
ERYTHROCYTE [DISTWIDTH] IN BLOOD BY AUTOMATED COUNT: 13.3 % (ref 10–15)
FLUAV RNA RESP QL NAA+PROBE: NEGATIVE
FLUBV RNA RESP QL NAA+PROBE: NEGATIVE
GFR SERPL CREATININE-BSD FRML MDRD: 38 ML/MIN/{1.73_M2}
GLUCOSE BLDC GLUCOMTR-MCNC: 258 MG/DL (ref 70–99)
GLUCOSE SERPL-MCNC: 179 MG/DL (ref 70–99)
GLUCOSE UR STRIP-MCNC: NEGATIVE MG/DL
HCT VFR BLD AUTO: 40.2 % (ref 35–47)
HGB BLD-MCNC: 12.8 G/DL (ref 11.7–15.7)
HGB UR QL STRIP: NEGATIVE
HYALINE CASTS #/AREA URNS LPF: 3 /LPF (ref 0–2)
IMM GRANULOCYTES # BLD: 0.1 10E9/L (ref 0–0.4)
IMM GRANULOCYTES NFR BLD: 0.9 %
INR PPP: 1.11 (ref 0.86–1.14)
KETONES UR STRIP-MCNC: NEGATIVE MG/DL
LABORATORY COMMENT REPORT: NORMAL
LACTATE BLD-SCNC: 2 MMOL/L (ref 0.7–2)
LEUKOCYTE ESTERASE UR QL STRIP: ABNORMAL
LYMPHOCYTES # BLD AUTO: 2 10E9/L (ref 0.8–5.3)
LYMPHOCYTES NFR BLD AUTO: 19.5 %
MCH RBC QN AUTO: 30.9 PG (ref 26.5–33)
MCHC RBC AUTO-ENTMCNC: 31.8 G/DL (ref 31.5–36.5)
MCV RBC AUTO: 97 FL (ref 78–100)
MONOCYTES # BLD AUTO: 0.9 10E9/L (ref 0–1.3)
MONOCYTES NFR BLD AUTO: 8.7 %
NEUTROPHILS # BLD AUTO: 6.8 10E9/L (ref 1.6–8.3)
NEUTROPHILS NFR BLD AUTO: 66.6 %
NITRATE UR QL: POSITIVE
NRBC # BLD AUTO: 0 10*3/UL
NRBC BLD AUTO-RTO: 0 /100
PH UR STRIP: 5 PH (ref 5–7)
PLATELET # BLD AUTO: 186 10E9/L (ref 150–450)
POTASSIUM SERPL-SCNC: 4.5 MMOL/L (ref 3.4–5.3)
RBC # BLD AUTO: 4.14 10E12/L (ref 3.8–5.2)
RBC #/AREA URNS AUTO: 1 /HPF (ref 0–2)
RSV RNA SPEC QL NAA+PROBE: NORMAL
SARS-COV-2 RNA RESP QL NAA+PROBE: NEGATIVE
SODIUM SERPL-SCNC: 139 MMOL/L (ref 133–144)
SOURCE: ABNORMAL
SP GR UR STRIP: 1.02 (ref 1–1.03)
SPECIMEN SOURCE: NORMAL
SQUAMOUS #/AREA URNS AUTO: 1 /HPF (ref 0–1)
UROBILINOGEN UR STRIP-MCNC: 0 MG/DL (ref 0–2)
WBC # BLD AUTO: 10.2 10E9/L (ref 4–11)
WBC #/AREA URNS AUTO: 8 /HPF (ref 0–5)

## 2021-04-28 PROCEDURE — 258N000003 HC RX IP 258 OP 636: Performed by: EMERGENCY MEDICINE

## 2021-04-28 PROCEDURE — 96361 HYDRATE IV INFUSION ADD-ON: CPT

## 2021-04-28 PROCEDURE — 83605 ASSAY OF LACTIC ACID: CPT | Performed by: EMERGENCY MEDICINE

## 2021-04-28 PROCEDURE — 96372 THER/PROPH/DIAG INJ SC/IM: CPT | Mod: XS

## 2021-04-28 PROCEDURE — 87086 URINE CULTURE/COLONY COUNT: CPT | Performed by: HOSPITALIST

## 2021-04-28 PROCEDURE — G0378 HOSPITAL OBSERVATION PER HR: HCPCS

## 2021-04-28 PROCEDURE — 71045 X-RAY EXAM CHEST 1 VIEW: CPT

## 2021-04-28 PROCEDURE — 93005 ELECTROCARDIOGRAM TRACING: CPT

## 2021-04-28 PROCEDURE — 73502 X-RAY EXAM HIP UNI 2-3 VIEWS: CPT

## 2021-04-28 PROCEDURE — 85730 THROMBOPLASTIN TIME PARTIAL: CPT | Performed by: EMERGENCY MEDICINE

## 2021-04-28 PROCEDURE — 99285 EMERGENCY DEPT VISIT HI MDM: CPT | Mod: 25

## 2021-04-28 PROCEDURE — 99220 PR INITIAL OBSERVATION CARE,LEVEL III: CPT | Performed by: HOSPITALIST

## 2021-04-28 PROCEDURE — 999N001017 HC STATISTIC GLUCOSE BY METER IP

## 2021-04-28 PROCEDURE — C9803 HOPD COVID-19 SPEC COLLECT: HCPCS

## 2021-04-28 PROCEDURE — 81001 URINALYSIS AUTO W/SCOPE: CPT | Performed by: EMERGENCY MEDICINE

## 2021-04-28 PROCEDURE — 80048 BASIC METABOLIC PNL TOTAL CA: CPT | Performed by: EMERGENCY MEDICINE

## 2021-04-28 PROCEDURE — 96365 THER/PROPH/DIAG IV INF INIT: CPT

## 2021-04-28 PROCEDURE — 85025 COMPLETE CBC W/AUTO DIFF WBC: CPT | Performed by: EMERGENCY MEDICINE

## 2021-04-28 PROCEDURE — 85610 PROTHROMBIN TIME: CPT | Performed by: EMERGENCY MEDICINE

## 2021-04-28 PROCEDURE — 250N000011 HC RX IP 250 OP 636: Performed by: EMERGENCY MEDICINE

## 2021-04-28 PROCEDURE — 87636 SARSCOV2 & INF A&B AMP PRB: CPT | Performed by: EMERGENCY MEDICINE

## 2021-04-28 RX ORDER — ACETAMINOPHEN 650 MG/1
650 SUPPOSITORY RECTAL EVERY 4 HOURS PRN
Status: DISCONTINUED | OUTPATIENT
Start: 2021-04-28 | End: 2021-04-29 | Stop reason: HOSPADM

## 2021-04-28 RX ORDER — ACETAMINOPHEN 325 MG/1
650 TABLET ORAL 3 TIMES DAILY
COMMUNITY

## 2021-04-28 RX ORDER — ONDANSETRON 4 MG/1
4 TABLET, FILM COATED ORAL EVERY 8 HOURS PRN
COMMUNITY
End: 2022-01-01

## 2021-04-28 RX ORDER — ONDANSETRON 2 MG/ML
4 INJECTION INTRAMUSCULAR; INTRAVENOUS EVERY 6 HOURS PRN
Status: DISCONTINUED | OUTPATIENT
Start: 2021-04-28 | End: 2021-04-29 | Stop reason: HOSPADM

## 2021-04-28 RX ORDER — CEFTRIAXONE 1 G/1
1 INJECTION, POWDER, FOR SOLUTION INTRAMUSCULAR; INTRAVENOUS ONCE
Status: COMPLETED | OUTPATIENT
Start: 2021-04-28 | End: 2021-04-28

## 2021-04-28 RX ORDER — ONDANSETRON 4 MG/1
4 TABLET, ORALLY DISINTEGRATING ORAL EVERY 6 HOURS PRN
Status: DISCONTINUED | OUTPATIENT
Start: 2021-04-28 | End: 2021-04-29 | Stop reason: HOSPADM

## 2021-04-28 RX ORDER — PRAMIPEXOLE DIHYDROCHLORIDE 0.12 MG/1
0.38 TABLET ORAL EVERY EVENING
COMMUNITY

## 2021-04-28 RX ORDER — LIDOCAINE 40 MG/G
CREAM TOPICAL
Status: DISCONTINUED | OUTPATIENT
Start: 2021-04-28 | End: 2021-04-29 | Stop reason: HOSPADM

## 2021-04-28 RX ORDER — SODIUM CHLORIDE 9 MG/ML
INJECTION, SOLUTION INTRAVENOUS ONCE
Status: DISCONTINUED | OUTPATIENT
Start: 2021-04-28 | End: 2021-04-28

## 2021-04-28 RX ORDER — ACETAMINOPHEN 500 MG
500 TABLET ORAL 2 TIMES DAILY PRN
COMMUNITY

## 2021-04-28 RX ORDER — NICOTINE POLACRILEX 4 MG
15-30 LOZENGE BUCCAL
Status: DISCONTINUED | OUTPATIENT
Start: 2021-04-28 | End: 2021-04-29 | Stop reason: HOSPADM

## 2021-04-28 RX ORDER — DEXTROSE MONOHYDRATE 25 G/50ML
25-50 INJECTION, SOLUTION INTRAVENOUS
Status: DISCONTINUED | OUTPATIENT
Start: 2021-04-28 | End: 2021-04-29 | Stop reason: HOSPADM

## 2021-04-28 RX ORDER — ACETAMINOPHEN 325 MG/1
650 TABLET ORAL EVERY 4 HOURS PRN
Status: DISCONTINUED | OUTPATIENT
Start: 2021-04-28 | End: 2021-04-29 | Stop reason: HOSPADM

## 2021-04-28 RX ORDER — NYSTATIN 100000 [USP'U]/G
POWDER TOPICAL 2 TIMES DAILY
COMMUNITY
End: 2022-01-01

## 2021-04-28 RX ORDER — HYDROMORPHONE HYDROCHLORIDE 1 MG/ML
0.5 INJECTION, SOLUTION INTRAMUSCULAR; INTRAVENOUS; SUBCUTANEOUS EVERY 30 MIN PRN
Status: DISCONTINUED | OUTPATIENT
Start: 2021-04-28 | End: 2021-04-28

## 2021-04-28 RX ADMIN — CEFTRIAXONE SODIUM 1 G: 1 INJECTION, POWDER, FOR SOLUTION INTRAMUSCULAR; INTRAVENOUS at 19:48

## 2021-04-28 RX ADMIN — SODIUM CHLORIDE 500 ML: 9 INJECTION, SOLUTION INTRAVENOUS at 17:57

## 2021-04-28 ASSESSMENT — ENCOUNTER SYMPTOMS: FEVER: 1

## 2021-04-28 NOTE — ED NOTES
[facility]  ED Nurse Handoff Report    ED Chief complaint: Fall      ED Diagnosis:   Final diagnoses:   Hip pain, right   Fall, initial encounter       Code Status: DNR / DNI    Allergies:   Allergies   Allergen Reactions     Cozaar [Losartan Potassium] Swelling     Leg swelling. Same from Avapro.     Hydrochlorothiazide Other (See Comments)     hyponatremia     Hydrocodone Nausea and Vomiting     Nausea       Patient Story: Pt was found on floor by NH staff. Pt states she fell trying to arrange closet in room. Unwitnessed fall. Pt daughter was with daughter 3 hours prior to pt being found  Focused Assessment:  Hard of hearing. Pain to R hip and femur.    Treatments and/or interventions provided: IVF, declined pain meds  Patient's response to treatments and/or interventions: No change    To be done/followed up on inpatient unit:  See inpatient orders    Does this patient have any cognitive concerns?: Short term memory loss    Activity level - Baseline/Home:  Walker  Activity Level - Current:   Stand with assist x2    Patient's Preferred language: English   Needed?: No    Isolation: None  Infection: Not Applicable  Patient tested for COVID 19 prior to admission: YES  Bariatric?: No    Vital Signs:   Vitals:    04/28/21 1642 04/28/21 1800   BP: (!) 187/82 (!) 168/65   Pulse: 81 75   Temp: 98.6  F (37  C)    TempSrc: Oral    SpO2: 97% 94%       Cardiac Rhythm:     Was the PSS-3 completed:   Yes  What interventions are required if any?               Family Comments: Daughetr at bedside  OBS brochure/video discussed/provided to patient/family: Yes           For the majority of the shift this patient's behavior was Green.   Behavioral interventions performed were none.    ED NURSE PHONE NUMBER: [phone]

## 2021-04-28 NOTE — ED NOTES
Bed: ED14  Expected date:   Expected time:   Means of arrival:   Comments:  Wily 437 Fall hip fever poss covid 95 f

## 2021-04-28 NOTE — TELEPHONE ENCOUNTER
Fell 3:33 pm fell on bottom tried to get up and fell again . Pt was having severe right hip and back pain  Sent to ER via ambulance from [facility].Delma Mirza RN

## 2021-04-28 NOTE — ED TRIAGE NOTES
Pt arrives via EMS from nursing home. Staff went to room and found pt laying on floor. Pt os a poor historian. Fall not witnessed. Pt complaining of R hip and thigh pain. No obvious trauma. Pt received 1mg dilaudid and 4mg zofran en route.

## 2021-04-28 NOTE — ED PROVIDER NOTES
History   Chief Complaint:  Hip Pain     The history is provided by the patient and the EMS personnel.      Regla Benavidez is a 89 year old female with history of cerebral infarction, type II diabetes, CKD and hypertension who presents via EMS from her assisted living facility for evaluation of right hip pain after an unwitnessed fall. Facility staff found the patient on the ground sleeping. The patient stated to facility staff and EMS that she was cleaning her closet when she fell, and fell again when attempting to stand up. The patient denies any head injury and notes of hip pain but denies any other pain. EMS states that per the patient's daughter, the patient had a previous stroke and occasionally repeats and mixes up words. Glucose was 192 per EMS. Patient was given 1mg Dilaudid and 4mg Zofran IV and placed on oxygen en route. Per EMS, the patient had a temperature of 101F via infrared forehead temperature, however, the patient and facility staff note no known fevers or illness. Of note, the patient is DNR/DNI.       Emergency Contact Information:   Obdulio: [phone]  Ed:  [phone] (Work) [phone] (Home)  Charles:  [phone]        Review of Systems   Unable to perform ROS: Mental status change   Constitutional: Positive for fever (per EMS).   Musculoskeletal:        (+) right hip pain       Allergies:  Cozaar [Losartan Potassium]  Hydrochlorothiazide  Hydrocodone    Medications:  Aspirin 325mg  Pepcid  Lasix  Gabapentin  Glucotrol  Metformin  Zofran  Mirapex  simvastatin  Januvia  Aldactone    Past Medical History:    Aspiration pneumonia  Cholelithiasis   DJD of lumbar spine   Expressive aphasia related to prior stroke   Hard of hearing   Hypertension  Shingles   Varicose vein of leg   Obesity   PAD  CKD  Type II diabetes  Cerebral infarction  Restless legs syndrome    Past Surgical History:    Cataract IOL     Family History:    Diabetes    Social History:  The patient presents to the  emergency department alone via EMS.  The patient lives at an assisted living facility.   The patient has multiple children.     Physical Exam     Patient Vitals for the past 24 hrs:   BP Temp Temp src Pulse SpO2   04/28/21 1800 (!) 168/65 -- -- 75 94 %   04/28/21 1642 (!) 187/82 98.6  F (37  C) Oral 81 97 %       Physical Exam  Nursing note and vitals reviewed.  Constitutional:  Appears well-developed and well-nourished.   HENT:   Head:    Atraumatic.   Mouth/Throat:   Oropharynx is clear and moist. No oropharyngeal exudate.   Eyes:    Pupils are equal, round, and reactive to light.   Neck:    Normal range of motion. Neck supple.      No tracheal deviation present. No thyromegaly present.   Cardiovascular:  Normal rate, regular rhythm, no murmur   Pulmonary/Chest: Breath sounds are clear and equal without wheezes or crackles.  Abdominal:   Soft. Bowel sounds are normal. Exhibits no distension and      no mass. There is no tenderness.      There is no rebound and no guarding.   Musculoskeletal:  Pain with range of motion of right hip with slight external rotation but no shortening.     Arms and legs nontender.  Lymphadenopathy:  No cervical adenopathy.   Neurological:   Awake and alert, oriented to person and place but not time.       strong and equal.      GCS 15.   Skin:    Skin is warm and dry. No rash noted. No pallor.     Emergency Department Course     ECG:  ECG taken at 1857, ECG read at 1910  Sinus rhythm with 1st degree AV block.  Left axis deviation.  Minimal voltage criteria for LVH, may be a normal variant.  Anterior infarct, age undetermined.  Abnormal ECG.  Rate 74 bpm. OK interval 274 ms. QRS duration 80 ms. QT/QTc 392/435 ms. P-R-T axis 68 -37 1.      Imaging:  XR Chest 1 View:  Low lung volumes. No focal infiltrate or significant change from previous. No visible rib fracture or pneumothorax.  As per radiology.      XR Pelvis w Hip Right 1 View:  Osteopenia. No fractures are evident. Hypertrophic  changes in the hip joints. Degenerative changes in the SI joints and lower lumbar spine.  As per radiology.      Laboratory:  CBC: WBC: 10.2, HB.8, PLT: 186    BMP: Anion Gap: 2 (low), Urea Nitrogen: 39 (high), Glucose: 179 (high), Creatinine: 1.21 (high), GFR: 38 (low), o/w WNL     Lactic Acid (1658): 2.0    INR: 1.11    PTT: 25    Symptomatic Influenza A/B & SARS-CoV2 (COVID-19) Virus PCR Multiplex: Negative    UA with Microscopic: Nitrite Positive, Leukocyte Esterace Moderate, WBC 8 (high), Bacteria Many, Hyaline Casts 3 (high) o/w Negative    Emergency Department Course:    Reviewed:  : I reviewed the patient's nursing notes, vitals, past medical records, Care Everywhere.     Assessments:  : I assessed the patient and performed a physical exam at this time.  : I reassessed the patient and informed them of their workup thus far.     Consults:    I consulted with Dr. Morris, hospitalist, regarding the patient's history and presentation here in the emergency department who accepted the patient for admission.     Interventions:   NS 0.5L IV    Rocephin 1g IV    Disposition:  The patient was admitted to the hospital under the care of Dr. Morris.     Impression & Plan     Covid-19  Regla Benavidez was evaluated during a global COVID-19 pandemic, which necessitated consideration that the patient might be at risk for infection with the SARS-CoV-2 virus that causes COVID-19.   Applicable protocols for evaluation were followed during the patient's care.   COVID-19 was considered as part of the patient's evaluation. The plan for testing is:  a test was obtained during this visit.     Medical Decision Making:  Regla Benavidez is a 89 year old female who presents to the emergency department after a mechanical fall resulting in right hip pain, however, x-ray does not show any fracture. Upon recheck, her hip pain is resolved making fracture unlikely. She was kept on oxygen here since she had  some mild hypoxia after the dilaudid administration via EMS. There was concern about possible UTI since she has a history of this, but her daughter did not want her catheterized for the urine collection so urine collection device was placed. She cannot go back to her assisted living facility tonight because there is no nursing care so she was kept in the hospital for further observation after her fall in order to ambulate her and perform physical therapy to see if it is safe for her to be discharged and to rule out an infection since the paramedics noted a fever by temporal thermometer. The patient has not had any known fevers, however. Her lactic acid is within normal limits at two and her COVID test is negative. She has a normal neurologic exam with no sign of head injury, so intracranial bleed or stroke is unlikely. Urine did show a urinary tract infection so culture was sent and she was given ceftriaxone IV. She was admitted to the hospital under the care of Dr. Morris, the hospitalist.     Diagnosis:    ICD-10-CM    1. Hip pain, right  M25.551 Routine UA with microscopic   2. Fall, initial encounter  W19.XXXA    3. Urinary tract infection without hematuria, site unspecified  N39.0        Scribe Disclosure:  I, Agustin Barber, am serving as a scribe at 5:41 PM on 4/28/2021 to document services personally performed by Vanessa Yap MD based on my observations and the provider's statements to me.          Vanessa Yap MD  04/29/21 0144

## 2021-04-29 ENCOUNTER — PATIENT OUTREACH (OUTPATIENT)
Dept: GERIATRIC MEDICINE | Facility: CLINIC | Age: 86
End: 2021-04-29

## 2021-04-29 ENCOUNTER — APPOINTMENT (OUTPATIENT)
Dept: PHYSICAL THERAPY | Facility: CLINIC | Age: 86
End: 2021-04-29
Attending: HOSPITALIST
Payer: COMMERCIAL

## 2021-04-29 VITALS
RESPIRATION RATE: 16 BRPM | HEART RATE: 83 BPM | SYSTOLIC BLOOD PRESSURE: 140 MMHG | DIASTOLIC BLOOD PRESSURE: 44 MMHG | TEMPERATURE: 98 F | OXYGEN SATURATION: 94 %

## 2021-04-29 LAB
BACTERIA SPEC CULT: NO GROWTH
GLUCOSE BLDC GLUCOMTR-MCNC: 196 MG/DL (ref 70–99)
GLUCOSE BLDC GLUCOMTR-MCNC: 216 MG/DL (ref 70–99)
GLUCOSE BLDC GLUCOMTR-MCNC: 236 MG/DL (ref 70–99)
INTERPRETATION ECG - MUSE: NORMAL
LACTATE BLD-SCNC: 1.6 MMOL/L (ref 0.7–2)
Lab: NORMAL
SPECIMEN SOURCE: NORMAL

## 2021-04-29 PROCEDURE — 250N000013 HC RX MED GY IP 250 OP 250 PS 637: Performed by: PHYSICIAN ASSISTANT

## 2021-04-29 PROCEDURE — G0378 HOSPITAL OBSERVATION PER HR: HCPCS

## 2021-04-29 PROCEDURE — 96372 THER/PROPH/DIAG INJ SC/IM: CPT | Performed by: HOSPITALIST

## 2021-04-29 PROCEDURE — 97161 PT EVAL LOW COMPLEX 20 MIN: CPT | Mod: GP | Performed by: PHYSICAL THERAPIST

## 2021-04-29 PROCEDURE — 999N001017 HC STATISTIC GLUCOSE BY METER IP

## 2021-04-29 PROCEDURE — 250N000012 HC RX MED GY IP 250 OP 636 PS 637: Performed by: HOSPITALIST

## 2021-04-29 PROCEDURE — 83605 ASSAY OF LACTIC ACID: CPT | Performed by: HOSPITALIST

## 2021-04-29 PROCEDURE — 99207 PR APP CREDIT; MD BILLING SHARED VISIT: CPT | Performed by: PHYSICIAN ASSISTANT

## 2021-04-29 PROCEDURE — 36415 COLL VENOUS BLD VENIPUNCTURE: CPT | Performed by: HOSPITALIST

## 2021-04-29 PROCEDURE — 99217 PR OBSERVATION CARE DISCHARGE: CPT | Performed by: INTERNAL MEDICINE

## 2021-04-29 RX ORDER — SPIRONOLACTONE 25 MG
12.5 TABLET ORAL EVERY OTHER DAY
Status: DISCONTINUED | OUTPATIENT
Start: 2021-04-30 | End: 2021-04-29 | Stop reason: HOSPADM

## 2021-04-29 RX ORDER — GABAPENTIN 100 MG/1
100 CAPSULE ORAL 2 TIMES DAILY
Status: DISCONTINUED | OUTPATIENT
Start: 2021-04-29 | End: 2021-04-29 | Stop reason: HOSPADM

## 2021-04-29 RX ORDER — FUROSEMIDE 20 MG
20 TABLET ORAL 2 TIMES DAILY
Status: DISCONTINUED | OUTPATIENT
Start: 2021-04-29 | End: 2021-04-29 | Stop reason: HOSPADM

## 2021-04-29 RX ORDER — FAMOTIDINE 20 MG/1
20 TABLET, FILM COATED ORAL 2 TIMES DAILY
Status: DISCONTINUED | OUTPATIENT
Start: 2021-04-29 | End: 2021-04-29 | Stop reason: HOSPADM

## 2021-04-29 RX ORDER — ASPIRIN 325 MG
325 TABLET ORAL DAILY
Status: DISCONTINUED | OUTPATIENT
Start: 2021-04-29 | End: 2021-04-29 | Stop reason: HOSPADM

## 2021-04-29 RX ORDER — GLIPIZIDE 10 MG/1
10 TABLET ORAL DAILY
Status: DISCONTINUED | OUTPATIENT
Start: 2021-04-29 | End: 2021-04-29 | Stop reason: HOSPADM

## 2021-04-29 RX ADMIN — METFORMIN HYDROCHLORIDE 500 MG: 500 TABLET, FILM COATED ORAL at 10:32

## 2021-04-29 RX ADMIN — GLIPIZIDE 10 MG: 10 TABLET ORAL at 12:33

## 2021-04-29 RX ADMIN — FUROSEMIDE 20 MG: 20 TABLET ORAL at 12:33

## 2021-04-29 RX ADMIN — INSULIN ASPART 2 UNITS: 100 INJECTION, SOLUTION INTRAVENOUS; SUBCUTANEOUS at 08:39

## 2021-04-29 RX ADMIN — GABAPENTIN 100 MG: 100 CAPSULE ORAL at 10:32

## 2021-04-29 RX ADMIN — INSULIN ASPART 2 UNITS: 100 INJECTION, SOLUTION INTRAVENOUS; SUBCUTANEOUS at 11:08

## 2021-04-29 RX ADMIN — FUROSEMIDE 20 MG: 20 TABLET ORAL at 10:32

## 2021-04-29 RX ADMIN — FAMOTIDINE 20 MG: 20 TABLET ORAL at 10:32

## 2021-04-29 RX ADMIN — SITAGLIPTIN 50 MG: 50 TABLET, FILM COATED ORAL at 12:32

## 2021-04-29 RX ADMIN — ASPIRIN 325 MG ORAL TABLET 325 MG: 325 PILL ORAL at 10:32

## 2021-04-29 NOTE — H&P
[facility]    History and Physical - Hospitalist Service       Date of Admission:  4/28/2021    Assessment & Plan   Regla Benavidez is a 89 year old female who fell at her assisted living facility today.  She did not suffer any injury but is unable to return there this evening.  She is being admitted to observation.    Status post fall   Acute right hip pain  Chronic low back pain  This appears to have been a mechanical fall resulting in some right hip pain.  No fracture identified on x-ray.     Admit to observation    Physcial therapy evaluation    Continue diclofenac gel and Tylenol    Daughter wants to avoid opioids    Lactic acidosis   Lactate is 2 ? Volume depletion or metformin or both- doubt sepsis    Hold metformin    Repeat in the morning     Abnormal UA  The patient is not symptomatic, antibiotics not continued    Hypertension     Hold diuretic for now with increased lactic acid     Hyperlipidemia    Continue statin     Type 2 diabetes mellitus     Continue glipizide and sitagliptin but hold metformin due to elevated lactic acid    Correction scale    Chronic kidney disease   Stable     Restless legs     Continue pramipexole and gabapentin      Diet: NPO for Medical/Clinical Reasons Except for: Meds    DVT Prophylaxis: Low Risk/Ambulatory with no VTE prophylaxis indicated  Garduno Catheter: not present  Code Status:   NCB         Disposition Plan   Expected discharge: Tomorrow, recommended to AL versus TCU once back to baseline status.  Entered: Bill Morris MD 04/28/2021, 7:19 PM     The patient's care was discussed with the Patient and Patient's Family.    Bill Morris MD  [facility]  Contact information available via [facility] Paging/Directory      ______________________________________________________________________    Chief Complaint   Status post fall     History is obtained from the patient, electronic health record, emergency  department physician and patient's daughter    History of Present Illness   Regla Benavidez is a 89 year old female who lives in an assisted living facility.  She has type 2 diabetes hypertension and chronic kidney disease.  She ambulates with a walker.  The staff found the patient on the floor sleeping and was found to have right hip pain when they got her up.  Apparently she fell while cleaning her closet and fell again when she tried to get up.  On route to the ER the patient was given 1 mg of hydromorphone for hip pain.  She then became slightly hypoxic and was placed on supplemental oxygen.  EMS reported a temperature of 101  F but according to the staff in the assisted living facility the patient has not had any fever recently and in the emergency room she had no fever either.  In the emergency room her blood pressure was elevated at 187/82 pulse 81 temperature 98.6  F R saturation 97% on supplemental oxygen.  She was in no distress she did have pain with motion at the right hip.  Hip and pelvic x-rays were negative.  EKG showed sinus rhythm with first-degree AV block and left axis deviation.  Chest x-ray was negative.  Her lab work was only notable for lactic acid of 2.0.  UA showed 8 white cells bacteria leukocyte esterase.  She was given 1/2 L of normal saline and milligrams of ceftriaxone.    Review of Systems    The 10 point Review of Systems is negative other than noted in the HPI or here.     Past Medical History    I have reviewed this patient's medical history and updated it with pertinent information if needed.   Past Medical History:   Diagnosis Date     Aspiration pneumonia (H) 11/15/2014    11/14; and UTI with sepsis      Cholelithiasis 2009    incidental finding     DJD (degenerative joint disease) of lumbar spine 2009     Elevated glucose      Expressive aphasia related to prior stroke      Hard of hearing     hearing aids     HTN (hypertension)      Shingles 2005     Varicose vein of leg         Past Surgical History   I have reviewed this patient's surgical history and updated it with pertinent information if needed.  Past Surgical History:   Procedure Laterality Date     CATARACT IOL, RT/LT         Social History   I have reviewed this patient's social history and updated it with pertinent information if needed.  Social History     Tobacco Use     Smoking status: Never Smoker     Smokeless tobacco: Never Used   Substance Use Topics     Alcohol use: No     Drug use: No       Family History   I have reviewed this patient's family history and updated it with pertinent information if needed.  Family History   Problem Relation Age of Onset     Diabetes Brother      Unknown/Adopted Mother      Unknown/Adopted Father      Coronary Artery Disease No family hx of      Hypertension No family hx of      Hyperlipidemia No family hx of      Cerebrovascular Disease No family hx of      Breast Cancer No family hx of      Colon Cancer No family hx of      Prostate Cancer No family hx of      Other Cancer No family hx of      Depression No family hx of      Anxiety Disorder No family hx of      Mental Illness No family hx of      Substance Abuse No family hx of      Anesthesia Reaction No family hx of      Asthma No family hx of      Osteoporosis No family hx of      Genetic Disorder No family hx of      Thyroid Disease No family hx of      Obesity No family hx of        Prior to Admission Medications   Prior to Admission Medications   Prescriptions Last Dose Informant Patient Reported? Taking?   Cholecalciferol (VITAMIN D3 PO)  Nursing Home Yes No   Sig: Take 2,000 Units by mouth daily   Ondansetron HCl (ZOFRAN PO)  Nursing Home Yes No   Sig: Take 4 mg by mouth every 8 hours as needed for nausea or vomiting   UNABLE TO FIND   Yes No   Sig: MEDICATION NAME: Aspercreme with lidocaine 0.4% 2 x daily to feet   acetaminophen (TYLENOL) 325 MG tablet   No No   Sig: Take 2 tablets in AM, 2 tabs at bedtime and also 2 tabs  PRN   aspirin 325 MG tablet  Nursing Home No No   Sig: Take 1 tablet (325 mg) by mouth daily   blood glucose calibration (NO BRAND SPECIFIED) solution   No No   Sig: Use to calibrate blood glucose monitor as needed as directed. To accompany: Accu-Chek Safe-T pro plus   blood glucose monitoring (NO BRAND SPECIFIED) test strip   No No   Sig: Use to test blood sugar 1 times daily or as directed. To accompany: Accu-Chek safety pro plus   blood glucose monitoring (ONE TOUCH ULTRA 2) meter device kit   No No   Sig: Use to test blood sugar 1 times daily or as directed.   diclofenac (VOLTAREN) 1 % topical gel   No No   Sig: Apply 2 g topically 3 times daily To left knee   famotidine (PEPCID) 20 MG tablet   No No   Sig: Take 1 tablet (20 mg) by mouth 2 times daily   furosemide (LASIX) 20 MG tablet  Nursing Home Yes No   Sig: Take 1 tablet (20 mg) by mouth 2 times daily As of 1/27/18   gabapentin (NEURONTIN) 100 MG capsule   No No   Sig: Take 1 capsule (100 mg) by mouth 2 times daily   glipiZIDE (GLUCOTROL) 10 MG tablet   Yes No   Sig: Take 1 tablet (10 mg) by mouth daily   lidocaine (LMX4) 4 % CREA cream   No No   Sig: Apply to both feet TID   metFORMIN (GLUCOPHAGE) 500 MG tablet  Nursing Home Yes No   Sig: Take 500 mg by mouth daily    nystatin (MYCOSTATIN) 549078 UNIT/GM external cream   No No   Sig: APPLY TO INGUINAL AREAS AND UNDER THE BREASTS BID.   polyethylene glycol (MIRALAX/GLYCOLAX) Packet  Nursing Home Yes No   Sig: Take 8.5 g by mouth every other day    pramipexole (MIRAPEX) 0.25 MG tablet  Nursing Home No No   Sig: Take 1.5 tablets (0.375 mg) by mouth At Bedtime   simvastatin (ZOCOR) 5 MG tablet  Nursing Home No No   Sig: Take 1 tablet (5 mg) by mouth every evening   sitagliptin (JANUVIA) 50 MG tablet  Nursing Home No No   Sig: Take 1 tablet (50 mg) by mouth daily   spironolactone (ALDACTONE) 25 MG tablet   Yes No   Sig: Take 0.5 tablets (12.5 mg) by mouth every other day   thin (NO BRAND SPECIFIED) lancets   No  No   Sig: Use to test blood sugar 1 times daily or as directed. To accompany: [employer]u-Cheks safety pro plus      Facility-Administered Medications: None     Allergies   Allergies   Allergen Reactions     Cozaar [Losartan Potassium] Swelling     Leg swelling. Same from Avapro.     Hydrochlorothiazide Other (See Comments)     hyponatremia     Hydrocodone Nausea and Vomiting     Nausea       Physical Exam   Vital Signs: Temp: 98.6  F (37  C) Temp src: Oral BP: (!) 168/65 Pulse: 75     SpO2: 94 % O2 Device: None (Room air)    Weight: 0 lbs 0 oz    Constitutional: awake, alert, cooperative, no apparent distress  Eyes: Lids and lashes normal, pupils equal, round, sclera clear, conjunctiva normal  ENT: Normocephalic, without obvious abnormality, atraumatic  Respiratory: No increased work of breathing, good air exchange, clear to auscultation bilaterally, no crackles or wheezing  Cardiovascular:  regular rate and rhythm, normal S1 and S2, no S3 or S4, and no murmur noted  GI: normal bowel sounds, soft, non-distended, non-tender, no masses palpated,  Skin: no rashes  Musculoskeletal: There is no redness, warmth, or swelling of the joints.    Neurologic: Awake, alert, oCranial nerves II-XII are grossly intact.  She can raise both legs up off the bed.  Neuropsychiatric: General: normal, calm and normal eye contact    Data   Data reviewed today: I reviewed all medications, new labs and imaging results over the last 24 hours. I personally reviewed no images or EKG's today.    Recent Labs   Lab 04/28/21  1648   WBC 10.2   HGB 12.8   MCV 97      INR 1.11      POTASSIUM 4.5   CHLORIDE 107   CO2 30   BUN 39*   CR 1.21*   ANIONGAP 2*   AAYUSH 9.2   *     Recent Results (from the past 24 hour(s))   XR Chest 1 View    Narrative    XR CHEST 1 VIEW  4/28/2021 5:40 PM       INDICATION: fall and possible fever, check for trauma or pneumonia  COMPARISON: 2/9/2018       Impression    IMPRESSION: Low lung volumes. No focal  infiltrate or significant  change from previous. No visible rib fracture or pneumothorax.    BRITTANY JEAN MD   XR Pelvis w Hip Right 1 View    Narrative    EXAM: XR PELVIS AND HIP RIGHT 1 VIEW  LOCATION: [facility]  DATE/TIME: 4/28/2021 5:20 PM    INDICATION: Pain following fall.  COMPARISON: None.      Impression    IMPRESSION: Osteopenia. No fractures are evident. Hypertrophic changes in the hip joints. Degenerative changes in the SI joints and lower lumbar spine.

## 2021-04-29 NOTE — PROGRESS NOTES
[facility] Care Coordination Contact    [facility]  Ambulatory Care Coordination to Inpatient Care Management   Hand-In Communication    Date:  April 29, 2021  Name: Regla Benavidez is enrolled in [facility] Care Coordination program and I am the Lead Care Coordinator.  CC Contact Information: Autumn Jacobsen RN, PHN, [facility] [email or web address], Office: [phone]     Payor Source: Payor: MEDICA / Plan: MEDICA D[employer]IONS [employer]/ [employer]NERS / Product Type: HMO /   Current services in place:     Please see the CC Snaphot and Care Management Flowsheets for specific details of this Regla Benavidez care plan.   Additional details/specific concerns r/t this admission:    No additional concerns at this time . She has expressive aphasia. Best contact is son, but he may be out of town currently. Daughter Obdulio Joyce-daughter- [phone] or [phone] is next best contact.    I will follow this admission in Epic. Please feel free to contact me with questions or for further collaboration in discharge planning.    Tiffanie Reynolds, CLARICE  [facility]  [phone]  Fax: [phone]

## 2021-04-29 NOTE — PLAN OF CARE
Observation goals PRIOR TO DISCHARGE     Comments: -adequate pain control on oral analgesics met  -returns to baseline functional status met   -safe disposition plan has been identified met   Nurse to notify provider when observation goals have been met and patient is ready for discharge.     Oriented to self and situation. VSS on RA. Denies pain at rest, reports mild pain with activity, declines medication. Ambulating assist x1, GB and walker. Voiding adequately. PT recommending home PT. Blood sugars: 216, 196. Tolerating diet. Plan to discharge today.

## 2021-04-29 NOTE — DISCHARGE SUMMARY
[facility]    Discharge Summary  Hospitalist    Date of Admission:  4/28/2021  Date of Discharge:  4/29/2021  Discharging Provider: Leif Bradshaw  Date of Service (when I saw the patient): 04/29/21    Discharge Diagnoses   1. Status post mechanical fall   2. Acute right hip pain, without fracture  3. Chronic low back pain  4. Lactic acidosis, resolved  5. Abnormal UA, without concern for acute UTI  6. Hypertension   7. Hyperlipidemia  8. Type 2 diabetes mellitus   9. Chronic kidney disease     History of Present Illness   Regla Benavidez is a very pleasant 89 year old female who lives in an assisted living facility.  She has type 2 diabetes, hypertension, and chronic kidney disease.  She ambulates with a walker.  The staff found the patient on the floor sleeping, and she reported to have right hip pain when they got her up.  Apparently, she fell while cleaning her closet and fell again when she tried to get up.  On route to the ER the patient was given 1 mg of hydromorphone for hip pain.  She then became slightly hypoxic and was placed on supplemental oxygen.  EMS reported a temperature of 101  F but according to the staff in the assisted living facility the patient has not had any fever recently and in the emergency room she had no fever either.  In the ED, her blood pressure was elevated at 187/82, pulse 81, temperature 98.6  F.  She was in no distress she did have pain with motion at the right hip.  Hip and pelvic x-rays were negative.  EKG showed sinus rhythm with first-degree AV block and left axis deviation.  Chest x-ray is negative.  Her lab work was only notable for lactic acid of 2.0.  UA showed 8 white cells bacteria leukocyte esterase.  She was given 1/2 L of normal saline and milligrams of ceftriaxone.    Hospital Course   Status post fall   Acute right hip pain  Chronic low back pain  This appears to have been a mechanical fall resulting in some right hip pain.  No  fracture identified on x-ray.  Patient was admitted to the observation unit.  She was monitored, and completed a physical therapy evaluation and actually did quite well.  She was recommended to discharge back to her assisted living facility, with home PT referral.  Daughter present at bedside and in agreement.  Will continue with her prior to admission diclofenac gel and Tylenol.  She has not needed any opioids here, and would avoid for this patient if able.     Lactic acidosis   Lactate is possibly secondary to Volume depletion or metformin or both- doubt sepsis.    Metformin was held, lactic acidosis resolved, repeat lactic acid is 1.6.  Can resume PTA Metformin.     Abnormal UA  The patient is not symptomatic, antibiotics not continued  -Follow-up urine culture with PCP to determine if she were to need any antibiotics     Hypertension     Resume diuretics, lactic acid back to normal.     Hyperlipidemia    Continue statin on discharge     Type 2 diabetes mellitus     Continue PTA glipizide Januvia and Metformin     Chronic kidney disease   Stable      Restless legs     Continue pramipexole and gabapentin       Leif Bradshaw PA-C    Significant Results and Procedures   No procedures.  Results as documented above.    Pending Results   Follow-up urine culture with PCP to determine if she were to need any antibiotics  Unresulted Labs Ordered in the Past 30 Days of this Admission     Date and Time Order Name Status Description    4/28/2021 2127 Urine Culture Preliminary           Code Status   DNR / DNI       Primary Care Physician   Leo Melgar    Physical Exam   Temp: 98  F (36.7  C) Temp src: Oral BP: (!) 140/44 Pulse: 83   Resp: 16 SpO2: 95 % O2 Device: Nasal cannula Oxygen Delivery: 1 LPM  There were no vitals filed for this visit.  Vital Signs with Ranges  Temp:  [96.6  F (35.9  C)-98.8  F (37.1  C)] 98  F (36.7  C)  Pulse:  [70-83] 83  Resp:  [16-20] 16  BP: (140-187)/(44-82) 140/44  SpO2:  [87 %-97  %] 95 %  I/O last 3 completed shifts:  In: -   Out: 1150 [Urine:1150]    Constitutional:  Well-appearing elderly female, alert, sitting up in the chair in NAD.  Respiratory:  Lungs CTAB.  No crackles, wheezes, or rhonchi, no labored breathing.  Cardiovascular:  Heart RRR, quiet systolic murmur, no edema.  GI:  Abdomen soft, NT/ND and with normoactive BS  Skin/Integumen:  Warm, dry, non-diaphoretic.  No rashes on exposed skin.  MSK: CMS x4 grossly intact.    Discharge Disposition   Discharged to assisted living  Condition at discharge: Stable    Consultations This Hospital Stay   PHYSICAL THERAPY ADULT IP CONSULT  CARE MANAGEMENT / SOCIAL WORK IP CONSULT    Time Spent on this Encounter   ILeif PA, personally saw the patient today and spent less than or equal to 30 minutes discharging this patient.    Discharge Orders      Home Care PT Referral for Hospital Discharge      Reason for your hospital stay    Mechanical fall, resulting in hip pain without any fracture     Follow-up and recommended labs and tests     Follow up with primary care provider, Leo Melgar, within 7 days for hospital follow- up.     Activity    Your activity upon discharge: activity as tolerated     MD face to face encounter    Documentation of Face to Face and Certification for Home Health Services    I certify that patient: Regla Benavidez is under my care and that I, or a nurse practitioner or physician's assistant working with me, had a face-to-face encounter that meets the physician face-to-face encounter requirements with this patient on: 4/29/2021.    This encounter with the patient was in whole, or in part, for the following medical condition, which is the primary reason for home health care: Status post fall with acute right hip pain, no fracture, physical deconditioning.    I certify that, based on my findings, the following services are medically necessary home health services: Physical Therapy.    My clinical  findings support the need for the above services because: Physical Therapy Services are needed to assess and treat the following functional impairments: Status post fall with acute right hip pain, no fracture, physical deconditioning.    Further, I certify that my clinical findings support that this patient is homebound (i.e. absences from home require considerable and taxing effort and are for medical reasons or [employer] services or infrequently or of short duration when for other reasons) because: Requires assistance of another person or specialized equipment to access medical services because patient: Requires supervision of another for safe transfer...    Based on the above findings. I certify that this patient is confined to the home and needs intermittent skilled nursing care, physical therapy and/or speech therapy.  The patient is under my care, and I have initiated the establishment of the plan of care.  This patient will be followed by a physician who will periodically review the plan of care.  Physician/Provider to provide follow up care: Leo Melgar    Attending hospital physician (the Medicare certified [place] provider): Dr. Lakesha Bowden MD  Physician Signature: See electronic signature associated with these discharge orders.  Date: 4/29/2021     Diet    Follow this diet upon discharge: Regular     Discharge Medications   Current Discharge Medication List      CONTINUE these medications which have NOT CHANGED    Details   !! acetaminophen (TYLENOL) 325 MG tablet Take 650 mg by mouth 3 times daily 9am, 1pm, and 8pm      !! acetaminophen (TYLENOL) 500 MG tablet Take 500-1,000 mg by mouth 2 times daily as needed for mild pain      aspirin 325 MG tablet Take 1 tablet (325 mg) by mouth daily  Qty: 120 tablet    Associated Diagnoses: Stroke (H)      Cholecalciferol (VITAMIN D3 PO) Take 2,000 Units by mouth daily      diclofenac (VOLTAREN) 1 % topical gel Apply 2 g topically 3 times daily Apply to both  kness, back of the knees, and side to side. 9am, 1pm, and 8pm      famotidine (PEPCID) 20 MG tablet Take 1 tablet (20 mg) by mouth 2 times daily  Qty: 180 tablet, Refills: 4    Comments: This replaces ranitidine.  Associated Diagnoses: Gastroesophageal reflux disease, esophagitis presence not specified      furosemide (LASIX) 20 MG tablet Take 20 mg by mouth 2 times daily 9 am and 1pm  Qty: 60 tablet, Refills: 12    Associated Diagnoses: Bilateral leg edema      gabapentin (NEURONTIN) 100 MG capsule Take 1 capsule (100 mg) by mouth 2 times daily  Qty: 60 capsule, Refills: 11    Comments: Dosage change  Associated Diagnoses: Diabetic polyneuropathy associated with other specified diabetes mellitus (H)      glipiZIDE (GLUCOTROL) 10 MG tablet Take 1 tablet (10 mg) by mouth daily  Qty: 60 tablet, Refills: 11    Comments: As of 11/8/18  Associated Diagnoses: Type 2 diabetes mellitus with hyperglycemia, without long-term current use of insulin (H)      LIDOCAINE-DM-TROLAMINE SAL EX Externally apply topically 2 times daily Aspercreme w/Lidocaine (Trolamine salicylate) 0.4% Apply thin later of cream to both feet twice daily and once daily as needed to feet and lower legs      metFORMIN (GLUCOPHAGE) 500 MG tablet Take 500 mg by mouth daily   Qty: 30 tablet, Refills: 12      nystatin (MYCOSTATIN) 169018 UNIT/GM external cream APPLY TO INGUINAL AREAS AND UNDER THE BREASTS BID.  Qty: 120 g, Refills: 8    Associated Diagnoses: Intertrigo      nystatin (MYCOSTATIN) 146073 UNIT/GM external powder Apply topically 3 times daily as needed (redness in abdominal folds)      ondansetron (ZOFRAN) 4 MG tablet Take 4 mg by mouth every 8 hours as needed for nausea      polyethylene glycol (MIRALAX/GLYCOLAX) Packet Take 8.5 g by mouth every other day       pramipexole (MIRAPEX) 0.125 MG tablet Take 0.375 mg by mouth every evening      simvastatin (ZOCOR) 5 MG tablet Take 1 tablet (5 mg) by mouth every evening  Qty: 30 tablet    Associated  Diagnoses: Stroke (H)      sitagliptin (JANUVIA) 50 MG tablet Take 1 tablet (50 mg) by mouth daily  Qty: 30 tablet, Refills: 11    Associated Diagnoses: Uncontrolled type 2 diabetes mellitus with complication, without long-term current use of insulin (H); Type 2 diabetes mellitus with hyperglycemia, without long-term current use of insulin (H)      spironolactone (ALDACTONE) 25 MG tablet Take 0.5 tablets (12.5 mg) by mouth every other day  Qty: 30 tablet, Refills: 11    Associated Diagnoses: Bilateral leg edema      blood glucose calibration (NO BRAND SPECIFIED) solution Use to calibrate blood glucose monitor as needed as directed. To accompany: Accu-Chek Safe-T pro plus  Qty: 1 Bottle, Refills: 3    Associated Diagnoses: Type 2 diabetes mellitus with hyperglycemia, without long-term current use of insulin (H)      blood glucose monitoring (NO BRAND SPECIFIED) test strip Use to test blood sugar 1 times daily or as directed. To accompany: Accu-Chek safety pro plus  Qty: 100 strip, Refills: 6    Associated Diagnoses: Type 2 diabetes mellitus with hyperglycemia, without long-term current use of insulin (H)      blood glucose monitoring (ONE TOUCH ULTRA 2) meter device kit Use to test blood sugar 1 times daily or as directed.  Qty: 1 kit, Refills: 1    Comments: Or as covered by insurance  Associated Diagnoses: Type 2 diabetes mellitus with hyperglycemia, without long-term current use of insulin (H)      thin (NO BRAND SPECIFIED) lancets Use to test blood sugar 1 times daily or as directed. To accompany: Accu-Cheks safety pro plus  Qty: 100 each, Refills: 3    Associated Diagnoses: Type 2 diabetes mellitus with hyperglycemia, without long-term current use of insulin (H)       !! - Potential duplicate medications found. Please discuss with provider.        Allergies   Allergies   Allergen Reactions     Cozaar [Losartan Potassium] Swelling     Leg swelling. Same from Avapro.     Hydrochlorothiazide Other (See Comments)      hyponatremia     Hydrocodone Nausea and Vomiting     Nausea     Data   Most Recent 3 CBC's:  Recent Labs   Lab Test 04/28/21  1648 03/04/18  0825 03/02/18  1021   WBC 10.2 8.6 9.3   HGB 12.8 13.0 11.1*   MCV 97 91 91    276 233      Most Recent 3 BMP's:  Recent Labs   Lab Test 04/28/21  1648 02/04/21 11/05/20 03/04/18  0825 03/04/18  0825 03/02/18  1021     --   --   --  139 138   POTASSIUM 4.5 3.9 3.8   < > 4.5 4.4   CHLORIDE 107  --   --   --  104 103   CO2 30  --   --   --  29 30   BUN 39*  --   --   --  21 26   CR 1.21* 1.03* 1.15*   < > 1.00 1.05*   ANIONGAP 2*  --   --   --  6 5   AAYUSH 9.2  --   --   --  8.7 8.1*   * 121* 157*   < > 157* 190*    < > = values in this interval not displayed.     Most Recent 2 LFT's:  Recent Labs   Lab Test 10/22/15 03/15/15  1435 11/15/14  0815   AST 25 32 34   ALT 25 39 29   ALKPHOS  --  66 70   BILITOTAL  --  0.6 0.7     Most Recent INR's and Anticoagulation Dosing History:  Anticoagulation Dose History     Recent Dosing and Labs Latest Ref Rng & Units 4/28/2021    INR 0.86 - 1.14 1.11        Most Recent 3 Troponin's:  Recent Labs   Lab Test 11/15/14  1940 11/15/14  1439 11/15/14  0815   TROPI 0.069* 0.057* 0.057*     Most Recent Cholesterol Panel:  Recent Labs   Lab Test 02/09/18  1248   CHOL 122   LDL 49   HDL 46*   TRIG 135     Most Recent 6 Bacteria Isolates From Any Culture (See EPIC Reports for Culture Details):  Recent Labs   Lab Test 04/28/21  2148 01/20/21  1030 08/12/20  1146 03/17/20  1249 03/06/20  1408 12/19/18  1346   CULT PENDING >100,000 colonies/mL  Escherichia coli  * 10,000 to 50,000 colonies/mL  Escherichia coli  * >100,000 colonies/mL  mixed urogenital lynn  Susceptibility testing not routinely done   50,000 to 100,000 colonies/mL  Escherichia coli  *  <10,000 colonies/mL  urogenital lynn  Susceptibility testing not routinely done   50,000 to 100,000 colonies/mL  Escherichia coli  *     Most Recent TSH, T4 and A1c Labs:  Recent Labs    Lab Test 02/04/21 02/09/18  1248 02/09/18  1248   TSH  --   --  0.77   A1C 7.8*   < > 6.9*    < > = values in this interval not displayed.     Results for orders placed or performed during the hospital encounter of 04/28/21   XR Chest 1 View    Narrative    XR CHEST 1 VIEW  4/28/2021 5:40 PM       INDICATION: fall and possible fever, check for trauma or pneumonia  COMPARISON: 2/9/2018       Impression    IMPRESSION: Low lung volumes. No focal infiltrate or significant  change from previous. No visible rib fracture or pneumothorax.    BRITTANY JEAN MD   XR Pelvis w Hip Right 1 View    Narrative    EXAM: XR PELVIS AND HIP RIGHT 1 VIEW  LOCATION: [facility]  DATE/TIME: 4/28/2021 5:20 PM    INDICATION: Pain following fall.  COMPARISON: None.      Impression    IMPRESSION: Osteopenia. No fractures are evident. Hypertrophic changes in the hip joints. Degenerative changes in the SI joints and lower lumbar spine.

## 2021-04-29 NOTE — PROGRESS NOTES
Observation goals PRIOR TO DISCHARGE     Comments: -adequate pain control on oral analgesics met  -returns to baseline functional status met  -safe disposition plan has been identified not met   Nurse to notify provider when observation goals have been met and patient is ready for discharge.

## 2021-04-29 NOTE — PROGRESS NOTES
Observation goals PRIOR TO DISCHARGE     Comments: -adequate pain control on oral analgesics not met  -returns to baseline functional status not met   -safe disposition plan has been identified not met   Nurse to notify provider when observation goals have been met and patient is ready for discharge.

## 2021-04-29 NOTE — PLAN OF CARE
Observation goals PRIOR TO DISCHARGE     Comments: -adequate pain control on oral analgesics met  -returns to baseline functional status not met   -safe disposition plan has been identified not met   Nurse to notify provider when observation goals have been met and patient is ready for discharge.

## 2021-04-29 NOTE — PROGRESS NOTES
TRANSITIONS OF CARE (DU) LOG   DU tasks should be completed by the CC within one (1) business day of notification of each transition. Follow up contact with member is required after return to their usual care setting.  Note:  If CC finds out about the transitions fifteen (15) days or more after the member has returned to their usual care setting, no DU log is needed. However, the CC should check in with the member to discuss the transition process, any changes needed to the care plan and document it in a case note.    Member Name:  Regla Benavidez MCO Name:  [place] [employer]/Health Plan Member ID#: [id number]   [place] [facility] [place] Contact:  Autumn Jacobsen RN Agency/County/Care System: [facility]   Transition Communication Actions from Care Management Contact   Transition #1   Notification Date: 04/29/2021 Transition Date: 04/28/2021 Transition From: Assisted Living, [facility]     Is this the member s usual care setting?               yes Transition To: Hospital, [facility]   Transition Type:  Unplanned  Reason for Admission/Comments: Fall, Right Hip Pain  CC contacted Hospital /discharge planner via the [facility] Transitional Care Hand-In Process, with community care plan included.  CC reached out to adult daughter Obdulio Joyce - [phone]  regarding transition and offered support as needed. Daughter is anxious for Alberta to be seen by PT and discharge home. Offered to call hospital discharge planner to let them know that she (daughter) was waiting to speak with someone about discharge. Spoke w/ Laura ([phone]) [facility] . Laura is waiting for Alberta to be seen by PT for discharge recommendations from PT. JUAN is going to contact daughter.  Reviewed and update care plan as needed.  Notified community service providers and placed services None on hold as needed.  Transition log initiated.   PCP notified of hospitalization via  EMR.  Tiffanie Reynolds, [facility] [phone], Fax: [phone]   Shared CC contact info, care plan/services with receiving setting--Date completed: 04/29/2021   Notified PCP of transition--Date completed: 04/29/2021     via  EMR   Transition #2   Transition #3  (if applicable)   Notification Date: 5/3/21       Transition To:  Assisted Living, [facility]  Transition Date: 4/29/21  Transition Type:    Planned  Notified PCP -- Date completed: 4/29/21              Shared CC contact info, care plan/services with receiving setting or, if applicable, home care agency--Date completed: 5/3/21  *Complete additional tasks below, if this transition is a return to usual care setting.      Comments:  Alberta is back at her AL and doing well per daughter  Obdulio. Called PT dept and left a message asking how they need PT orders written so that she can have PT at [facility]/Autumn Jacobsen RN Notification Date:        Transition To:    Transition Date:           Transition Type:      Notified PCP--Date completed:       Shared CC contact info, care plan/services with receiving setting or, if applicable, home care agency--Date completed:   *Complete additional tasks below, if this transition is a return to usual care setting.      Comments:      *Complete tasks below when the member is discharging TO their usual care setting within one (1) business day of notification.  For situations where the Care Coordinator is notified of the discharge prior to the date of discharge, the Care Coordinator must follow up with the member or designated representative to confirm that discharge actually occurred and discuss required DU tasks as outlined in the DU Instructions.  (This includes situations where it may be a  new  usual care setting for the member. (i.e., a community member who decides upon permanent nursing home placement following hospitalization and rehab).    Date completed: 5/3/21  Communicated with member or their  designated representative about the following:  care transition process; about changes to the member s health status; plan of care updates; education about transitions and how to prevent unplanned transitions/readmissions  Four Pillars for Optimal Transition:    Check  Yes  - if the member, family member and/or SNF/facility staff manages the following:    If  No  provide explanation in the comments section.          [x]  Yes     []  No     Does the member have a follow-up appointment scheduled with primary care or specialist? (Mental health hospitalizations--the appt. should be w/in 7 days)   [x]  Yes     []  No     Can the member manage their medications or is there a system in place to manage medications (e.g. home care set-up)?         []  Yes     []  No     Can the member verbalize warning signs and symptoms to watch for and how to respond?         [x]  Yes     []  No     Does the member use a Personal Health Care Record?  Check  Yes  if visit summary, discharge summary, and/or healthcare summary are being used as a PHR.                                                                                                                                                                                    [x] Yes      [] No      Have you updated the member s care plan?  If  No  provide explanation in comments.   Comments:  Has appt with PCP for next week. In process of getting PT orders. Autumn Jacobsen RN

## 2021-04-29 NOTE — PROGRESS NOTES
04/29/21 1221   Quick Adds   Type of Visit Initial PT Evaluation   Living Environment   People in home alone;facility resident   Current Living Arrangements assisted living   Self-Care   Usual Activity Tolerance good   Current Activity Tolerance good   Regular Exercise No   Equipment Currently Used at Home walker, rolling   Activity/Exercise/Self-Care Comment A with toileting, bathing and meds; escort to meals, pt completes own dressing   Disability/Function   Hearing Difficulty or Deaf yes   Patient's preferred means of communication English speaker with hearing loss, no speech problems.   Describe hearing loss bilateral hearing loss   Use of hearing assistive devices bilateral hearing aids   Were auxiliary aids offered? no   The following aids were provided; patient declined offer of auxiliary aids   Hearing Management hearing aids   Wear Glasses or Blind yes   Vision Management glasses   Concentrating, Remembering or Making Decisions Difficulty yes   Walking or Climbing Stairs Difficulty yes   Walking or Climbing Stairs ambulation difficulty, requires equipment   Mobility Management WW   Toileting issues yes   Toileting Assistance toileting difficulty, assistance 1 person   Fall history within last six months yes   Number of times patient has fallen within last six months 2   General Information   Onset of Illness/Injury or Date of Surgery 04/28/21   Referring Physician Arturo   Patient/Family Therapy Goals Statement (PT) return home   Pertinent History of Current Problem (include personal factors and/or comorbidities that impact the POC)  89 year old female with history of cerebral infarction, type II diabetes, CKD and hypertension who presents via EMS from her assisted living facility for evaluation of right hip pain after an unwitnessed fall   Existing Precautions/Restrictions fall   Cognition   Orientation Status (Cognition) oriented x 3   Affect/Mental Status (Cognition) WNL   Follows Commands  (Cognition) follows one-step commands   Safety Deficit (Cognition) safety precautions awareness;safety precautions follow-through/compliance   Pain Assessment   Patient Currently in Pain No   Posture    Posture Forward head position;Protracted shoulders   Range of Motion (ROM)   ROM Comment LE ROM is WFL   Strength   Strength Comments B LE strength 4/5 grossly   Bed Mobility   Comment (Bed Mobility) min A   Transfers   Transfer Safety Comments CGA with WW   Gait/Stairs (Locomotion)   Comment (Gait/Stairs) SBA w/ WW, dec step length and reduced gait speed, close to baseline per daughter   Balance   Balance Comments impaired from baseline req WW for mobility   Clinical Impression   Criteria for Skilled Therapeutic Intervention evaluation only   PT Diagnosis (PT) Decreased strength   Influenced by the following impairments impaired gait, dec indep w/ transfers   Functional limitations due to impairments impaired mobility   Clinical Presentation Stable/Uncomplicated   Clinical Presentation Rationale PMH and clincial assessment   Clinical Decision Making (Complexity) low complexity   Therapy Frequency (PT) Evaluation only   Risk & Benefits of therapy have been explained evaluation/treatment results reviewed;care plan/treatment goals reviewed;risks/benefits reviewed;patient;daughter   PT Discharge Planning    PT Discharge Recommendation (DC Rec) home with home care physical therapy   PT Rationale for DC Rec Pt nearing baseline for moblity and ADLS, will benefit from continued PT upon discharge back to [facility]; PT orders completed   Therapy Certification   Start of care date 04/28/21   Certification date from 04/29/21   Certification date to 04/29/21   Medical Diagnosis Weakness   Total Evaluation Time   Total Evaluation Time (Minutes) 15

## 2021-04-29 NOTE — PLAN OF CARE
Summary: R hip pain from unwitnessed fall     Date/time: 4/28/2021  Orientation: Alert to self. Confused. Baseline from previous stroke per daughter.  Observation Goals (met & not met): not met  Activity Level: A2 GB walker  Fall Risk: yes  Behavior & Aggression Tool Color: green  Pain Management: denies pain. Nonverbal indicators present with movement, resolve at rest.   ABNL VS/O2: /65, 169/63, 146/47. Other VSS on 1.5L NC  ABNL Lab/BG: creatinine 1.2, lactic acid 2. , 236.  Diet: regular  Bowel/Bladder: purewick, incontinent.   Drains/Devices: PIV SL. Intermittent abx.   Tests/Procedures for next shift: UA pending  Anticipated DC date: expected tomorrow to TCU.  Other Important Info:

## 2021-04-29 NOTE — PLAN OF CARE
Patient has been discharged April 29, 2021 with daughter. Discharge instructions and education reviewed with pt and daughter, medication schedule and follow up appts discussed. Pt had no questions. All belongings sent with pt. Daughter updated that Home PT will be set up with [facility], number of company provided and was will be arranged by Care Coordinator after the pt discharges since pt needs to return to facility by 1PM. Daughter was in agreement and left her cell phone number.

## 2021-04-29 NOTE — PROGRESS NOTES
RECEIVING UNIT ED HANDOFF REVIEW    ED Nurse Handoff Report was reviewed by: Aline Benavidez RN on April 28, 2021 at 7:33 PM

## 2021-04-29 NOTE — PHARMACY-ADMISSION MEDICATION HISTORY
Pharmacy Medication History  Admission medication history interview status for the 4/28/2021  admission is complete. See EPIC admission navigator for prior to admission medications     Location of Interview: Patient room  Medication history sources: Patient's family/friend (daughter) and med list from assisted living    Significant changes made to the medication list:  Modified APAP, diclofenac, lidocaine, pramipexole    In the past week, patient estimated taking medication this percent of the time: greater than 90%    Additional medication history information:   Daughter gave me last dose info    Medication reconciliation completed by provider prior to medication history? No    Time spent in this activity: 30 minutes    Prior to Admission medications    Medication Sig Last Dose Taking? Auth Provider   acetaminophen (TYLENOL) 325 MG tablet Take 650 mg by mouth 3 times daily 9am, 1pm, and 8pm 4/28/2021 at 1300 Yes Unknown, Entered By History   acetaminophen (TYLENOL) 500 MG tablet Take 500-1,000 mg by mouth 2 times daily as needed for mild pain prn Yes Unknown, Entered By History   aspirin 325 MG tablet Take 1 tablet (325 mg) by mouth daily 4/28/2021 at 0900 Yes Kendall Saba MD   Cholecalciferol (VITAMIN D3 PO) Take 2,000 Units by mouth daily 4/28/2021 at 0900 Yes Unknown, Entered By History   diclofenac (VOLTAREN) 1 % topical gel Apply 2 g topically 3 times daily Apply to both kness, back of the knees, and side to side. 9am, 1pm, and 8pm 4/28/2021 at 1300 Yes Unknown, Entered By History   famotidine (PEPCID) 20 MG tablet Take 1 tablet (20 mg) by mouth 2 times daily 4/28/2021 at 0900 Yes Leo Melgar MD   furosemide (LASIX) 20 MG tablet Take 20 mg by mouth 2 times daily 9 am and 1pm 4/28/2021 at 0900 Yes Leo Melgar MD   gabapentin (NEURONTIN) 100 MG capsule Take 1 capsule (100 mg) by mouth 2 times daily 4/28/2021 at 0900 Yes Leo Melgar MD   glipiZIDE (GLUCOTROL) 10 MG tablet Take 1 tablet (10 mg) by  mouth daily 4/28/2021 at 0900 Yes Leo Melgar MD   LIDOCAINE-DM-TROLAMINE SAL EX Externally apply topically 2 times daily Aspercreme w/Lidocaine (Trolamine salicylate) 0.4% Apply thin later of cream to both feet twice daily and once daily as needed to feet and lower legs 4/28/2021 at 0800 Yes Unknown, Entered By History   metFORMIN (GLUCOPHAGE) 500 MG tablet Take 500 mg by mouth daily  4/28/2021 at 0900 Yes Leo Melgar MD   nystatin (MYCOSTATIN) 996883 UNIT/GM external cream APPLY TO INGUINAL AREAS AND UNDER THE BREASTS BID. 4/28/2021 at 0900 Yes Leo Melgar MD   nystatin (MYCOSTATIN) 852858 UNIT/GM external powder Apply topically 3 times daily as needed (redness in abdominal folds) prn Yes Unknown, Entered By History   ondansetron (ZOFRAN) 4 MG tablet Take 4 mg by mouth every 8 hours as needed for nausea prn Yes Unknown, Entered By History   polyethylene glycol (MIRALAX/GLYCOLAX) Packet Take 8.5 g by mouth every other day  4/27/2021 at 0900 Yes Unknown, Entered By History   pramipexole (MIRAPEX) 0.125 MG tablet Take 0.375 mg by mouth every evening 4/27/2021 at 2000 Yes Unknown, Entered By History   simvastatin (ZOCOR) 5 MG tablet Take 1 tablet (5 mg) by mouth every evening 4/27/2021 at 2000 Yes Kendall Saba MD   sitagliptin (JANUVIA) 50 MG tablet Take 1 tablet (50 mg) by mouth daily 4/28/2021 at 0900 Yes Leo Melgar MD   spironolactone (ALDACTONE) 25 MG tablet Take 0.5 tablets (12.5 mg) by mouth every other day 4/28/2021 at 0900 Yes Leo Melgar MD   blood glucose calibration (NO BRAND SPECIFIED) solution Use to calibrate blood glucose monitor as needed as directed. To accompany: Accu-Chek Safe-T pro plus   Leo Melgar MD   blood glucose monitoring (NO BRAND SPECIFIED) test strip Use to test blood sugar 1 times daily or as directed. To accompany: Accu-Chek safety pro plus   Leo Melgar MD   blood glucose monitoring (ONE TOUCH ULTRA 2) meter device kit Use to test blood sugar 1  times daily or as directed.   Leo Melgar MD   thin (NO BRAND SPECIFIED) lancets Use to test blood sugar 1 times daily or as directed. To accompany: Accu-Cheks safety pro plus   Leo Melgar MD       The information provided in this note is only as accurate as the sources available at the time of update(s)

## 2021-04-29 NOTE — CONSULTS
Care Management Discharge Note    Discharge Date: 04/29/21       Discharge Disposition: Return back to [facility] ([phone])    Discharge Services:  Home Care PT-daughter refused to use an Agency for PT. She is planning on using the Physical Therapy Department at [facility].    Discharge DME:  N/A    Discharge Transportation:  [place] transporting her.    Private pay costs discussed: Not applicable    PAS Confirmation Code:  N/A  Patient/family educated on Medicare website which has current facility and service quality ratings:  N/A    Education Provided on the Discharge Plan:  yes  Persons Notified of Discharge Plans: la ObdulioBetty RN at [facility]  Patient/Family in Agreement with the Plan:  Yes    Handoff Referral Completed: Yes, Outpatient Care Coordinator Referral    Additional Information:  Writer spoke with Betty ALEMAN at [facility]. Orders were faxed to [phone]. Writer spoke with daughter Obdulio. She said in the past she has used [facility] for her mom's Physical Therapy.  She plans on doing this again. Writer spoke with Betty ALEMAN to confirm this plan. She said the daughter does not want anyone coming into the facility to see her mother. She would rather use the   Physical Therapy Department at [facility].   Laura had received a call earlier this am (approximately 1045) from patient's [facility] Care Coordinator informing her to call patient's daughter Obdulio. ([phone])-her note to follow.        Angi Donahue, RN  Care Coordinator

## 2021-05-03 ENCOUNTER — PATIENT OUTREACH (OUTPATIENT)
Dept: GERIATRIC MEDICINE | Facility: CLINIC | Age: 86
End: 2021-05-03

## 2021-05-03 DIAGNOSIS — M25.551 HIP PAIN, RIGHT: Primary | ICD-10-CM

## 2021-05-03 DIAGNOSIS — Z91.81 AT RISK FOR FALLING: ICD-10-CM

## 2021-05-03 NOTE — PROGRESS NOTES
[facility] Care Coordination Contact    CC contacted daughter Obdulio and reviewed discharge summary. Alberta already has an appt with PCP next week. Obdulio also states that Alberta wants to use the PT at [facility] as she is familiar with them. Obdulio states that the PT at [facility] needs to have different orders from PCP sent to the therapy dept. Called AL nurse Betty and left a message asking how member was doing and also ML for therapy dept asking what they needed for orders for member for PT.    Member has had a change in condition: No  Home visit needed: No  Care plan reviewed and updated.  New referrals placed: Yes: Therapies: PT at [facility]  Transition log completed.   PCP notified of transition back to home via EMR.  Autumn Jacobsen RN, PHN, CCM  [facility] Care Coordination  Epibbv-[phone]. [phone]

## 2021-05-03 NOTE — PROGRESS NOTES
"[facility] Care Coordination Contact    Received a call back from Milad with PT dept at [facility]. Orders are for home care PT to eval and treat and the in house PT dept at [facility] is technically considered out patient.   Milad said that they would need new orders for \"PT to eval and Treat\" faxed to them at [phone]. (no mention of home PT in orders). Note sent to PCP with this request.  Autumn Jacobsen RN, PHN, [facility] Care Coordination  Wqqiqc-[phone]. [phone]    "

## 2021-05-04 NOTE — PROGRESS NOTES
[facility] Care Coordination Contact    Called and spoke with Milad in PT to see if he got order for PT as it looked like PCP had written this writer's note to him in a PT order. Milad thinks they may have gotten it but was not in the office currently. He said he will contact this writer if any further follow up is needed for the orders.  Autumn Jacobsen RN, PHN, [facility] Care Coordination  Sxbymu-[phone]. Udkh-[phone]

## 2021-05-06 ENCOUNTER — TRANSFERRED RECORDS (OUTPATIENT)
Dept: HEALTH INFORMATION MANAGEMENT | Facility: CLINIC | Age: 86
End: 2021-05-06

## 2021-05-06 LAB
CREAT SERPL-MCNC: 1.06 MG/DL (ref 0.55–1.02)
GFR SERPL CREATININE-BSD FRML MDRD: 45 ML/MIN/1.73M2
GLUCOSE SERPL-MCNC: 211 MG/DL (ref 70–100)
HBA1C MFR BLD: 8.5 % (ref 0–5.7)
POTASSIUM SERPL-SCNC: 4.4 MMOL/L (ref 3.5–5.1)

## 2021-05-07 ENCOUNTER — MYC MEDICAL ADVICE (OUTPATIENT)
Dept: INTERNAL MEDICINE | Facility: CLINIC | Age: 86
End: 2021-05-07

## 2021-05-08 NOTE — PATIENT INSTRUCTIONS
Try to cut back on desserts and other treats.                                                                                                                                         Please note that I am planning to retire on December 31, 2021.

## 2021-05-10 ENCOUNTER — OFFICE VISIT (OUTPATIENT)
Dept: INTERNAL MEDICINE | Facility: CLINIC | Age: 86
End: 2021-05-10
Payer: COMMERCIAL

## 2021-05-10 VITALS
SYSTOLIC BLOOD PRESSURE: 136 MMHG | HEART RATE: 76 BPM | HEIGHT: 60 IN | TEMPERATURE: 97.6 F | RESPIRATION RATE: 16 BRPM | DIASTOLIC BLOOD PRESSURE: 68 MMHG | WEIGHT: 186.4 LBS | OXYGEN SATURATION: 92 % | BODY MASS INDEX: 36.6 KG/M2

## 2021-05-10 DIAGNOSIS — E11.65 TYPE 2 DIABETES MELLITUS WITH HYPERGLYCEMIA, WITHOUT LONG-TERM CURRENT USE OF INSULIN (H): ICD-10-CM

## 2021-05-10 DIAGNOSIS — N18.30 STAGE 3 CHRONIC KIDNEY DISEASE, UNSPECIFIED WHETHER STAGE 3A OR 3B CKD (H): ICD-10-CM
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
## 2021-05-10 DIAGNOSIS — R47.01 EXPRESSIVE APHASIA: ICD-10-CM

## 2021-05-10 DIAGNOSIS — E79.0 HYPERURICEMIA: ICD-10-CM

## 2021-05-10 DIAGNOSIS — M19.90 ARTHRITIS: ICD-10-CM

## 2021-05-10 PROCEDURE — 99214 OFFICE O/P EST MOD 30 MIN: CPT | Performed by: INTERNAL MEDICINE

## 2021-05-10 RX ORDER — LIDOCAINE 40 MG/G
CREAM TOPICAL
COMMUNITY
End: 2022-01-01

## 2021-05-10 ASSESSMENT — MIFFLIN-ST. JEOR: SCORE: 1162

## 2021-05-10 NOTE — PROGRESS NOTES
Assessment & Plan     Falls, subsequent encounter  No signs of major injury.    Type 2 diabetes mellitus with hyperglycemia, without long-term current use of insulin (H)  We discussed that tight control is not needed.  However she should cut back on some of the desserts and other treats.    Arthritis  Okay to use diclofenac gel  - diclofenac (VOLTAREN) 1 % topical gel  Dispense: 100 g; Refill: 4    Hyperuricemia  No recent symptoms to suggest gout     Expressive aphasia  Due to previous stroke    Stage 3 chronic kidney disease, unspecified whether stage 3a or 3b CKD  Stable               BMI:   Estimated body mass index is 36.4 kg/m  as calculated from the following:    Height as of this encounter: 1.524 m (5').    Weight as of this encounter: 84.6 kg (186 lb 6.4 oz).   Weight management plan: Discussed healthy diet and exercise guidelines    Patient Instructions                Try to cut back on desserts and other treats.                                                                                                                                         Please note that I am planning to retire on December 31, 2021.                  No follow-ups on file.    Leo Melgar MD  [facility] is a 89 year old who presents for the following health issues     HPI       Hospital Follow-up Visit:    Hospital/Nursing Home/IP Rehab Facility: [facility]  Date of Admission: 4/28/21  Date of Discharge: 4/29/21  Reason(s) for Admission: Hip pain, fall       Was your hospitalization related to COVID-19? No   Problems taking medications regularly:  None  Medication changes since discharge: None  Problems adhering to non-medication therapy:  None    Summary of hospitalization:  [facility] discharge summary reviewed  Diagnostic Tests/Treatments reviewed.  Follow up needed: none  Other Healthcare Providers Involved in Patient s Care:          Physical Therapy  Update since discharge: stable.       Post Discharge Medication Reconciliation: discharge medications reconciled and changed, per note/orders.  Plan of care communicated with patient and family                 This patient is here with her daughter, from assisted living.             In the future, she will be moving to a different home, with a higher level of care, and will be followed by the house providers there.               In the meantime, she was admitted to the hospital after falling  and complaining of hip pain.       X-rays were negative for fracture and she was discharged.                Urine culture was negative for UTI.                  She does have some bruising especially on the left posterior lateral hip/buttock area which I examined today.                  Labs were done at her assisted living facility on May 6 and her daughter brought in the results.  The A1c was 8.5.     This patient does not turn down any desserts that are offered in the dining room.  BUN 34 creatinine 1.06 potassium 4.4 sodium 140.  Nonfasting glucose 211 uric acid 9.2.                                         This patient wants a refill of Voltaren gel.  This has been helpful in dealing with knee arthritis especially.  Review of Systems         Current Outpatient Medications   Medication Sig Dispense Refill     acetaminophen (TYLENOL) 325 MG tablet Take 650 mg by mouth 3 times daily 9am, 1pm, and 8pm       acetaminophen (TYLENOL) 500 MG tablet Take 500-1,000 mg by mouth 2 times daily as needed for mild pain       aspirin 325 MG tablet Take 1 tablet (325 mg) by mouth daily 120 tablet      Cholecalciferol (VITAMIN D3 PO) Take 2,000 Units by mouth daily       diclofenac (VOLTAREN) 1 % topical gel Apply 2 g topically 3 times daily Apply to both kness, back of the knees, and side to side. 9am, 1pm, and 8pm       famotidine (PEPCID) 20 MG tablet Take 1 tablet (20 mg) by mouth 2 times daily 180 tablet 4      furosemide (LASIX) 20 MG tablet Take 20 mg by mouth daily Give 1 tablet daily 60 tablet 12     gabapentin (NEURONTIN) 100 MG capsule Take 1 capsule (100 mg) by mouth 2 times daily 60 capsule 11     glipiZIDE (GLUCOTROL) 10 MG tablet Take 1 tablet (10 mg) by mouth daily 60 tablet 11     lidocaine (LMX4) 4 % external cream Apply topically once as needed for mild pain       LIDOCAINE-DM-TROLAMINE SAL EX Externally apply topically 2 times daily Aspercreme w/Lidocaine (Trolamine salicylate) 0.4% Apply thin later of cream to both feet twice daily and once daily as needed to feet and lower legs       metFORMIN (GLUCOPHAGE) 500 MG tablet Take 500 mg by mouth daily  30 tablet 12     nystatin (MYCOSTATIN) 281247 UNIT/GM external cream APPLY TO INGUINAL AREAS AND UNDER THE BREASTS BID. 120 g 8     nystatin (MYCOSTATIN) 286110 UNIT/GM external powder Apply topically 3 times daily as needed (redness in abdominal folds)       ondansetron (ZOFRAN) 4 MG tablet Take 4 mg by mouth every 8 hours as needed for nausea       pramipexole (MIRAPEX) 0.125 MG tablet Take 0.375 mg by mouth every evening       simvastatin (ZOCOR) 5 MG tablet Take 1 tablet (5 mg) by mouth every evening 30 tablet      sitagliptin (JANUVIA) 50 MG tablet Take 1 tablet (50 mg) by mouth daily 30 tablet 11     spironolactone (ALDACTONE) 25 MG tablet Take 0.5 tablets (12.5 mg) by mouth every other day 30 tablet 11     blood glucose calibration (NO BRAND SPECIFIED) solution Use to calibrate blood glucose monitor as needed as directed. To accompany: Accu-Chek Safe-T pro plus 1 Bottle 3     blood glucose monitoring (NO BRAND SPECIFIED) test strip Use to test blood sugar 1 times daily or as directed. To accompany: Accu-Chek safety pro plus 100 strip 6     blood glucose monitoring (ONE TOUCH ULTRA 2) meter device kit Use to test blood sugar 1 times daily or as directed. 1 kit 1     polyethylene glycol (MIRALAX/GLYCOLAX) Packet Take 8.5 g by mouth every other day         thin (NO BRAND SPECIFIED) lancets Use to test blood sugar 1 times daily or as directed. To accompany: Accu-Cheks safety pro plus 100 each 3     Wt Readings from Last 4 Encounters:   05/10/21 84.6 kg (186 lb 6.4 oz)   11/16/20 83.9 kg (185 lb)   08/26/20 84.4 kg (186 lb)   02/05/20 84.8 kg (187 lb)       Objective    /68   Pulse 76   Temp 97.6  F (36.4  C) (Temporal)   Resp 16   Ht 1.524 m (5')   Wt 84.6 kg (186 lb 6.4 oz)   LMP  (LMP Unknown)   SpO2 92%   BMI 36.40 kg/m    Body mass index is 36.4 kg/m .  Physical Exam   GENERAL APPEARANCE: alert, no distress and over weight  CV: regular rates and rhythm  NEURO: She has expressive aphasia and uses a walker  PSYCH: affect normal/bright

## 2021-05-17 ENCOUNTER — DOCUMENTATION ONLY (OUTPATIENT)
Dept: OTHER | Facility: CLINIC | Age: 86
End: 2021-05-17

## 2021-05-19 ENCOUNTER — MEDICAL CORRESPONDENCE (OUTPATIENT)
Dept: HEALTH INFORMATION MANAGEMENT | Facility: CLINIC | Age: 86
End: 2021-05-19

## 2021-05-25 ENCOUNTER — MEDICAL CORRESPONDENCE (OUTPATIENT)
Dept: HEALTH INFORMATION MANAGEMENT | Facility: CLINIC | Age: 86
End: 2021-05-25

## 2021-05-27 ENCOUNTER — PATIENT OUTREACH (OUTPATIENT)
Dept: GERIATRIC MEDICINE | Facility: CLINIC | Age: 86
End: 2021-05-27

## 2021-05-27 NOTE — PROGRESS NOTES
[facility] Care Coordination Contact    Spoke with daughter Obdulio and she states member is doing well-needed only a couple of sessions of PT before returning to baseline. Obdulio states that member plans on moving when [facility] is torn town-member is grand atfhered in to new AL that is attached. So far, plan is for member to move when it opens in Sept.    Asked Obdulio re the POC signature page and Obdulio said she never got the POC or sig page sent to her. Request sent to CMS to please resend POC and sig page and SASE to [facility] at [address] [place] [address] as son Ed was not at the visit this year.  Autumn Jacobsen RN, PHN, CCM  [facility] Care Coordination  Roxrbi-[phone]. [phone]

## 2021-06-08 ENCOUNTER — MYC MEDICAL ADVICE (OUTPATIENT)
Dept: INTERNAL MEDICINE | Facility: CLINIC | Age: 86
End: 2021-06-08

## 2021-06-08 NOTE — LETTER
"June 8, 2021      Regla Benavidez  C/O MONALISA BENAVIDEZ  [address] [facility] [address]        To Whom It May Concern           This message arrived from Alberta's son:        \" Dear Dr. Melgar, Please change Alberta's doctor order for MIRALAX 8.5 grams by mouth from \"Every Two Days at 9:00am\" to \"Every Three Days at 9:00am\" as the current dosage is causing overly soft stools. And, forward the revised doctor order to Betty Reid RN at [facility]. Thanks ever so much. Monalisa Benavidez son & POA. \"                                                                                                                                                                                               The above orders should be instituted.          Please take care of this.    If you have any questions or concerns, please call the clinic at the number listed above.       Sincerely,    Leo Melgar MD                "

## 2021-06-15 ENCOUNTER — TELEPHONE (OUTPATIENT)
Dept: INTERNAL MEDICINE | Facility: CLINIC | Age: 86
End: 2021-06-15

## 2021-06-15 NOTE — LETTER
Ana Paula 15, 2021      Regla Benavidez  C/O MONALISA BENAVIDEZ  [address]  [place] [facility] [address]        Betty RN at [facility] wondering if you can also write a letter verifying blood sugar parameter as to when facility should call if blood sugars are too low or are too high. Family are having issues with this and telling nursing staff not to call they would like something in written form.     Please advise.      Fax letter to [phone]                                                                                                                                                                                                                                                                                       CALL IF GLUCOSE IS LESS THAN 60 OR MORE THAN 400.   If you have any questions or concerns, please call the clinic at the number listed above.       Sincerely,      Leo Melgar MD

## 2021-06-15 NOTE — TELEPHONE ENCOUNTER
Betty RN at [facility] wondering if you can also write a letter verifying blood sugar parameter as to when facility should call if blood sugars are too low or are too high. Family are having issues with this and telling nursing staff not to call they would like something in written form.    Please advise.     Fax letter to [phone]

## 2021-06-24 ENCOUNTER — TELEPHONE (OUTPATIENT)
Dept: INTERNAL MEDICINE | Facility: CLINIC | Age: 86
End: 2021-06-24

## 2021-06-24 NOTE — TELEPHONE ENCOUNTER
Faxed documentation/referral from 04/28/2021 to go to the ER after falls with severe hip and back pain. Faxed to [employer] at [phone].

## 2021-06-24 NOTE — TELEPHONE ENCOUNTER
Pt's daughter Keshav called to request a referral for the ER visit on 4/28/21. [employer] is refusing to pay without the referral.    Keshav [phone] --call when placed    Send referral to [employer] fax [phone]  Phone [phone] provider line to [employer]

## 2021-06-29 ENCOUNTER — MYC MEDICAL ADVICE (OUTPATIENT)
Dept: INTERNAL MEDICINE | Facility: CLINIC | Age: 86
End: 2021-06-29

## 2021-07-14 ENCOUNTER — MEDICAL CORRESPONDENCE (OUTPATIENT)
Dept: HEALTH INFORMATION MANAGEMENT | Facility: CLINIC | Age: 86
End: 2021-07-14

## 2021-07-16 ENCOUNTER — OFFICE VISIT (OUTPATIENT)
Dept: FAMILY MEDICINE | Facility: CLINIC | Age: 86
End: 2021-07-16
Payer: COMMERCIAL

## 2021-07-16 ENCOUNTER — ANCILLARY PROCEDURE (OUTPATIENT)
Dept: GENERAL RADIOLOGY | Facility: CLINIC | Age: 86
End: 2021-07-16
Attending: PHYSICIAN ASSISTANT
Payer: COMMERCIAL

## 2021-07-16 VITALS
SYSTOLIC BLOOD PRESSURE: 132 MMHG | RESPIRATION RATE: 10 BRPM | HEART RATE: 71 BPM | OXYGEN SATURATION: 96 % | TEMPERATURE: 97.7 F | BODY MASS INDEX: 37.46 KG/M2 | DIASTOLIC BLOOD PRESSURE: 68 MMHG | HEIGHT: 59 IN | WEIGHT: 185.8 LBS

## 2021-07-16 DIAGNOSIS — I73.9 PAD (PERIPHERAL ARTERY DISEASE) (H): ICD-10-CM

## 2021-07-16 DIAGNOSIS — E66.01 MORBID OBESITY (H): ICD-10-CM

## 2021-07-16 DIAGNOSIS — I50.9 CONGESTIVE HEART FAILURE, UNSPECIFIED HF CHRONICITY, UNSPECIFIED HEART FAILURE TYPE (H): ICD-10-CM

## 2021-07-16 DIAGNOSIS — R41.0 DELIRIUM: Primary | ICD-10-CM

## 2021-07-16 DIAGNOSIS — E11.65 TYPE 2 DIABETES MELLITUS WITH HYPERGLYCEMIA, WITHOUT LONG-TERM CURRENT USE OF INSULIN (H): ICD-10-CM

## 2021-07-16 DIAGNOSIS — N39.0 URINARY TRACT INFECTION WITHOUT HEMATURIA, SITE UNSPECIFIED: ICD-10-CM

## 2021-07-16 LAB
ALBUMIN UR-MCNC: NEGATIVE MG/DL
APPEARANCE UR: ABNORMAL
BACTERIA #/AREA URNS HPF: ABNORMAL /HPF
BASOPHILS # BLD AUTO: 0 10E3/UL (ref 0–0.2)
BASOPHILS NFR BLD AUTO: 0 %
BILIRUB UR QL STRIP: NEGATIVE
COLOR UR AUTO: YELLOW
EOSINOPHIL # BLD AUTO: 0.3 10E3/UL (ref 0–0.7)
EOSINOPHIL NFR BLD AUTO: 3 %
ERYTHROCYTE [DISTWIDTH] IN BLOOD BY AUTOMATED COUNT: 13.6 % (ref 10–15)
GLUCOSE UR STRIP-MCNC: NEGATIVE MG/DL
HCT VFR BLD AUTO: 41.9 % (ref 35–47)
HGB BLD-MCNC: 13.7 G/DL (ref 11.7–15.7)
HGB UR QL STRIP: NEGATIVE
KETONES UR STRIP-MCNC: NEGATIVE MG/DL
LEUKOCYTE ESTERASE UR QL STRIP: ABNORMAL
LYMPHOCYTES # BLD AUTO: 2.6 10E3/UL (ref 0.8–5.3)
LYMPHOCYTES NFR BLD AUTO: 24 %
MCH RBC QN AUTO: 31.5 PG (ref 26.5–33)
MCHC RBC AUTO-ENTMCNC: 32.7 G/DL (ref 31.5–36.5)
MCV RBC AUTO: 96 FL (ref 78–100)
MONOCYTES # BLD AUTO: 1 10E3/UL (ref 0–1.3)
MONOCYTES NFR BLD AUTO: 10 %
NEUTROPHILS # BLD AUTO: 6.6 10E3/UL (ref 1.6–8.3)
NEUTROPHILS NFR BLD AUTO: 63 %
NITRATE UR QL: NEGATIVE
PH UR STRIP: 5.5 [PH] (ref 5–7)
PLATELET # BLD AUTO: 173 10E3/UL (ref 150–450)
RBC # BLD AUTO: 4.35 10E6/UL (ref 3.8–5.2)
RBC #/AREA URNS AUTO: ABNORMAL /HPF
SP GR UR STRIP: 1.01 (ref 1–1.03)
SQUAMOUS #/AREA URNS AUTO: ABNORMAL /LPF
UROBILINOGEN UR STRIP-ACNC: 0.2 E.U./DL
WBC # BLD AUTO: 10.5 10E3/UL (ref 4–11)
WBC #/AREA URNS AUTO: ABNORMAL /HPF

## 2021-07-16 PROCEDURE — 80053 COMPREHEN METABOLIC PANEL: CPT | Performed by: PHYSICIAN ASSISTANT

## 2021-07-16 PROCEDURE — 99417 PROLNG OP E/M EACH 15 MIN: CPT | Performed by: PHYSICIAN ASSISTANT

## 2021-07-16 PROCEDURE — 81001 URINALYSIS AUTO W/SCOPE: CPT | Performed by: PHYSICIAN ASSISTANT

## 2021-07-16 PROCEDURE — 87186 SC STD MICRODIL/AGAR DIL: CPT | Performed by: PHYSICIAN ASSISTANT

## 2021-07-16 PROCEDURE — 99215 OFFICE O/P EST HI 40 MIN: CPT | Performed by: PHYSICIAN ASSISTANT

## 2021-07-16 PROCEDURE — 71046 X-RAY EXAM CHEST 2 VIEWS: CPT | Performed by: RADIOLOGY

## 2021-07-16 PROCEDURE — 83880 ASSAY OF NATRIURETIC PEPTIDE: CPT | Performed by: PHYSICIAN ASSISTANT

## 2021-07-16 PROCEDURE — 87086 URINE CULTURE/COLONY COUNT: CPT | Performed by: PHYSICIAN ASSISTANT

## 2021-07-16 PROCEDURE — 36415 COLL VENOUS BLD VENIPUNCTURE: CPT | Performed by: PHYSICIAN ASSISTANT

## 2021-07-16 PROCEDURE — 85025 COMPLETE CBC W/AUTO DIFF WBC: CPT | Performed by: PHYSICIAN ASSISTANT

## 2021-07-16 RX ORDER — CEPHALEXIN 500 MG/1
500 CAPSULE ORAL 2 TIMES DAILY
Qty: 14 CAPSULE | Refills: 0 | Status: SHIPPED | OUTPATIENT
Start: 2021-07-16 | End: 2021-07-23

## 2021-07-16 ASSESSMENT — MIFFLIN-ST. JEOR: SCORE: 1143.41

## 2021-07-16 ASSESSMENT — PAIN SCALES - GENERAL: PAINLEVEL: NO PAIN (0)

## 2021-07-16 NOTE — PROGRESS NOTES
Assessment & Plan     Delirium  UA is concerning for UTI, this is the likely cause of her acute confusion. Keflex ordered based on most recent positive culture.  Her CBC is unremarkable today and vitals are stable.  I did order a chest x ray due to some crackles on her lung exam, and this was normal per my read.  There are no signs of acute CHF exacerbation thought this was also considered and BNP ordered and pending.  Advised very close monitoring over the next 24-48 hours.  If fatigue and confusion not improving with oral antibiotics, will need IV abx in the ER.Strict precautions discussions for when she may need emergent care.  Family is agreeable.  BMP pending at this time, will call with results.   - CBC with platelets and differential; Future  - Urine Culture Aerobic Bacterial - lab collect; Future  - Comprehensive metabolic panel (BMP + Alb, Alk Phos, ALT, AST, Total. Bili, TP); Future  - CBC with platelets and differential  - Comprehensive metabolic panel (BMP + Alb, Alk Phos, ALT, AST, Total. Bili, TP)  - Urine Culture Aerobic Bacterial - lab collect  - cephALEXin (KEFLEX) 500 MG capsule; Take 1 capsule (500 mg) by mouth 2 times daily for 7 days    Urinary tract infection without hematuria, site unspecified  - Urine Culture Aerobic Bacterial - lab collect; Future  - Urine Culture Aerobic Bacterial - lab collect  - cephALEXin (KEFLEX) 500 MG capsule; Take 1 capsule (500 mg) by mouth 2 times daily for 7 days    Type 2 diabetes mellitus with hyperglycemia, without long-term current use of insulin (H)  Stable recently    Congestive heart failure, unspecified HF chronicity, unspecified heart failure type (H)  No evidence of acute exacerbation.  - BNP-N terminal pro; Future  - XR Chest 2 Views; Future  - BNP-N terminal pro    PAD (peripheral artery disease) (H)  Stable    Morbid obesity (H)  Not discussed today, per PCP        Follow up in 1 week with PCP    Total time: 75 minutes    Tj Brown,  "ADARSH DELEON [facility] ALEXANDER Arauz is a 89 year old who presents for the following health issues son and daughter     HPI     Genitourinary - Female  Onset/Duration: 4-5 days ago  Description:   Painful urination (Dysuria): no           Frequency: no  Blood in urine (Hematuria): no  Delay in urine (Hesitency): no  Intensity: moderate  Progression of Symptoms:  same  Accompanying Signs & Symptoms: wetting the bed, disorientation, confusion,   Fever/chills: no  Flank pain: no  Nausea and vomiting: no  Vaginal symptoms: none  Abdominal/Pelvic Pain: no  History:   History of frequent UTI s: YES  History of kidney stones: no  Sexually Active: no  Possibility of pregnancy: No  Precipitating or alleviating factors: None  Therapies tried and outcome: none    Alberta presents to the clinic today with her son and daughter for evaluation of recent change in mental status.  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  She is currently in assisted living.  About 5 days ago, staff noticed that she has been very tired, often falling asleep at the table and throughout the day.  She also has been having frequent accidents and urinary frequency.  She seems confused compared to her baseline.  Her vitals have remained stable at home per nursing staff.   She has not been noted to have fever, cough, leg swelling or dyspnea.  She has some chronic discoloration of her left lower leg but no new redness, warmth, pain or swelling.   Assisted living was concerned for UTI.  Last UTI 01/1/2021, treated with oral abx.     Review of Systems   Constitutional, HEENT, cardiovascular, pulmonary, GI, , musculoskeletal, neuro, skin, endocrine and psych systems are negative, except as otherwise noted.      Objective    /68   Pulse 71   Temp 97.7  F (36.5  C) (Tympanic)   Resp 10   Ht 1.499 m (4' 11\")   Wt 84.3 kg (185 lb 12.8 oz)   LMP  (LMP Unknown)   SpO2 96%   BMI 37.53 kg/m    Body " mass index is 37.53 kg/m .  Physical Exam   GENERAL: healthy, alert and no distress  EYES: Eyes grossly normal to inspection, PERRL and conjunctivae and sclerae normal  RESP: crackles noted in right lower lung field, otherwise normal breath sounds throughout  CV: regular rate and rhythm, normal S1 S2, no S3 or S4, no murmur, click or rub, no peripheral edema  ABDOMEN: soft, nontender, no hepatosplenomegaly, no masses and bowel sounds normal  MS: no gross musculoskeletal defects noted, no edema, some chronic appearing brawny discoloration noted to left calf  SKIN: no suspicious lesions or rashes  NEURO: patient is responsive to voice, nods along to questioning, Normal strength and tone  PSYCH: mentation appears normal, affect normal/bright

## 2021-07-17 ENCOUNTER — NURSE TRIAGE (OUTPATIENT)
Dept: NURSING | Facility: CLINIC | Age: 86
End: 2021-07-17

## 2021-07-17 LAB — BACTERIA UR CULT: ABNORMAL

## 2021-07-17 NOTE — TELEPHONE ENCOUNTER
Daughter calling. CTC on file. Patient is not with caller.     Seen at clinic yesterday. Started on an antibiotic for a UTI.  Daughter is waiting to hear about liver test results in case she needs to be taken to ED for IV fluids.     Per chart review, basic metabolic panel was done 7/16, still listed as In process.   Informed caller writer would try to contact lab; however, clinic lab is closed today.    Phone call to daughter. Informed her that clinic lab is closed. Informed her that if there are any critical abnormal results, lab will call to report.  Declined offer to triage patient's symptoms.  Daughter states she was only wondering if she needed to go in to ED based on lab results.     Message to provider: Please call daughter regarding lab results.  She can be reached at [phone].     Rhonda Gabriel RN 07/17/21 1:02 PM  [facility] Nurse Advisor       Reason for Disposition    Caller requesting lab results    Additional Information    Negative: ED call to PCP    Negative: Physician call to PCP    Negative: Call about patient who is currently hospitalized    Negative: Lab or radiology calling with CRITICAL test results    Negative: [1] Prescription not at pharmacy AND [2] was prescribed today by PCP    Negative: [1] Follow-up call from patient regarding patient's clinical status AND [2] information urgent    Negative: [1] Caller requests to speak ONLY to PCP AND [2] urgent question    Negative: [1] Caller requests to speak to PCP now AND [2] won't tell us reason for call  (Exception: if 10 pm to 6 am, caller must first discuss reason for the call)    Negative: Notification of hospital admission    Negative: Notification of death    Protocols used: PCP CALL - NO TRIAGE-A-    COVID 19 Nurse Triage Plan/Patient Instructions    Please be aware that novel coronavirus (COVID-19) may be circulating in the community. If you develop symptoms such as fever, cough, or SOB or if you have concerns about the  "presence of another infection including coronavirus (COVID-19), please contact your health care provider or visit [email or web address].     Disposition/Instructions    Additional COVID19 information to add for patients.   How can I protect others?  If you have symptoms (fever, cough, body aches or trouble breathing): Stay home and away from others (self-isolate) until:    At least 10 days have passed since your symptoms started, And     You ve had no fever--and no medicine that reduces fever--for 1 full day (24 hours), And      Your other symptoms have resolved (gotten better).     If you don t have symptoms, but a test showed that you have COVID-19 (you tested positive):    Stay home and away from others (self-isolate). Follow the tips under \"How do I self-isolate?\" below for 10 days (20 days if you have a weak immune system).    You don't need to be retested for COVID-19 before going back to school or work. As long as you're fever-free and feeling better, you can go back to school, work and other activities after waiting the 10 or 20 days.     How do I self-isolate?    Stay in your own room, even for meals. Use your own bathroom if you can.     Stay away from others in your home. No hugging, kissing or shaking hands. No visitors.    Don t go to work, school or anywhere else.     Clean  high touch  surfaces often (doorknobs, counters, handles, etc.). Use a household cleaning spray or wipes. You ll find a full list on the EPA website:  www.epa.gov/pesticide-registration/list-n-disinfectants-use-against-sars-cov-2.    Cover your mouth and nose with a mask, tissue or washcloth to avoid spreading germs.    Wash your hands and face often. Use soap and water.    Caregivers in these groups are at risk for severe illness due to COVID-19:  o People 65 years and older  o People who live in a nursing home or long-term care facility  o People with chronic disease (lung, heart, cancer, diabetes, kidney, liver, " immunologic)  o People who have a weakened immune system, including those who:  - Are in cancer treatment  - Take medicine that weakens the immune system, such as corticosteroids  - Had a bone marrow or organ transplant  - Have an immune deficiency  - Have poorly controlled HIV or AIDS  - Are obese (body mass index of 40 or higher)  - Smoke regularly    Caregivers should wear gloves while washing dishes, handling laundry and cleaning bedrooms and bathrooms.    Use caution when washing and drying laundry: Don t shake dirty laundry, and use the warmest water setting that you can.    For more tips, go to www.cdc.gov/coronavirus/2019-ncov/downloads/10Things.pdf.    How can I take care of myself?  1. Get lots of rest. Drink extra fluids (unless a doctor has told you not to).     2. Take Tylenol (acetaminophen) for fever or pain. If you have liver or kidney problems, ask your family doctor if it s okay to take Tylenol.     Adults can take either:     650 mg (two 325 mg pills) every 4 to 6 hours, or     1,000 mg (two 500 mg pills) every 8 hours as needed.     Note: Don t take more than 3,000 mg in one day.   Acetaminophen is found in many medicines (both prescribed and over-the-counter medicines). Read all labels to be sure you don t take too much.     For children, check the Tylenol bottle for the right dose. The dose is based on the child s age or weight.    3. If you have other health problems (like cancer, heart failure, an organ transplant or severe kidney disease): Call your specialty clinic if you don t feel better in the next 2 days.    4. Know when to call 911: Emergency warning signs include:    Trouble breathing or shortness of breath    Pain or pressure in the chest that doesn t go away    Feeling confused like you haven t felt before, or not being able to wake up    Bluish-colored lips or face    What are the symptoms of COVID-19?     The most common symptoms are cough, fever and trouble breathing.     Less  common symptoms include body aches, chills, diarrhea (loose, watery poops), fatigue (feeling very tired), headache, runny nose, sore throat and loss of smell.    COVID-19 can cause severe coughing (bronchitis) and lung infection (pneumonia).    How does it spread?     The virus may spread when a person coughs or sneezes into the air. The virus can travel about 6 feet this way, and it can live on surfaces.      Common  (household disinfectants) will kill the virus.    Who is at risk?  Anyone can catch COVID-19 if they re around someone who has the virus.    How can others protect themselves?     Stay away from people who have COVID-19 (or symptoms of COVID-19).    Wash hands often with soap and water. Or, use hand  with at least 60% alcohol.    Avoid touching the eyes, nose or mouth.     Wear a face mask when you go out in public, when sick or when caring for a sick person.    Where can I get more information?    [facility]: About COVID-19: [email or web address]    CDC: What to Do If You re Sick: www.cdc.gov/coronavirus/2019-ncov/about/steps-when-sick.html    CDC: Ending Home Isolation: www.cdc.gov/coronavirus/2019-ncov/hcp/disposition-in-home-patients.html     CDC: Caring for Someone: www.cdc.gov/coronavirus/2019-ncov/if-you-are-sick/care-for-someone.html     [facility]: Interim Guidance for Hospital Discharge to Home: www.health.[facility].[place]/diseases/coronavirus/hcp/hospdischarge.pdf    [facility] clinical trials (COVID-19 research studies): clinicalaffairs.[facility]/umn-clinical-trials     Below are the COVID-19 hotlines at the [place] Department of Health ([facility]). Interpreters are available.   o For health questions: Call 410-542-9266 or 1-304.742.7407 (7 a.m. to 7 p.m.)  o For questions about schools and childcare: Call 151-901-4261 or 1-507.258.7277 (7 a.m. to 7 p.m.)          Thank you for taking steps to prevent the spread of this virus.  o Limit your contact with  others.  o Wear a simple mask to cover your cough.  o Wash your hands well and often.    Resources    [facility]: About COVID-19: [email or web address]    CDC: What to Do If You're Sick: www.cdc.gov/coronavirus/2019-ncov/about/steps-when-sick.html    CDC: Ending Home Isolation: www.cdc.gov/coronavirus/2019-ncov/hcp/disposition-in-home-patients.html     CDC: Caring for Someone: www.cdc.gov/coronavirus/2019-ncov/if-you-are-sick/care-for-someone.html     [facility]: Interim Guidance for Hospital Discharge to Home: www.[facility].[place]/diseases/coronavirus/hcp/hospdischarge.pdf    [facility] clinical trials (COVID-19 research studies): clinicalaffairs.[facility]-clinical-trials     Below are the COVID-19 hotlines at the [place] Department of Health ([facility]). Interpreters are available.   o For health questions: Call 496-846-8005 or 1-563.595.4660 (7 a.m. to 7 p.m.)  o For questions about schools and childcare: Call 574-193-3067 or 1-377.489.4854 (7 a.m. to 7 p.m.)

## 2021-07-18 ENCOUNTER — TELEPHONE (OUTPATIENT)
Dept: FAMILY MEDICINE | Facility: CLINIC | Age: 86
End: 2021-07-18

## 2021-07-18 NOTE — TELEPHONE ENCOUNTER
Called Albertas daughter with the results of her Urine culture that is positive for e coli.  She is taking the appropriate antibiotic and confusion and fatigue are improving over the weekend per daughter's report.  I do not yet have the results of her CMP but will reach out tomorrow with results.  She is agreeable.

## 2021-07-19 ENCOUNTER — TELEPHONE (OUTPATIENT)
Dept: FAMILY MEDICINE | Facility: CLINIC | Age: 86
End: 2021-07-19

## 2021-07-19 LAB
ALBUMIN SERPL-MCNC: 3.8 G/DL (ref 3.4–5)
ALP SERPL-CCNC: 66 U/L (ref 40–150)
ALT SERPL W P-5'-P-CCNC: 24 U/L (ref 0–50)
ANION GAP SERPL CALCULATED.3IONS-SCNC: 4 MMOL/L (ref 3–14)
AST SERPL W P-5'-P-CCNC: 18 U/L (ref 0–45)
BILIRUB SERPL-MCNC: 0.5 MG/DL (ref 0.2–1.3)
BUN SERPL-MCNC: 40 MG/DL (ref 7–30)
CALCIUM SERPL-MCNC: 8.9 MG/DL (ref 8.5–10.1)
CHLORIDE BLD-SCNC: 102 MMOL/L (ref 94–109)
CO2 SERPL-SCNC: 31 MMOL/L (ref 20–32)
CREAT SERPL-MCNC: 1.21 MG/DL (ref 0.52–1.04)
GFR SERPL CREATININE-BSD FRML MDRD: 38 ML/MIN/1.73M2
GLUCOSE BLD-MCNC: 152 MG/DL (ref 70–99)
NT-PROBNP SERPL-MCNC: 396 PG/ML (ref 0–450)
POTASSIUM BLD-SCNC: 4.3 MMOL/L (ref 3.4–5.3)
PROT SERPL-MCNC: 7.2 G/DL (ref 6.8–8.8)
SODIUM SERPL-SCNC: 137 MMOL/L (ref 133–144)

## 2021-07-19 NOTE — TELEPHONE ENCOUNTER
Please call Alberta's daughter, Hiren regarding Alberta's labs.  Her creatinine is slightly elevated but the rest of her electrolytes are normal.  If she is continuing to improve on antibiotics and is eating and drinking well, Alberta can follow up with her PCP in 1 week.  If symptoms are worsening she needs reevaluation more urgently.

## 2021-07-19 NOTE — TELEPHONE ENCOUNTER
Attempted to call daughter Hiren at [phone] and had to leave a message to call the clinic at [phone] and press option 2 to speak with the care team for Tj's message below.    Carlie TURPIN RN  EP Triage

## 2021-07-19 NOTE — TELEPHONE ENCOUNTER
Daughter Hiren calling back and given Tj's reply below.    Scheduled with Dr. Melgar on Thursday 7/29 at 9:40 am for in person visit.    Carlie TURPIN RN  EP Triage

## 2021-07-21 ENCOUNTER — MYC MEDICAL ADVICE (OUTPATIENT)
Dept: INTERNAL MEDICINE | Facility: CLINIC | Age: 86
End: 2021-07-21

## 2021-07-21 DIAGNOSIS — R60.0 BILATERAL LEG EDEMA: ICD-10-CM

## 2021-07-22 RX ORDER — SPIRONOLACTONE 25 MG/1
12.5 TABLET ORAL EVERY OTHER DAY
Qty: 15 TABLET | Refills: 11 | Status: SHIPPED | OUTPATIENT
Start: 2021-07-22

## 2021-07-29 ENCOUNTER — TELEPHONE (OUTPATIENT)
Dept: INTERNAL MEDICINE | Facility: CLINIC | Age: 86
End: 2021-07-29

## 2021-07-29 ENCOUNTER — OFFICE VISIT (OUTPATIENT)
Dept: INTERNAL MEDICINE | Facility: CLINIC | Age: 86
End: 2021-07-29
Payer: COMMERCIAL

## 2021-07-29 VITALS
BODY MASS INDEX: 36.96 KG/M2 | SYSTOLIC BLOOD PRESSURE: 124 MMHG | DIASTOLIC BLOOD PRESSURE: 70 MMHG | HEART RATE: 74 BPM | TEMPERATURE: 98.2 F | WEIGHT: 183 LBS | OXYGEN SATURATION: 92 %

## 2021-07-29 DIAGNOSIS — E11.65 TYPE 2 DIABETES MELLITUS WITH HYPERGLYCEMIA, WITHOUT LONG-TERM CURRENT USE OF INSULIN (H): ICD-10-CM

## 2021-07-29 DIAGNOSIS — Z91.81 AT RISK FOR FALLING: ICD-10-CM

## 2021-07-29 DIAGNOSIS — R41.0 CONFUSION ASSOCIATED WITH INFECTION: ICD-10-CM

## 2021-07-29 DIAGNOSIS — Z86.73 HISTORY OF STROKE: ICD-10-CM

## 2021-07-29 DIAGNOSIS — R26.9 ABNORMAL GAIT: ICD-10-CM

## 2021-07-29 DIAGNOSIS — B99.9 CONFUSION ASSOCIATED WITH INFECTION: ICD-10-CM

## 2021-07-29 DIAGNOSIS — N39.0 RECURRENT UTI: Primary | ICD-10-CM

## 2021-07-29 LAB
ALBUMIN UR-MCNC: NEGATIVE MG/DL
APPEARANCE UR: ABNORMAL
BACTERIA #/AREA URNS HPF: ABNORMAL /HPF
BILIRUB UR QL STRIP: NEGATIVE
COLOR UR AUTO: YELLOW
GLUCOSE UR STRIP-MCNC: >=1000 MG/DL
HGB UR QL STRIP: ABNORMAL
KETONES UR STRIP-MCNC: NEGATIVE MG/DL
LEUKOCYTE ESTERASE UR QL STRIP: ABNORMAL
NITRATE UR QL: POSITIVE
PH UR STRIP: 6 [PH] (ref 5–7)
RBC #/AREA URNS AUTO: ABNORMAL /HPF
SP GR UR STRIP: 1.01 (ref 1–1.03)
SQUAMOUS #/AREA URNS AUTO: ABNORMAL /LPF
UROBILINOGEN UR STRIP-ACNC: 0.2 E.U./DL
WBC #/AREA URNS AUTO: ABNORMAL /HPF
WBC CLUMPS #/AREA URNS HPF: PRESENT /HPF

## 2021-07-29 PROCEDURE — 87086 URINE CULTURE/COLONY COUNT: CPT | Performed by: INTERNAL MEDICINE

## 2021-07-29 PROCEDURE — 87186 SC STD MICRODIL/AGAR DIL: CPT | Performed by: INTERNAL MEDICINE

## 2021-07-29 PROCEDURE — 81001 URINALYSIS AUTO W/SCOPE: CPT | Performed by: INTERNAL MEDICINE

## 2021-07-29 PROCEDURE — 99214 OFFICE O/P EST MOD 30 MIN: CPT | Performed by: INTERNAL MEDICINE

## 2021-07-29 RX ORDER — GLIPIZIDE 10 MG/1
10 TABLET ORAL
Qty: 60 TABLET | Refills: 11 | Status: SHIPPED | OUTPATIENT
Start: 2021-07-29 | End: 2023-01-01

## 2021-07-29 NOTE — TELEPHONE ENCOUNTER
Daughter Hrien called asking about urine results coral back at   [phone] -Ok to leave message    Jen Howard MA

## 2021-07-29 NOTE — PATIENT INSTRUCTIONS
Today we increased the glipizide to 10 mg twice per day.                          I will let you know your urine results.                   Consider getting the shingles vaccine (Shingrix) at your pharmacy.

## 2021-07-29 NOTE — TELEPHONE ENCOUNTER
The urinalysis is abnormal, but there are epithelial cells present, so we do not know yet if there is an actual urinary tract infection, versus a contaminated specimen.         The urine is incubating, and we should have a culture result  in approximately 2 days.       Please let her know.

## 2021-07-29 NOTE — PROGRESS NOTES
Assessment & Plan     Recurrent UTI  Recheck urine today.  - UA Macro with Reflex to Micro and Culture - lab collect  -   Confusion associated with infection  Greatly improved.    Type 2 diabetes mellitus with hyperglycemia, without long-term current use of insulin (H)  We will increase glipizide to 10 mg twice daily.  - glipiZIDE (GLUCOTROL) 10 MG tablet  Dispense: 60 tablet; Refill: 11    History of stroke    - Wheelchair Order for DME - ONLY FOR DME    At risk for falling    - Wheelchair Order for DME - ONLY FOR DME    Abnormal gait    - Wheelchair Order for DME - ONLY FOR DME               Patient Instructions   Today we increased the glipizide to 10 mg twice per day.                          I will let you know your urine results.                   Consider getting the shingles vaccine (Shingrix) at your pharmacy.         No follow-ups on file.    Leo Melgar MD  [facility] is a 89 year old who presents for the following health issues  accompanied by her son and daughter.                   HPI     Pt is following up from 7/16/2021 visit for a uti here to see if the antibiotics are working. Still having urinary frequency    pallative care                                                                                                                                                                                                    This patient is here with her son, and with her daughter.       Due to her expressive aphasia, they do most of the talking.                We reviewed the office note of a couple of weeks ago.  She had a thorough evaluation due to symptoms of confusion, etc.  She was found to have a urinary tract infection.    She finished taking cephalexin a few days ago.                 UTI showed E coli, R to TMP/sulfa and to quinolones.                                                                                                                                                                                                family is in the process of getting the patient moved to a memory care/nursing home unit in approximately 6 weeks.   Then her care will be transferred to the house providers.              In terms of level of care, they had questions about palliative care, etc.  I suggested they talk with the new providers and with the  at the new facility after she moves.                                                                                                                                            we reviewed her glucoses, her morning glucoses have been over 300 lately.             She is currently in assisted living, and insulin administration would be very difficult.              Her family advises her to avoid desserts etc., but they really have no good control over this.                                          This patient has a great deal of trouble walking, is very slow with use of a walker, and they would like to have an Rx for a wheelchair.     Patient Active Problem List    Diagnosis Date Noted     PAD (peripheral artery disease) (H) 02/09/2018     Priority: High     Type 2 diabetes mellitus with stage 3 chronic kidney disease, without long-term current use of insulin (H) 02/09/2018     Priority: High     Type 2 diabetes mellitus with hyperglycemia, without long-term current use of insulin (H) 07/05/2017     Priority: High     Hyponatremia 12/20/2014     Priority: High     With acute illness 11/14; HCTZ stopped.                Later,furosemide and spironolactone added; in 5/17, 36/1.18, Na+ 137,K+4.5, glucose 257       Expressive aphasia 07/02/2014     Priority: High     Cerebral infarction (H) 10/22/2013     Priority: High     10/21/13;L frontal, MCA ; see hosp () and rehab (Dr. Saba) reports.            Has severe expressive aphasia  Diagnosis updated by automated process. Provider to review and  confirm.       Hip pain, right 04/28/2021     Priority: Medium     Fall, initial encounter 04/28/2021     Priority: Medium     Obesity (BMI 35.0-39.9) with comorbidity (H) 11/26/2019     Priority: Medium     Intertrigo 11/26/2019     Priority: Medium     Hypertension goal BP (blood pressure) < 140/80 11/25/2019     Priority: Medium     Pain in both feet 08/04/2018     Priority: Medium     Foot deformity, bilateral 08/04/2018     Priority: Medium     Diabetic polyneuropathy associated with other specified diabetes mellitus (H) 08/04/2018     Priority: Medium     B12 452 in 5/19       Bilateral bunions 08/04/2018     Priority: Medium     Hyperuricemia 04/10/2018     Priority: Medium     9.3; COLCRYS IS COVERED,NOT GENERIC COLCHICINE  10.5 in 5/18       Cellulitis, leg 03/02/2018     Priority: Medium     Cellulitis left leg 03/01/2018     Priority: Medium     Left foot pain 02/28/2018     Priority: Medium     Recent Results (from the past 24 hour(s))   MR Foot Left w/o Contrast    Narrative    MR FOOT LEFT WITHOUT CONTRAST 3/1/2018 2:19 PM    HISTORY: Left foot pain and swelling; etiology? Differential diagnosis  includes gout and diabetic foot infection.    COMPARISON: X-ray left foot from 2/24/2018.    TECHNIQUE: Routine multiplanar, multisequence imaging was performed.    FINDINGS:   Osseous structures: Prominent first MTP joint degenerative changes and  hallux valgus with some marginal erosive changes and subchondral cyst  formation. This could be degenerative but gout could have a similar  appearance as there is some moderate adjacent soft tissue prominence.  Remaining osseous structures are unremarkable. No evidence of fracture  or osteomyelitis.    Additional findings: Diffuse subcutaneous edema. There is also edema  in the deep musculature of the plantar aspect of the foot. Findings  suggest cellulitis and perhaps myositis as well. No evidence of  abscess or fluid collection.      Impression    IMPRESSION:  1.  "Somewhat focal marginal erosive changes surrounding the first MTP  joint suggests the possibility of gout. There is also soft tissue  prominence surrounding the joint supporting this diagnosis. There may  be secondary degenerative changes as well.  2. Diffuse subcutaneous and deep muscle edema suggesting  cellulitis/myositis. No abscess or fluid collection.    REYNALDO BLACKWOOD MD        Uric acid 9.3       CKD (chronic kidney disease) stage 3, GFR 30-59 ml/min 02/09/2018     Priority: Medium     Class 2 obesity due to excess calories with serious comorbidity and body mass index (BMI) of 35.0 to 35.9 in adult 02/09/2018     Priority: Medium     Other vaginitis 07/29/2017     Priority: Medium     due to urinary incontinence       Dermatitis 07/29/2017     Priority: Medium     skin folds       Continuous leakage of urine 07/29/2017     Priority: Medium     Bilateral hearing loss, unspecified hearing loss type 07/29/2017     Priority: Medium     Bilateral leg edema 03/22/2017     Priority: Medium     Prerenal azotemia 02/14/2017     Priority: Medium     Abdominal bloating 10/28/2015     Priority: Medium     CT neg except L ovarian cyst; CA-125= 3.    The other labs ok; see office visit note 10/21/15       Abnormal weight gain 10/28/2015     Priority: Medium     Cyst of left ovary 10/28/2015     Priority: Medium     Complex on CT and US: 6x4 cm in 11/15; d/w her son and daughter; given her age and comorbidities and lack of symptoms, and a low Ca 125 level, further work-up was not done.       Recurrent UTI 03/18/2015     Priority: Medium     See urology note 12/15; cysto neg; PVR low; \"consider a prophylactic antibiotic\"       Chronic midline low back pain without sciatica 03/18/2015     Priority: Medium     Worse in 12/18;              HERE IS THE SPINE XRAY REPORT:           IMPRESSION: Mild to moderate scoliotic curvature of the lumbar spine,  apex left. No compression deformity or acute fracture. Anterior and  right " lateral osteophytes in the mid to lower lumbar spine. Degree of  this severe degenerative change has progressed since previous exam.       Restless legs syndrome 11/27/2013     Priority: Medium     Hard of hearing      Priority: Medium     hearing aids       Constipation 11/18/2013     Priority: Medium     Branch retinal vein occlusion of left eye 11/26/2010     Priority: Medium     Rx Avastin in 2011       ACP (advance care planning) 11/17/2014     Priority: Low     Advance Care Planning 6/12/2017: ACP Review of ACP with client and family. Reviewed advanced care plan and POLST and all remain current.  Justusmiguel Benavidez has an up to date advance care plan on file.  Added by Autumn Jacobsen  Advance Care Planning 6/16/2016: ACP Review of ACP with client and family.  Reviewed advance care plan and POLST with client and it still reflects client's wishes.  Justusmiguel Benavidez has an up to date advance care plan on file.  Added by Autumn Jacobsen  Advance Care Planning:   Receipt of ACP document:  Received: POLST which was signed and dated by provider on 2/11/14.  Document previously scanned on 4/8/14.  Order reviewed and found to be valid.  Code Status reflects choices in most recent ACP document.  Confirmed/documented designated decision maker(s). See permanent comments section of demographics in clinical tab. View document(s) and details by clicking on code status. Added by Ashanti Benavidez on 11/17/2014.  Advance Care Planning: Receipt of ACP document:  Received: Health Care Directive which was witnessed or notarized on 9/30/09.  Document previously scanned on 10/29/13.  Validation form completed and  scanned.  Code Status reflects choices in most recent ACP document has a POLST .  Confirmed/documented designated decision maker(s). See permanent comments section of demographics in clinical tab. View document(s) and details by clicking on code status. Added by Ashanti Benavidez on 11/17/2014.           Preventive measure  09/11/2013     Priority: Low     [facility] DATA BASE UNDER THE 9/11/13 NOTE  Colonoscopy 11/01; mammogram 6/00    [facility] [phone]       Past Surgical History:   Procedure Laterality Date     CATARACT IOL, RT/LT       Social History     Socioeconomic History     Marital status:      Spouse name: Not on file     Number of children: 5     Years of education: Not on file     Highest education level: Not on file   Occupational History     Not on file   Tobacco Use     Smoking status: Never Smoker     Smokeless tobacco: Never Used   Substance and Sexual Activity     Alcohol use: No     Drug use: No     Sexual activity: Not Currently   Other Topics Concern     Parent/sibling w/ CABG, MI or angioplasty before 65F 55M? No   Social History Narrative    , had 5 children with 4 living, son Ed is POA and patient has a POLST and says DNR/DNI which son agrees with.  (last updated 3/1/2018)      Social Determinants of Health     Financial Resource Strain:      Difficulty of Paying Living Expenses:    Food Insecurity: No Food Insecurity     Worried About Running Out of Food in the Last Year: Never true     Ran Out of Food in the Last Year: Never true   Transportation Needs: No Transportation Needs     Lack of Transportation (Medical): No     Lack of Transportation (Non-Medical): No   Physical Activity: Unknown     Days of Exercise per Week: 0 days     Minutes of Exercise per Session: Not on file   Stress:      Feeling of Stress :    Social Connections: Unknown     Frequency of Communication with Friends and Family: More than three times a week     Frequency of Social Gatherings with Friends and Family: Three times a week     Attends [employer] Services: Not on file     Active Member of Clubs or Organizations: No     Attends Club or Organization Meetings: Never     Marital Status:    Intimate Partner Violence:      Fear of Current or Ex-Partner:      Emotionally Abused:      Physically Abused:       Sexually Abused:      Immunization History   Administered Date(s) Administered     COVID-19,PF,Moderna 01/21/2021, 02/18/2021     Influenza (High Dose) 3 valent vaccine 09/25/2015, 10/04/2016, 10/24/2018, 10/23/2019, 11/03/2020     Influenza (IIV3) PF 09/15/2009, 10/03/2013, 10/14/2014, 10/05/2017     Pneumo Conj 13-V (2010&after) 11/16/2016     Pneumococcal 23 valent 09/15/2002     TD (ADULT, 7+) 11/08/2010     Current Outpatient Medications   Medication Sig Dispense Refill     acetaminophen (TYLENOL) 325 MG tablet Take 650 mg by mouth 3 times daily 9am, 1pm, and 8pm       acetaminophen (TYLENOL) 500 MG tablet Take 500-1,000 mg by mouth 2 times daily as needed for mild pain       aspirin 325 MG tablet Take 1 tablet (325 mg) by mouth daily 120 tablet      blood glucose calibration (NO BRAND SPECIFIED) solution Use to calibrate blood glucose monitor as needed as directed. To accompany: Accu-Chek Safe-T pro plus 1 Bottle 3     blood glucose monitoring (NO BRAND SPECIFIED) test strip Use to test blood sugar 1 times daily or as directed. To accompany: Accu-Chek safety pro plus 100 strip 6     blood glucose monitoring (ONE TOUCH ULTRA 2) meter device kit Use to test blood sugar 1 times daily or as directed. 1 kit 1     Cholecalciferol (VITAMIN D3 PO) Take 2,000 Units by mouth daily       diclofenac (VOLTAREN) 1 % topical gel Apply 2 g topically 3 times daily Apply to both kness, back of the knees, and side to side. 9am, 1pm, and 8pm 100 g 4     famotidine (PEPCID) 20 MG tablet Take 1 tablet (20 mg) by mouth 2 times daily 180 tablet 4     furosemide (LASIX) 20 MG tablet Take 20 mg by mouth daily Give 1 tablet daily 60 tablet 12     gabapentin (NEURONTIN) 100 MG capsule Take 1 capsule (100 mg) by mouth 2 times daily 60 capsule 11     glipiZIDE (GLUCOTROL) 10 MG tablet Take 1 tablet (10 mg) by mouth 2 times daily (before meals) 60 tablet 11     lidocaine (LMX4) 4 % external cream Apply topically once as needed for mild pain        LIDOCAINE-DM-TROLAMINE SAL EX Externally apply topically 2 times daily Aspercreme w/Lidocaine (Trolamine salicylate) 0.4% Apply thin later of cream to both feet twice daily and once daily as needed to feet and lower legs       metFORMIN (GLUCOPHAGE) 500 MG tablet Take 500 mg by mouth daily  30 tablet 12     nystatin (MYCOSTATIN) 933790 UNIT/GM external cream APPLY TO INGUINAL AREAS AND UNDER THE BREASTS BID. 120 g 8     nystatin (MYCOSTATIN) 710352 UNIT/GM external powder Apply topically 3 times daily as needed (redness in abdominal folds)       ondansetron (ZOFRAN) 4 MG tablet Take 4 mg by mouth every 8 hours as needed for nausea       polyethylene glycol (MIRALAX/GLYCOLAX) Packet Take 8.5 g by mouth every other day        pramipexole (MIRAPEX) 0.125 MG tablet Take 0.375 mg by mouth every evening       simvastatin (ZOCOR) 5 MG tablet Take 1 tablet (5 mg) by mouth every evening 30 tablet      sitagliptin (JANUVIA) 50 MG tablet Take 1 tablet (50 mg) by mouth daily 30 tablet 11     spironolactone (ALDACTONE) 25 MG tablet Take 0.5 tablets (12.5 mg) by mouth every other day 15 tablet 11     thin (NO BRAND SPECIFIED) lancets Use to test blood sugar 1 times daily or as directed. To accompany: Accu-Cheks safety pro plus 100 each 3     cefuroxime (CEFTIN) 250 MG tablet TO NURSING STAFF; GIVE THIS MEDICATION BID FOR 7 DAYS.                   Leo Melgar MD 14 tablet 0     Wt Readings from Last 4 Encounters:   07/29/21 83 kg (183 lb)   07/16/21 84.3 kg (185 lb 12.8 oz)   05/10/21 84.6 kg (186 lb 6.4 oz)   11/16/20 83.9 kg (185 lb)           Objective    /70   Pulse 74   Temp 98.2  F (36.8  C) (Oral)   Wt 83 kg (183 lb)   LMP  (LMP Unknown)   SpO2 92%   BMI 36.96 kg/m    Body mass index is 36.96 kg/m .  Physical Exam   GENERAL APPEARANCE: alert, no distress and over weight  CV: regular rates and rhythm and pitting B/L LE edema to trace to 1+  NEURO: gait abnormal ; she uses a walker    Urine result today  is pending

## 2021-07-31 LAB
BACTERIA UR CULT: ABNORMAL
BACTERIA UR CULT: ABNORMAL

## 2021-07-31 RX ORDER — CEFPODOXIME PROXETIL 100 MG/1
100 TABLET, FILM COATED ORAL 2 TIMES DAILY
Qty: 14 TABLET | Refills: 0 | Status: SHIPPED | OUTPATIENT
Start: 2021-07-31 | End: 2021-08-26

## 2021-08-02 ENCOUNTER — TELEPHONE (OUTPATIENT)
Dept: INTERNAL MEDICINE | Facility: CLINIC | Age: 86
End: 2021-08-02

## 2021-08-02 NOTE — TELEPHONE ENCOUNTER
Quyen LAEMAN calling from assisted living for results of urine culture. Fax number is [phone].  Results faxed as requested.   Kenyatta Haji RN

## 2021-08-02 NOTE — TELEPHONE ENCOUNTER
See result notes from the urine cx:        RX for cefpodoxime (VANTIN) 100 MG tablet was sent to pharmacy on 7/31 (sat).    Order printed & faxed (FAX: [phone]) to AL today (mon) 8/2.    Tried calling to confirm they got fax, but listed ph# (Phone [phone]) is not in service.

## 2021-08-02 NOTE — TELEPHONE ENCOUNTER
Pt daughter Hiren calling-CTC on file; is very frustrated. Pt had antibiotic called in on 7-31-21, which was after hours for patients pharmacy. [facility]. Hiren states that she already waited a week to get in to see Dr. Melgar and then had to wait the entire weekend to get medication. Hiren states that she was then on hold for 45 minutes to talk to an RN to get this message to Dr. Melgar. Hiren states that they will be leaving [facility] because they are so frustrated with the Central calling system     Please fax order for antibiotic to pt AL. Please call and confirm that order has been received.     Routing high priority to OX IM Triage.     FAX: [phone]  Phone [phone]  Attn: Betty King RN

## 2021-08-05 ENCOUNTER — TRANSFERRED RECORDS (OUTPATIENT)
Dept: HEALTH INFORMATION MANAGEMENT | Facility: CLINIC | Age: 86
End: 2021-08-05

## 2021-08-05 LAB
CREATININE (EXTERNAL): 1.08 MG/DL (ref 0.55–1.02)
GFR ESTIMATED (EXTERNAL): 44 ML/MIN/1.73M2
GLUCOSE (EXTERNAL): 265 MG/DL (ref 70–100)
HBA1C MFR BLD: 9.2 %
POTASSIUM (EXTERNAL): 4.5 MMOL/L (ref 3.5–5.1)

## 2021-08-06 ENCOUNTER — MEDICAL CORRESPONDENCE (OUTPATIENT)
Dept: HEALTH INFORMATION MANAGEMENT | Facility: CLINIC | Age: 86
End: 2021-08-06

## 2021-08-23 ENCOUNTER — TELEPHONE (OUTPATIENT)
Dept: INTERNAL MEDICINE | Facility: CLINIC | Age: 86
End: 2021-08-23

## 2021-08-23 ENCOUNTER — LAB (OUTPATIENT)
Dept: LAB | Facility: CLINIC | Age: 86
End: 2021-08-23
Payer: COMMERCIAL

## 2021-08-23 DIAGNOSIS — N39.0 RECURRENT UTI: ICD-10-CM

## 2021-08-23 LAB
ALBUMIN UR-MCNC: NEGATIVE MG/DL
APPEARANCE UR: ABNORMAL
BILIRUB UR QL STRIP: NEGATIVE
COLOR UR AUTO: YELLOW
GLUCOSE UR STRIP-MCNC: 250 MG/DL
HGB UR QL STRIP: NEGATIVE
KETONES UR STRIP-MCNC: NEGATIVE MG/DL
LEUKOCYTE ESTERASE UR QL STRIP: ABNORMAL
NITRATE UR QL: NEGATIVE
PH UR STRIP: 5 [PH] (ref 5–7)
RBC #/AREA URNS AUTO: NORMAL /HPF
SP GR UR STRIP: 1.01 (ref 1–1.03)
UROBILINOGEN UR STRIP-ACNC: 0.2 E.U./DL
WBC #/AREA URNS AUTO: NORMAL /HPF

## 2021-08-23 PROCEDURE — 87086 URINE CULTURE/COLONY COUNT: CPT

## 2021-08-23 PROCEDURE — 81001 URINALYSIS AUTO W/SCOPE: CPT

## 2021-08-23 PROCEDURE — 87186 SC STD MICRODIL/AGAR DIL: CPT

## 2021-08-23 NOTE — TELEPHONE ENCOUNTER
Daughter Obdulio calling requesting results of the UA/UC that was done today.     Read Dr. Melgar's message that urine looks a lot better and going to wait for culture results.    Advised no abx sent to pharmacy.    Daughter states understanding.    Carlie TURPIN RN  EP Triage

## 2021-08-23 NOTE — TELEPHONE ENCOUNTER
Daughter evy would like a call when Urine results are in and she is needing the antibiotic  ( if needed) order sent to the assisted living fax number below okay to leave detailed message for daughter       [phone] attention Betty Zhu,   ASH Kidd

## 2021-08-25 LAB — BACTERIA UR CULT: ABNORMAL

## 2021-08-25 RX ORDER — CEFUROXIME AXETIL 250 MG/1
250 TABLET ORAL 2 TIMES DAILY
Qty: 14 TABLET | Refills: 0 | Status: SHIPPED | OUTPATIENT
Start: 2021-08-25 | End: 2021-08-25

## 2021-08-30 ENCOUNTER — MYC MEDICAL ADVICE (OUTPATIENT)
Dept: INTERNAL MEDICINE | Facility: CLINIC | Age: 86
End: 2021-08-30

## 2021-08-31 ENCOUNTER — MEDICAL CORRESPONDENCE (OUTPATIENT)
Dept: HEALTH INFORMATION MANAGEMENT | Facility: CLINIC | Age: 86
End: 2021-08-31
Payer: COMMERCIAL

## 2021-09-01 ENCOUNTER — TELEPHONE (OUTPATIENT)
Dept: INTERNAL MEDICINE | Facility: CLINIC | Age: 86
End: 2021-09-01

## 2021-09-01 NOTE — TELEPHONE ENCOUNTER
patient is discharged from assisted living to the [facility] on 09/9/21    Faxed over two forms yesterday and need to be faxed back asap- please make sire both forms are addressed     Please send these back today and they need to arrange transfer and orders    Daphney GAMBLE RN BSN  [facility]- [place]  [phone]

## 2021-09-02 ENCOUNTER — MEDICAL CORRESPONDENCE (OUTPATIENT)
Dept: HEALTH INFORMATION MANAGEMENT | Facility: CLINIC | Age: 86
End: 2021-09-02

## 2021-09-02 ENCOUNTER — TELEPHONE (OUTPATIENT)
Dept: INTERNAL MEDICINE | Facility: CLINIC | Age: 86
End: 2021-09-02

## 2021-09-02 NOTE — TELEPHONE ENCOUNTER
TC/ PCP    Romelia from [facility] called to verify PCP received form sent from them. Pt is transferring care to their care center and they need his form faxed back today or tomorrow. Preferably today.      TC, please fax form to number below. If this was not received, please call Romelia to request form. Thanks!      Phone number for Romelia is [phone] and fax [phone]

## 2021-09-18 ENCOUNTER — HEALTH MAINTENANCE LETTER (OUTPATIENT)
Age: 86
End: 2021-09-18

## 2021-09-22 ENCOUNTER — PATIENT OUTREACH (OUTPATIENT)
Dept: GERIATRIC MEDICINE | Facility: CLINIC | Age: 86
End: 2021-09-22

## 2021-09-22 NOTE — PROGRESS NOTES
[facility] Care Coordination Contact    Late entry. Received notice on 9/21/21 that member moved from [facility] to building that was built next door-[facility]. This was a planned moved as [facility] was going to be demolished and member was grand fathered in to be able to move to new building, now called [facility].  Called son Ed and he verified that member moved on 9/9/21. Member ended up moving to [facility] instead of the [facility] as per Ed, they had openings for her -with a private room, and there was no guarantee that there would be a LTC bed available for her as her condition worsened so they decided to have her move to LTC now. Member is now seeing in house NH team of [facility].  Member is adjusting to the move.   [facility] completed closing member's EW effective 9/9/21. [facility] [id number] faxed to the [facility]. UTF completed.   On 9/21/21 called [facility] and [facility] to notify them that member moved to LTC on 9/9/21. [facility] faxed to LTC. DTR info to Deneen Henry.  Autumn Jacobsen RN, PHN, CCM  [facility] Care Coordination  [phone]. Fywn-[phone]

## 2021-09-28 ENCOUNTER — PATIENT OUTREACH (OUTPATIENT)
Dept: GERIATRIC MEDICINE | Facility: CLINIC | Age: 86
End: 2021-09-28

## 2021-09-28 NOTE — PROGRESS NOTES
[facility] Care Coordination Contact    Received a request to submit a DTR for the terminated of Assisted Living, supplies, EW. Documentation completed and faxed to the health plan. Care Coordinator aware.    Deneen Henry RN  Utilization   [facility]  [phone]

## 2021-10-05 NOTE — PROGRESS NOTES
[facility] Care Coordination Contact    Received notice that the health plan approved the termination of services including EW in the community as member moved to a NH. Will no longer follow.  Autmun Jacobsen RN, PHN, [facility] Care Coordination  Crvftr-[phone]. cell-[phone]

## 2021-11-23 ENCOUNTER — OFFICE VISIT (OUTPATIENT)
Dept: PODIATRY | Facility: CLINIC | Age: 86
End: 2021-11-23
Payer: COMMERCIAL

## 2021-11-23 VITALS
SYSTOLIC BLOOD PRESSURE: 148 MMHG | BODY MASS INDEX: 36.19 KG/M2 | WEIGHT: 179.5 LBS | DIASTOLIC BLOOD PRESSURE: 90 MMHG | HEIGHT: 59 IN

## 2021-11-23 DIAGNOSIS — L60.8 NAIL DEFORMITY: ICD-10-CM

## 2021-11-23 DIAGNOSIS — M79.675 PAIN OF TOE OF LEFT FOOT: ICD-10-CM

## 2021-11-23 DIAGNOSIS — E13.42 DIABETIC POLYNEUROPATHY ASSOCIATED WITH OTHER SPECIFIED DIABETES MELLITUS (H): Chronic | ICD-10-CM

## 2021-11-23 DIAGNOSIS — B35.1 ONYCHOMYCOSIS: ICD-10-CM

## 2021-11-23 DIAGNOSIS — E11.22 TYPE 2 DIABETES MELLITUS WITH STAGE 3B CHRONIC KIDNEY DISEASE, WITHOUT LONG-TERM CURRENT USE OF INSULIN (H): ICD-10-CM

## 2021-11-23 DIAGNOSIS — N18.32 TYPE 2 DIABETES MELLITUS WITH STAGE 3B CHRONIC KIDNEY DISEASE, WITHOUT LONG-TERM CURRENT USE OF INSULIN (H): ICD-10-CM

## 2021-11-23 DIAGNOSIS — L60.2 NAIL HYPERTROPHY: Primary | ICD-10-CM

## 2021-11-23 PROCEDURE — 11720 DEBRIDE NAIL 1-5: CPT | Mod: Q8 | Performed by: PODIATRIST

## 2021-11-23 PROCEDURE — 99203 OFFICE O/P NEW LOW 30 MIN: CPT | Mod: 25 | Performed by: PODIATRIST

## 2021-11-23 ASSESSMENT — MIFFLIN-ST. JEOR: SCORE: 1114.84

## 2021-11-23 NOTE — PATIENT INSTRUCTIONS
Thank you for choosing [facility]!    DR. CERDA'S CLINIC LOCATIONS     [facility] SURGERY: [phone]   [address] [place] [address] APPOINTMENTS: [phone]   [place] [address] BILLING QUESTIONS: [phone]  [phone] RADIOLOGY: [phone]       [place] [id number]    [place] [address]    [phone]  [phone]      Follow up: as needed    Next steps: soak feet twice a day in luke warm soap water twice daily x 1 week      SIGNS OF INFECTION    expanding redness around the wound     yellow or greenish-colored pus or cloudy wound drainage     red streaking spreading from the wound     increased swelling, tenderness, or pain around the wound     fever  *If you notice any of these signs of infection, call us right away!

## 2021-11-23 NOTE — PROGRESS NOTES
ASSESSMENT:  Encounter Diagnoses   Name Primary?     Nail hypertrophy Yes     Pain of toe of left foot      Nail deformity      Onychomycosis      Type 2 diabetes mellitus with stage 3b chronic kidney disease, without long-term current use of insulin (H)      Diabetic polyneuropathy associated with other specified diabetes mellitus (H)      MEDICAL DECISION MAKING:  Although no clinical signs of infection, the left hallux toenail presentation is consistent with ingrown nail.  This involves the most distal medial aspect of the nail plate.  Haroldo's daughter inquired about nail debridement.  I explained that this is a reasonable option, if I can cut the nail at a slant.  Otherwise a more formal nail avulsion is an option.  However, given her diabetes and peripheral arterial disease, I am concerned about healing problems.    Instructions for her care facility were provided.  I recommend soaking in lukewarm soap water, twice daily, for 1 week.    Using a nail nippers, 5 toenails were debrided, including the left hallux nail.  The distal medial corner was cut at a slant.  I was able to remove some subungual debris.  She tolerated this well.  No bleeding.    Disclaimer: This note consists of symbols derived from keyboarding, dictation and/or voice recognition software. As a result, there may be errors in the script that have gone undetected. Please consider this when interpreting information found in this chart.    Bill Garcia DPM, FACFAS, MS    [facility] Department of Podiatry/Foot & Ankle Surgery      ____________________________________________________________________    HPI:       **.     Chief Complaint:   Onset of problem: months  For a while, due to the pandemic, she did not have regular nail care at her living facility.  They recently resumed with nail care, yet her daughter wonders if they were able to trim enough.  Alberta has occasional pain related to her toenails.  The pain is exacerbated by pressure    Type  2 DM  Last Hgb A1C = 9.2.    Past Medical History:   Diagnosis Date     Aspiration pneumonia (H) 11/15/2014    11/14; and UTI with sepsis      Cholelithiasis 2009    incidental finding     DJD (degenerative joint disease) of lumbar spine 2009     Elevated glucose      Expressive aphasia related to prior stroke      Hard of hearing     hearing aids     HTN (hypertension)      Shingles 2005     Varicose vein of leg    *  *  Past Surgical History:   Procedure Laterality Date     CATARACT IOL, RT/LT     *  *  Current Outpatient Medications   Medication Sig Dispense Refill     acetaminophen (TYLENOL) 325 MG tablet Take 650 mg by mouth 3 times daily 9am, 1pm, and 8pm       acetaminophen (TYLENOL) 500 MG tablet Take 500-1,000 mg by mouth 2 times daily as needed for mild pain       aspirin 325 MG tablet Take 1 tablet (325 mg) by mouth daily 120 tablet      blood glucose calibration (NO BRAND SPECIFIED) solution Use to calibrate blood glucose monitor as needed as directed. To accompany: Accu-Chek Safe-T pro plus 1 Bottle 3     blood glucose monitoring (ONE TOUCH ULTRA 2) meter device kit Use to test blood sugar 1 times daily or as directed. 1 kit 1     cefuroxime (CEFTIN) 250 MG tablet TO NURSING STAFF; GIVE THIS MEDICATION BID FOR 7 DAYS.                   Leo Melgar MD 14 tablet 0     Cholecalciferol (VITAMIN D3 PO) Take 2,000 Units by mouth daily       diclofenac (VOLTAREN) 1 % topical gel Apply 2 g topically 3 times daily Apply to both kness, back of the knees, and side to side. 9am, 1pm, and 8pm 100 g 4     famotidine (PEPCID) 20 MG tablet Take 1 tablet (20 mg) by mouth 2 times daily 180 tablet 4     furosemide (LASIX) 20 MG tablet Take 20 mg by mouth daily Give 1 tablet daily 60 tablet 12     gabapentin (NEURONTIN) 100 MG capsule Take 1 capsule (100 mg) by mouth 2 times daily 60 capsule 11     glipiZIDE (GLUCOTROL) 10 MG tablet Take 1 tablet (10 mg) by mouth 2 times daily (before meals) 60 tablet 11     lidocaine  "(LMX4) 4 % external cream Apply topically once as needed for mild pain       LIDOCAINE-DM-TROLAMINE SAL EX Externally apply topically 2 times daily Aspercreme w/Lidocaine (Trolamine salicylate) 0.4% Apply thin later of cream to both feet twice daily and once daily as needed to feet and lower legs       metFORMIN (GLUCOPHAGE) 500 MG tablet Take 500 mg by mouth daily  30 tablet 12     nystatin (MYCOSTATIN) 978438 UNIT/GM external cream APPLY TO INGUINAL AREAS AND UNDER THE BREASTS BID. 120 g 8     nystatin (MYCOSTATIN) 944672 UNIT/GM external powder Apply topically 3 times daily as needed (redness in abdominal folds)       ondansetron (ZOFRAN) 4 MG tablet Take 4 mg by mouth every 8 hours as needed for nausea       polyethylene glycol (MIRALAX/GLYCOLAX) Packet Take 8.5 g by mouth every other day        pramipexole (MIRAPEX) 0.125 MG tablet Take 0.375 mg by mouth every evening       simvastatin (ZOCOR) 5 MG tablet Take 1 tablet (5 mg) by mouth every evening 30 tablet      sitagliptin (JANUVIA) 50 MG tablet Take 1 tablet (50 mg) by mouth daily 30 tablet 11     spironolactone (ALDACTONE) 25 MG tablet Take 0.5 tablets (12.5 mg) by mouth every other day 15 tablet 11     blood glucose monitoring (NO BRAND SPECIFIED) test strip Use to test blood sugar 1 times daily or as directed. To accompany: Accu-Chek safety pro plus 100 strip 6     thin (NO BRAND SPECIFIED) lancets Use to test blood sugar 1 times daily or as directed. To accompany: Accu-Cheks safety pro plus 100 each 3         EXAM:    Vitals: BP (!) 148/90   Ht 1.499 m (4' 11\")   Wt 81.4 kg (179 lb 8 oz)   LMP  (LMP Unknown)   BMI 36.25 kg/m    BMI: Body mass index is 36.25 kg/m .    Constitutional:  Regla Benavidez is in no apparent distress, appears well-nourished.  Cooperative with history and physical exam.    Vascular:  Pedal pulses are not readily palpable, bilateral foot, for both the DP and PT arteries.  CFT < 3 sec.  No edema.      Neuro: Light touch " sensation is intact to the L4, L5, S1 distributions  No evidence of weakness, spasticity, or contracture in the lower extremities.     Derm: Normal texture and turgor.  No erythema, ecchymosis, or cyanosis.  No open lesions.   Several nails are elongated and thickened.  Left hallux nail is very thick and incurvated.  There is tenderness with pressure applied to the nail plate.  This tenderness is medially.  The nail incurvated's.    Musculoskeletal:    Lower extremity muscle strength is normal. No gross deformities.

## 2021-11-23 NOTE — LETTER
11/23/2021         RE: Regla Benavidez  C/o David Benavidez  [address] [place] [address]        Dear Colleague,    Thank you for referring your patient, Regla Benavidez, to the [facility]. Please see a copy of my visit note below.    ASSESSMENT:  Encounter Diagnoses   Name Primary?     Nail hypertrophy Yes     Pain of toe of left foot      Nail deformity      Onychomycosis      Type 2 diabetes mellitus with stage 3b chronic kidney disease, without long-term current use of insulin (H)      Diabetic polyneuropathy associated with other specified diabetes mellitus (H)      MEDICAL DECISION MAKING:  Although no clinical signs of infection, the left hallux toenail presentation is consistent with ingrown nail.  This involves the most distal medial aspect of the nail plate.  Haroldo's daughter inquired about nail debridement.  I explained that this is a reasonable option, if I can cut the nail at a slant.  Otherwise a more formal nail avulsion is an option.  However, given her diabetes and peripheral arterial disease, I am concerned about healing problems.    Instructions for her care facility were provided.  I recommend soaking in lukewarm soap water, twice daily, for 1 week.    Using a nail nippers, 5 toenails were debrided, including the left hallux nail.  The distal medial corner was cut at a slant.  I was able to remove some subungual debris.  She tolerated this well.  No bleeding.    Disclaimer: This note consists of symbols derived from keyboarding, dictation and/or voice recognition software. As a result, there may be errors in the script that have gone undetected. Please consider this when interpreting information found in this chart.    Bill Garcia DPM, FACFAS, [facility] Department of Podiatry/Foot & Ankle Surgery      ____________________________________________________________________    HPI:       **.     Chief Complaint:   Onset of problem: months  For  a while, due to the pandemic, she did not have regular nail care at her living facility.  They recently resumed with nail care, yet her daughter wonders if they were able to trim enough.  Alberta has occasional pain related to her toenails.  The pain is exacerbated by pressure    Type 2 DM  Last Hgb A1C = 9.2.    Past Medical History:   Diagnosis Date     Aspiration pneumonia (H) 11/15/2014    11/14; and UTI with sepsis      Cholelithiasis 2009    incidental finding     DJD (degenerative joint disease) of lumbar spine 2009     Elevated glucose      Expressive aphasia related to prior stroke      Hard of hearing     hearing aids     HTN (hypertension)      Shingles 2005     Varicose vein of leg    *  *  Past Surgical History:   Procedure Laterality Date     CATARACT IOL, RT/LT     *  *  Current Outpatient Medications   Medication Sig Dispense Refill     acetaminophen (TYLENOL) 325 MG tablet Take 650 mg by mouth 3 times daily 9am, 1pm, and 8pm       acetaminophen (TYLENOL) 500 MG tablet Take 500-1,000 mg by mouth 2 times daily as needed for mild pain       aspirin 325 MG tablet Take 1 tablet (325 mg) by mouth daily 120 tablet      blood glucose calibration (NO BRAND SPECIFIED) solution Use to calibrate blood glucose monitor as needed as directed. To accompany: Accu-Chek Safe-T pro plus 1 Bottle 3     blood glucose monitoring (ONE TOUCH ULTRA 2) meter device kit Use to test blood sugar 1 times daily or as directed. 1 kit 1     cefuroxime (CEFTIN) 250 MG tablet TO NURSING STAFF; GIVE THIS MEDICATION BID FOR 7 DAYS.                   Leo Melgar MD 14 tablet 0     Cholecalciferol (VITAMIN D3 PO) Take 2,000 Units by mouth daily       diclofenac (VOLTAREN) 1 % topical gel Apply 2 g topically 3 times daily Apply to both kness, back of the knees, and side to side. 9am, 1pm, and 8pm 100 g 4     famotidine (PEPCID) 20 MG tablet Take 1 tablet (20 mg) by mouth 2 times daily 180 tablet 4     furosemide (LASIX) 20 MG tablet  "Take 20 mg by mouth daily Give 1 tablet daily 60 tablet 12     gabapentin (NEURONTIN) 100 MG capsule Take 1 capsule (100 mg) by mouth 2 times daily 60 capsule 11     glipiZIDE (GLUCOTROL) 10 MG tablet Take 1 tablet (10 mg) by mouth 2 times daily (before meals) 60 tablet 11     lidocaine (LMX4) 4 % external cream Apply topically once as needed for mild pain       LIDOCAINE-DM-TROLAMINE SAL EX Externally apply topically 2 times daily Aspercreme w/Lidocaine (Trolamine salicylate) 0.4% Apply thin later of cream to both feet twice daily and once daily as needed to feet and lower legs       metFORMIN (GLUCOPHAGE) 500 MG tablet Take 500 mg by mouth daily  30 tablet 12     nystatin (MYCOSTATIN) 502931 UNIT/GM external cream APPLY TO INGUINAL AREAS AND UNDER THE BREASTS BID. 120 g 8     nystatin (MYCOSTATIN) 599809 UNIT/GM external powder Apply topically 3 times daily as needed (redness in abdominal folds)       ondansetron (ZOFRAN) 4 MG tablet Take 4 mg by mouth every 8 hours as needed for nausea       polyethylene glycol (MIRALAX/GLYCOLAX) Packet Take 8.5 g by mouth every other day        pramipexole (MIRAPEX) 0.125 MG tablet Take 0.375 mg by mouth every evening       simvastatin (ZOCOR) 5 MG tablet Take 1 tablet (5 mg) by mouth every evening 30 tablet      sitagliptin (JANUVIA) 50 MG tablet Take 1 tablet (50 mg) by mouth daily 30 tablet 11     spironolactone (ALDACTONE) 25 MG tablet Take 0.5 tablets (12.5 mg) by mouth every other day 15 tablet 11     blood glucose monitoring (NO BRAND SPECIFIED) test strip Use to test blood sugar 1 times daily or as directed. To accompany: Accu-Chek safety pro plus 100 strip 6     thin (NO BRAND SPECIFIED) lancets Use to test blood sugar 1 times daily or as directed. To accompany: Accu-Cheks safety pro plus 100 each 3         EXAM:    Vitals: BP (!) 148/90   Ht 1.499 m (4' 11\")   Wt 81.4 kg (179 lb 8 oz)   LMP  (LMP Unknown)   BMI 36.25 kg/m    BMI: Body mass index is 36.25 " kg/m .    Constitutional:  Regla Benavidez is in no apparent distress, appears well-nourished.  Cooperative with history and physical exam.    Vascular:  Pedal pulses are not readily palpable, bilateral foot, for both the DP and PT arteries.  CFT < 3 sec.  No edema.      Neuro: Light touch sensation is intact to the L4, L5, S1 distributions  No evidence of weakness, spasticity, or contracture in the lower extremities.     Derm: Normal texture and turgor.  No erythema, ecchymosis, or cyanosis.  No open lesions.   Several nails are elongated and thickened.  Left hallux nail is very thick and incurvated.  There is tenderness with pressure applied to the nail plate.  This tenderness is medially.  The nail incurvated's.    Musculoskeletal:    Lower extremity muscle strength is normal. No gross deformities.          Again, thank you for allowing me to participate in the care of your patient.        Sincerely,        Bill Garcia DPM

## 2022-01-01 ENCOUNTER — APPOINTMENT (OUTPATIENT)
Dept: PHYSICAL THERAPY | Facility: CLINIC | Age: 87
DRG: 193 | End: 2022-01-01
Payer: COMMERCIAL

## 2022-01-01 ENCOUNTER — APPOINTMENT (OUTPATIENT)
Dept: OCCUPATIONAL THERAPY | Facility: CLINIC | Age: 87
DRG: 193 | End: 2022-01-01
Attending: HOSPITALIST
Payer: COMMERCIAL

## 2022-01-01 ENCOUNTER — HOSPITAL ENCOUNTER (INPATIENT)
Facility: CLINIC | Age: 87
LOS: 3 days | Discharge: NURSING, REHAB OR LONG-TERM CARE | DRG: 193 | End: 2022-05-12
Attending: EMERGENCY MEDICINE | Admitting: HOSPITALIST
Payer: COMMERCIAL

## 2022-01-01 ENCOUNTER — APPOINTMENT (OUTPATIENT)
Dept: GENERAL RADIOLOGY | Facility: CLINIC | Age: 87
DRG: 193 | End: 2022-01-01
Attending: EMERGENCY MEDICINE
Payer: COMMERCIAL

## 2022-01-01 ENCOUNTER — APPOINTMENT (OUTPATIENT)
Dept: CT IMAGING | Facility: CLINIC | Age: 87
DRG: 193 | End: 2022-01-01
Attending: EMERGENCY MEDICINE
Payer: COMMERCIAL

## 2022-01-01 ENCOUNTER — APPOINTMENT (OUTPATIENT)
Dept: OCCUPATIONAL THERAPY | Facility: CLINIC | Age: 87
DRG: 193 | End: 2022-01-01
Payer: COMMERCIAL

## 2022-01-01 ENCOUNTER — APPOINTMENT (OUTPATIENT)
Dept: PHYSICAL THERAPY | Facility: CLINIC | Age: 87
DRG: 193 | End: 2022-01-01
Attending: HOSPITALIST
Payer: COMMERCIAL

## 2022-01-01 ENCOUNTER — HOSPITAL ENCOUNTER (OUTPATIENT)
Facility: CLINIC | Age: 87
Setting detail: OBSERVATION
Discharge: NURSING, REHAB OR LONG-TERM CARE | End: 2022-05-25
Attending: EMERGENCY MEDICINE | Admitting: EMERGENCY MEDICINE
Payer: COMMERCIAL

## 2022-01-01 ENCOUNTER — APPOINTMENT (OUTPATIENT)
Dept: CT IMAGING | Facility: CLINIC | Age: 87
End: 2022-01-01
Attending: EMERGENCY MEDICINE
Payer: COMMERCIAL

## 2022-01-01 ENCOUNTER — APPOINTMENT (OUTPATIENT)
Dept: CT IMAGING | Facility: CLINIC | Age: 87
End: 2022-01-01
Attending: PHYSICIAN ASSISTANT
Payer: COMMERCIAL

## 2022-01-01 ENCOUNTER — PATIENT OUTREACH (OUTPATIENT)
Dept: CARE COORDINATION | Facility: CLINIC | Age: 87
End: 2022-01-01
Payer: COMMERCIAL

## 2022-01-01 ENCOUNTER — DOCUMENTATION ONLY (OUTPATIENT)
Dept: OTHER | Facility: CLINIC | Age: 87
End: 2022-01-01
Payer: COMMERCIAL

## 2022-01-01 ENCOUNTER — APPOINTMENT (OUTPATIENT)
Dept: OCCUPATIONAL THERAPY | Facility: CLINIC | Age: 87
End: 2022-01-01
Attending: HOSPITALIST
Payer: COMMERCIAL

## 2022-01-01 ENCOUNTER — OFFICE VISIT (OUTPATIENT)
Dept: PODIATRY | Facility: CLINIC | Age: 87
End: 2022-01-01
Payer: COMMERCIAL

## 2022-01-01 ENCOUNTER — APPOINTMENT (OUTPATIENT)
Dept: PHYSICAL THERAPY | Facility: CLINIC | Age: 87
End: 2022-01-01
Attending: HOSPITALIST
Payer: COMMERCIAL

## 2022-01-01 ENCOUNTER — MEDICAL CORRESPONDENCE (OUTPATIENT)
Dept: HEALTH INFORMATION MANAGEMENT | Facility: CLINIC | Age: 87
End: 2022-01-01

## 2022-01-01 ENCOUNTER — HEALTH MAINTENANCE LETTER (OUTPATIENT)
Age: 87
End: 2022-01-01

## 2022-01-01 ENCOUNTER — APPOINTMENT (OUTPATIENT)
Dept: GENERAL RADIOLOGY | Facility: CLINIC | Age: 87
End: 2022-01-01
Attending: EMERGENCY MEDICINE
Payer: COMMERCIAL

## 2022-01-01 VITALS
HEART RATE: 62 BPM | SYSTOLIC BLOOD PRESSURE: 133 MMHG | RESPIRATION RATE: 18 BRPM | WEIGHT: 179 LBS | BODY MASS INDEX: 36.15 KG/M2 | DIASTOLIC BLOOD PRESSURE: 57 MMHG | OXYGEN SATURATION: 95 % | TEMPERATURE: 98 F

## 2022-01-01 VITALS
RESPIRATION RATE: 16 BRPM | WEIGHT: 179 LBS | DIASTOLIC BLOOD PRESSURE: 57 MMHG | TEMPERATURE: 98 F | BODY MASS INDEX: 36.15 KG/M2 | OXYGEN SATURATION: 92 % | SYSTOLIC BLOOD PRESSURE: 134 MMHG | HEART RATE: 70 BPM

## 2022-01-01 VITALS
BODY MASS INDEX: 36.08 KG/M2 | HEIGHT: 59 IN | SYSTOLIC BLOOD PRESSURE: 137 MMHG | DIASTOLIC BLOOD PRESSURE: 62 MMHG | WEIGHT: 179 LBS

## 2022-01-01 DIAGNOSIS — L97.521 ULCER OF TOE, LEFT, LIMITED TO BREAKDOWN OF SKIN (H): ICD-10-CM

## 2022-01-01 DIAGNOSIS — M79.675 TOE PAIN, LEFT: Primary | ICD-10-CM

## 2022-01-01 DIAGNOSIS — R82.90 ABNORMAL URINALYSIS: ICD-10-CM

## 2022-01-01 DIAGNOSIS — Z79.02 ANTIPLATELET OR ANTITHROMBOTIC LONG-TERM USE: ICD-10-CM

## 2022-01-01 DIAGNOSIS — J18.9 PNEUMONIA OF LEFT LOWER LOBE DUE TO INFECTIOUS ORGANISM: ICD-10-CM

## 2022-01-01 DIAGNOSIS — E11.22 TYPE 2 DIABETES MELLITUS WITH STAGE 3B CHRONIC KIDNEY DISEASE, WITHOUT LONG-TERM CURRENT USE OF INSULIN (H): ICD-10-CM

## 2022-01-01 DIAGNOSIS — N39.0 URINARY TRACT INFECTION WITHOUT HEMATURIA, SITE UNSPECIFIED: ICD-10-CM

## 2022-01-01 DIAGNOSIS — M20.42 HAMMER TOE OF LEFT FOOT: ICD-10-CM

## 2022-01-01 DIAGNOSIS — Z71.89 OTHER SPECIFIED COUNSELING: ICD-10-CM

## 2022-01-01 DIAGNOSIS — S09.92XA INJURY OF NOSE, INITIAL ENCOUNTER: ICD-10-CM

## 2022-01-01 DIAGNOSIS — I73.9 PAD (PERIPHERAL ARTERY DISEASE) (H): ICD-10-CM

## 2022-01-01 DIAGNOSIS — N39.0 RECURRENT UTI: Primary | ICD-10-CM

## 2022-01-01 DIAGNOSIS — M79.674 TOE PAIN, RIGHT: ICD-10-CM

## 2022-01-01 DIAGNOSIS — R09.89 DECREASED PEDAL PULSES: Primary | ICD-10-CM

## 2022-01-01 DIAGNOSIS — I62.00 SUBDURAL HEMORRHAGE (H): ICD-10-CM
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
## 2022-01-01 DIAGNOSIS — E13.42 DIABETIC POLYNEUROPATHY ASSOCIATED WITH OTHER SPECIFIED DIABETES MELLITUS (H): ICD-10-CM

## 2022-01-01 DIAGNOSIS — N18.32 TYPE 2 DIABETES MELLITUS WITH STAGE 3B CHRONIC KIDNEY DISEASE, WITHOUT LONG-TERM CURRENT USE OF INSULIN (H): ICD-10-CM

## 2022-01-01 DIAGNOSIS — N39.0 URINARY TRACT INFECTION WITHOUT HEMATURIA, SITE UNSPECIFIED: Primary | ICD-10-CM

## 2022-01-01 DIAGNOSIS — B35.1 ONYCHOMYCOSIS: ICD-10-CM

## 2022-01-01 DIAGNOSIS — R41.82 ALTERED MENTAL STATUS, UNSPECIFIED ALTERED MENTAL STATUS TYPE: ICD-10-CM

## 2022-01-01 DIAGNOSIS — L60.2 NAIL HYPERTROPHY: ICD-10-CM

## 2022-01-01 DIAGNOSIS — J96.01 ACUTE RESPIRATORY FAILURE WITH HYPOXIA (H): ICD-10-CM

## 2022-01-01 LAB
ALBUMIN SERPL-MCNC: 3.3 G/DL (ref 3.4–5)
ALBUMIN SERPL-MCNC: 3.5 G/DL (ref 3.4–5)
ALBUMIN UR-MCNC: NEGATIVE MG/DL
ALBUMIN UR-MCNC: NEGATIVE MG/DL
ALP SERPL-CCNC: 69 U/L (ref 40–150)
ALP SERPL-CCNC: 77 U/L (ref 40–150)
ALT SERPL W P-5'-P-CCNC: 23 U/L (ref 0–50)
ALT SERPL W P-5'-P-CCNC: 26 U/L (ref 0–50)
ANION GAP SERPL CALCULATED.3IONS-SCNC: 3 MMOL/L (ref 3–14)
ANION GAP SERPL CALCULATED.3IONS-SCNC: 4 MMOL/L (ref 3–14)
ANION GAP SERPL CALCULATED.3IONS-SCNC: 5 MMOL/L (ref 3–14)
APPEARANCE UR: ABNORMAL
APPEARANCE UR: CLEAR
AST SERPL W P-5'-P-CCNC: 16 U/L (ref 0–45)
AST SERPL W P-5'-P-CCNC: 27 U/L (ref 0–45)
ATRIAL RATE - MUSE: 71 BPM
ATRIAL RATE - MUSE: 95 BPM
BACTERIA #/AREA URNS HPF: ABNORMAL /HPF
BACTERIA #/AREA URNS HPF: ABNORMAL /HPF
BACTERIA UR CULT: ABNORMAL
BASOPHILS # BLD AUTO: 0 10E3/UL (ref 0–0.2)
BASOPHILS # BLD AUTO: 0 10E3/UL (ref 0–0.2)
BASOPHILS NFR BLD AUTO: 0 %
BASOPHILS NFR BLD AUTO: 0 %
BILIRUB SERPL-MCNC: 0.5 MG/DL (ref 0.2–1.3)
BILIRUB SERPL-MCNC: 0.5 MG/DL (ref 0.2–1.3)
BILIRUB UR QL STRIP: NEGATIVE
BILIRUB UR QL STRIP: NEGATIVE
BUN SERPL-MCNC: 25 MG/DL (ref 7–30)
BUN SERPL-MCNC: 27 MG/DL (ref 7–30)
BUN SERPL-MCNC: 28 MG/DL (ref 7–30)
BUN SERPL-MCNC: 32 MG/DL (ref 7–30)
BUN SERPL-MCNC: 39 MG/DL (ref 7–30)
CALCIUM SERPL-MCNC: 8.5 MG/DL (ref 8.5–10.1)
CALCIUM SERPL-MCNC: 8.5 MG/DL (ref 8.5–10.1)
CALCIUM SERPL-MCNC: 8.7 MG/DL (ref 8.5–10.1)
CALCIUM SERPL-MCNC: 8.9 MG/DL (ref 8.5–10.1)
CALCIUM SERPL-MCNC: 9.1 MG/DL (ref 8.5–10.1)
CHLORIDE BLD-SCNC: 103 MMOL/L (ref 94–109)
CHLORIDE BLD-SCNC: 103 MMOL/L (ref 94–109)
CHLORIDE BLD-SCNC: 107 MMOL/L (ref 94–109)
CHLORIDE BLD-SCNC: 110 MMOL/L (ref 94–109)
CHLORIDE BLD-SCNC: 110 MMOL/L (ref 94–109)
CK SERPL-CCNC: 123 U/L (ref 30–225)
CK SERPL-CCNC: 124 U/L (ref 30–225)
CO2 SERPL-SCNC: 23 MMOL/L (ref 20–32)
CO2 SERPL-SCNC: 26 MMOL/L (ref 20–32)
CO2 SERPL-SCNC: 27 MMOL/L (ref 20–32)
CO2 SERPL-SCNC: 29 MMOL/L (ref 20–32)
CO2 SERPL-SCNC: 29 MMOL/L (ref 20–32)
COLOR UR AUTO: ABNORMAL
COLOR UR AUTO: ABNORMAL
CREAT SERPL-MCNC: 0.77 MG/DL (ref 0.52–1.04)
CREAT SERPL-MCNC: 0.79 MG/DL (ref 0.52–1.04)
CREAT SERPL-MCNC: 0.87 MG/DL (ref 0.52–1.04)
CREAT SERPL-MCNC: 0.88 MG/DL (ref 0.52–1.04)
CREAT SERPL-MCNC: 0.97 MG/DL (ref 0.52–1.04)
CREAT SERPL-MCNC: 1.16 MG/DL (ref 0.52–1.04)
DIASTOLIC BLOOD PRESSURE - MUSE: NORMAL MMHG
DIASTOLIC BLOOD PRESSURE - MUSE: NORMAL MMHG
EOSINOPHIL # BLD AUTO: 0.2 10E3/UL (ref 0–0.7)
EOSINOPHIL # BLD AUTO: 0.4 10E3/UL (ref 0–0.7)
EOSINOPHIL NFR BLD AUTO: 2 %
EOSINOPHIL NFR BLD AUTO: 4 %
ERYTHROCYTE [DISTWIDTH] IN BLOOD BY AUTOMATED COUNT: 13.5 % (ref 10–15)
ERYTHROCYTE [DISTWIDTH] IN BLOOD BY AUTOMATED COUNT: 13.6 % (ref 10–15)
ERYTHROCYTE [DISTWIDTH] IN BLOOD BY AUTOMATED COUNT: 13.6 % (ref 10–15)
ERYTHROCYTE [DISTWIDTH] IN BLOOD BY AUTOMATED COUNT: 13.9 % (ref 10–15)
ERYTHROCYTE [DISTWIDTH] IN BLOOD BY AUTOMATED COUNT: 14 % (ref 10–15)
GFR SERPL CREATININE-BSD FRML MDRD: 43 ML/MIN/1.73M2
GFR SERPL CREATININE-BSD FRML MDRD: 53 ML/MIN/1.73M2
GFR SERPL CREATININE-BSD FRML MDRD: 60 ML/MIN/1.73M2
GFR SERPL CREATININE-BSD FRML MDRD: 61 ML/MIN/1.73M2
GFR SERPL CREATININE-BSD FRML MDRD: 68 ML/MIN/1.73M2
GFR SERPL CREATININE-BSD FRML MDRD: 70 ML/MIN/1.73M2
GLUCOSE BLD-MCNC: 152 MG/DL (ref 70–99)
GLUCOSE BLD-MCNC: 161 MG/DL (ref 70–99)
GLUCOSE BLD-MCNC: 167 MG/DL (ref 70–99)
GLUCOSE BLD-MCNC: 186 MG/DL (ref 70–99)
GLUCOSE BLD-MCNC: 189 MG/DL (ref 70–99)
GLUCOSE BLDC GLUCOMTR-MCNC: 122 MG/DL (ref 70–99)
GLUCOSE BLDC GLUCOMTR-MCNC: 128 MG/DL (ref 70–99)
GLUCOSE BLDC GLUCOMTR-MCNC: 133 MG/DL (ref 70–99)
GLUCOSE BLDC GLUCOMTR-MCNC: 136 MG/DL (ref 70–99)
GLUCOSE BLDC GLUCOMTR-MCNC: 139 MG/DL (ref 70–99)
GLUCOSE BLDC GLUCOMTR-MCNC: 140 MG/DL (ref 70–99)
GLUCOSE BLDC GLUCOMTR-MCNC: 146 MG/DL (ref 70–99)
GLUCOSE BLDC GLUCOMTR-MCNC: 155 MG/DL (ref 70–99)
GLUCOSE BLDC GLUCOMTR-MCNC: 156 MG/DL (ref 70–99)
GLUCOSE BLDC GLUCOMTR-MCNC: 163 MG/DL (ref 70–99)
GLUCOSE BLDC GLUCOMTR-MCNC: 164 MG/DL (ref 70–99)
GLUCOSE BLDC GLUCOMTR-MCNC: 169 MG/DL (ref 70–99)
GLUCOSE BLDC GLUCOMTR-MCNC: 176 MG/DL (ref 70–99)
GLUCOSE BLDC GLUCOMTR-MCNC: 177 MG/DL (ref 70–99)
GLUCOSE BLDC GLUCOMTR-MCNC: 179 MG/DL (ref 70–99)
GLUCOSE BLDC GLUCOMTR-MCNC: 193 MG/DL (ref 70–99)
GLUCOSE BLDC GLUCOMTR-MCNC: 206 MG/DL (ref 70–99)
GLUCOSE BLDC GLUCOMTR-MCNC: 236 MG/DL (ref 70–99)
GLUCOSE BLDC GLUCOMTR-MCNC: 239 MG/DL (ref 70–99)
GLUCOSE UR STRIP-MCNC: NEGATIVE MG/DL
GLUCOSE UR STRIP-MCNC: NEGATIVE MG/DL
HBA1C MFR BLD: 7.1 % (ref 0–5.6)
HCT VFR BLD AUTO: 39.4 % (ref 35–47)
HCT VFR BLD AUTO: 39.6 % (ref 35–47)
HCT VFR BLD AUTO: 41.2 % (ref 35–47)
HCT VFR BLD AUTO: 42.5 % (ref 35–47)
HCT VFR BLD AUTO: 42.5 % (ref 35–47)
HGB BLD-MCNC: 12.6 G/DL (ref 11.7–15.7)
HGB BLD-MCNC: 12.7 G/DL (ref 11.7–15.7)
HGB BLD-MCNC: 13 G/DL (ref 11.7–15.7)
HGB BLD-MCNC: 13.4 G/DL (ref 11.7–15.7)
HGB BLD-MCNC: 13.5 G/DL (ref 11.7–15.7)
HGB UR QL STRIP: ABNORMAL
HGB UR QL STRIP: ABNORMAL
HYALINE CASTS: 3 /LPF
IMM GRANULOCYTES # BLD: 0 10E3/UL
IMM GRANULOCYTES # BLD: 0 10E3/UL
IMM GRANULOCYTES NFR BLD: 0 %
IMM GRANULOCYTES NFR BLD: 0 %
INTERPRETATION ECG - MUSE: NORMAL
INTERPRETATION ECG - MUSE: NORMAL
KETONES UR STRIP-MCNC: NEGATIVE MG/DL
KETONES UR STRIP-MCNC: NEGATIVE MG/DL
LEUKOCYTE ESTERASE UR QL STRIP: ABNORMAL
LEUKOCYTE ESTERASE UR QL STRIP: NEGATIVE
LYMPHOCYTES # BLD AUTO: 0.8 10E3/UL (ref 0.8–5.3)
LYMPHOCYTES # BLD AUTO: 1.3 10E3/UL (ref 0.8–5.3)
LYMPHOCYTES NFR BLD AUTO: 14 %
LYMPHOCYTES NFR BLD AUTO: 8 %
MCH RBC QN AUTO: 30.4 PG (ref 26.5–33)
MCH RBC QN AUTO: 30.7 PG (ref 26.5–33)
MCH RBC QN AUTO: 30.9 PG (ref 26.5–33)
MCH RBC QN AUTO: 31 PG (ref 26.5–33)
MCH RBC QN AUTO: 31.4 PG (ref 26.5–33)
MCHC RBC AUTO-ENTMCNC: 31.5 G/DL (ref 31.5–36.5)
MCHC RBC AUTO-ENTMCNC: 31.6 G/DL (ref 31.5–36.5)
MCHC RBC AUTO-ENTMCNC: 31.8 G/DL (ref 31.5–36.5)
MCHC RBC AUTO-ENTMCNC: 31.8 G/DL (ref 31.5–36.5)
MCHC RBC AUTO-ENTMCNC: 32.2 G/DL (ref 31.5–36.5)
MCV RBC AUTO: 100 FL (ref 78–100)
MCV RBC AUTO: 96 FL (ref 78–100)
MCV RBC AUTO: 96 FL (ref 78–100)
MCV RBC AUTO: 97 FL (ref 78–100)
MCV RBC AUTO: 97 FL (ref 78–100)
MONOCYTES # BLD AUTO: 0.7 10E3/UL (ref 0–1.3)
MONOCYTES # BLD AUTO: 0.8 10E3/UL (ref 0–1.3)
MONOCYTES NFR BLD AUTO: 7 %
MONOCYTES NFR BLD AUTO: 8 %
MUCOUS THREADS #/AREA URNS LPF: PRESENT /LPF
NEUTROPHILS # BLD AUTO: 7.4 10E3/UL (ref 1.6–8.3)
NEUTROPHILS # BLD AUTO: 8 10E3/UL (ref 1.6–8.3)
NEUTROPHILS NFR BLD AUTO: 75 %
NEUTROPHILS NFR BLD AUTO: 82 %
NITRATE UR QL: NEGATIVE
NITRATE UR QL: NEGATIVE
NRBC # BLD AUTO: 0 10E3/UL
NRBC # BLD AUTO: 0 10E3/UL
NRBC BLD AUTO-RTO: 0 /100
NRBC BLD AUTO-RTO: 0 /100
NT-PROBNP SERPL-MCNC: 720 PG/ML (ref 0–1800)
P AXIS - MUSE: 86 DEGREES
P AXIS - MUSE: 92 DEGREES
PH UR STRIP: 5.5 [PH] (ref 5–7)
PH UR STRIP: 6.5 [PH] (ref 5–7)
PLATELET # BLD AUTO: 161 10E3/UL (ref 150–450)
PLATELET # BLD AUTO: 174 10E3/UL (ref 150–450)
PLATELET # BLD AUTO: 176 10E3/UL (ref 150–450)
PLATELET # BLD AUTO: 181 10E3/UL (ref 150–450)
PLATELET # BLD AUTO: 215 10E3/UL (ref 150–450)
PLATELET # BLD AUTO: 219 10E3/UL (ref 150–450)
POTASSIUM BLD-SCNC: 3.9 MMOL/L (ref 3.4–5.3)
POTASSIUM BLD-SCNC: 4.1 MMOL/L (ref 3.4–5.3)
POTASSIUM BLD-SCNC: 4.2 MMOL/L (ref 3.4–5.3)
POTASSIUM BLD-SCNC: 4.5 MMOL/L (ref 3.4–5.3)
POTASSIUM BLD-SCNC: 4.6 MMOL/L (ref 3.4–5.3)
PR INTERVAL - MUSE: 272 MS
PR INTERVAL - MUSE: 340 MS
PROT SERPL-MCNC: 6.8 G/DL (ref 6.8–8.8)
PROT SERPL-MCNC: 7 G/DL (ref 6.8–8.8)
QRS DURATION - MUSE: 80 MS
QRS DURATION - MUSE: 84 MS
QT - MUSE: 352 MS
QT - MUSE: 400 MS
QTC - MUSE: 434 MS
QTC - MUSE: 442 MS
R AXIS - MUSE: -35 DEGREES
R AXIS - MUSE: -37 DEGREES
RADIOLOGIST FLAGS: ABNORMAL
RBC # BLD AUTO: 4.1 10E6/UL (ref 3.8–5.2)
RBC # BLD AUTO: 4.11 10E6/UL (ref 3.8–5.2)
RBC # BLD AUTO: 4.27 10E6/UL (ref 3.8–5.2)
RBC # BLD AUTO: 4.27 10E6/UL (ref 3.8–5.2)
RBC # BLD AUTO: 4.37 10E6/UL (ref 3.8–5.2)
RBC URINE: 6 /HPF
RBC URINE: 8 /HPF
SARS-COV-2 RNA RESP QL NAA+PROBE: NEGATIVE
SARS-COV-2 RNA RESP QL NAA+PROBE: NEGATIVE
SODIUM SERPL-SCNC: 134 MMOL/L (ref 133–144)
SODIUM SERPL-SCNC: 135 MMOL/L (ref 133–144)
SODIUM SERPL-SCNC: 137 MMOL/L (ref 133–144)
SODIUM SERPL-SCNC: 140 MMOL/L (ref 133–144)
SODIUM SERPL-SCNC: 141 MMOL/L (ref 133–144)
SP GR UR STRIP: 1.01 (ref 1–1.03)
SP GR UR STRIP: 1.01 (ref 1–1.03)
SQUAMOUS EPITHELIAL: 1 /HPF
SQUAMOUS EPITHELIAL: 1 /HPF
SYSTOLIC BLOOD PRESSURE - MUSE: NORMAL MMHG
SYSTOLIC BLOOD PRESSURE - MUSE: NORMAL MMHG
T AXIS - MUSE: 28 DEGREES
T AXIS - MUSE: 30 DEGREES
TROPONIN I SERPL HS-MCNC: 11 NG/L
UROBILINOGEN UR STRIP-MCNC: NORMAL MG/DL
UROBILINOGEN UR STRIP-MCNC: NORMAL MG/DL
VENTRICULAR RATE- MUSE: 71 BPM
VENTRICULAR RATE- MUSE: 95 BPM
WBC # BLD AUTO: 6.8 10E3/UL (ref 4–11)
WBC # BLD AUTO: 8.4 10E3/UL (ref 4–11)
WBC # BLD AUTO: 9.7 10E3/UL (ref 4–11)
WBC # BLD AUTO: 9.8 10E3/UL (ref 4–11)
WBC # BLD AUTO: 9.8 10E3/UL (ref 4–11)
WBC CLUMPS #/AREA URNS HPF: PRESENT /HPF
WBC URINE: 10 /HPF
WBC URINE: >182 /HPF

## 2022-01-01 PROCEDURE — 85025 COMPLETE CBC W/AUTO DIFF WBC: CPT | Performed by: EMERGENCY MEDICINE

## 2022-01-01 PROCEDURE — 51701 INSERT BLADDER CATHETER: CPT

## 2022-01-01 PROCEDURE — 80053 COMPREHEN METABOLIC PANEL: CPT | Performed by: EMERGENCY MEDICINE

## 2022-01-01 PROCEDURE — 97165 OT EVAL LOW COMPLEX 30 MIN: CPT | Mod: GO | Performed by: OCCUPATIONAL THERAPIST

## 2022-01-01 PROCEDURE — 96366 THER/PROPH/DIAG IV INF ADDON: CPT

## 2022-01-01 PROCEDURE — 87086 URINE CULTURE/COLONY COUNT: CPT | Performed by: EMERGENCY MEDICINE

## 2022-01-01 PROCEDURE — 250N000013 HC RX MED GY IP 250 OP 250 PS 637: Performed by: HOSPITALIST

## 2022-01-01 PROCEDURE — 82310 ASSAY OF CALCIUM: CPT | Performed by: INTERNAL MEDICINE

## 2022-01-01 PROCEDURE — 70450 CT HEAD/BRAIN W/O DYE: CPT

## 2022-01-01 PROCEDURE — 82550 ASSAY OF CK (CPK): CPT | Performed by: EMERGENCY MEDICINE

## 2022-01-01 PROCEDURE — G0378 HOSPITAL OBSERVATION PER HR: HCPCS

## 2022-01-01 PROCEDURE — 36415 COLL VENOUS BLD VENIPUNCTURE: CPT | Performed by: HOSPITALIST

## 2022-01-01 PROCEDURE — C9803 HOPD COVID-19 SPEC COLLECT: HCPCS

## 2022-01-01 PROCEDURE — 250N000011 HC RX IP 250 OP 636: Performed by: HOSPITALIST

## 2022-01-01 PROCEDURE — 80048 BASIC METABOLIC PNL TOTAL CA: CPT | Performed by: HOSPITALIST

## 2022-01-01 PROCEDURE — 96372 THER/PROPH/DIAG INJ SC/IM: CPT | Mod: XU

## 2022-01-01 PROCEDURE — 120N000001 HC R&B MED SURG/OB

## 2022-01-01 PROCEDURE — 70450 CT HEAD/BRAIN W/O DYE: CPT | Mod: 77

## 2022-01-01 PROCEDURE — 85014 HEMATOCRIT: CPT | Performed by: HOSPITALIST

## 2022-01-01 PROCEDURE — 99207 PR CDG-CODE CATEGORY CHANGED: CPT | Performed by: HOSPITALIST

## 2022-01-01 PROCEDURE — 71046 X-RAY EXAM CHEST 2 VIEWS: CPT

## 2022-01-01 PROCEDURE — 97530 THERAPEUTIC ACTIVITIES: CPT | Mod: GP | Performed by: PHYSICAL THERAPIST

## 2022-01-01 PROCEDURE — 99223 1ST HOSP IP/OBS HIGH 75: CPT | Mod: AI | Performed by: HOSPITALIST

## 2022-01-01 PROCEDURE — 97116 GAIT TRAINING THERAPY: CPT | Mod: GP

## 2022-01-01 PROCEDURE — 11721 DEBRIDE NAIL 6 OR MORE: CPT | Mod: GA | Performed by: PODIATRIST

## 2022-01-01 PROCEDURE — 250N000013 HC RX MED GY IP 250 OP 250 PS 637: Performed by: INTERNAL MEDICINE

## 2022-01-01 PROCEDURE — 85027 COMPLETE CBC AUTOMATED: CPT | Performed by: HOSPITALIST

## 2022-01-01 PROCEDURE — 82962 GLUCOSE BLOOD TEST: CPT

## 2022-01-01 PROCEDURE — 93005 ELECTROCARDIOGRAM TRACING: CPT | Mod: XU

## 2022-01-01 PROCEDURE — 96372 THER/PROPH/DIAG INJ SC/IM: CPT | Mod: XU | Performed by: HOSPITALIST

## 2022-01-01 PROCEDURE — 85041 AUTOMATED RBC COUNT: CPT | Performed by: INTERNAL MEDICINE

## 2022-01-01 PROCEDURE — 36415 COLL VENOUS BLD VENIPUNCTURE: CPT | Performed by: EMERGENCY MEDICINE

## 2022-01-01 PROCEDURE — 83880 ASSAY OF NATRIURETIC PEPTIDE: CPT | Performed by: HOSPITALIST

## 2022-01-01 PROCEDURE — 99232 SBSQ HOSP IP/OBS MODERATE 35: CPT | Performed by: INTERNAL MEDICINE

## 2022-01-01 PROCEDURE — 250N000011 HC RX IP 250 OP 636: Performed by: EMERGENCY MEDICINE

## 2022-01-01 PROCEDURE — 97530 THERAPEUTIC ACTIVITIES: CPT | Mod: GP

## 2022-01-01 PROCEDURE — 82550 ASSAY OF CK (CPK): CPT | Performed by: HOSPITALIST

## 2022-01-01 PROCEDURE — 85027 COMPLETE CBC AUTOMATED: CPT | Performed by: INTERNAL MEDICINE

## 2022-01-01 PROCEDURE — 99213 OFFICE O/P EST LOW 20 MIN: CPT | Performed by: PODIATRIST

## 2022-01-01 PROCEDURE — 99213 OFFICE O/P EST LOW 20 MIN: CPT | Mod: 25 | Performed by: PODIATRIST

## 2022-01-01 PROCEDURE — 99217 PR OBSERVATION CARE DISCHARGE: CPT | Performed by: HOSPITALIST

## 2022-01-01 PROCEDURE — 97161 PT EVAL LOW COMPLEX 20 MIN: CPT | Mod: GP | Performed by: PHYSICAL THERAPIST

## 2022-01-01 PROCEDURE — 83036 HEMOGLOBIN GLYCOSYLATED A1C: CPT | Performed by: INTERNAL MEDICINE

## 2022-01-01 PROCEDURE — U0003 INFECTIOUS AGENT DETECTION BY NUCLEIC ACID (DNA OR RNA); SEVERE ACUTE RESPIRATORY SYNDROME CORONAVIRUS 2 (SARS-COV-2) (CORONAVIRUS DISEASE [COVID-19]), AMPLIFIED PROBE TECHNIQUE, MAKING USE OF HIGH THROUGHPUT TECHNOLOGIES AS DESCRIBED BY CMS-2020-01-R: HCPCS | Performed by: EMERGENCY MEDICINE

## 2022-01-01 PROCEDURE — 99285 EMERGENCY DEPT VISIT HI MDM: CPT | Mod: 25

## 2022-01-01 PROCEDURE — 250N000012 HC RX MED GY IP 250 OP 636 PS 637: Performed by: HOSPITALIST

## 2022-01-01 PROCEDURE — 84484 ASSAY OF TROPONIN QUANT: CPT | Performed by: EMERGENCY MEDICINE

## 2022-01-01 PROCEDURE — 82565 ASSAY OF CREATININE: CPT | Performed by: HOSPITALIST

## 2022-01-01 PROCEDURE — 97535 SELF CARE MNGMENT TRAINING: CPT | Mod: GO | Performed by: OCCUPATIONAL THERAPIST

## 2022-01-01 PROCEDURE — 36415 COLL VENOUS BLD VENIPUNCTURE: CPT | Performed by: INTERNAL MEDICINE

## 2022-01-01 PROCEDURE — 81001 URINALYSIS AUTO W/SCOPE: CPT | Performed by: EMERGENCY MEDICINE

## 2022-01-01 PROCEDURE — 97161 PT EVAL LOW COMPLEX 20 MIN: CPT | Mod: GP

## 2022-01-01 PROCEDURE — 99226 PR SUBSEQUENT OBSERVATION CARE,LEVEL III: CPT | Performed by: HOSPITALIST

## 2022-01-01 PROCEDURE — 97530 THERAPEUTIC ACTIVITIES: CPT | Mod: GO | Performed by: OCCUPATIONAL THERAPIST

## 2022-01-01 PROCEDURE — 85004 AUTOMATED DIFF WBC COUNT: CPT | Performed by: EMERGENCY MEDICINE

## 2022-01-01 PROCEDURE — 99220 PR INITIAL OBSERVATION CARE,LEVEL III: CPT | Performed by: HOSPITALIST

## 2022-01-01 PROCEDURE — 82310 ASSAY OF CALCIUM: CPT | Performed by: HOSPITALIST

## 2022-01-01 PROCEDURE — G0463 HOSPITAL OUTPT CLINIC VISIT: HCPCS | Performed by: PHYSICIAN ASSISTANT

## 2022-01-01 PROCEDURE — 96365 THER/PROPH/DIAG IV INF INIT: CPT

## 2022-01-01 PROCEDURE — 87088 URINE BACTERIA CULTURE: CPT | Performed by: EMERGENCY MEDICINE

## 2022-01-01 PROCEDURE — 97116 GAIT TRAINING THERAPY: CPT | Mod: GP | Performed by: PHYSICAL THERAPIST

## 2022-01-01 PROCEDURE — 85049 AUTOMATED PLATELET COUNT: CPT | Performed by: HOSPITALIST

## 2022-01-01 PROCEDURE — 258N000003 HC RX IP 258 OP 636: Performed by: EMERGENCY MEDICINE

## 2022-01-01 PROCEDURE — 93005 ELECTROCARDIOGRAM TRACING: CPT

## 2022-01-01 PROCEDURE — 99239 HOSP IP/OBS DSCHRG MGMT >30: CPT | Performed by: INTERNAL MEDICINE

## 2022-01-01 PROCEDURE — U0005 INFEC AGEN DETEC AMPLI PROBE: HCPCS | Performed by: EMERGENCY MEDICINE

## 2022-01-01 RX ORDER — SPIRONOLACTONE 25 MG
12.5 TABLET ORAL EVERY OTHER DAY
Status: DISCONTINUED | OUTPATIENT
Start: 2022-01-01 | End: 2022-01-01 | Stop reason: HOSPADM

## 2022-01-01 RX ORDER — GABAPENTIN 100 MG/1
100 CAPSULE ORAL 2 TIMES DAILY
Status: DISCONTINUED | OUTPATIENT
Start: 2022-01-01 | End: 2022-01-01 | Stop reason: HOSPADM

## 2022-01-01 RX ORDER — POLYETHYLENE GLYCOL 3350 17 G/17G
8.5 POWDER, FOR SOLUTION ORAL
Status: DISCONTINUED | OUTPATIENT
Start: 2022-01-01 | End: 2022-01-01 | Stop reason: HOSPADM

## 2022-01-01 RX ORDER — ENOXAPARIN SODIUM 100 MG/ML
40 INJECTION SUBCUTANEOUS EVERY 24 HOURS
Status: DISCONTINUED | OUTPATIENT
Start: 2022-01-01 | End: 2022-01-01 | Stop reason: HOSPADM

## 2022-01-01 RX ORDER — FAMOTIDINE 20 MG/1
20 TABLET, FILM COATED ORAL DAILY
Status: DISCONTINUED | OUTPATIENT
Start: 2022-01-01 | End: 2022-01-01 | Stop reason: HOSPADM

## 2022-01-01 RX ORDER — LIDOCAINE 4 G/G
1 PATCH TOPICAL EVERY 24 HOURS
COMMUNITY

## 2022-01-01 RX ORDER — LEVOFLOXACIN 250 MG/1
250 TABLET, FILM COATED ORAL EVERY 24 HOURS
Qty: 4 TABLET | Refills: 0 | Status: SHIPPED | OUTPATIENT
Start: 2022-01-01 | End: 2023-01-01

## 2022-01-01 RX ORDER — AMOXICILLIN 250 MG
2 CAPSULE ORAL 2 TIMES DAILY PRN
Status: DISCONTINUED | OUTPATIENT
Start: 2022-01-01 | End: 2022-01-01 | Stop reason: HOSPADM

## 2022-01-01 RX ORDER — DEXTROSE MONOHYDRATE 25 G/50ML
25-50 INJECTION, SOLUTION INTRAVENOUS
Status: DISCONTINUED | OUTPATIENT
Start: 2022-01-01 | End: 2022-01-01 | Stop reason: HOSPADM

## 2022-01-01 RX ORDER — PRAMIPEXOLE DIHYDROCHLORIDE 0.12 MG/1
0.38 TABLET ORAL EVERY EVENING
Status: DISCONTINUED | OUTPATIENT
Start: 2022-01-01 | End: 2022-01-01 | Stop reason: HOSPADM

## 2022-01-01 RX ORDER — AZITHROMYCIN 250 MG/1
TABLET, FILM COATED ORAL
Qty: 3 TABLET | Refills: 0 | DISCHARGE
Start: 2022-01-01 | End: 2022-01-01

## 2022-01-01 RX ORDER — ONDANSETRON 2 MG/ML
4 INJECTION INTRAMUSCULAR; INTRAVENOUS EVERY 6 HOURS PRN
Status: DISCONTINUED | OUTPATIENT
Start: 2022-01-01 | End: 2022-01-01 | Stop reason: HOSPADM

## 2022-01-01 RX ORDER — LIDOCAINE 4 G/G
1 PATCH TOPICAL EVERY 24 HOURS
Status: DISCONTINUED | OUTPATIENT
Start: 2022-01-01 | End: 2022-01-01 | Stop reason: HOSPADM

## 2022-01-01 RX ORDER — CEFTRIAXONE 1 G/1
1 INJECTION, POWDER, FOR SOLUTION INTRAMUSCULAR; INTRAVENOUS EVERY 24 HOURS
Status: DISCONTINUED | OUTPATIENT
Start: 2022-01-01 | End: 2022-01-01 | Stop reason: HOSPADM

## 2022-01-01 RX ORDER — NICOTINE POLACRILEX 4 MG
15-30 LOZENGE BUCCAL
Status: DISCONTINUED | OUTPATIENT
Start: 2022-01-01 | End: 2022-01-01 | Stop reason: HOSPADM

## 2022-01-01 RX ORDER — ASPIRIN 325 MG
325 TABLET ORAL DAILY
Status: DISCONTINUED | OUTPATIENT
Start: 2022-01-01 | End: 2022-01-01 | Stop reason: HOSPADM

## 2022-01-01 RX ORDER — ONDANSETRON 4 MG/1
4 TABLET, ORALLY DISINTEGRATING ORAL EVERY 6 HOURS PRN
Status: DISCONTINUED | OUTPATIENT
Start: 2022-01-01 | End: 2022-01-01 | Stop reason: HOSPADM

## 2022-01-01 RX ORDER — CEFPODOXIME PROXETIL 100 MG/1
100 TABLET, FILM COATED ORAL 2 TIMES DAILY
Qty: 8 TABLET | Refills: 0 | DISCHARGE
Start: 2022-01-01 | End: 2022-01-01

## 2022-01-01 RX ORDER — LEVOFLOXACIN 250 MG/1
250 TABLET, FILM COATED ORAL EVERY 24 HOURS
Status: DISCONTINUED | OUTPATIENT
Start: 2022-01-01 | End: 2022-01-01 | Stop reason: HOSPADM

## 2022-01-01 RX ORDER — NALOXONE HYDROCHLORIDE 0.4 MG/ML
0.2 INJECTION, SOLUTION INTRAMUSCULAR; INTRAVENOUS; SUBCUTANEOUS
Status: DISCONTINUED | OUTPATIENT
Start: 2022-01-01 | End: 2022-01-01 | Stop reason: HOSPADM

## 2022-01-01 RX ORDER — LEVOFLOXACIN 500 MG/1
500 TABLET, FILM COATED ORAL DAILY
Status: COMPLETED | OUTPATIENT
Start: 2022-01-01 | End: 2022-01-01

## 2022-01-01 RX ORDER — AMOXICILLIN 250 MG
1 CAPSULE ORAL 2 TIMES DAILY PRN
Status: DISCONTINUED | OUTPATIENT
Start: 2022-01-01 | End: 2022-01-01 | Stop reason: HOSPADM

## 2022-01-01 RX ORDER — NALOXONE HYDROCHLORIDE 0.4 MG/ML
0.4 INJECTION, SOLUTION INTRAMUSCULAR; INTRAVENOUS; SUBCUTANEOUS
Status: DISCONTINUED | OUTPATIENT
Start: 2022-01-01 | End: 2022-01-01 | Stop reason: HOSPADM

## 2022-01-01 RX ORDER — ACETAMINOPHEN 325 MG/1
650 TABLET ORAL 3 TIMES DAILY
Status: DISCONTINUED | OUTPATIENT
Start: 2022-01-01 | End: 2022-01-01 | Stop reason: HOSPADM

## 2022-01-01 RX ORDER — AZITHROMYCIN 250 MG/1
250 TABLET, FILM COATED ORAL DAILY
Qty: 2 TABLET | Refills: 0 | DISCHARGE
Start: 2022-01-01 | End: 2022-01-01

## 2022-01-01 RX ORDER — AZITHROMYCIN 500 MG/1
500 INJECTION, POWDER, LYOPHILIZED, FOR SOLUTION INTRAVENOUS ONCE
Status: COMPLETED | OUTPATIENT
Start: 2022-01-01 | End: 2022-01-01

## 2022-01-01 RX ORDER — LANOLIN ALCOHOL/MO/W.PET/CERES
3 CREAM (GRAM) TOPICAL AT BEDTIME
Status: DISCONTINUED | OUTPATIENT
Start: 2022-01-01 | End: 2022-01-01 | Stop reason: HOSPADM

## 2022-01-01 RX ORDER — FUROSEMIDE 20 MG
20 TABLET ORAL
Status: DISCONTINUED | OUTPATIENT
Start: 2022-01-01 | End: 2022-01-01 | Stop reason: HOSPADM

## 2022-01-01 RX ORDER — CEFTRIAXONE 1 G/1
1 INJECTION, POWDER, FOR SOLUTION INTRAMUSCULAR; INTRAVENOUS ONCE
Status: COMPLETED | OUTPATIENT
Start: 2022-01-01 | End: 2022-01-01

## 2022-01-01 RX ORDER — ACETAMINOPHEN 500 MG
500 TABLET ORAL 2 TIMES DAILY PRN
Status: DISCONTINUED | OUTPATIENT
Start: 2022-01-01 | End: 2022-01-01 | Stop reason: HOSPADM

## 2022-01-01 RX ORDER — FAMOTIDINE 20 MG/1
20 TABLET, FILM COATED ORAL 2 TIMES DAILY
Status: DISCONTINUED | OUTPATIENT
Start: 2022-01-01 | End: 2022-01-01

## 2022-01-01 RX ORDER — HYDRALAZINE HYDROCHLORIDE 20 MG/ML
10 INJECTION INTRAMUSCULAR; INTRAVENOUS EVERY 4 HOURS PRN
Status: DISCONTINUED | OUTPATIENT
Start: 2022-01-01 | End: 2022-01-01 | Stop reason: HOSPADM

## 2022-01-01 RX ORDER — AZITHROMYCIN 500 MG/1
500 INJECTION, POWDER, LYOPHILIZED, FOR SOLUTION INTRAVENOUS EVERY 24 HOURS
Status: DISCONTINUED | OUTPATIENT
Start: 2022-01-01 | End: 2022-01-01 | Stop reason: HOSPADM

## 2022-01-01 RX ORDER — ACETAMINOPHEN 650 MG/1
650 SUPPOSITORY RECTAL EVERY 6 HOURS PRN
Status: DISCONTINUED | OUTPATIENT
Start: 2022-01-01 | End: 2022-01-01 | Stop reason: HOSPADM

## 2022-01-01 RX ORDER — ACETAMINOPHEN 325 MG/1
650 TABLET ORAL EVERY 6 HOURS PRN
Status: DISCONTINUED | OUTPATIENT
Start: 2022-01-01 | End: 2022-01-01 | Stop reason: HOSPADM

## 2022-01-01 RX ORDER — LEVOFLOXACIN 250 MG/1
250 TABLET, FILM COATED ORAL EVERY 24 HOURS
Status: DISCONTINUED | OUTPATIENT
Start: 2022-01-01 | End: 2022-01-01

## 2022-01-01 RX ORDER — LIDOCAINE 40 MG/G
CREAM TOPICAL
Status: DISCONTINUED | OUTPATIENT
Start: 2022-01-01 | End: 2022-01-01 | Stop reason: HOSPADM

## 2022-01-01 RX ADMIN — CEFTRIAXONE SODIUM 1 G: 1 INJECTION, POWDER, FOR SOLUTION INTRAMUSCULAR; INTRAVENOUS at 01:47

## 2022-01-01 RX ADMIN — AZITHROMYCIN MONOHYDRATE 500 MG: 500 INJECTION, POWDER, LYOPHILIZED, FOR SOLUTION INTRAVENOUS at 23:33

## 2022-01-01 RX ADMIN — GABAPENTIN 100 MG: 100 CAPSULE ORAL at 09:18

## 2022-01-01 RX ADMIN — INSULIN ASPART 1 UNITS: 100 INJECTION, SOLUTION INTRAVENOUS; SUBCUTANEOUS at 18:12

## 2022-01-01 RX ADMIN — GABAPENTIN 100 MG: 100 CAPSULE ORAL at 10:51

## 2022-01-01 RX ADMIN — Medication 650 MG: at 17:23

## 2022-01-01 RX ADMIN — SPIRONOLACTONE 12.5 MG: 25 TABLET ORAL at 08:11

## 2022-01-01 RX ADMIN — MICONAZOLE NITRATE: 20 POWDER TOPICAL at 00:23

## 2022-01-01 RX ADMIN — MICONAZOLE NITRATE: 20 POWDER TOPICAL at 09:58

## 2022-01-01 RX ADMIN — DICLOFENAC SODIUM 2 G: 10 GEL TOPICAL at 20:13

## 2022-01-01 RX ADMIN — INSULIN ASPART 1 UNITS: 100 INJECTION, SOLUTION INTRAVENOUS; SUBCUTANEOUS at 12:06

## 2022-01-01 RX ADMIN — FUROSEMIDE 20 MG: 20 TABLET ORAL at 08:11

## 2022-01-01 RX ADMIN — PRAMIPEXOLE DIHYDROCHLORIDE 0.38 MG: 0.25 TABLET ORAL at 17:09

## 2022-01-01 RX ADMIN — CEFTRIAXONE SODIUM 1 G: 1 INJECTION, POWDER, FOR SOLUTION INTRAMUSCULAR; INTRAVENOUS at 20:10

## 2022-01-01 RX ADMIN — FUROSEMIDE 20 MG: 20 TABLET ORAL at 15:43

## 2022-01-01 RX ADMIN — AZITHROMYCIN MONOHYDRATE 500 MG: 500 INJECTION, POWDER, LYOPHILIZED, FOR SOLUTION INTRAVENOUS at 00:23

## 2022-01-01 RX ADMIN — MICONAZOLE NITRATE: 20 POWDER TOPICAL at 20:13

## 2022-01-01 RX ADMIN — MICONAZOLE NITRATE: 20 POWDER TOPICAL at 14:11

## 2022-01-01 RX ADMIN — SPIRONOLACTONE 12.5 MG: 25 TABLET ORAL at 15:43

## 2022-01-01 RX ADMIN — POLYETHYLENE GLYCOL 3350 8.5 G: 17 POWDER, FOR SOLUTION ORAL at 08:54

## 2022-01-01 RX ADMIN — AZITHROMYCIN MONOHYDRATE 500 MG: 500 INJECTION, POWDER, LYOPHILIZED, FOR SOLUTION INTRAVENOUS at 02:20

## 2022-01-01 RX ADMIN — POLYETHYLENE GLYCOL 3350 8.5 G: 17 POWDER, FOR SOLUTION ORAL at 08:12

## 2022-01-01 RX ADMIN — GABAPENTIN 100 MG: 100 CAPSULE ORAL at 21:06

## 2022-01-01 RX ADMIN — DICLOFENAC SODIUM 2 G: 10 GEL TOPICAL at 09:58

## 2022-01-01 RX ADMIN — GABAPENTIN 100 MG: 100 CAPSULE ORAL at 20:12

## 2022-01-01 RX ADMIN — Medication 650 MG: at 23:24

## 2022-01-01 RX ADMIN — INSULIN ASPART 1 UNITS: 100 INJECTION, SOLUTION INTRAVENOUS; SUBCUTANEOUS at 10:02

## 2022-01-01 RX ADMIN — DICLOFENAC SODIUM 2 G: 10 GEL TOPICAL at 10:50

## 2022-01-01 RX ADMIN — PRAMIPEXOLE DIHYDROCHLORIDE 0.38 MG: 0.12 TABLET ORAL at 21:38

## 2022-01-01 RX ADMIN — CEFTRIAXONE SODIUM 1 G: 1 INJECTION, POWDER, FOR SOLUTION INTRAMUSCULAR; INTRAVENOUS at 21:48

## 2022-01-01 RX ADMIN — DICLOFENAC SODIUM 2 G: 10 GEL TOPICAL at 09:19

## 2022-01-01 RX ADMIN — MICONAZOLE NITRATE: 20 POWDER TOPICAL at 09:19

## 2022-01-01 RX ADMIN — SITAGLIPTIN 50 MG: 50 TABLET, FILM COATED ORAL at 15:33

## 2022-01-01 RX ADMIN — FAMOTIDINE 20 MG: 20 TABLET ORAL at 09:50

## 2022-01-01 RX ADMIN — LIDOCAINE 1 PATCH: 560 PATCH PERCUTANEOUS; TOPICAL; TRANSDERMAL at 15:43

## 2022-01-01 RX ADMIN — ASPIRIN 325 MG ORAL TABLET 325 MG: 325 PILL ORAL at 08:52

## 2022-01-01 RX ADMIN — ASPIRIN 325 MG ORAL TABLET 325 MG: 325 PILL ORAL at 09:18

## 2022-01-01 RX ADMIN — SITAGLIPTIN 50 MG: 50 TABLET, FILM COATED ORAL at 08:52

## 2022-01-01 RX ADMIN — FUROSEMIDE 20 MG: 20 TABLET ORAL at 10:51

## 2022-01-01 RX ADMIN — ACETAMINOPHEN 650 MG: 325 TABLET ORAL at 22:11

## 2022-01-01 RX ADMIN — DICLOFENAC SODIUM 2 G: 10 GEL TOPICAL at 22:06

## 2022-01-01 RX ADMIN — INSULIN ASPART 1 UNITS: 100 INJECTION, SOLUTION INTRAVENOUS; SUBCUTANEOUS at 17:24

## 2022-01-01 RX ADMIN — INSULIN ASPART 1 UNITS: 100 INJECTION, SOLUTION INTRAVENOUS; SUBCUTANEOUS at 17:30

## 2022-01-01 RX ADMIN — GABAPENTIN 100 MG: 100 CAPSULE ORAL at 08:52

## 2022-01-01 RX ADMIN — SODIUM CHLORIDE 1000 ML: 9 INJECTION, SOLUTION INTRAVENOUS at 20:11

## 2022-01-01 RX ADMIN — FAMOTIDINE 20 MG: 20 TABLET ORAL at 21:06

## 2022-01-01 RX ADMIN — ENOXAPARIN SODIUM 40 MG: 40 INJECTION SUBCUTANEOUS at 09:19

## 2022-01-01 RX ADMIN — FAMOTIDINE 20 MG: 20 TABLET ORAL at 09:18

## 2022-01-01 RX ADMIN — LEVOFLOXACIN 500 MG: 500 TABLET, FILM COATED ORAL at 10:07

## 2022-01-01 RX ADMIN — ENOXAPARIN SODIUM 40 MG: 40 INJECTION SUBCUTANEOUS at 08:51

## 2022-01-01 RX ADMIN — Medication 650 MG: at 22:06

## 2022-01-01 RX ADMIN — MELATONIN TAB 3 MG 3 MG: 3 TAB at 21:49

## 2022-01-01 RX ADMIN — Medication 650 MG: at 16:20

## 2022-01-01 RX ADMIN — ASPIRIN 325 MG ORAL TABLET 325 MG: 325 PILL ORAL at 09:50

## 2022-01-01 RX ADMIN — GABAPENTIN 100 MG: 100 CAPSULE ORAL at 22:06

## 2022-01-01 RX ADMIN — LIDOCAINE 1 PATCH: 560 PATCH PERCUTANEOUS; TOPICAL; TRANSDERMAL at 14:04

## 2022-01-01 RX ADMIN — DICLOFENAC SODIUM 2 G: 10 GEL TOPICAL at 00:22

## 2022-01-01 RX ADMIN — GABAPENTIN 100 MG: 100 CAPSULE ORAL at 09:50

## 2022-01-01 RX ADMIN — GABAPENTIN 100 MG: 100 CAPSULE ORAL at 21:39

## 2022-01-01 RX ADMIN — DICLOFENAC SODIUM 2 G: 10 GEL TOPICAL at 23:50

## 2022-01-01 RX ADMIN — INSULIN ASPART 2 UNITS: 100 INJECTION, SOLUTION INTRAVENOUS; SUBCUTANEOUS at 14:06

## 2022-01-01 RX ADMIN — INSULIN ASPART 1 UNITS: 100 INJECTION, SOLUTION INTRAVENOUS; SUBCUTANEOUS at 09:20

## 2022-01-01 RX ADMIN — INSULIN ASPART 1 UNITS: 100 INJECTION, SOLUTION INTRAVENOUS; SUBCUTANEOUS at 08:49

## 2022-01-01 RX ADMIN — ENOXAPARIN SODIUM 40 MG: 40 INJECTION SUBCUTANEOUS at 09:50

## 2022-01-01 RX ADMIN — FUROSEMIDE 20 MG: 20 TABLET ORAL at 17:23

## 2022-01-01 RX ADMIN — DICLOFENAC SODIUM 2 G: 10 GEL TOPICAL at 10:07

## 2022-01-01 RX ADMIN — MELATONIN TAB 3 MG 3 MG: 3 TAB at 21:39

## 2022-01-01 RX ADMIN — PRAMIPEXOLE DIHYDROCHLORIDE 0.38 MG: 0.12 TABLET ORAL at 20:12

## 2022-01-01 RX ADMIN — FAMOTIDINE 20 MG: 20 TABLET ORAL at 22:06

## 2022-01-01 RX ADMIN — Medication 650 MG: at 10:51

## 2022-01-01 RX ADMIN — GABAPENTIN 100 MG: 100 CAPSULE ORAL at 08:11

## 2022-01-01 RX ADMIN — SITAGLIPTIN 50 MG: 50 TABLET, FILM COATED ORAL at 09:18

## 2022-01-01 RX ADMIN — FAMOTIDINE 20 MG: 20 TABLET ORAL at 08:52

## 2022-01-01 RX ADMIN — Medication 650 MG: at 08:11

## 2022-01-01 RX ADMIN — PRAMIPEXOLE DIHYDROCHLORIDE 0.38 MG: 0.25 TABLET ORAL at 17:31

## 2022-01-01 RX ADMIN — DICLOFENAC SODIUM 2 G: 10 GEL TOPICAL at 16:40

## 2022-01-01 RX ADMIN — INSULIN ASPART 1 UNITS: 100 INJECTION, SOLUTION INTRAVENOUS; SUBCUTANEOUS at 12:03

## 2022-01-01 RX ADMIN — INSULIN ASPART 1 UNITS: 100 INJECTION, SOLUTION INTRAVENOUS; SUBCUTANEOUS at 10:52

## 2022-01-01 ASSESSMENT — ACTIVITIES OF DAILY LIVING (ADL)
DOING_ERRANDS_INDEPENDENTLY_DIFFICULTY: YES
FALL_HISTORY_WITHIN_LAST_SIX_MONTHS: YES
DRESSING/BATHING_DIFFICULTY: YES
ADLS_ACUITY_SCORE: 43
ADLS_ACUITY_SCORE: 42
ADLS_ACUITY_SCORE: 47
ADLS_ACUITY_SCORE: 42
DEPENDENT_IADLS:: CLEANING;COOKING;LAUNDRY;SHOPPING;MEAL PREPARATION;MEDICATION MANAGEMENT;MONEY MANAGEMENT;TRANSPORTATION;INCONTINENCE
ADLS_ACUITY_SCORE: 43
ADLS_ACUITY_SCORE: 43
ADLS_ACUITY_SCORE: 35
CHANGE_IN_FUNCTIONAL_STATUS_SINCE_ONSET_OF_CURRENT_ILLNESS/INJURY: YES
ADLS_ACUITY_SCORE: 43
TOILETING_ISSUES: YES
ADLS_ACUITY_SCORE: 48
ADLS_ACUITY_SCORE: 43
ADLS_ACUITY_SCORE: 42
ADLS_ACUITY_SCORE: 43
ADLS_ACUITY_SCORE: 35
ADLS_ACUITY_SCORE: 35
ADLS_ACUITY_SCORE: 43
ADLS_ACUITY_SCORE: 43
ADLS_ACUITY_SCORE: 35
ADLS_ACUITY_SCORE: 42
CONCENTRATING,_REMEMBERING_OR_MAKING_DECISIONS_DIFFICULTY: YES
ADLS_ACUITY_SCORE: 42
ADLS_ACUITY_SCORE: 43
ADLS_ACUITY_SCORE: 42
ADLS_ACUITY_SCORE: 43
ADLS_ACUITY_SCORE: 33
ADLS_ACUITY_SCORE: 43
ADLS_ACUITY_SCORE: 42
ADLS_ACUITY_SCORE: 35
ADLS_ACUITY_SCORE: 48
ADLS_ACUITY_SCORE: 42
WALKING_OR_CLIMBING_STAIRS_DIFFICULTY: YES
ADLS_ACUITY_SCORE: 43
ADLS_ACUITY_SCORE: 43
ADLS_ACUITY_SCORE: 42
ADLS_ACUITY_SCORE: 35
ADLS_ACUITY_SCORE: 35
WEAR_GLASSES_OR_BLIND: YES
ADLS_ACUITY_SCORE: 33
ADLS_ACUITY_SCORE: 42
ADLS_ACUITY_SCORE: 43
ADLS_ACUITY_SCORE: 43

## 2022-01-01 ASSESSMENT — ENCOUNTER SYMPTOMS
HEADACHES: 0
COUGH: 0
NECK PAIN: 0
ARTHRALGIAS: 1

## 2022-02-17 PROBLEM — M79.672 LEFT FOOT PAIN: Status: ACTIVE | Noted: 2018-02-28

## 2022-04-29 NOTE — LETTER
4/29/2022         RE: Regla Benavidez  C/o David Benavidez  [address] [place] [address]        Dear Colleague,    Thank you for referring your patient, Regla Benavidez, to the [facility]. Please see a copy of my visit note below.    ASSESSMENT:  Encounter Diagnoses   Name Primary?     Toe pain, left Yes     Diabetic polyneuropathy associated with other specified diabetes mellitus (H)      Hammer toe of left foot      Nail hypertrophy      Onychomycosis      Type 2 diabetes mellitus with stage 3b chronic kidney disease, without long-term current use of insulin (H)        MEDICAL DECISION MAKING:  I personally reviewed the x-ray images with Alberta and her daughter.  Nothing to suggest or raise concern for osteomyelitis involving the left second toe.    We discussed that the sores on her toes are likely related to the swelling and irritation from footwear.  I am not sure what caused her swelling.  We discussed having shoes stretched in an orthopedic shoe store or with our orthotic department.    We discussed the use of low-profile padding, like toe sleeves.    She is referred for new diabetic shoes and orthotic devices.    An ABN waiver form was signed.  Using a nail nippers, all 10 toenails were debrided.  This involved removing some of the bulk.  Nails were shortened.  Subungual debris was cleared.  No bleeding.    Follow-up for a check of the left foot in 4 weeks or sooner.        Disclaimer: This note consists of symbols derived from keyboarding, dictation and/or voice recognition software. As a result, there may be errors in the script that have gone undetected. Please consider this when interpreting information found in this chart.    Bill Garcia DPM, FACFAS, [facility] Department of Podiatry/Foot & Ankle Surgery      ____________________________________________________________________    HPI:       Alberta presents today with her daughter.  They are  concerned about her toenails and requests trimming.  She also reports pain involving the toes of her left foot and questions if they rub in her shoes.  Her left second toe was particularly painful.  Apparently she had significant left lower extremity edema on Easter.  At that time her feet did not fit comfortably in shoes and she developed sores on 3 toes.  2 of the 3 sores have healed.  There is some residual abnormality on the left second toe.  There is a history of lower extremity cellulitis.    I last evaluated her 11/23/2021.  She has type 2 diabetes with chronic kidney disease.  She has diabetic polyneuropathy.    Past Medical History:   Diagnosis Date     Aspiration pneumonia (H) 11/15/2014    11/14; and UTI with sepsis      Cholelithiasis 2009    incidental finding     DJD (degenerative joint disease) of lumbar spine 2009     Elevated glucose      Expressive aphasia related to prior stroke      Hard of hearing     hearing aids     HTN (hypertension)      Shingles 2005     Varicose vein of leg    *  *  Past Surgical History:   Procedure Laterality Date     CATARACT IOL, RT/LT     *  *  Current Outpatient Medications   Medication Sig Dispense Refill     acetaminophen (TYLENOL) 325 MG tablet Take 650 mg by mouth 3 times daily 9am, 1pm, and 8pm       acetaminophen (TYLENOL) 500 MG tablet Take 500-1,000 mg by mouth 2 times daily as needed for mild pain       aspirin 325 MG tablet Take 1 tablet (325 mg) by mouth daily 120 tablet      blood glucose calibration (NO BRAND SPECIFIED) solution Use to calibrate blood glucose monitor as needed as directed. To accompany: Accu-Chek Safe-T pro plus 1 Bottle 3     blood glucose monitoring (NO BRAND SPECIFIED) test strip Use to test blood sugar 1 times daily or as directed. To accompany: Accu-Chek safety pro plus 100 strip 6     blood glucose monitoring (ONE TOUCH ULTRA 2) meter device kit Use to test blood sugar 1 times daily or as directed. 1 kit 1     cefuroxime (CEFTIN)  250 MG tablet TO NURSING STAFF; GIVE THIS MEDICATION BID FOR 7 DAYS.                   Leo Melgar MD 14 tablet 0     Cholecalciferol (VITAMIN D3 PO) Take 2,000 Units by mouth daily       diclofenac (VOLTAREN) 1 % topical gel Apply 2 g topically 3 times daily Apply to both kness, back of the knees, and side to side. 9am, 1pm, and 8pm 100 g 4     famotidine (PEPCID) 20 MG tablet Take 1 tablet (20 mg) by mouth 2 times daily 180 tablet 4     furosemide (LASIX) 20 MG tablet Take 20 mg by mouth daily Give 1 tablet daily 60 tablet 12     gabapentin (NEURONTIN) 100 MG capsule Take 1 capsule (100 mg) by mouth 2 times daily 60 capsule 11     glipiZIDE (GLUCOTROL) 10 MG tablet Take 1 tablet (10 mg) by mouth 2 times daily (before meals) 60 tablet 11     lidocaine (LMX4) 4 % external cream Apply topically once as needed for mild pain       LIDOCAINE-DM-TROLAMINE SAL EX Externally apply topically 2 times daily Aspercreme w/Lidocaine (Trolamine salicylate) 0.4% Apply thin later of cream to both feet twice daily and once daily as needed to feet and lower legs       metFORMIN (GLUCOPHAGE) 500 MG tablet Take 500 mg by mouth daily  30 tablet 12     nystatin (MYCOSTATIN) 808348 UNIT/GM external cream APPLY TO INGUINAL AREAS AND UNDER THE BREASTS BID. 120 g 8     nystatin (MYCOSTATIN) 779041 UNIT/GM external powder Apply topically 3 times daily as needed (redness in abdominal folds)       ondansetron (ZOFRAN) 4 MG tablet Take 4 mg by mouth every 8 hours as needed for nausea       polyethylene glycol (MIRALAX/GLYCOLAX) Packet Take 8.5 g by mouth every other day        pramipexole (MIRAPEX) 0.125 MG tablet Take 0.375 mg by mouth every evening       simvastatin (ZOCOR) 5 MG tablet Take 1 tablet (5 mg) by mouth every evening 30 tablet      sitagliptin (JANUVIA) 50 MG tablet Take 1 tablet (50 mg) by mouth daily 30 tablet 11     spironolactone (ALDACTONE) 25 MG tablet Take 0.5 tablets (12.5 mg) by mouth every other day 15 tablet 11      thin (NO BRAND SPECIFIED) lancets Use to test blood sugar 1 times daily or as directed. To accompany: Accu-Cheks safety pro plus 100 each 3         EXAM:    Vitals: LMP  (LMP Unknown)   BMI: There is no height or weight on file to calculate BMI.    Constitutional:  Regla Benavidez is in no apparent distress, appears well-nourished.  Cooperative with history and physical exam.     Vascular:  Pedal pulses are not readily palpable, bilateral foot, for both the DP and PT arteries.  CFT < 3 sec.  No edema.       Neuro: Light touch sensation is intact to the L4, L5, S1 distributions  No evidence of weakness, spasticity, or contracture in the lower extremities.      Derm: Normal texture and turgor.  No erythema, ecchymosis, or cyanosis.  No open lesions.  There is hyperkeratotic skin on the dorsal aspect of the left second toe in the location of previous wound.  There is no associated edema or erythema.  No evidence of intra epidermal bleeding.    Several nails are elongated and thickened.  Left hallux nail is very thick and ncurvated.  There is tenderness with pressure applied to the nail plate.    This nail is incurvated.    Musculoskeletal:    Lower extremity muscle strength is normal. No gross deformities.    Mild contractures of the toes, left foot more than right.    XR TOE LEFT G/E 2 VIEWS 4/29/2022 1:51 PM      HISTORY: lesion over dorsal toe; previous redness and swelling; Toe  pain, left     COMPARISON: 3/1/2018.                                                                       IMPRESSION: 3 views of the second toe show no fracture or definitive  evidence of ostomy myelitis. Mild degenerative arthritic change.     Large marginal erosions across the first MTP joint likely indicating  gouty arthropathy. Bones are demineralized. There is soft tissue  swelling about the first MTP joint. No definitive acute fracture or  evidence of osteomyelitis.     CHELA TAMAYO MD       Again, thank you for allowing me to  participate in the care of your patient.        Sincerely,        Bill Garcia DPM

## 2022-04-29 NOTE — PATIENT INSTRUCTIONS
Thank you for choosing [facility] Podiatry / Foot & Ankle Surgery!    DR. CERDA'S CLINIC LOCATIONS:     [facility] TRIAGE LINE: [phone]   [address] [place] [address] APPOINTMENTS: [phone]   [place] [address] RADIOLOGY: [phone]    SET UP SURGERY: [phone]    BILLING QUESTIONS: [phone]   [facility] FAX: [phone]   [address]    [place] [address]      Follow up: order placed for diabetic shoes and inserts    [facility] LOCATIONS  [facility]  [address]  [place] [address]  Phone: [phone]  Fax: [phone] [facility]  [address]  [place] [address]  Phone: [phone]   Fax: [phone]   [facility]  [address]  [place] [address]  Phone: [phone]  Fax: [phone] [facility]  [address]  [place] [address]  Phone: [phone]   Fax: [phone]   [facility]   [address]  [place] [address]  Phone: [phone]  Fax: [phone] * Please call any location listed to make an appointment for a casting/fitting. Your referral was sent to their central office and they will all have the order on file.        [email or web address]   1-800-PEDIFIX     [email or web address]   1-800-PEDIFIX  [email or web address] or call [email or web address]-[email or web address]  TOE COVERS/CAPS

## 2022-04-29 NOTE — PROGRESS NOTES
ASSESSMENT:  Encounter Diagnoses   Name Primary?     Toe pain, left Yes     Diabetic polyneuropathy associated with other specified diabetes mellitus (H)      Hammer toe of left foot      Nail hypertrophy      Onychomycosis      Type 2 diabetes mellitus with stage 3b chronic kidney disease, without long-term current use of insulin (H)        MEDICAL DECISION MAKING:  I personally reviewed the x-ray images with Alberta and her daughter.  Nothing to suggest or raise concern for osteomyelitis involving the left second toe.    We discussed that the sores on her toes are likely related to the swelling and irritation from footwear.  I am not sure what caused her swelling.  We discussed having shoes stretched in an orthopedic shoe store or with our orthotic department.    We discussed the use of low-profile padding, like toe sleeves.    She is referred for new diabetic shoes and orthotic devices.    An ABN waiver form was signed.  Using a nail nippers, all 10 toenails were debrided.  This involved removing some of the bulk.  Nails were shortened.  Subungual debris was cleared.  No bleeding.    Follow-up for a check of the left foot in 4 weeks or sooner.        Disclaimer: This note consists of symbols derived from keyboarding, dictation and/or voice recognition software. As a result, there may be errors in the script that have gone undetected. Please consider this when interpreting information found in this chart.    Bill Garcia DPM, FACFAS, MS    [facility]      ____________________________________________________________________    HPI:       Alberta presents today with her daughter.  They are concerned about her toenails and requests trimming.  She also reports pain involving the toes of her left foot and questions if they rub in her shoes.  Her left second toe was particularly painful.  Apparently she had significant left lower extremity edema on Easter.  At that time her  feet did not fit comfortably in shoes and she developed sores on 3 toes.  2 of the 3 sores have healed.  There is some residual abnormality on the left second toe.  There is a history of lower extremity cellulitis.    I last evaluated her 11/23/2021.  She has type 2 diabetes with chronic kidney disease.  She has diabetic polyneuropathy.    Past Medical History:   Diagnosis Date     Aspiration pneumonia (H) 11/15/2014    11/14; and UTI with sepsis      Cholelithiasis 2009    incidental finding     DJD (degenerative joint disease) of lumbar spine 2009     Elevated glucose      Expressive aphasia related to prior stroke      Hard of hearing     hearing aids     HTN (hypertension)      Shingles 2005     Varicose vein of leg    *  *  Past Surgical History:   Procedure Laterality Date     CATARACT IOL, RT/LT     *  *  Current Outpatient Medications   Medication Sig Dispense Refill     acetaminophen (TYLENOL) 325 MG tablet Take 650 mg by mouth 3 times daily 9am, 1pm, and 8pm       acetaminophen (TYLENOL) 500 MG tablet Take 500-1,000 mg by mouth 2 times daily as needed for mild pain       aspirin 325 MG tablet Take 1 tablet (325 mg) by mouth daily 120 tablet      blood glucose calibration (NO BRAND SPECIFIED) solution Use to calibrate blood glucose monitor as needed as directed. To accompany: Accu-Chek Safe-T pro plus 1 Bottle 3     blood glucose monitoring (NO BRAND SPECIFIED) test strip Use to test blood sugar 1 times daily or as directed. To accompany: Accu-Chek safety pro plus 100 strip 6     blood glucose monitoring (ONE TOUCH ULTRA 2) meter device kit Use to test blood sugar 1 times daily or as directed. 1 kit 1     cefuroxime (CEFTIN) 250 MG tablet TO NURSING STAFF; GIVE THIS MEDICATION BID FOR 7 DAYS.                   Leo Melgar MD 14 tablet 0     Cholecalciferol (VITAMIN D3 PO) Take 2,000 Units by mouth daily       diclofenac (VOLTAREN) 1 % topical gel Apply 2 g topically 3 times daily Apply to both kness,  back of the knees, and side to side. 9am, 1pm, and 8pm 100 g 4     famotidine (PEPCID) 20 MG tablet Take 1 tablet (20 mg) by mouth 2 times daily 180 tablet 4     furosemide (LASIX) 20 MG tablet Take 20 mg by mouth daily Give 1 tablet daily 60 tablet 12     gabapentin (NEURONTIN) 100 MG capsule Take 1 capsule (100 mg) by mouth 2 times daily 60 capsule 11     glipiZIDE (GLUCOTROL) 10 MG tablet Take 1 tablet (10 mg) by mouth 2 times daily (before meals) 60 tablet 11     lidocaine (LMX4) 4 % external cream Apply topically once as needed for mild pain       LIDOCAINE-DM-TROLAMINE SAL EX Externally apply topically 2 times daily Aspercreme w/Lidocaine (Trolamine salicylate) 0.4% Apply thin later of cream to both feet twice daily and once daily as needed to feet and lower legs       metFORMIN (GLUCOPHAGE) 500 MG tablet Take 500 mg by mouth daily  30 tablet 12     nystatin (MYCOSTATIN) 411523 UNIT/GM external cream APPLY TO INGUINAL AREAS AND UNDER THE BREASTS BID. 120 g 8     nystatin (MYCOSTATIN) 841192 UNIT/GM external powder Apply topically 3 times daily as needed (redness in abdominal folds)       ondansetron (ZOFRAN) 4 MG tablet Take 4 mg by mouth every 8 hours as needed for nausea       polyethylene glycol (MIRALAX/GLYCOLAX) Packet Take 8.5 g by mouth every other day        pramipexole (MIRAPEX) 0.125 MG tablet Take 0.375 mg by mouth every evening       simvastatin (ZOCOR) 5 MG tablet Take 1 tablet (5 mg) by mouth every evening 30 tablet      sitagliptin (JANUVIA) 50 MG tablet Take 1 tablet (50 mg) by mouth daily 30 tablet 11     spironolactone (ALDACTONE) 25 MG tablet Take 0.5 tablets (12.5 mg) by mouth every other day 15 tablet 11     thin (NO BRAND SPECIFIED) lancets Use to test blood sugar 1 times daily or as directed. To accompany: Color [employer]pli[employer] safety pro plus 100 each 3         EXAM:    Vitals: LMP  (LMP Unknown)   BMI: There is no height or weight on file to calculate BMI.    Constitutional:  Regla Benavidez is  in no apparent distress, appears well-nourished.  Cooperative with history and physical exam.     Vascular:  Pedal pulses are not readily palpable, bilateral foot, for both the DP and PT arteries.  CFT < 3 sec.  No edema.       Neuro: Light touch sensation is intact to the L4, L5, S1 distributions  No evidence of weakness, spasticity, or contracture in the lower extremities.      Derm: Normal texture and turgor.  No erythema, ecchymosis, or cyanosis.  No open lesions.  There is hyperkeratotic skin on the dorsal aspect of the left second toe in the location of previous wound.  There is no associated edema or erythema.  No evidence of intra epidermal bleeding.    Several nails are elongated and thickened.  Left hallux nail is very thick and ncurvated.  There is tenderness with pressure applied to the nail plate.    This nail is incurvated.    Musculoskeletal:    Lower extremity muscle strength is normal. No gross deformities.    Mild contractures of the toes, left foot more than right.    XR TOE LEFT G/E 2 VIEWS 4/29/2022 1:51 PM      HISTORY: lesion over dorsal toe; previous redness and swelling; Toe  pain, left     COMPARISON: 3/1/2018.                                                                       IMPRESSION: 3 views of the second toe show no fracture or definitive  evidence of ostomy myelitis. Mild degenerative arthritic change.     Large marginal erosions across the first MTP joint likely indicating  gouty arthropathy. Bones are demineralized. There is soft tissue  swelling about the first MTP joint. No definitive acute fracture or  evidence of osteomyelitis.     CHELA TAMAYO MD

## 2022-05-09 PROBLEM — J18.9 PNEUMONIA OF LEFT LOWER LOBE DUE TO INFECTIOUS ORGANISM: Status: ACTIVE | Noted: 2022-01-01

## 2022-05-09 PROBLEM — R41.82 ALTERED MENTAL STATUS, UNSPECIFIED ALTERED MENTAL STATUS TYPE: Status: ACTIVE | Noted: 2022-01-01

## 2022-05-09 NOTE — ED TRIAGE NOTES
Patient presents via EMS with reports of living in an assisted living in which she is independent of everything bu meds and food. Staff were doing rounds at 1700 when they found patient on the floor covered in stool. Patient initial reports back and hip pain but when placed on cot denies pain. Patient reports hitting her head but unsure of LOC. Patient denies pain at this time and is able to move all extremities.

## 2022-05-09 NOTE — ED PROVIDER NOTES
History   Chief Complaint:  Fall (Patient was found down at [facility] in bathroom. Unknown downtime)       History provided by: patient and daughter on the phone.      Regla Benavidez is a 89 year old female with history of UTI, type 2 diabetes, hypertension who presents from assisted living with unwitnessed fall. Per EMS, patient is independent of everything except medications and food. Staff were doing rounds at 1700 when they found patient on the floor covered in stool. She told the staff that she hit the back of her head. No loss of consciousness. She denies current headache or neck pain. She initially reports back and hip pain but denies pain currently. Patient is able to move her legs and ankles. Per daughter, patient has had history of chronic UTI with cloudy, smelly urine and off balance, but no recent UTI diagnosis. Daughter states this was her 3rd fall and not sure if it is UTI-related. No recent cough or cold symptoms.     Review of Systems   Unable to perform ROS: Mental status change   Respiratory: Negative for cough.    Musculoskeletal: Positive for arthralgias. Negative for neck pain.   Neurological: Negative for headaches.     Allergies:  Cozaar [Losartan Potassium]  Hydrochlorothiazide  Hydrocodone    Medications:  aspirin 325 MG tablet  cefuroxime (CEFTIN) 250 MG tablet  famotidine (PEPCID) 20 MG tablet  furosemide (LASIX) 20 MG tablet  gabapentin (NEURONTIN) 100 MG capsule  glipiZIDE (GLUCOTROL) 10 MG tablet  lidocaine (LMX4) 4 % external creamablet  nystatin (MYCOSTATIN)   ondansetron (ZOFRAN) 4 MG tablet  polyethylene glycol (MIRALAX/GLYCOLAX) Packet  pramipexole (MIRAPEX) 0.125 MG tablet  simvastatin (ZOCOR) 5 MG tablet  sitagliptin (JANUVIA) 50 MG tablet  spironolactone (ALDACTONE) 25 MG tablet    Past Medical History:     Aspiration pneumonia  Cholelithiasis     Cellulitis  Chronic midline low back pain without sciatica  DJD of lumbar spine     Expressive aphasia related to prior stroke        Hard of hearing             Hypertension  Shingles            Varicose vein of leg      Obesity   PAD  CKD, stage 3  Type II diabetes  Cerebral infarction  Restless legs syndrome      Past Surgical History:    Cataract IOL     Family History:    Diabetes    Social History:  Patient presents via EMS with son. Spoke to daughter on the phone. Patient has 4 kids. Never smoker. No alcohol.     Physical Exam     Patient Vitals for the past 24 hrs:   BP Pulse Resp SpO2 Weight   05/09/22 2200 -- 94 17 95 % --   05/09/22 2135 (!) 146/66 85 20 90 % --   05/09/22 2005 -- 92 22 (!) 88 % --   05/09/22 1910 -- 93 17 90 % --   05/09/22 1900 (!) 156/63 92 14 93 % --   05/09/22 1830 (!) 141/68 92 18 93 % --   05/09/22 1829 -- -- -- -- 81.2 kg (179 lb)       Physical Exam  Nursing note and vitals reviewed.  Constitutional:  Appears well-developed and well-nourished.   HENT:   Head:    Atraumatic.   Mouth/Throat:   Oropharynx is clear and moist. No oropharyngeal exudate.   Eyes:    Pupils are equal, round, and reactive to light.   Neck:    Non tendered neck. Good ROM without pain.      No tracheal deviation present. No thyromegaly present.   Cardiovascular:  Normal rate, regular rhythm, no murmur   Pulmonary/Chest: Breath sounds are clear and equal without wheezes or crackles.  Abdominal:   Soft. Bowel sounds are normal. Exhibits no distension and      no mass. There is no tenderness.      There is no rebound and no guarding.   Musculoskeletal:  Exhibits no edema.   Lymphadenopathy:  No cervical adenopathy.   Neurological:   Awake and alert, follows commands and answers questions. GCS 15.  CN 2-12 intact.  and proximal upper extremity strength strong and equal.  Bilateral lower extremity strength strong and equal, including strong dorsiflexion and plantarflexion strength.  Sensation intact and equal to the face, arms and legs.  No facial droop or weakness. Normal speech.  Follows commands and answers questions normally.     Skin:    Skin is warm and dry. No rash noted. No pallor.     Emergency Department Course   ECG  ECG obtained at 1847, ECG read at 1918  Sinus rhythm with 1st degree AV block with occasional premature ventricular complexes.  Left axis deviation.  Pulmonary disease pattern.  Minimal voltage criteria for LVH, may be normal variant.  Septal infarct, age undetermined.   Rate 95 bpm. IA interval 272 ms. QRS duration 80 ms. QT/QTc 352/442 ms. P-R-T axes 92 -37 30.     Imaging:  Chest XR,  PA & LAT   Final Result   IMPRESSION: Stable cardiomediastinal silhouette. Atelectasis or infiltrate left lung base. Trace effusions. Stable cardiomediastinal silhouette. Atherosclerotic aorta and degenerative change osseous structures.      CT Head w/o Contrast   Final Result   IMPRESSION:   1.  Unchanged age-related changes and chronic ischemic changes as above with no acute intracranial abnormality.        Report per radiology    Laboratory:  Labs Ordered and Resulted from Time of ED Arrival to Time of ED Departure   COMPREHENSIVE METABOLIC PANEL - Abnormal       Result Value    Sodium 135      Potassium 4.1      Chloride 103      Carbon Dioxide (CO2) 29      Anion Gap 3      Urea Nitrogen 39 (*)     Creatinine 1.16 (*)     Calcium 9.1      Glucose 152 (*)     Alkaline Phosphatase 77      AST 27      ALT 26      Protein Total 7.0      Albumin 3.5      Bilirubin Total 0.5      GFR Estimate 43 (*)    ROUTINE UA WITH MICROSCOPIC REFLEX TO CULTURE - Abnormal    Color Urine Straw      Appearance Urine Slightly Cloudy (*)     Glucose Urine Negative      Bilirubin Urine Negative      Ketones Urine Negative      Specific Gravity Urine 1.007      Blood Urine Trace (*)     pH Urine 5.5      Protein Albumin Urine Negative      Urobilinogen Urine Normal      Nitrite Urine Negative      Leukocyte Esterase Urine Large (*)     Bacteria Urine Few (*)     WBC Clumps Urine Present (*)     Mucus Urine Present (*)     RBC Urine 6 (*)     WBC Urine >182  (*)     Squamous Epithelials Urine 1      Hyaline Casts Urine 3 (*)    COVID-19 VIRUS (CORONAVIRUS) BY PCR - Normal    SARS CoV2 PCR Negative     CBC WITH PLATELETS AND DIFFERENTIAL    WBC Count 9.8      RBC Count 4.37      Hemoglobin 13.5      Hematocrit 42.5      MCV 97      MCH 30.9      MCHC 31.8      RDW 13.6      Platelet Count 176      % Neutrophils 82      % Lymphocytes 8      % Monocytes 8      % Eosinophils 2      % Basophils 0      % Immature Granulocytes 0      NRBCs per 100 WBC 0      Absolute Neutrophils 8.0      Absolute Lymphocytes 0.8      Absolute Monocytes 0.8      Absolute Eosinophils 0.2      Absolute Basophils 0.0      Absolute Immature Granulocytes 0.0      Absolute NRBCs 0.0     URINE CULTURE        Emergency Department Course:     Reviewed:  I reviewed nursing notes, vitals, past medical history, Care Everywhere and [facility]    Assessments:  1820 I obtained history and examined the patient as noted above.   2250 I rechecked the patient and explained findings.      Consults:  2219 I spoke with Dr. Manning of the hospitalist service regarding patient's presentation, findings, and plan of care.    Interventions:  2011 Bolus 1L IV  2010 ceftriaxone 1g IV  2250 Azithromycin 500mg IV    Disposition:  The patient was admitted to the hospital under the care of Dr. Manning.     Impression & Plan     Medical Decision Making:  Regla Benavidez is a 89 year old female presents with unwitnessed fall. I found this patient to have hypoxic respiratory failure due to pneumonia and UTI, altered mental status and a fall without any significant injuries. She was treated with ceftriaxone and azithromycin for antibiotic treatment of UTI and pneumonia. She will be admitted to the hospitalist under the care of Dr. Manning. There was no sign of pneumothorax, intercranial bleeding, or other obvious injuries.     Diagnosis:    ICD-10-CM    1. Acute respiratory failure with hypoxia (H)  J96.01    2. Pneumonia of left  lower lobe due to infectious organism  J18.9    3. Altered mental status, unspecified altered mental status type  R41.82    4. Urinary tract infection without hematuria, site unspecified  N39.0    5. Fall, initial encounter  W19.XXXA        Scribe Disclosure:  I, Elmo Huang, am serving as a scribe at 6:20 PM on 5/9/2022 to document services personally performed by Vanessa Yap MD based on my observations and the provider's statements to me.          Vanessa Yap MD  05/10/22 0147

## 2022-05-09 NOTE — ED NOTES
Bed: ED04  Expected date:   Expected time:   Means of arrival:   Comments:  427  89 F fall/unknown downtime/confused/denies pain/no thinners  1801

## 2022-05-10 NOTE — PLAN OF CARE
Goal Outcome Evaluation:    Pt up to floor at 0100-    VSS on 1L, A/O to self, denies pain but when being moved she was startled, purewick in place with adequate UOP, slept the rest of the shift after she was situated.

## 2022-05-10 NOTE — UTILIZATION REVIEW
Admission Status; Secondary Review Determination       Under the authority of the Utilization Management Committee, the utilization review process indicated a secondary review on the above patient. The review outcome is based on review of the medical records, discussions with staff, and applying clinical experience noted on the date of the review.     (x) Inpatient Status Appropriate - This patient's medical care is consistent with medical management for inpatient care and reasonable inpatient medical practice.     RATIONALE FOR DETERMINATION   89 year old female with a past medical history of cognitive impairment, frequent falls, hypertension, hyperlipidemia, stroke, type 2 diabetes, chronic pain, restless leg syndrome presents to hospital with a fall and UTI.   Patient also noted to be hypoxic on room air to 88%. Chest x-ray revealed possible infiltrate in left lung base. Patient started on azithromycin for coverage of community-acquired pneumonia.     Patient requires inpatient admission versus short stay observation or outpatient treatment for the following reasons: Given the patient advanced age she is at significant risk for clinical deterioration, also has urinary tract infection in addition to the concern for pneumonia and hypoxia, she has extensive comorbidities including congestive heart failure stroke type 2 diabetes mellitus chronic kidney disease AV block and there is concern for polypharmacy requiring medication reduction and adjustment.     At the time of admission with the information available to the attending physician more than 2 nights of Hospital complex care was anticipated, based on patient risk of adverse outcome if treated as outpatient and complex care required.        This document was produced using voice recognition software       The information on this document is developed by the utilization review team in order for the business office to ensure compliance. This only denotes the  appropriateness of proper admission status and does not reflect the quality of care rendered.   The definitions of Inpatient Status and Observation Status used in making the determination above are those provided in the CMS Coverage Manual, Chapter 1 and Chapter 6, section 70.4.   Sincerely,   JULIO CESAR VERMA MD   System Medical Director   Utilization Management   [facility].

## 2022-05-10 NOTE — PROGRESS NOTES
RECEIVING UNIT ED HANDOFF REVIEW    ED Nurse Handoff Report was reviewed by: Dorothy Ayala RN on May 10, 2022 at 12:32 AM

## 2022-05-10 NOTE — H&P
[facility]    History and Physical  Hospitalist     Date of Admission:  5/9/2022    Assessment & Plan   Regla Benavidez is a 89 year old female with a past medical history of cognitive impairment, frequent falls, hypertension, hyperlipidemia, stroke, type 2 diabetes, chronic pain, restless leg syndrome presents to hospital with a fall and UTI.    UTI  CAP  Acute hypoxic respiratory failure  Fall  Patient presenting after a fall found to have a positive UA.  Patient also noted to be hypoxic on room air to 88%.  Chest x-ray revealed possible infiltrate in left lung base.  Patient denies any respiratory symptoms or urinary symptoms. Patient has a history of frequent UTIs.  Most recent urinary culture grew E. coli sensitive to ceftriaxone.  Patient started on azithromycin for coverage of community-acquired pneumonia.  Patient does not have any cough nor sputum production for no role for sputum cultures.  -c/w ceftriaxone and azithromycin  -f/u urine culture  -Supplemental O2  -pt/ot    Cognitive impairment  Patient is alert to person and situation.  Did not remember the date.  -Maintain sleep-wake sleep-wake cycle  -Melatonin every evening    Hypertension  HLD  CHF?  Patient's son thinks she has been diagnosed with CHF; however, I am not seeing it in her doctors note.   -Hold Lasix and Aldactone, will restart as needed  -Strict I's and O's, daily weights  -Continue PTA statin  -Follow-up BNP    Hx of stroke  -Resume PTA aspirin    Type 2 diabetes  Hold oral medications. Last a1c in February was 7.6%  -f/u a1c  -Sensitive insulin sliding scale    GERD  -Pepcid    Chronic pain  In back and knees  -Continue PTA gabapentin  -Voltaren gel to bilateral knees as needed    Restless leg syndrome  -Pramipexole    CKD 3  Creatinine stable when compared to previous labs.  -Avoid nephrotoxic medications.    1st degree av block  Noted on ekg. Vital signs stable. Heart rate in the 90s. bp 144/71. Not on any  av blockers. Similar finding seen on past ekgs.  -no intervention required.     Polypharmacy  Given the patient's age she would likely benefit from a reduction in her medications.  I spoke with the patient's son who reports that her physician did reduce her dose of glipizide late last year.  I encouraged him to talk with her physician about decreasing medications further.    Code Status   DNR / DNI  DVT ppx: lovenox sc  Expected length of stay greater than 2 days      Primary Care Physician   Physician No Ref-Primary    Chief Complaint   fall    History obtained from the medical chart, the patient, her son.  History limited from the patient due to difficulty hearing and cognitive impairment.    History of Present Illness   Regla Benavidez is a 89 year old female with a past medical history of cognitive impairment, frequent falls, hypertension, hyperlipidemia, stroke, type 2 diabetes, chronic pain, restless leg syndrome presents to hospital with a fall.  The patient reports that she was on the toilet when she lost balance and fell over.  She reports hitting her head but no loss of consciousness.  Per the medical chart the patient was found on the floor covered in stool.  Patient denies any pain and has no current complaints during my interview.  Patient's son is bedside during my interview and assisted with the history as the patient is very hard of hearing making communication difficult.    Past Medical History    I have reviewed this patient's medical history and updated it with pertinent information if needed.   Past Medical History:   Diagnosis Date     Aspiration pneumonia (H) 11/15/2014    11/14; and UTI with sepsis      Cholelithiasis 2009    incidental finding     DJD (degenerative joint disease) of lumbar spine 2009     Elevated glucose      Expressive aphasia related to prior stroke      Hard of hearing     hearing aids     HTN (hypertension)      Shingles 2005     Varicose vein of leg        Past  Surgical History   I have reviewed this patient's surgical history and updated it with pertinent information if needed.  Past Surgical History:   Procedure Laterality Date     CATARACT IOL, RT/LT         Prior to Admission Medications   Prior to Admission Medications   Prescriptions Last Dose Informant Patient Reported? Taking?   Cholecalciferol (VITAMIN D3 PO) 5/9/2022 at AM Nursing Home Yes Yes   Sig: Take 2,000 Units by mouth daily   Lidocaine (LIDOCARE) 4 % Patch 5/9/2022 at applied AM Nursing Home Yes Yes   Sig: Place 1 patch onto the skin every 24 hours To prevent lidocaine toxicity, patient should be patch free for 12 hrs daily. Apply in AM to lower back   acetaminophen (TYLENOL) 325 MG tablet 5/9/2022 at 1200 Nursing Home Yes Yes   Sig: Take 650 mg by mouth 3 times daily 0800/1200/1700   acetaminophen (TYLENOL) 500 MG tablet Unknown at PRN [facility] Yes Yes   Sig: Take 500 mg by mouth 2 times daily as needed for mild pain   aspirin 325 MG tablet 5/9/2022 at AM Nursing Home No Yes   Sig: Take 1 tablet (325 mg) by mouth daily   blood glucose calibration (NO BRAND SPECIFIED) solution   No No   Sig: Use to calibrate blood glucose monitor as needed as directed. To accompany: Accu-Chek Safe-T pro plus   blood glucose monitoring (NO BRAND SPECIFIED) test strip   No No   Sig: Use to test blood sugar 1 times daily or as directed. To accompany: Accu-Chek safety pro plus   blood glucose monitoring (ONE TOUCH ULTRA 2) meter device kit   No No   Sig: Use to test blood sugar 1 times daily or as directed.   diclofenac (VOLTAREN) 1 % topical gel 5/9/2022 at AM Nursing Home No Yes   Sig: Apply 2 g topically 3 times daily Apply to both kness, back of the knees, and side to side. 9am, 1pm, and 8pm   Patient taking differently: Apply 2 g topically 2 times daily Apply to both kness, back of the knees, and side to side. AM and HS   famotidine (PEPCID) 20 MG tablet 5/9/2022 at AM Nursing Home No Yes   Sig: Take 1 tablet (20 mg)  by mouth 2 times daily   furosemide (LASIX) 20 MG tablet 5/9/2022 at 1300 Nursing Home Yes Yes   Sig: Take 20 mg by mouth 2 times daily 0900/1300   gabapentin (NEURONTIN) 100 MG capsule 5/9/2022 at AM Nursing Home No Yes   Sig: Take 1 capsule (100 mg) by mouth 2 times daily   glipiZIDE (GLUCOTROL) 10 MG tablet 5/9/2022 at AM Nursing Home No Yes   Sig: Take 1 tablet (10 mg) by mouth 2 times daily (before meals)   Patient taking differently: Take 5 mg by mouth 2 times daily (before meals) AM & dinner (1700)   metFORMIN (GLUCOPHAGE) 500 MG tablet 5/9/2022 at AM Nursing Home Yes Yes   Sig: Take 500 mg by mouth daily    nystatin (MYCOSTATIN) 437175 UNIT/GM external cream Unknown at PRN Nursing Home No Yes   Sig: APPLY TO INGUINAL AREAS AND UNDER THE BREASTS BID.   Patient taking differently: Apply topically 2 times daily as needed APPLY TO INGUINAL AREAS AND UNDER THE BREASTS   nystatin (MYCOSTATIN) 147199 UNIT/GM external powder Unknown at Unknown time Nursing Home Yes Yes   Sig: Apply topically 2 times daily Apply to abdominal folds and groin   polyethylene glycol (MIRALAX/GLYCOLAX) Packet 5/8/2022 at AM Nursing Home Yes Yes   Sig: Take 8.5 g by mouth every 72 hours   pramipexole (MIRAPEX) 0.125 MG tablet 5/8/2022 at 1700 Nursing Home Yes Yes   Sig: Take 0.375 mg by mouth every evening 1700   simvastatin (ZOCOR) 5 MG tablet 5/8/2022 at 1700 Nursing Home No Yes   Sig: Take 1 tablet (5 mg) by mouth every evening   sitagliptin (JANUVIA) 50 MG tablet 5/9/2022 at AM Nursing Home No Yes   Sig: Take 1 tablet (50 mg) by mouth daily   spironolactone (ALDACTONE) 25 MG tablet 5/8/2022 at AM Nursing Home No Yes   Sig: Take 0.5 tablets (12.5 mg) by mouth every other day   thin (NO BRAND SPECIFIED) lancets   No No   Sig: Use to test blood sugar 1 times daily or as directed. To accompany: Accu-Cheks safety pro plus      Facility-Administered Medications: None     Allergies   Allergies   Allergen Reactions     Cozaar [Losartan  Potassium] Swelling     Leg swelling. Same from Avapro.     Hydrochlorothiazide Other (See Comments)     hyponatremia     Hydrocodone Nausea and Vomiting     Nausea       Social History   I have reviewed this patient's social history and updated it with pertinent information if needed. Regla Benavidez  reports that she has never smoked. She has never used smokeless tobacco. She reports that she does not drink alcohol and does not use drugs.    Family History   I have reviewed this patient's family history and updated it with pertinent information if needed.   Family History   Problem Relation Age of Onset     Diabetes Brother      Unknown/Adopted Mother      Unknown/Adopted Father      Coronary Artery Disease No family hx of      Hypertension No family hx of      Hyperlipidemia No family hx of      Cerebrovascular Disease No family hx of      Breast Cancer No family hx of      Colon Cancer No family hx of      Prostate Cancer No family hx of      Other Cancer No family hx of      Depression No family hx of      Anxiety Disorder No family hx of      Mental Illness No family hx of      Substance Abuse No family hx of      Anesthesia Reaction No family hx of      Asthma No family hx of      Osteoporosis No family hx of      Genetic Disorder No family hx of      Thyroid Disease No family hx of      Obesity No family hx of        Review of Systems   The 10 point Review of Systems is negative other than noted in the HPI or here.     Physical Exam       BP: (!) 146/66 Pulse: 94   Resp: 17 SpO2: 95 % O2 Device: Nasal cannula Oxygen Delivery: 1 LPM  Vital Signs with Ranges  Pulse:  [85-94] 94  Resp:  [14-22] 17  BP: (141-156)/(63-68) 146/66  SpO2:  [88 %-95 %] 95 %  179 lbs 0 oz  Physical Exam  Vitals reviewed.   Constitutional:       Appearance: Normal appearance.      Comments: Very pleasant elderly lady seen resting in bed comfortably no apparent distress.  Hard of hearing.     HENT:      Head: Normocephalic and  atraumatic.      Mouth/Throat:      Mouth: Mucous membranes are moist.      Pharynx: Oropharynx is clear.   Eyes:      Extraocular Movements: Extraocular movements intact.      Conjunctiva/sclera: Conjunctivae normal.      Pupils: Pupils are equal, round, and reactive to light.   Cardiovascular:      Rate and Rhythm: Normal rate and regular rhythm.      Pulses: Normal pulses.      Heart sounds: Normal heart sounds. No murmur heard.  Pulmonary:      Effort: Pulmonary effort is normal.      Breath sounds: Normal breath sounds. No wheezing, rhonchi or rales.   Abdominal:      General: Abdomen is flat. Bowel sounds are normal.      Palpations: Abdomen is soft.   Musculoskeletal:         General: No swelling. Normal range of motion.      Cervical back: Normal range of motion and neck supple.   Skin:     General: Skin is warm and dry.   Neurological:      General: No focal deficit present.      Mental Status: She is alert. Mental status is at baseline.      Cranial Nerves: No cranial nerve deficit.      Comments: Oriented to person, knows she is in the hospital for a follow-up.  Did not know which hospital did not know the date and did not know the day.

## 2022-05-10 NOTE — ED NOTES
[facility]  ED Nurse Handoff Report    ED Chief complaint: Fall (Patient was found down at [facility] in bathroom. Unknown downtime)      ED Diagnosis:   Final diagnoses:   Acute respiratory failure with hypoxia (H)   Pneumonia of left lower lobe due to infectious organism   Altered mental status, unspecified altered mental status type   Urinary tract infection without hematuria, site unspecified   Fall, initial encounter       Code Status: TBD by admiting     Allergies:   Allergies   Allergen Reactions     Cozaar [Losartan Potassium] Swelling     Leg swelling. Same from Avapro.     Hydrochlorothiazide Other (See Comments)     hyponatremia     Hydrocodone Nausea and Vomiting     Nausea       Patient Story: pt from a [facility], has had multiple falls, found down by staff. Mentation at baseline per family. AO to person at this time.     Focused Assessment:  ED assessment found pt with UTI. Pt also 88% on RA at times 95% on 1-2LNC. CXR shows pneumonia      Labs Ordered and Resulted from Time of ED Arrival to Time of ED Departure   COMPREHENSIVE METABOLIC PANEL - Abnormal       Result Value    Sodium 135      Potassium 4.1      Chloride 103      Carbon Dioxide (CO2) 29      Anion Gap 3      Urea Nitrogen 39 (*)     Creatinine 1.16 (*)     Calcium 9.1      Glucose 152 (*)     Alkaline Phosphatase 77      AST 27      ALT 26      Protein Total 7.0      Albumin 3.5      Bilirubin Total 0.5      GFR Estimate 43 (*)    ROUTINE UA WITH MICROSCOPIC REFLEX TO CULTURE - Abnormal    Color Urine Straw      Appearance Urine Slightly Cloudy (*)     Glucose Urine Negative      Bilirubin Urine Negative      Ketones Urine Negative      Specific Gravity Urine 1.007      Blood Urine Trace (*)     pH Urine 5.5      Protein Albumin Urine Negative      Urobilinogen Urine Normal      Nitrite Urine Negative      Leukocyte Esterase Urine Large (*)     Bacteria Urine Few (*)     WBC Clumps Urine Present (*)     Mucus Urine Present (*)      RBC Urine 6 (*)     WBC Urine >182 (*)     Squamous Epithelials Urine 1      Hyaline Casts Urine 3 (*)    COVID-19 VIRUS (CORONAVIRUS) BY PCR - Normal    SARS CoV2 PCR Negative     CBC WITH PLATELETS AND DIFFERENTIAL    WBC Count 9.8      RBC Count 4.37      Hemoglobin 13.5      Hematocrit 42.5      MCV 97      MCH 30.9      MCHC 31.8      RDW 13.6      Platelet Count 176      % Neutrophils 82      % Lymphocytes 8      % Monocytes 8      % Eosinophils 2      % Basophils 0      % Immature Granulocytes 0      NRBCs per 100 WBC 0      Absolute Neutrophils 8.0      Absolute Lymphocytes 0.8      Absolute Monocytes 0.8      Absolute Eosinophils 0.2      Absolute Basophils 0.0      Absolute Immature Granulocytes 0.0      Absolute NRBCs 0.0     URINE CULTURE     Chest XR,  PA & LAT   Final Result   IMPRESSION: Stable cardiomediastinal silhouette. Atelectasis or infiltrate left lung base. Trace effusions. Stable cardiomediastinal silhouette. Atherosclerotic aorta and degenerative change osseous structures.      CT Head w/o Contrast   Final Result   IMPRESSION:   1.  Unchanged age-related changes and chronic ischemic changes as above with no acute intracranial abnormality.            Treatments and/or interventions provided: IV abx  Patient's response to treatments and/or interventions:     To be done/followed up on inpatient unit:      Does this patient have any cognitive concerns?:     Activity level - Baseline/Home:  Walker  Activity Level - Current:   Unknown    Patient's Preferred language: English   Needed?: No    Isolation: None  Infection: Not Applicable  Patient tested for COVID 19 prior to admission: YES  Bariatric?: No    Vital Signs:   Vitals:    05/09/22 2005 05/09/22 2135 05/09/22 2200 05/09/22 2300   BP:  (!) 146/66  (!) 144/71   Pulse: 92 85 94 91   Resp: 22 20 17    SpO2: (!) 88% 90% 95% 95%   Weight:           Cardiac Rhythm:     Was the PSS-3 completed:   Yes  What interventions are required if  any?               Family Comments: sons POA at bedside  OBS brochure/video discussed/provided to patient/family: N/A              Name of person given brochure if not patient:               Relationship to patient:     For the majority of the shift this patient's behavior was Green.   Behavioral interventions performed were .    ED NURSE PHONE NUMBER: ED [id number]

## 2022-05-10 NOTE — PROGRESS NOTES
05/10/22 1600   Quick Adds   Type of Visit Initial PT Evaluation   Living Environment   People in Home facility resident   Current Living Arrangements residential facility   Home Accessibility no concerns   Transportation Anticipated family or friend will provide   Living Environment Comments Pt lives in [facility], no concernts about accessibility   Self-Care   Usual Activity Tolerance good   Current Activity Tolerance moderate   Equipment Currently Used at Home shower chair;grab bar, toilet;grab bar, tub/shower;walker, rolling   Fall history within last six months yes   Number of times patient has fallen within last six months 3   Activity/Exercise/Self-Care Comment pt was Mod-I using FWW at baseline, assist with showering, meals, medication, son states he feels pt needs more ability to call for help with some transfers especially in bathroom   General Information   Onset of Illness/Injury or Date of Surgery 05/09/22   Referring Physician Natasha Manning MD   Patient/Family Therapy Goals Statement (PT) son states goal is to return back to LTC facility with increase in services   Pertinent History of Current Problem (include personal factors and/or comorbidities that impact the POC) Regla Benavidez is a 89 year old female with a past medical history of cognitive impairment, frequent falls, hypertension, hyperlipidemia, stroke, type 2 diabetes, chronic pain, restless leg syndrome presents to hospital with a fall and UTI.   Existing Precautions/Restrictions fall   Weight-Bearing Status - LLE full weight-bearing   Weight-Bearing Status - RLE full weight-bearing   Cognition   Affect/Mental Status (Cognition) unable/difficult to assess   Orientation Status (Cognition) oriented to;person   Follows Commands (Cognition) follows one-step commands;over 90% accuracy;physical/tactile prompts required   Cognitive Status Comments son states pt has expressive aphasia from stroke in 2013   Pain Assessment    Patient Currently in Pain No   Posture    Posture Forward head position;Protracted shoulders;Kyphosis   Range of Motion (ROM)   Range of Motion ROM is WFL   Strength (Manual Muscle Testing)   Strength (Manual Muscle Testing) strength is WFL   Bed Mobility   Comment, (Bed Mobility) not observed   Transfers   Comment, (Transfers) sit<>stand with FWW CGA   Gait/Stairs (Locomotion)   [place] Level (Gait) contact guard   Assistive Device (Gait) walker, front-wheeled   Distance in Feet (Required for LE Total Joints) 10' + 200'   Pattern (Gait) step-through   Comment, (Gait/Stairs) amb with FWW CGA   Balance   Balance Comments sitting edge of recliner unsupported, static standing in FWW balance okay, deficits with dynamic balance but pt able to ambulate with FWW and no overt LOB   Sensory Examination   Sensory Perception Comments pt denies any deficits   Clinical Impression   Criteria for Skilled Therapeutic Intervention Yes, treatment indicated   PT Diagnosis (PT) impaired mobility   Influenced by the following impairments deficits with functional strength and balance   Functional limitations due to impairments fall risk, reduced Ind with functional transfers, ambulation, and ADL's   Clinical Presentation (PT Evaluation Complexity) Stable/Uncomplicated   Clinical Presentation Rationale PMH and clinical judgment   Clinical Decision Making (Complexity) low complexity   Planned Therapy Interventions (PT) balance training;bed mobility training;gait training;home exercise program;patient/family education;ROM (range of motion);strengthening;stretching;transfer training;progressive activity/exercise   Anticipated Equipment Needs at Discharge (PT)   (pt will provide)   Risk & Benefits of therapy have been explained evaluation/treatment results reviewed;care plan/treatment goals reviewed;risks/benefits reviewed;current/potential barriers reviewed;participants voiced agreement with care plan;participants included;son   PT  Discharge Planning   PT Discharge Recommendation (DC Rec) home with assist;home with home care physical therapy   PT Rationale for DC Rec Pt is currently below baseline of being Mod-I with FWW at her LTC facility, but was moving well. Pt currently requires assist with all functional transfers and ambulation using her FWW, and has some deficits with balance and functional strength which still make her a fall risk. Pt has had 3 falls at her facility in the last 3 months. Pt could reasonably discharge home to previous living situation if she is able to have increased services to have 24/7 assist for all mobility at this time, and HHPT in order to progress safety and independence to prior level of function.   PT Brief overview of current status functional transfers with FWW CGA, amb with FWW CGA   Total Evaluation Time   Total Evaluation Time (Minutes) 8   Physical Therapy Goals   PT Frequency Daily   PT Predicted Duration/Target Date for Goal Attainment 05/14/22   PT Goals Bed Mobility;Transfers;Gait   PT: Bed Mobility Modified independent;Supine to/from sit;Rolling;Bridging   PT: Transfers Modified independent;Assistive device;Sit to/from stand   PT: Gait Modified independent;Rolling walker;100 feet

## 2022-05-10 NOTE — PROGRESS NOTES
[facility]  Hospitalist Progress Note  Greg Cheatham MD  05/10/2022    Assessment & Plan   Regla Benavidez is a 89 year old female with a past medical history of cognitive impairment, frequent falls, hypertension, hyperlipidemia, stroke, type 2 diabetes, chronic pain, restless leg syndrome presents to hospital with a fall and UTI.     UTI  CAP  Acute hypoxic respiratory failure, with mild hypoxemia  Fall  Patient presenting after a fall found to have a positive UA.  Patient also noted to be hypoxic on room air to 88%.  Chest x-ray revealed possible infiltrate in left lung base.  Patient denies any respiratory symptoms or urinary symptoms. Patient has a history of frequent UTIs.  Most recent urinary culture grew E. coli sensitive to ceftriaxone.  Patient started on azithromycin for coverage of community-acquired pneumonia.  Patient does not have any cough nor sputum production for no role for sputum cultures.  -c/w ceftriaxone and azithromycin  -f/u urine culture pending  -Supplemental O2 down to 1 L  - CTH shows no acute findings  -pt/ot     Cognitive impairment  Patient is alert to person and situation.  Did not remember the date.  -Maintain sleep-wake sleep-wake cycle  -Melatonin every evening     Hypertension  HLD  CHF?  Patient's son thinks she has been diagnosed with CHF; however, I am not seeing it in her doctors note.   -continue to hold Lasix and Aldactone, consider discontinuing them  -Strict I's and O's, daily weights  -Continue PTA statin  -Follow-up BNP is only 720  -no CHF on CXR     Hx of stroke  -continue PTA aspirin     Type 2 diabetes  Hold oral medications. Last a1c in February was 7.6%  -f/u a1c  -Sensitive insulin sliding scale  -hold metformin, continue sitagliptin     GERD  -Pepcid     Chronic pain  In back and knees  -Continue PTA gabapentin  -Voltaren gel to bilateral knees as needed     Restless leg syndrome  -Pramipexole     CKD 3  Creatinine stable when  compared to previous labs.  -Avoid nephrotoxic medications.     1st degree av block  Noted on ekg. Vital signs stable. Heart rate in the 90s. bp 144/71. Not on any av blockers. Similar finding seen on past ekgs.  -no intervention required.      Polypharmacy  Given the patient's age she would likely benefit from a reduction in her medications.  I spoke with the patient's son who reports that her physician did reduce her dose of glipizide late last year.  I encouraged him to talk with her physician about decreasing medications further.    Code Status  DNR / DNI    DVT ppx: lovenox      Disposition  - lives in assisted living and fairly independent  - Expected length of stay 3-5 days  - await PT/OT input          Interval History   -- chart reviewed  -- no acute overnight issues  -- her meds reviewed  -- denies any pain or trouble breathing    -Data reviewed today: I reviewed all new labs and imaging over the last 24 hours. I personally reviewed no images or EKG's today.    Physical Exam    , Blood pressure 123/75, pulse 81, temperature 97.8  F (36.6  C), temperature source Axillary, resp. rate 19, weight 81.2 kg (179 lb), SpO2 94 %, not currently breastfeeding.  Vitals:    05/09/22 1829   Weight: 81.2 kg (179 lb)     Vital Signs with Ranges  Temp:  [97.8  F (36.6  C)-98.4  F (36.9  C)] 97.8  F (36.6  C)  Pulse:  [81-94] 81  Resp:  [14-22] 19  BP: (123-157)/(63-80) 123/75  SpO2:  [88 %-95 %] 94 %  I/O's Last 24 hours  I/O last 3 completed shifts:  In: 1350 [IV Piggyback:1350]  Out: 975 [Urine:975]    Constitutional: Awake, alert, cooperative, no apparent distress, oriented only to self  Respiratory: Clear to auscultation bilaterally, no crackles or wheezing  Cardiovascular: Regular rate and rhythm, normal S1 and S2, and no murmur noted  GI: Normal bowel sounds, soft, non-distended, non-tender, obese  Skin/Integumen: No rashes, no cyanosis, no edema  Other:      Medications   All medications were reviewed.      aspirin   325 mg Oral Daily     azithromycin  500 mg Intravenous Q24H     cefTRIAXone  1 g Intravenous Q24H     diclofenac  2 g Topical BID     enoxaparin ANTICOAGULANT  40 mg Subcutaneous Q24H     famotidine  20 mg Oral Daily     gabapentin  100 mg Oral BID     insulin aspart  1-3 Units Subcutaneous TID AC     insulin aspart  1-3 Units Subcutaneous At Bedtime     melatonin  3 mg Oral At Bedtime     nystatin   Topical BID     polyethylene glycol  8.5 g Oral Q72H     pramipexole  0.375 mg Oral QPM     sitagliptin  50 mg Oral Daily     sodium chloride (PF)  3 mL Intracatheter Q8H        Data   Recent Labs   Lab 05/10/22  0747 05/10/22  0647 05/10/22  0119 05/09/22  1841   WBC  --  6.8  --  9.8   HGB  --  12.7  --  13.5   MCV  --  96  --  97   PLT  --  181  --  176   NA  --  141  --  135   POTASSIUM  --  3.9  --  4.1   CHLORIDE  --  110*  --  103   CO2  --  27  --  29   BUN  --  28  --  39*   CR  --  0.88  --  1.16*   ANIONGAP  --  4  --  3   AAYUSH  --  8.5  --  9.1   * 167* 133* 152*   ALBUMIN  --   --   --  3.5   PROTTOTAL  --   --   --  7.0   BILITOTAL  --   --   --  0.5   ALKPHOS  --   --   --  77   ALT  --   --   --  26   AST  --   --   --  27       Recent Results (from the past 24 hour(s))   CT Head w/o Contrast    Narrative    EXAM: CT HEAD W/O CONTRAST  LOCATION: [facility]  DATE/TIME: 5/9/2022 8:10 PM    INDICATION: fall, possible head injury, confused  COMPARISON: 11/15/2014 head CT.  TECHNIQUE: Routine CT Head without IV contrast. Multiplanar reformats. Dose reduction techniques were used.    FINDINGS:  INTRACRANIAL CONTENTS: No intracranial hemorrhage, extraaxial collection, or mass effect.  No CT evidence of acute infarct. Redemonstrated chronic infarct in the anterior left MCA distribution. Background mild chronic small vessel ischemic changes are   unchanged. Mild generalized volume loss. No hydrocephalus.     VISUALIZED ORBITS/SINUSES/MASTOIDS: Prior bilateral cataract surgery.  Visualized portions of the orbits are otherwise unremarkable. No paranasal sinus mucosal disease. No middle ear or mastoid effusion.    BONES/SOFT TISSUES: No skull fracture. No scalp hematoma. Hyperostosis frontalis interna is an incidental age-related finding.      Impression    IMPRESSION:  1.  Unchanged age-related changes and chronic ischemic changes as above with no acute intracranial abnormality.   Chest XR,  PA & LAT    Narrative    EXAM: XR CHEST 2 VW  LOCATION: [facility]  DATE/TIME: 5/9/2022 10:06 PM    INDICATION: hypoxia, fall, check for pneumonia or pneumothorax  COMPARISON: 07/16/2021      Impression    IMPRESSION: Stable cardiomediastinal silhouette. Atelectasis or infiltrate left lung base. Trace effusions. Stable cardiomediastinal silhouette. Atherosclerotic aorta and degenerative change osseous structures.       Greg Cheatham MD  Text Page  (7am to 6pm)

## 2022-05-10 NOTE — PHARMACY-ADMISSION MEDICATION HISTORY
"Pharmacy Medication History  Admission medication history interview status for the 5/9/2022  admission is complete. See EPIC admission navigator for prior to admission medications     Location of Interview: Outside patient room but on unit  Medication history sources: Patient, Surescripts and MAR (med list and MAR from [facility])    Significant changes made to the medication list:  Adjusted:   -dose times scheduled acetaminophen   -PRN acetaminophen 500-1000 mg --> 500 mg   -voltaren TID --> BID   -furosemide daily --> BID   -glipizide 10 mg BID --> 5 mg BID   -nystatin cream BID --> BID PRN   -nystatin powder TID PRN --> BID   -miralax every other day --> every 3 days  Added: lidocaine patch   Removed:   -ceftin 250 mg BID X7d (filled 8/2021)   -lidocaine 4% cream (once PRN)   -lidocaine-trolamine cream BID and once daily PRN (using patch and Voltaren)   -ondansetron 4 mg q8h PRN (filled 9/2021)     In the past week, patient estimated taking medication this percent of the time: greater than 90%    Additional medication history information:   Nystatin powder present on facility's medication list (as \"active\" order) but not present on MAR. Kept on PTA med list.     Medication reconciliation completed by provider prior to medication history? No    Time spent in this activity: 35 minutes    Prior to Admission medications    Medication Sig Last Dose Taking? Auth Provider   acetaminophen (TYLENOL) 325 MG tablet Take 650 mg by mouth 3 times daily 0800/1200/1700 5/9/2022 at 1200 Yes Unknown, Entered By History   acetaminophen (TYLENOL) 500 MG tablet Take 500 mg by mouth 2 times daily as needed for mild pain Unknown at PRN Yes Unknown, Entered By History   aspirin 325 MG tablet Take 1 tablet (325 mg) by mouth daily 5/9/2022 at AM Yes Kendall Saba MD   Cholecalciferol (VITAMIN D3 PO) Take 2,000 Units by mouth daily 5/9/2022 at AM Yes Unknown, Entered By History   diclofenac (VOLTAREN) 1 % topical " gel Apply 2 g topically 3 times daily Apply to both kness, back of the knees, and side to side. 9am, 1pm, and 8pm  Patient taking differently: Apply 2 g topically 2 times daily Apply to both kness, back of the knees, and side to side. AM and HS 5/9/2022 at AM Yes Leo Melgar MD   famotidine (PEPCID) 20 MG tablet Take 1 tablet (20 mg) by mouth 2 times daily 5/9/2022 at AM Yes Leo Melgar MD   furosemide (LASIX) 20 MG tablet Take 20 mg by mouth 2 times daily 0900/1300 5/9/2022 at 1300 Yes Leo Melgar MD   gabapentin (NEURONTIN) 100 MG capsule Take 1 capsule (100 mg) by mouth 2 times daily 5/9/2022 at AM Yes Leo Melgar MD   glipiZIDE (GLUCOTROL) 10 MG tablet Take 1 tablet (10 mg) by mouth 2 times daily (before meals)  Patient taking differently: Take 5 mg by mouth 2 times daily (before meals) AM & dinner (1700) 5/9/2022 at AM Yes Leo Melgar MD   Lidocaine (LIDOCARE) 4 % Patch Place 1 patch onto the skin every 24 hours To prevent lidocaine toxicity, patient should be patch free for 12 hrs daily. Apply in AM to lower back 5/9/2022 at applied AM Yes Unknown, Entered By History   metFORMIN (GLUCOPHAGE) 500 MG tablet Take 500 mg by mouth daily  5/9/2022 at AM Yes Leo Melgar MD   nystatin (MYCOSTATIN) 244077 UNIT/GM external cream APPLY TO INGUINAL AREAS AND UNDER THE BREASTS BID.  Patient taking differently: Apply topically 2 times daily as needed APPLY TO INGUINAL AREAS AND UNDER THE BREASTS Unknown at PRN Yes Leo Melgar MD   nystatin (MYCOSTATIN) 769344 UNIT/GM external powder Apply topically 2 times daily Apply to abdominal folds and groin Unknown at Unknown time Yes Unknown, Entered By History   polyethylene glycol (MIRALAX/GLYCOLAX) Packet Take 8.5 g by mouth every 72 hours 5/8/2022 at AM Yes Unknown, Entered By History   pramipexole (MIRAPEX) 0.125 MG tablet Take 0.375 mg by mouth every evening 1700 5/8/2022 at 1700 Yes Unknown, Entered By History   simvastatin (ZOCOR) 5 MG  tablet Take 1 tablet (5 mg) by mouth every evening 5/8/2022 at 1700 Yes Kendall Saba MD   sitagliptin (JANUVIA) 50 MG tablet Take 1 tablet (50 mg) by mouth daily 5/9/2022 at AM Yes Leo Melgar MD   spironolactone (ALDACTONE) 25 MG tablet Take 0.5 tablets (12.5 mg) by mouth every other day 5/8/2022 at AM Yes Leo Melgar MD   blood glucose calibration (NO BRAND SPECIFIED) solution Use to calibrate blood glucose monitor as needed as directed. To accompany: Accu-Chek Safe-T pro plus   Leo Melgar MD   blood glucose monitoring (NO BRAND SPECIFIED) test strip Use to test blood sugar 1 times daily or as directed. To accompany: Accu-Chek safety pro plus   Leo Melgar MD   blood glucose monitoring (ONE TOUCH ULTRA 2) meter device kit Use to test blood sugar 1 times daily or as directed.   Leo Melgar MD   thin (NO BRAND SPECIFIED) lancets Use to test blood sugar 1 times daily or as directed. To accompany: Accu-Cheks safety pro plus   Leo Melgar MD       The information provided in this note is only as accurate as the sources available at the time of update(s)   Natalia Brady, SerinaD

## 2022-05-10 NOTE — PROGRESS NOTES
05/10/22 1446   Quick Adds   Type of Visit Initial Occupational Therapy Evaluation   Living Environment   People in Home facility resident   Current Living Arrangements residential facility   Home Accessibility no concerns   Transportation Anticipated family or friend will provide   Living Environment Comments lives in [facility]. has help with showers, walk in shower grab bars   Self-Care   Usual Activity Tolerance good   Current Activity Tolerance moderate   Equipment Currently Used at Home shower chair;grab bar, toilet;grab bar, tub/shower   Activity/Exercise/Self-Care Comment I with dressing, toileting but family would like for her to call for help.   Instrumental Activities of Daily Living (IADL)   IADL Comments all managed by care staff   General Information   Onset of Illness/Injury or Date of Surgery 05/09/22   Referring Physician Natasha Manning MD   Patient/Family Therapy Goal Statement (OT) none stated   Additional Occupational Profile Info/Pertinent History of Current Problem pt found down in her bathroom at her care center. found to have UTI and CAP   Existing Precautions/Restrictions fall   General Observations and Info pt finishing eating upon arrival. son present   Cognitive Status Examination   Orientation Status person   Affect/Mental Status (Cognitive) flat/blunted affect   Cognitive Status Comments son reports pt has some expressive aphasia since her stroke in 2013   Sensory   Sensory Quick Adds No deficits were identified   Pain Assessment   Patient Currently in Pain No   Integumentary/Edema   Integumentary/Edema no deficits were identifed   Posture   Posture not impaired   Range of Motion Comprehensive   General Range of Motion no range of motion deficits identified   Strength Comprehensive (MMT)   Comment, General Manual Muscle Testing (MMT) Assessment over all 3+/5   Muscle Tone Assessment   Muscle Tone Quick Adds No deficits were identified   Coordination   Upper Extremity  Coordination No deficits were identified   Bed Mobility   Comment (Bed Mobility) mod A   Transfers   Transfers bed-chair transfer   Transfer Skill: Bed to Chair/Chair to Bed   Bed-Chair [place] (Transfers) contact guard   Clinical Impression   Criteria for Skilled Therapeutic Interventions Met (OT) Yes, treatment indicated   OT Diagnosis decreased I with ADL's and functional mobility   OT Problem List-Impairments impacting ADL problems related to;activity tolerance impaired;balance;strength;cognition;communication   Assessment of Occupational Performance 1-3 Performance Deficits   Identified Performance Deficits decreased dressing, functional mobility   Planned Therapy Interventions (OT) ADL retraining;balance training;cognition;bed mobility training;strengthening;transfer training;progressive activity/exercise   Clinical Decision Making Complexity (OT) low complexity   Risk & Benefits of therapy have been explained evaluation/treatment results reviewed;care plan/treatment goals reviewed;risks/benefits reviewed;current/potential barriers reviewed;participants voiced agreement with care plan;participants included;patient;son   OT Discharge Planning   OT Discharge Recommendation (DC Rec) home with home care occupational therapy   OT Rationale for DC Rec pt lives in [facility]. was I with dressing, grooming and BR transfers. staff helps with showers, all meals provided and assist with medication mgmt. family manages finances. pt currently needing mod A to transfer OOB, CGA to transfer to chair. able to pull up socks with verbal cues. anticipate she will be able to go back to her care center with A for all transfers and home therapy   Total Evaluation Time (Minutes)   Total Evaluation Time (Minutes) 10   OT Goals   Therapy Frequency (OT) Daily   OT Predicted Duration/Target Date for Goal Attainment 05/17/22   OT Goals Hygiene/Grooming;Lower Body Dressing;Toilet Transfer/Toileting   OT: Hygiene/Grooming  modified independent;while standing   OT: Lower Body Dressing Modified independent;including set-up/clothing retrieval   OT: Toilet Transfer/Toileting Modified independent;cleaning and garment management;toilet transfer

## 2022-05-10 NOTE — PROGRESS NOTES
Shift Summary [phone]    Admitting Diagnosis: Acute respiratory failure with hypoxia (H) [J96.01],pneumonia, UTI  Fall, initial encounter [W19.XXXA]  Urinary tract infection without hematuria, site unspecified [N39.0]  Altered mental status, unspecified altered mental status type [R41.82]  Pneumonia of left lower lobe due to infectious organism [J18.9]   Vitals : STABLE  Pain: DENIES PAIN.   A&Ox1 . Follows commands. However, d/t history of expressive aphasia,thereis difficulty with communicastion.   Voiding : well. Uses commode and PUR WICK.   Mobility : ASSIST OF 1. GB. Walker.     CMS : edema in BLE.   Lung Sounds  CLEAR. ON 1 L. O2.   GI : wdl.   Dressing : PIV site intact.     Orders Placed This Encounter      Combination Diet Regular Diet Adult       Plan:   On 2 antibiotics for UTI and Pneumonia. Awaiting UC.

## 2022-05-10 NOTE — CONSULTS
Care Management Initial Consult    General Information  Assessment completed with: Care Team Member ([facility] Staff), Delores [phone]  Type of CM/SW Visit: Initial Assessment    Primary Care Provider verified and updated as needed:     Readmission within the last 30 days:        Reason for Consult: discharge planning  Advance Care Planning: Advance Care Planning Reviewed: present on chart          Communication Assessment  Patient's communication style: spoken language (English or Bilingual)    Hearing Difficulty or Deaf: yes   Wear Glasses or Blind: yes    Cognitive  Cognitive/Neuro/Behavioral: .WDL except  Level of Consciousness: alert  Arousal Level: opens eyes spontaneously  Orientation: disoriented to, place, time, situation (d/t expressive aphasia)  Mood/Behavior: calm, cooperative  Best Language: 0 - No aphasia  Speech: clear (with expressive aphasia)    Living Environment:   People in home: facility resident  [facility]  Current living Arrangements: residential facility  Name of Facility: [facility]   Able to return to prior arrangements: yes       Family/Social Support:  Care provided by: other (see comments) (Staff)  Provides care for: no one  Marital Status: Single  Children, Facility resident(s)/Staff          Description of Support System: Supportive, Involved    Support Assessment: Adequate family and caregiver support, Adequate social supports    Current Resources:   Patient receiving home care services: No     Community Resources: None  Equipment currently used at home: shower chair, grab bar, toilet, grab bar, tub/shower  Supplies currently used at home:      Employment/Financial:  Employment Status:          Financial Concerns: No concerns identified   Referral to Financial Worker: No       Lifestyle & Psychosocial Needs:  Social Determinants of Health     Tobacco Use: Low Risk      Smoking Tobacco Use: Never Smoker     Smokeless Tobacco Use: Never Used   Alcohol Use: Not on file   Financial Resource  Strain: Not on file   Food Insecurity: No Food Insecurity     Worried About Running Out of Food in the Last Year: Never true     Ran Out of Food in the Last Year: Never true   Transportation Needs: No Transportation Needs     Lack of Transportation (Medical): No     Lack of Transportation (Non-Medical): No   Physical Activity: Not on file   Stress: Not on file   Social Connections: Not on file   Intimate Partner Violence: Not on file   Depression: Not at risk     PHQ-2 Score: 0   Housing Stability: Not on file       Functional Status:  Prior to admission patient needed assistance:   Dependent ADLs:: Ambulation-walker, Dressing  Dependent IADLs:: Cleaning, Cooking, Laundry, Shopping, Meal Preparation, Medication Management, Money Management, Transportation, Incontinence       Mental Health Status:  Mental Health Status: No Current Concerns       Chemical Dependency Status:  Chemical Dependency Status: No Current Concerns             Values/Beliefs:  Spiritual, Cultural Beliefs, Anglican Practices, Values that affect care: no               Additional Information:  Per Care Management/Social Work Consult Patient admitted on 05/09/22 due to a fall. JUAN reviewed chart and spoke with Delores([phone]) at [facility] who indicated patient resides within their LTC facility. Patient at baseline is independent with dressing, and walking with a walker. Delores indicated patient receives care for toileting, showering, medication and most AIDL's. JUAN discussed patients current status and recommendations from OT. JUAN will keep in touch with LT to plan discharge. [facility] fax # [phone]    CLARICE Rosario    [facility]

## 2022-05-11 NOTE — PROGRESS NOTES
[facility]  Hospitalist Progress Note  Greg Cheatham MD  05/11/2022    Assessment & Plan   Regla Benavidez is a 89 year old female with a past medical history of cognitive impairment, frequent falls, hypertension, hyperlipidemia, stroke, type 2 diabetes, chronic pain, restless leg syndrome presents to hospital with a fall and UTI.     UTI  CAP  Acute hypoxic respiratory failure, with mild hypoxemia  Fall  Patient presenting after a fall found to have a positive UA.  Patient also noted to be hypoxic on room air to 88%.  Chest x-ray revealed possible infiltrate in left lung base.  Patient denies any respiratory symptoms or urinary symptoms. Patient has a history of frequent UTIs.  Most recent urinary culture grew E. coli sensitive to ceftriaxone.  Patient started on azithromycin for coverage of community-acquired pneumonia.  Patient does not have any cough nor sputum production for no role for sputum cultures.  -c/w ceftriaxone and azithromycin  -f/u urine culture pending but historically been resistant to ampicillin and quinolones  -Supplemental O2 down to 1 L  - CTH shows no acute findings  -pt/ot recommending back to LTC, she is not in assisted living but more nursing home  - discharge on 5/12 with po azithromax and vantin to complete 5 and 7 day course.     Cognitive impairment  Patient is alert to person and situation.  Not oriented to place or date.  -Maintain sleep-wake sleep-wake cycle, she is calm and cooperative  -Melatonin every evening     Hypertension  HLD  CHF?  Patient's son thinks she has been diagnosed with CHF; however, I am not seeing it in her doctors note.   -continue to hold Lasix and Aldactone, consider discontinuing them  -Strict I's and O's, daily weights  -Continue PTA statin  -Follow-up BNP is only 720  -no CHF on CXR     Hx of stroke  -continue PTA aspirin     Type 2 diabetes  Hold oral medications. Last a1c in February was 7.6%  -f/u a1c  -Sensitive insulin  sliding scale  -hold metformin, continue sitagliptin     GERD  -Pepcid     Chronic pain  In back and knees  -Continue PTA gabapentin  -Voltaren gel to bilateral knees as needed     Restless leg syndrome  -Pramipexole     CKD 3  Creatinine stable when compared to previous labs.  -Avoid nephrotoxic medications.     1st degree av block  Noted on ekg. Vital signs stable. Heart rate in the 90s. bp 144/71. Not on any av blockers. Similar finding seen on past ekgs.  -no intervention required.      Polypharmacy  Given the patient's age she would likely benefit from a reduction in her medications.  I spoke with the patient's son who reports that her physician did reduce her dose of glipizide late last year.  I encouraged him to talk with her physician about decreasing medications further.    Code Status  DNR / DNI    DVT ppx: lovenox      Disposition  - lives in a LTC, Nursing home setting  - await Ucx, (resistant to quinolones in past)   - discharge back to LTC tomorrow          Interval History   -- no acute overnight issues  -- she is calm  -- denies any pain or trouble breathing  -- son at bedside, who was updated    -Data reviewed today: I reviewed all new labs and imaging over the last 24 hours. I personally reviewed no images or EKG's today.    Physical Exam    , Blood pressure (!) 162/69, pulse 65, temperature (!) 96.2  F (35.7  C), temperature source Axillary, resp. rate 18, weight 81.2 kg (179 lb), SpO2 95 %, not currently breastfeeding.  Vitals:    05/09/22 1829   Weight: 81.2 kg (179 lb)     Vital Signs with Ranges  Temp:  [96.2  F (35.7  C)-98.1  F (36.7  C)] 96.2  F (35.7  C)  Pulse:  [60-81] 65  Resp:  [18] 18  BP: (142-162)/(63-71) 162/69  SpO2:  [94 %-97 %] 95 %  I/O's Last 24 hours  I/O last 3 completed shifts:  In: 600 [P.O.:600]  Out: 1300 [Urine:1300]    Constitutional: Awake, alert, cooperative, no apparent distress, oriented only to self  Respiratory: Clear to auscultation bilaterally, no crackles or  wheezing  Cardiovascular: Regular rate and rhythm, normal S1 and S2   GI: Normal bowel sounds, soft, non-distended, non-tender, obese  Skin/Integumen: No rashes, no cyanosis, no edema  Other:      Medications   All medications were reviewed.      aspirin  325 mg Oral Daily     azithromycin  500 mg Intravenous Q24H     cefTRIAXone  1 g Intravenous Q24H     diclofenac  2 g Topical BID     enoxaparin ANTICOAGULANT  40 mg Subcutaneous Q24H     famotidine  20 mg Oral Daily     gabapentin  100 mg Oral BID     insulin aspart  1-3 Units Subcutaneous TID AC     insulin aspart  1-3 Units Subcutaneous At Bedtime     melatonin  3 mg Oral At Bedtime     miconazole   Topical BID     polyethylene glycol  8.5 g Oral Q72H     pramipexole  0.375 mg Oral QPM     sitagliptin  50 mg Oral Daily     sodium chloride (PF)  3 mL Intracatheter Q8H        Data   Recent Labs   Lab 05/11/22  1125 05/11/22  0638 05/11/22  0439 05/10/22  0747 05/10/22  0647 05/10/22  0119 05/09/22  1841   WBC  --  8.4  --   --  6.8  --  9.8   HGB  --  12.6  --   --  12.7  --  13.5   MCV  --  96  --   --  96  --  97   PLT  --  174  --   --  181  --  176   NA  --  137  --   --  141  --  135   POTASSIUM  --  4.2  --   --  3.9  --  4.1   CHLORIDE  --  110*  --   --  110*  --  103   CO2  --  23  --   --  27  --  29   BUN  --  25  --   --  28  --  39*   CR  --  0.77  --   --  0.88  --  1.16*   ANIONGAP  --  4  --   --  4  --  3   AAYUSH  --  8.5  --   --  8.5  --  9.1   * 186* 179*   < > 167*   < > 152*   ALBUMIN  --   --   --   --   --   --  3.5   PROTTOTAL  --   --   --   --   --   --  7.0   BILITOTAL  --   --   --   --   --   --  0.5   ALKPHOS  --   --   --   --   --   --  77   ALT  --   --   --   --   --   --  26   AST  --   --   --   --   --   --  27    < > = values in this interval not displayed.       No results found for this or any previous visit (from the past 24 hour(s)).    Greg Cheatham MD  Text Page  (7am to 6pm)

## 2022-05-11 NOTE — PROGRESS NOTES
Care Management Follow Up    Length of Stay (days): 2    Expected Discharge Date: 05/12/2022     Concerns to be Addressed:       Patient plan of care discussed at interdisciplinary rounds: Yes    Anticipated Discharge Disposition: Long Term Care     Anticipated Discharge Services: None  Anticipated Discharge DME: None    Patient/family educated on Medicare website which has current facility and service quality ratings:    Education Provided on the Discharge Plan:    Patient/Family in Agreement with the Plan: yes    Referrals Placed by CM/SW:    Private pay costs discussed: Not applicable    Additional Information:  SW informed patient is able to discharge tomorrow 05/12. JUAN called Karan David and discussed transport options. Son would like SW to set up [facility] wheelchair transport. Patient has MA and transport should be covered. SW scheduled [facility] transport for 1100. JUAN called [facility] who requested discharge change to 1200. SW called [facility] and rescheduled transport for 1200. JUAN called [facility] and karan Hernandez and updated on 1200 discharge time both are in agreement. SW will continue to follow.      CLARICE Rosario    [facility]

## 2022-05-11 NOTE — PROGRESS NOTES
Pt. A&Ox3. VSS on 2l O2. Was not OOB this shift. Voids per purewick. Right arm IV infiltrated during shift, a new one was done on the other arm

## 2022-05-11 NOTE — PLAN OF CARE
Goal Outcome Evaluation: pt oriented to self, has expressive aphasia, takes regular diet with no difficulty, uses purwick, up w/ 1-2 assist GBW, on IV antibiotic therapy, was up to chair. Son at bedside morning shift. Possible discharge 5/11/2021

## 2022-05-11 NOTE — PLAN OF CARE
Goal Outcome Evaluation:  Pt oriented to self, has expressive aphasia, voids via purwick, takes regular diet wo difficulty, up w 1  assist GBW, on IV antibiotic treatment.

## 2022-05-12 NOTE — PLAN OF CARE
Physical Therapy Discharge Summary    Reason for therapy discharge:    Discharged to home with home therapy.    Progress towards therapy goal(s). See goals on Care Plan in [facility] electronic health record for goal details.  Goals not met.  Barriers to achieving goals:   discharge from facility.    Therapy recommendation(s):    Continued therapy is recommended.  Rationale/Recommendations:  Pt will need increased services to have 24/7 assist for all mobility at this time, and recommend HHPT in order to progress safety and independence to prior level of function..

## 2022-05-12 NOTE — PLAN OF CARE
"Occupational Therapy Discharge Summary    Reason for therapy discharge:    Discharged to long term care facility. Discharging today at 1200    Progress towards therapy goal(s). See goals on Care Plan in [facility] electronic health record for goal details.  Goals partially met.  Barriers to achieving goals:   limited tolerance for therapy and discharge from facility.    Therapy recommendation(s):    Continued therapy is recommended.  Rationale/Recommendations:  continue therapy in LTC setting as tolerated.      \"pt needing CGA for ambulation with FWW, 1 LOB backward at sink. anticipate she will need increased assist with ADLs/BR transfers initially when back at care center from staff. rec home occuaptional therapy to increase strength and I with ADLs and BR transfers when she gets back to facility.\"    Writing therapist did not treat pt, note written based on previous treating therapist notes and recommendations. Please see chart and flow sheet for additional details.    "

## 2022-05-12 NOTE — PROVIDER NOTIFICATION
"Dr. Cooper paged \"[facility] is requesting more information on the orders; a diabetic diet to be ordered, a physical therapy order. TY\"  "

## 2022-05-12 NOTE — DISCHARGE SUMMARY
[facility]  Hospitalist Discharge Summary      Date of Admission:  5/9/2022  Date of Discharge:  5/12/2022  Discharging Provider: Geni Cooper MD  Discharge Service: Hospitalist Service    Discharge Diagnoses   Urinary tract infection  Community-acquired pneumonia  Mild hypoxia secondary to CAP  Fall  Cognitive impairment  Hypertension  Hyperlipidemia  History of stroke  Type 2 diabetes mellitus  Gastroesophageal reflux disease  Chronic back pain  Restless leg syndrome  CKD stage III  First-degree AV block  Polypharmacy    Follow-ups Needed After Discharge   Follow-up Appointments     Follow-up and recommended labs and tests       Follow up with primary care provider, Physician No Ref-Primary, as needed             Unresulted Labs Ordered in the Past 30 Days of this Admission     Date and Time Order Name Status Description    5/12/2022 12:01 AM Creatinine In process           Discharge Disposition   Discharged to long-term care facility  Condition at discharge: Stable    Hospital Course   Regla Benavidez is a 89 year old female with a past medical history of cognitive impairment, frequent falls, hypertension, hyperlipidemia, stroke, type 2 diabetes, chronic pain, restless leg syndrome presents to hospital with generalized weakness and fall.     UTI  CAP  Mild hypoxemia  Fall/generalized weakness  Patient presenting after a fall found to have a positive UA.  Patient also noted to be hypoxic on room air to 88%.  Chest x-ray revealed possible infiltrate in left lung base. Patient started on azithromycin/ceftriaxone for coverage of community-acquired pneumonia/UTI.   -Patient was on room air prior to discharge  - CTH shows no acute findings  -pt/ot recommending back to LTC, she is not in assisted living but more nursing home  - discharged on  po azithromax and vantin to complete 5 and 7 day course.     Cognitive impairment  Patient is alert to person and situation.  Not oriented to place  or date.  -Maintain sleep-wake sleep-wake cycle, she is calm and cooperative  -Melatonin every evening     Hypertension  HLD  -Continue PTA statin  -  -Outpatient follow-up with PCP    Hx of stroke  -continue PTA aspirin     Type 2 diabetes  -Hemoglobin A1c 7.1.  PTA medications resumed at discharge     GERD  -Pepcid     Chronic pain  In back and knees  -Continue PTA gabapentin  -Voltaren gel to bilateral knees as needed     Restless leg syndrome  -Pramipexole     CKD 3  Creatinine stable when compared to previous labs.  -Avoid nephrotoxic medications.     1st degree av block  -Unchanged from past, asymptomatic     Polypharmacy  Given the patient's age she would likely benefit from a reduction in her medications.   -Patient to follow-up with PCP    Consultations This Hospital Stay   PHYSICAL THERAPY ADULT IP CONSULT  OCCUPATIONAL THERAPY ADULT IP CONSULT  CARE MANAGEMENT / SOCIAL WORK IP CONSULT    Code Status   No CPR- Do NOT Intubate    Time Spent on this Encounter   IGeni MD, personally saw the patient today and spent greater than 30 minutes discharging this patient.       Geni Cooper MD  [facility]  [address] [address]  Phone: [phone]  Fax: [phone]  ______________________________________________________________________    Physical Exam   Vital Signs: Temp: 98  F (36.7  C) Temp src: Oral BP: 133/57 Pulse: 62   Resp: 18 SpO2: 95 % O2 Device: None (Room air)    Weight: 179 lbs 0 oz  Exam:  Constitutional: Awake, alert and no distress. Appears comfortable  Head: Normocephalic. No masses, lesions, tenderness or abnormalities  ENT: ENT exam normal, no neck nodes or sinus tenderness  Cardiovascular: RRR.  no murmurs, no rubs or JVD  Respiratory:normal WOB,b/l equal air entry, no wheezes or crackles   Gastrointestinal: Abdomen soft, non-tender. BS normal. No masses, organomegaly  : Deferred  Extremities :no edema , no clubbing or  cyanosis         Primary Care Physician   Physician No Ref-Primary    Discharge Orders      Activity    Your activity upon discharge: activity as tolerated     Follow-up and recommended labs and tests     Follow up with primary care provider, Physician No Ref-Primary, as needed     Reason for your hospital stay    Admitted for uti and pneumonia     No CPR- Do NOT Intubate     Diet    Follow this diet upon discharge: Orders Placed This Encounter      Combination Diet Regular Diet Adult     Diet    Follow this diet upon discharge: Orders Placed This Encounter      Combination Diet Regular Diet Adult      Diet       Significant Results and Procedures   Results for orders placed or performed during the hospital encounter of 05/09/22   CT Head w/o Contrast    Narrative    EXAM: CT HEAD W/O CONTRAST  LOCATION: [facility]  DATE/TIME: 5/9/2022 8:10 PM    INDICATION: fall, possible head injury, confused  COMPARISON: 11/15/2014 head CT.  TECHNIQUE: Routine CT Head without IV contrast. Multiplanar reformats. Dose reduction techniques were used.    FINDINGS:  INTRACRANIAL CONTENTS: No intracranial hemorrhage, extraaxial collection, or mass effect.  No CT evidence of acute infarct. Redemonstrated chronic infarct in the anterior left MCA distribution. Background mild chronic small vessel ischemic changes are   unchanged. Mild generalized volume loss. No hydrocephalus.     VISUALIZED ORBITS/SINUSES/MASTOIDS: Prior bilateral cataract surgery. Visualized portions of the orbits are otherwise unremarkable. No paranasal sinus mucosal disease. No middle ear or mastoid effusion.    BONES/SOFT TISSUES: No skull fracture. No scalp hematoma. Hyperostosis frontalis interna is an incidental age-related finding.      Impression    IMPRESSION:  1.  Unchanged age-related changes and chronic ischemic changes as above with no acute intracranial abnormality.   Chest XR,  PA & LAT    Narrative    EXAM: XR CHEST 2  VW  LOCATION: [facility]  DATE/TIME: 5/9/2022 10:06 PM    INDICATION: hypoxia, fall, check for pneumonia or pneumothorax  COMPARISON: 07/16/2021      Impression    IMPRESSION: Stable cardiomediastinal silhouette. Atelectasis or infiltrate left lung base. Trace effusions. Stable cardiomediastinal silhouette. Atherosclerotic aorta and degenerative change osseous structures.       Discharge Medications   Current Discharge Medication List      START taking these medications    Details   azithromycin (ZITHROMAX) 250 MG tablet Take 1 tablet (250 mg) by mouth daily for 2 days  Qty: 2 tablet, Refills: 0    Associated Diagnoses: Recurrent UTI      cefpodoxime (VANTIN) 100 MG tablet Take 1 tablet (100 mg) by mouth 2 times daily for 4 days  Qty: 8 tablet, Refills: 0    Associated Diagnoses: Urinary tract infection without hematuria, site unspecified         CONTINUE these medications which have NOT CHANGED    Details   !! acetaminophen (TYLENOL) 325 MG tablet Take 650 mg by mouth 3 times daily 0800/1200/1700      !! acetaminophen (TYLENOL) 500 MG tablet Take 500 mg by mouth 2 times daily as needed for mild pain      aspirin 325 MG tablet Take 1 tablet (325 mg) by mouth daily  Qty: 120 tablet    Associated Diagnoses: Stroke (H)      Cholecalciferol (VITAMIN D3 PO) Take 2,000 Units by mouth daily      diclofenac (VOLTAREN) 1 % topical gel Apply 2 g topically 3 times daily Apply to both kness, back of the knees, and side to side. 9am, 1pm, and 8pm  Qty: 100 g, Refills: 4    Associated Diagnoses: Arthritis      famotidine (PEPCID) 20 MG tablet Take 1 tablet (20 mg) by mouth 2 times daily  Qty: 180 tablet, Refills: 4    Comments: This replaces ranitidine.  Associated Diagnoses: Gastroesophageal reflux disease, esophagitis presence not specified      furosemide (LASIX) 20 MG tablet Take 20 mg by mouth 2 times daily 0900/1300  Qty: 60 tablet, Refills: 12    Associated Diagnoses: Bilateral leg edema       gabapentin (NEURONTIN) 100 MG capsule Take 1 capsule (100 mg) by mouth 2 times daily  Qty: 60 capsule, Refills: 11    Comments: Dosage change  Associated Diagnoses: Diabetic polyneuropathy associated with other specified diabetes mellitus (H)      glipiZIDE (GLUCOTROL) 10 MG tablet Take 1 tablet (10 mg) by mouth 2 times daily (before meals)  Qty: 60 tablet, Refills: 11    Comments: Dosage change  Associated Diagnoses: Type 2 diabetes mellitus with hyperglycemia, without long-term current use of insulin (H)      Lidocaine (LIDOCARE) 4 % Patch Place 1 patch onto the skin every 24 hours To prevent lidocaine toxicity, patient should be patch free for 12 hrs daily. Apply in AM to lower back      metFORMIN (GLUCOPHAGE) 500 MG tablet Take 500 mg by mouth daily   Qty: 30 tablet, Refills: 12      nystatin (MYCOSTATIN) 002580 UNIT/GM external cream APPLY TO INGUINAL AREAS AND UNDER THE BREASTS BID.  Qty: 120 g, Refills: 8    Associated Diagnoses: Intertrigo      nystatin (MYCOSTATIN) 657672 UNIT/GM external powder Apply topically 2 times daily Apply to abdominal folds and groin      polyethylene glycol (MIRALAX/GLYCOLAX) Packet Take 8.5 g by mouth every 72 hours      pramipexole (MIRAPEX) 0.125 MG tablet Take 0.375 mg by mouth every evening 1700      simvastatin (ZOCOR) 5 MG tablet Take 1 tablet (5 mg) by mouth every evening  Qty: 30 tablet    Associated Diagnoses: Stroke (H)      sitagliptin (JANUVIA) 50 MG tablet Take 1 tablet (50 mg) by mouth daily  Qty: 30 tablet, Refills: 11    Associated Diagnoses: Uncontrolled type 2 diabetes mellitus with complication, without long-term current use of insulin (H); Type 2 diabetes mellitus with hyperglycemia, without long-term current use of insulin (H)      spironolactone (ALDACTONE) 25 MG tablet Take 0.5 tablets (12.5 mg) by mouth every other day  Qty: 15 tablet, Refills: 11    Associated Diagnoses: Bilateral leg edema      blood glucose calibration (NO BRAND SPECIFIED) solution  Use to calibrate blood glucose monitor as needed as directed. To accompany: Accu-Chek Safe-T pro plus  Qty: 1 Bottle, Refills: 3    Associated Diagnoses: Type 2 diabetes mellitus with hyperglycemia, without long-term current use of insulin (H)      blood glucose monitoring (NO BRAND SPECIFIED) test strip Use to test blood sugar 1 times daily or as directed. To accompany: Accu-Chek safety pro plus  Qty: 100 strip, Refills: 6    Associated Diagnoses: Type 2 diabetes mellitus with hyperglycemia, without long-term current use of insulin (H)      blood glucose monitoring (ONE TOUCH ULTRA 2) meter device kit Use to test blood sugar 1 times daily or as directed.  Qty: 1 kit, Refills: 1    Comments: Or as covered by insurance  Associated Diagnoses: Type 2 diabetes mellitus with hyperglycemia, without long-term current use of insulin (H)      thin (NO BRAND SPECIFIED) lancets Use to test blood sugar 1 times daily or as directed. To accompany: Accu-Cheks safety pro plus  Qty: 100 each, Refills: 3    Associated Diagnoses: Type 2 diabetes mellitus with hyperglycemia, without long-term current use of insulin (H)       !! - Potential duplicate medications found. Please discuss with provider.        Allergies   Allergies   Allergen Reactions     Cozaar [Losartan Potassium] Swelling     Leg swelling. Same from Avapro.     Hydrochlorothiazide Other (See Comments)     hyponatremia     Hydrocodone Nausea and Vomiting     Nausea

## 2022-05-12 NOTE — PROGRESS NOTES
The patient will be discharging to LTC via arranged transportation, accompanied by her son, David. She attests to having all of her belongings and David will sign all pertinent paperwork.

## 2022-05-12 NOTE — PLAN OF CARE
Pt is alert & oriented to self only. VSS. [place] in the R ear. One assist with gait belt/ walker. Voids via pure wick.Continent of bowel. Regular diet. Blood glucose check. Saline locked. on IV antibiotic. Slept well during this shift. Discharging today to LTC.

## 2022-05-12 NOTE — PROGRESS NOTES
Care Management Discharge Note    Discharge Date: 05/12/2022       Discharge Disposition: Long Term Care    Discharge Services: None    Discharge DME: None    Discharge Transportation: family or friend will provide    Private pay costs discussed: Not applicable    PAS Confirmation Code:    Patient/family educated on Medicare website which has current facility and service quality ratings:      Education Provided on the Discharge Plan:  yes  Persons Notified of Discharge Plans: patient  Patient/Family in Agreement with the Plan: yes    Handoff Referral Completed: Yes    Additional Information:  Discharge orders received. Bed available at [facility]. Wheelchair ride for 1200. Writer faxed orders to [facility].    CLARICE Hancock

## 2022-05-12 NOTE — PROGRESS NOTES
5/11/22; [id number]    A&O X1 ( self); A1 gb/walker; up in chair for dinner;  with corrective insulin; request by family for Voltaren at bedtime on lumbar spine, bilat hips, thighs and knees; plan for LTC.

## 2022-05-20 NOTE — PROGRESS NOTES
ASSESSMENT:  Encounter Diagnoses   Name Primary?     Decreased pedal pulses Yes     PAD (peripheral artery disease) (H)      Ulcer of toe, left, limited to breakdown of skin (H)      Type 2 diabetes mellitus with stage 3b chronic kidney disease, without long-term current use of insulin (H)      Diabetic polyneuropathy associated with other specified diabetes mellitus (H)      Toe pain, right      MEDICAL DECISION MAKING:  She will be presenting for new diabetic shoes and multi density orthoses.  Recommend the use of a toe spacer between the hallux and second toe bilaterally.  Some of her toe pain is related to these 2 toes contacting.  No open wounds.  No additional wound care is needed.  Right hallux toe pain is consistent with an ingrown toenail scenario along the medial edge.  There are no clinical signs of infection.  At her last visit I trimmed this back at a slant and really cannot trim anymore.  A partial nail avulsion is an option, yet I was concerned about her healing potential.  There is no urgency for a procedure.  STEVE/US ordered.  If a procedure is needed, I would like this testing done first.    Follow-up on an as-needed basis.    Disclaimer: This note consists of symbols derived from keyboarding, dictation and/or voice recognition software. As a result, there may be errors in the script that have gone undetected. Please consider this when interpreting information found in this chart.    Bill Garcia DPM, FACFAS, MS    [place] [facility] of Podiatry/Foot & Ankle Surgery      ____________________________________________________________________    HPI:       Alberta follows up for some sores on her toes.  At her last visit, 4/29/22 we evaluated the left second toe.  X-rays were done and did not reveal any changes to suggest osteomyelitis.  I asked her to return for recheck.  Her toenails were debrided at her last visit.  She is having some pain along the medial edge of the right hallux nail  "unit.  *  Past Medical History:   Diagnosis Date     Aspiration pneumonia (H) 11/15/2014    11/14; and UTI with sepsis      Cholelithiasis 2009    incidental finding     DJD (degenerative joint disease) of lumbar spine 2009     Elevated glucose      Expressive aphasia related to prior stroke      Hard of hearing     hearing aids     HTN (hypertension)      Shingles 2005     Varicose vein of leg    *  *  Past Surgical History:   Procedure Laterality Date     CATARACT IOL, RT/LT     *  *    EXAM:    Vitals: /62   Ht 1.499 m (4' 11\")   Wt 81.2 kg (179 lb)   LMP  (LMP Unknown)   BMI 36.15 kg/m    BMI: Body mass index is 36.15 kg/m .       Vascular:  Pedal pulses are not readily palpable, bilateral foot, for both the DP and PT arteries.  CFT < 3 sec.  No edema.       Neuro: Light touch sensation is intact to the L4, L5, S1 distributions  No evidence of weakness, spasticity, or contracture in the lower extremities.      Derm: Normal texture and turgor.  No erythema, ecchymosis, or cyanosis.  No open lesions.  There is a 0.5 cm diameter scab on the dorsal aspect of the left second toe in the location of previous wound.  There is no associated edema or erythema.  No evidence of intra epidermal bleeding.     Some tenderness on palpation to the medial skin fold of the right hallux.  The nail has been trimmed back at a slant.  No edema, erythema or wound.     Musculoskeletal:    Lower extremity muscle strength is normal. No gross deformities.    Mild contractures of the toes, left foot more than right.     XR TOE LEFT G/E 2 VIEWS 4/29/2022 1:51 PM      HISTORY: lesion over dorsal toe; previous redness and swelling; Toe  pain, left     COMPARISON: 3/1/2018.                                                              IMPRESSION: 3 views of the second toe show no fracture or definitive  evidence of ostomy myelitis. Mild degenerative arthritic change.     Large marginal erosions across the first MTP joint likely " indicating  gouty arthropathy. Bones are demineralized. There is soft tissue  swelling about the first MTP joint. No definitive acute fracture or  evidence of osteomyelitis.     CHELA TAMAYO MD

## 2022-05-20 NOTE — LETTER
5/20/2022         RE: Regla Benavidez  C/o David Benavidez  [address] [place] [address]        Dear Colleague,    Thank you for referring your patient, Regla Benavidez, to the [facility]. Please see a copy of my visit note below.    ASSESSMENT:  Encounter Diagnoses   Name Primary?     Decreased pedal pulses Yes     PAD (peripheral artery disease) (H)      Ulcer of toe, left, limited to breakdown of skin (H)      Type 2 diabetes mellitus with stage 3b chronic kidney disease, without long-term current use of insulin (H)      Diabetic polyneuropathy associated with other specified diabetes mellitus (H)      Toe pain, right      MEDICAL DECISION MAKING:  She will be presenting for new diabetic shoes and multi density orthoses.  Recommend the use of a toe spacer between the hallux and second toe bilaterally.  Some of her toe pain is related to these 2 toes contacting.  No open wounds.  No additional wound care is needed.  Right hallux toe pain is consistent with an ingrown toenail scenario along the medial edge.  There are no clinical signs of infection.  At her last visit I trimmed this back at a slant and really cannot trim anymore.  A partial nail avulsion is an option, yet I was concerned about her healing potential.  There is no urgency for a procedure.  STEVE/US ordered.  If a procedure is needed, I would like this testing done first.    Follow-up on an as-needed basis.    Disclaimer: This note consists of symbols derived from keyboarding, dictation and/or voice recognition software. As a result, there may be errors in the script that have gone undetected. Please consider this when interpreting information found in this chart.    Bill Garcia, WESTLEY, FACFAS, MS    [place] Department of Podiatry/Foot & Ankle Surgery      ____________________________________________________________________    HPI:       Alberta follows up for some sores on her toes.  At her last visit,  "4/29/22 we evaluated the left second toe.  X-rays were done and did not reveal any changes to suggest osteomyelitis.  I asked her to return for recheck.  Her toenails were debrided at her last visit.  She is having some pain along the medial edge of the right hallux nail unit.  *  Past Medical History:   Diagnosis Date     Aspiration pneumonia (H) 11/15/2014    11/14; and UTI with sepsis      Cholelithiasis 2009    incidental finding     DJD (degenerative joint disease) of lumbar spine 2009     Elevated glucose      Expressive aphasia related to prior stroke      Hard of hearing     hearing aids     HTN (hypertension)      Shingles 2005     Varicose vein of leg    *  *  Past Surgical History:   Procedure Laterality Date     CATARACT IOL, RT/LT     *  *    EXAM:    Vitals: /62   Ht 1.499 m (4' 11\")   Wt 81.2 kg (179 lb)   LMP  (LMP Unknown)   BMI 36.15 kg/m    BMI: Body mass index is 36.15 kg/m .       Vascular:  Pedal pulses are not readily palpable, bilateral foot, for both the DP and PT arteries.  CFT < 3 sec.  No edema.       Neuro: Light touch sensation is intact to the L4, L5, S1 distributions  No evidence of weakness, spasticity, or contracture in the lower extremities.      Derm: Normal texture and turgor.  No erythema, ecchymosis, or cyanosis.  No open lesions.  There is a 0.5 cm diameter scab on the dorsal aspect of the left second toe in the location of previous wound.  There is no associated edema or erythema.  No evidence of intra epidermal bleeding.     Some tenderness on palpation to the medial skin fold of the right hallux.  The nail has been trimmed back at a slant.  No edema, erythema or wound.     Musculoskeletal:    Lower extremity muscle strength is normal. No gross deformities.    Mild contractures of the toes, left foot more than right.     XR TOE LEFT G/E 2 VIEWS 4/29/2022 1:51 PM      HISTORY: lesion over dorsal toe; previous redness and swelling; Toe  pain, left     COMPARISON: " 3/1/2018.                                                              IMPRESSION: 3 views of the second toe show no fracture or definitive  evidence of ostomy myelitis. Mild degenerative arthritic change.     Large marginal erosions across the first MTP joint likely indicating  gouty arthropathy. Bones are demineralized. There is soft tissue  swelling about the first MTP joint. No definitive acute fracture or  evidence of osteomyelitis.     CHELA TAMAYO MD       Again, thank you for allowing me to participate in the care of your patient.        Sincerely,        Bill Garcia DPM

## 2022-05-20 NOTE — NURSING NOTE
"Chief Complaint   Patient presents with     Ingrown Toenail     Great right toenail     RECHECK     Second toe left foot       Initial /62   Ht 1.499 m (4' 11\")   Wt 81.2 kg (179 lb)   LMP  (LMP Unknown)   BMI 36.15 kg/m   Estimated body mass index is 36.15 kg/m  as calculated from the following:    Height as of this encounter: 1.499 m (4' 11\").    Weight as of this encounter: 81.2 kg (179 lb).  Medications and allergies reviewed.      Arianna LEWIS MA    "

## 2022-05-20 NOTE — PATIENT INSTRUCTIONS
Thank you for choosing [facility] Podiatry / Foot & Ankle Surgery!    DR. CERDA'S CLINIC LOCATIONS:     [facility] TRIAGE LINE: [phone]   [address] [place] [address] APPOINTMENTS: [phone]   [place] [address] RADIOLOGY: [phone]    SET UP SURGERY: [phone]    BILLING QUESTIONS: [phone]   [facility] FAX: [phone] [id number]    [place] [address]        NEW ORDERS/INSTRUCTIONS FOR CARE FACILITY STAFF;    May 20, 2022      WEIGHT BEARING STATUS/ ACTIVITY:   As tolerated with assistance    DRESSING: none needed at this time    OTHER: toe spacers between great toe and second to, both feet    FOLLOW UP: as needed    No new findings or new concerns today. Left 2nd toe wound is healing.       ____________________________________________  Signature      Bill Cerda DPM  [facility]

## 2022-05-23 PROBLEM — Z79.02 ANTIPLATELET OR ANTITHROMBOTIC LONG-TERM USE: Status: ACTIVE | Noted: 2022-01-01

## 2022-05-23 PROBLEM — R82.90 ABNORMAL URINALYSIS: Status: ACTIVE | Noted: 2022-01-01

## 2022-05-23 PROBLEM — I62.00 SUBDURAL HEMORRHAGE (H): Status: ACTIVE | Noted: 2022-01-01

## 2022-05-23 PROBLEM — S09.92XA INJURY OF NOSE, INITIAL ENCOUNTER: Status: ACTIVE | Noted: 2022-01-01

## 2022-05-23 NOTE — ED PROVIDER NOTES
History   Chief Complaint:  Fall and Facial Injury     The history is provided by the patient, the nursing home and the EMS personnel.   History supplemented by electronic chart review    Regla Benavidez is a 89 year old female with history of stroke with cognitive deficits who presents via ambulance from her care facility after a fall. Staff found the patient on the floor this morning with a cut to her nose. Unknown down time or circumstances surrounding presumed fall. Patient reports that she doesn't remember falling, but thinks she was on the floor for a while. She has a hematoma to her forehead and a laceration to the bridge of her nose noted by EMS. No blood thinners per MAR other than aspirin.       Emergency Contacts:   Ed Pramod (son, POA) at [phone] (home)  Hiren Joyce (daughter) at [phone] (cell)    Review of Systems   All other systems reviewed and are negative.    Allergies:  Cozaar [Losartan Potassium]  Hydrochlorothiazide  Hydrocodone    Medications:  Aspirin  Pepcid  Neurontin  Glucotrol  Mycostatin  Zocor  Januvia  Aldactone    Past Medical History:     Aspiration pneumonia  Cholelithiasis  DJD  Expressive aphasia  Hypertension  Shingles  Varicose vein of leg  Hard of hearing  Stroke     Past Surgical History:    Cataract IOL     Family History:    Brother: diabetes     Social History:  The patient presents to the ED via ambulance.   The patient lives in [facility] in [place].     Physical Exam     Patient Vitals for the past 24 hrs:   BP Temp Temp src Pulse Resp SpO2 Weight   05/23/22 1100 (!) 172/90 -- -- 78 12 95 % --   05/23/22 1045 -- -- -- 77 14 95 % --   05/23/22 1030 -- -- -- 76 13 96 % --   05/23/22 1015 -- -- -- 76 9 95 % --   05/23/22 1000 -- -- -- 78 12 95 % --   05/23/22 0948 -- -- -- 80 14 96 % --   05/23/22 0945 -- -- -- 80 18 -- --   05/23/22 0833 -- -- -- -- -- 93 % --   05/23/22 0830 (!) 159/78 -- -- 82 -- -- --   05/23/22 0826 -- 97.3  F (36.3  C)  Temporal 80 20 92 % 81.2 kg (179 lb)     Physical Exam  General: Woman sitting upright in room 28  HENT: face nontender with full painless ROM mandible, no bony deformity, OP clear, no difficulty controlling secretions, skull with mild tenderness to the swollen area of her central forehead   eyes: PERRL without proptosis  CV:  regular rhythm, cap refill normal in all extremities  Resp: normal effort, speaks in full phrases, no stridor  GI: abdomen soft,  nontender, no guarding  MSK:  Cervical spine: no midline tenderness, FROM  Thoracic spine: no midline tenderness, no CVAT  Lumbar spine: no midline tenderness  Chest wall: nontender without crepitus  Pelvis stable  Extremities: no focal tenderness  Skin:    abrasion to central forehead  Also large skin tear to central nose without underlying bony tenderness or crepitus, no nasal septal hematoma  No laceration  Neuro: awake, alert though moderately poor historian, follows basic commands, can lift both legs off bed, responds appropriately to commands  Psych: cooperative, no evidence of hallucinations    Emergency Department Course   ECG  ECG taken at 0853, ECG read at 0856  Left axis deviation. Minimal voltage criteria for LVH, may be normal variant. Anteroseptal infarct, old. Likely sinus rhythm with 1st degree AV block.    Rate 71 bpm. TX interval 340 ms. QRS duration 84 ms. QT/QTc 400/434 ms. P-R-T axes 86 -35 28.     Imaging:  CT Head w/o Contrast   Final Result   Abnormal   IMPRESSION:   1. Findings concerning for a thin acute right tentorial leaflet   subdural hematoma, as described. No significant associated mass   effect/herniation. No other findings concerning for recent   intracranial hemorrhage.   2. 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   3. Brain atrophy and presumed chronic small vessel ischemic changes,   as described.   4. Anterior frontal scalp hematoma without underlying calvarial   fracture.   5. Soft tissue irregularity  overlying the nasal arch, concerning for   superficial soft tissue laceration. Minimal apparent cortical   irregularity of the partially visualized nasal arch is   age-indeterminate and incompletely evaluated. Correlate clinically   with point tenderness to assess for the possibility of recent   nondisplaced nasal arch fracture.      [Critical Result: Acute intracranial hemorrhage]      Finding was identified on 5/23/2022 9:53 AM.       ANA MARIA DAWN was contacted by Dr. Oliver on 5/23/2022 10:12   AM and verbalized understanding of the critical result.       SHRUTHI OLIVER MD            SYSTEM ID:  [id number]      XR Chest 2 Views   Final Result   IMPRESSION: AP and lateral views of the chest were obtained.   Cardiomediastinal silhouette is within normal limits. Mild left   basilar pulmonary opacities, likely atelectasis. No significant   pleural effusion or pneumothorax. Multilevel degenerative changes of   the spine. No definite acute osseous pathology. If clinical suspicion   of rib fractures, remains high, rib series is more sensitive for   detection of subtle rib fractures.      ANNETTE COATS MD            SYSTEM ID:  [id number]        Report per radiology    Laboratory:  Labs Ordered and Resulted from Time of ED Arrival to Time of ED Departure   COMPREHENSIVE METABOLIC PANEL - Abnormal       Result Value    Sodium 140      Potassium 4.6      Chloride 107      Carbon Dioxide (CO2) 29      Anion Gap 4      Urea Nitrogen 32 (*)     Creatinine 0.97      Calcium 8.7      Glucose 161 (*)     Alkaline Phosphatase 69      AST 16      ALT 23      Protein Total 6.8      Albumin 3.3 (*)     Bilirubin Total 0.5      GFR Estimate 53 (*)    ROUTINE UA WITH MICROSCOPIC - Abnormal    Color Urine Light Yellow      Appearance Urine Clear      Glucose Urine Negative      Bilirubin Urine Negative      Ketones Urine Negative      Specific Gravity Urine 1.012      Blood Urine Large (*)     pH Urine 6.5      Protein  Albumin Urine Negative      Urobilinogen Urine Normal      Nitrite Urine Negative      Leukocyte Esterase Urine Negative      Bacteria Urine Few (*)     RBC Urine 8 (*)     WBC Urine 10 (*)     Squamous Epithelials Urine 1     TROPONIN I - Normal    Troponin I High Sensitivity 11     CK TOTAL - Normal         CBC WITH PLATELETS AND DIFFERENTIAL    WBC Count 9.8      RBC Count 4.27      Hemoglobin 13.4      Hematocrit 42.5            MCH 31.4      MCHC 31.5      RDW 13.5      Platelet Count 215      % Neutrophils 75      % Lymphocytes 14      % Monocytes 7      % Eosinophils 4      % Basophils 0      % Immature Granulocytes 0      NRBCs per 100 WBC 0      Absolute Neutrophils 7.4      Absolute Lymphocytes 1.3      Absolute Monocytes 0.7      Absolute Eosinophils 0.4      Absolute Basophils 0.0      Absolute Immature Granulocytes 0.0      Absolute NRBCs 0.0     URINE CULTURE        Emergency Department Course:         Reviewed:  I reviewed nursing notes, vitals, past medical history and Care Everywhere    Assessments:  0832 I obtained history and examined the patient as noted above.   1031 I rechecked the patient and explained findings. Daughter at bedside.     Consults:  1013 I spoke with radiology about the patient's CT imaging.   1045 I spoke with Khushi, neurosurgery, about the patient's presentation and plan.   1058 I spoke with Dr. Rossi, the hospitalist, who accepted the patient.     Interventions:  Voltaren 2g topical    Disposition:  The patient was admitted to the hospital under the care of Dr. Rossi.     Impression & Plan     Medical Decision Making:  She presents after presumed fall and due to her underlying cognitive deficits and the fact this was unwitnessed, we do not have more details surrounding this and the underlying etiology is unconfirmed.  Unfortunately, she does have obvious head trauma and evidence on CT of a small subdural, for which I spoke with the on-call  neurosurgery team who is reviewed her images and does not anticipate the need for neurosurgical intervention though recommends a repeat head CT later today.  I spoke with the patient and her daughter at bedside, who confirmed that she is DNR, DNI, and would not wish to pursue surgical intervention, but they do wish for her to be hospitalized for close monitoring and further specialist consult.  I have arranged for admission to the hospital service for further multidisciplinary care.  The patient did not wish to have her blood pressure checked again to help optimize blood pressure targets and given her overall goals of care I did think this was reasonable, given that her initial blood pressure was fairly close to the target already regardless.  Her nasal skin tear is not amenable to sutures and its was cleansed and dressed by our tech.  I do not think she requires facial CT.  No signs of additional serious injury at this time though she will require a tertiary exam while hospitalized.    Diagnosis:    ICD-10-CM    1. Subdural hemorrhage (H)  I62.00    2. Antiplatelet or antithrombotic long-term use  Z79.02    3. Abnormal urinalysis  R82.90    4. Injury of nose, initial encounter  S09.92XA        Scribe Disclosure:  I, Shae Razo, am serving as a scribe at 8:32 AM on 5/23/2022 to document services personally performed by Reg Valencia MD based on my observations and the provider's statements to me.     This note was completed in part using Dragon voice recognition software. Although reviewed after completion, some word and grammatical errors may occur.       Reg Valencia MD  05/23/22 [id number]

## 2022-05-23 NOTE — ED NOTES
[facility]  ED Nurse Handoff Report    ED Chief complaint: Fall and Facial Injury      ED Diagnosis:   Final diagnoses:   None       Code Status: Hospital MD to address     Allergies:   Allergies   Allergen Reactions     Cozaar [Losartan Potassium] Swelling     Leg swelling. Same from Avapro.     Hydrochlorothiazide Other (See Comments)     hyponatremia     Hydrocodone Nausea and Vomiting     Nausea       Patient Story: Pt presents from nursing facility where she sustained a fall during the night. Pt has hx of stroke and has some aphasia present and has troubles making some needs known. Pt also [place] and has hearing aid to the R ear. Pt unsure of when she fell, but states she felt like she laid on the floor most of the night. Pt has hematoma to forehead and lac to the nose. Head CT showing new small intracranial hemorrhage on the R side. Pt denies headache, no other focal deficits noted. Daughter at bedside. Pt also seen recently for concern for PNA and UTI, these tests are pending.   Focused Assessment:  Alert, [place], oriented to self and location. Assist of 2 at care facility.    Treatments and/or interventions provided: Blood work, ekg, imaging  Patient's response to treatments and/or interventions: tolerating well     To be done/followed up on inpatient unit:  inpt orders, neuro     Does this patient have any cognitive concerns?: Baseline dementia, Forgetful and Disoriented to situation    Activity level - Baseline/Home:  Stand with Assist  Activity Level - Current:   Total Care    Patient's Preferred language: English   Needed?: No    Isolation: None  Infection: Not Applicable  Patient tested for COVID 19 prior to admission: YES  Bariatric?: No    Vital Signs:   Vitals:    05/23/22 1000 05/23/22 1015 05/23/22 1030 05/23/22 1045   BP:       Pulse: 78 76 76 77   Resp: 12 9 13 14   Temp:       TempSrc:       SpO2: 95% 95% 96% 95%   Weight:           Cardiac Rhythm:     Was the PSS-3  completed:   Yes  What interventions are required if any?               Family Comments: daughter present  OBS brochure/video discussed/provided to patient/family: No              Name of person given brochure if not patient: n/a              Relationship to patient: n/a    For the majority of the shift this patient's behavior was none.   Behavioral interventions performed were green    ED NURSE PHONE NUMBER: *[phone]

## 2022-05-23 NOTE — CONSULTS
NEUROSURGERY CONSULT    Neurosurgery was asked by Dr. Valencia to consult for a traumatic SDH.      PLAN:  Ms. Benavidez exhibits a thin acute right tentorial leaflet subdural hematoma on her most recent imaging. From a Neurosurgical standpoint, we feel that it would be in her best interest to be admitted to [facility] by the Medical team. The head of the bed should be at 30 degrees at all times and the systolic blood pressure should be maintained at 150 mmHg or less at all times. We will repeat a head CT eight hours from her initial scan to assess the stability of the bleed. Please refrain from using any Aspirin or anti-inflammatory products.   In the meantime, she should not lift anything greater than 5-10 lbs., avoid putting her head below her heart, and avoid straining and sneezing with her mouth closed.   We did review the images together. She appeared to have a good understanding of the situation and asked appropriate questions which we answered. We did discuss signs of a worsening problem that a repeat evaluation should be obtained.     Please page our on-call service for any questions or concerns.     It has been a pleasure meeting Regla Benavidez. Thank you for having us be involved in her care.    ______________________________________________________________________    HPI:    Regla Benavidez is a pleasant 89 year old female who presented to the [facility] Emergency Department via EMS from her care facility for consultation after an unwitnessed fall. It is not clear as to when the fall occurred and she does not recall the fall. She is not anticoagulated.     There are no bowel or bladder changes. No other concerns are voiced.    Past Medical History:   Diagnosis Date     Aspiration pneumonia (H) 11/15/2014    11/14; and UTI with sepsis      Cholelithiasis 2009    incidental finding     DJD (degenerative joint disease) of lumbar spine 2009     Elevated glucose      Expressive aphasia  related to prior stroke      Hard of hearing     hearing aids     HTN (hypertension)      Shingles 2005     Varicose vein of leg        Past Surgical History:   Procedure Laterality Date     CATARACT IOL, RT/LT         Allergies   Allergen Reactions     Cozaar [Losartan Potassium] Swelling     Leg swelling. Same from Avapro.     Hydrochlorothiazide Other (See Comments)     hyponatremia     Hydrocodone Nausea and Vomiting     Nausea       Social History     Tobacco Use     Smoking status: Never Smoker     Smokeless tobacco: Never Used   Substance Use Topics     Alcohol use: No       Family History   Problem Relation Age of Onset     Diabetes Brother      Unknown/Adopted Mother      Unknown/Adopted Father      Coronary Artery Disease No family hx of      Hypertension No family hx of      Hyperlipidemia No family hx of      Cerebrovascular Disease No family hx of      Breast Cancer No family hx of      Colon Cancer No family hx of      Prostate Cancer No family hx of      Other Cancer No family hx of      Depression No family hx of      Anxiety Disorder No family hx of      Mental Illness No family hx of      Substance Abuse No family hx of      Anesthesia Reaction No family hx of      Asthma No family hx of      Osteoporosis No family hx of      Genetic Disorder No family hx of      Thyroid Disease No family hx of      Obesity No family hx of        Scheduled Medications      diclofenac  2 g Topical Once       Home Medications  Aspirin  Pepcid  Neurontin  Glucotrol  Mycostatin  Zocor  Januvia  Aldactone    ROS: 10 point ROS neg other than the symptoms noted above in the HPI.    Vitals:    BP (!) 172/90   Pulse 78   Temp 97.3  F (36.3  C) (Temporal)   Resp 12   Wt 179 lb (81.2 kg)   LMP  (LMP Unknown)   SpO2 95%   BMI 36.15 kg/m    Body mass index is 36.15 kg/m .  No intake/output data recorded.    Exam:    Pt examined in ED28. Pt appears comfortable and in no apparent distress, moving all extremities.     Head:  Normocephalic, facial abrasions, no facial asymmetry.   Eyes: conjunctivae/corneas clear. PERRL, EOM's intact.   Throat: lips, mucosa, and tongue normal; teeth and gums normal.   Neck: supple, symmetrical, trachea midline, no adenopathy and thyroid: not enlarged, symmetric, no tenderness/mass/nodules.   Lungs: clear to auscultation bilaterally.   Heart: regular rate and rhythm.   Abdomen: soft, non-tender; bowel sounds normal; no masses, no organomegaly.   Pulses: 2+ and symmetric.   Skin: Skin color, texture, turgor normal. No rashes or lesions.     Alert and oriented to date and time. Was not sure of location. Able to recall the current president of [employer] [facility].   CN II: Able to read nametag, VF full with gross confrontation.   CN III,IV,VI: PAULINE, Extraocular movements full, absent for nystagmus, absent for ptosis.   CN V: Full sensation in V1,V2,V3, jaw clench symmetrical.   CN VII: Face symmetrical and with equal strength, able to puff cheeks out.   CN VIII: Able to hear conversation - hard of hearing.   CN IX: Able to push tongue against bilateral cheeks.   CN X: Palate elevates symmetrically, uvula midline.   CN XI: Elevate shoulders symmetrical, full strength when turning head from side to side.   CNXII: Tongue protrudes midline, absent for fasciculation.   Able to name 3/3 objects.   Finger to nose slow and accurate.   Rapid hand movements intact.   Absent for upper and lower extremity drift.     GCS:   Eye: eyes open spontaneously (4)   Motor: obeys commands (6)   Verbal: oriented (5)   Composite score: 15     Bilateral upper extremities 5/5. Bilateral bicep and triceps reflexes 2/4. Sensation intact throughout. Normal ROM.   Bilateral lower extremities 5/5. Normal sensation t/o bilaterally. Bilateral patellar 2/4 and achilles reflex 1/4.   Calves soft and non-tender.     ECG/Telemetry:  ECG taken at 0853, ECG read at 0856  Left axis deviation. Minimal voltage criteria for LVH, may be normal variant.  Anteroseptal infarct, old. Likely sinus rhythm with 1st degree AV block.    Rate 71 bpm. KS interval 340 ms. QRS duration 84 ms. QT/QTc 400/434 ms. P-R-T axes 86 -35 28.     Imaging: CT SCAN OF THE HEAD WITHOUT CONTRAST   5/23/2022 9:51 AM  Impression per radiology read - I have personally reviewed the images with the patient.    1. Findings concerning for a thin acute right tentorial leaflet  subdural hematoma, as described. No significant associated mass  effect/herniation. No other findings concerning for recent  intracranial hemorrhage.  2. 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  3. Brain atrophy and presumed chronic small vessel ischemic changes,  as described.  4. Anterior frontal scalp hematoma without underlying calvarial  fracture.  5. Soft tissue irregularity overlying the nasal arch, concerning for  superficial soft tissue laceration. Minimal apparent cortical  irregularity of the partially visualized nasal arch is  age-indeterminate and incompletely evaluated. Correlate clinically  with point tenderness to assess for the possibility of recent  nondisplaced nasal arch fracture.    Available labs at time of consult:       Recent Labs   Lab 05/23/22  0840   WBC 9.8   HGB 13.4   HCT 42.5           Recent Labs   Lab 05/23/22  0840   WBC 9.8   HGB 13.4   HCT 42.5           No results for input(s): SED, CRP in the last 168 hours.  Recent Labs   Lab 05/23/22  0840   HGB 13.4     No results for input(s): INR in the last 168 hours.  Recent Labs   Lab 05/23/22  0840        Recent Labs   Lab 05/23/22  0840   WBC 9.8         Respectfully,    JAN Priest, PA-C  [facility] Neurosurgery  [facility]     Tel: [phone]      All imaging, physical findings, and the above plan have been reviewed with Dr. Pierre.

## 2022-05-23 NOTE — ED NOTES
Bed: ED28  Expected date:   Expected time:   Means of arrival:   Comments:  [facility] - 427 - 75 F fall head injury eta 0827

## 2022-05-23 NOTE — PLAN OF CARE
Reason for Admission: fall and facial injurty--L SDH    Cognitive/Mentation: A/Ox 3; disoriented to place, did not know the month, but knew the year. Aphasia at baseline from previous stroke.   Neuros/CMS: Intact ex for generalized weakness  VS: stable on RA.   Tele: SB.  GI: BS audible, passing flatus, last BM this AM per notes. Patient is Continent.  : purewick in place. Continent.  Pulmonary: LS clear.  Pain: slight head pain, scheduled tylenol.     Drains/Lines: none  Skin: face laceration and hematoma, scattered bruises. ARETHA.  Activity: Pt not ambulating.  Diet: mod carb with thin liquids. Takes pills whole, with water.     Therapies recs: pending  Discharge: pending, observation status    Aggression Stoplight Tool: yellow

## 2022-05-23 NOTE — PHARMACY-ADMISSION MEDICATION HISTORY
Pharmacy Medication History  Admission medication history interview status for the 5/23/2022  admission is complete. See EPIC admission navigator for prior to admission medications     Location of Interview:   Medication history sources: [facility] ([facility]    Significant changes made to the medication list:  Removed Nystatin    In the past week, patient estimated taking medication this percent of the time: greater than 90%    Additional medication history information:       Medication reconciliation completed by provider prior to medication history? No    Time spent in this activity: 20min    Prior to Admission medications    Medication Sig Last Dose Taking? Auth Provider   acetaminophen (TYLENOL) 325 MG tablet Take 650 mg by mouth 3 times daily 0800/1200/1700 5/22/2022 at 1700 Yes Unknown, Entered By History   acetaminophen (TYLENOL) 500 MG tablet Take 500 mg by mouth 2 times daily as needed for mild pain  Yes Unknown, Entered By History   aspirin 325 MG tablet Take 1 tablet (325 mg) by mouth daily 5/22/2022 at Unknown time Yes Kendall Saba MD   Cholecalciferol (VITAMIN D3 PO) Take 2,000 Units by mouth daily 5/22/2022 at Unknown time Yes Unknown, Entered By History   diclofenac (VOLTAREN) 1 % topical gel Apply 2 g topically 3 times daily 0900, 1300, 2000  at 2000 Yes Unknown, Entered By History   famotidine (PEPCID) 20 MG tablet Take 1 tablet (20 mg) by mouth 2 times daily 5/22/2022 at pm Yes Leo Melgar MD   furosemide (LASIX) 20 MG tablet Take 20 mg by mouth 2 times daily 0900/1300 5/22/2022 at pm Yes Leo Melgar MD   gabapentin (NEURONTIN) 100 MG capsule Take 1 capsule (100 mg) by mouth 2 times daily 5/22/2022 at pm Yes Leo Melgar MD   glipiZIDE (GLUCOTROL) 10 MG tablet Take 1 tablet (10 mg) by mouth 2 times daily (before meals)  Patient taking differently: Take 5 mg by mouth 2 times daily (before meals) AM & dinner (1700) 5/22/2022 at pm Yes Leo Melgar MD   Lidocaine (LIDOCARE)  4 % Patch Place 1 patch onto the skin every 24 hours To prevent lidocaine toxicity, patient should be patch free for 12 hrs daily. Apply in AM to lower back 5/22/2022 at Unknown time Yes Unknown, Entered By History   metFORMIN (GLUCOPHAGE) 500 MG tablet Take 500 mg by mouth daily  5/22/2022 at Unknown time Yes eLo Melgar MD   polyethylene glycol (MIRALAX/GLYCOLAX) Packet Take 8.5 g by mouth every 72 hours 5/22/2022 at Unknown time Yes Unknown, Entered By History   pramipexole (MIRAPEX) 0.125 MG tablet Take 0.375 mg by mouth every evening 1700 5/22/2022 at Unknown time Yes Unknown, Entered By History   simvastatin (ZOCOR) 5 MG tablet Take 1 tablet (5 mg) by mouth every evening 5/22/2022 at Unknown time Yes Kendall Saba MD   sitagliptin (JANUVIA) 50 MG tablet Take 1 tablet (50 mg) by mouth daily 5/22/2022 at Unknown time Yes Leo Melgar MD   spironolactone (ALDACTONE) 25 MG tablet Take 0.5 tablets (12.5 mg) by mouth every other day 5/21/2022 Yes Leo Melgar MD   blood glucose calibration (NO BRAND SPECIFIED) solution Use to calibrate blood glucose monitor as needed as directed. To accompany: Accu-Chek Safe-T pro plus   Leo Melgar MD   blood glucose monitoring (NO BRAND SPECIFIED) test strip Use to test blood sugar 1 times daily or as directed. To accompany: Accu-Chek safety pro plus   Leo Melgar MD   blood glucose monitoring (ONE TOUCH ULTRA 2) meter device kit Use to test blood sugar 1 times daily or as directed.   Leo Melgar MD   thin (NO BRAND SPECIFIED) lancets Use to test blood sugar 1 times daily or as directed. To accompany: Accu-Cheks safety pro plus   Leo Melgar MD       The information provided in this note is only as accurate as the sources available at the time of update(s)

## 2022-05-23 NOTE — ED TRIAGE NOTES
Pt presents by [facility] from [facility] in [place]. Pt sustained a fall in the middle of the night. Pt states she fell out of bed and staff found her laying on the floor. Pt unsure of what time she fell but states she was on the ground for awhile. Pt has hematoma present to forehead and laceration present to the bridge of her nose. No blood thinners per MAR. Pt does not have any other pain at this time. Pt [place], hearing aid to R ear present. Pt has old bruising to L knee.      Triage Assessment     Row Name 05/23/22 0831       Triage Assessment (Adult)    Airway WDL WDL       Respiratory WDL    Respiratory WDL WDL       Skin Circulation/Temperature WDL    Skin Circulation/Temperature WDL X  Hematoma to forehead and nasal bridge lac       Cardiac WDL    Cardiac WDL WDL       Peripheral/Neurovascular WDL    Peripheral Neurovascular WDL WDL       Cognitive/Neuro/Behavioral WDL    Cognitive/Neuro/Behavioral WDL WDL

## 2022-05-23 NOTE — H&P
[facility]    History and Physical  Hospitalist       Date of Admission:  5/23/2022    Assessment & Plan   Regla Benavidez is a 89 year old female with history of stroke with residual expressive aphasia, recurrent UTI presents after unwitnessed fall in her LTC.      Mechanical fall, unwitnessed  SDH  Hematoma forehead, laceration/abrasion bridge of nose  [facility] and reports unwitnessed fall ambulating with wheeled walker to toilet.  She cannot recall details of the fall however on presentation head trauma with hematoma to forehead and abrasion on bridge of nose.  Patient does not report any prodrome symptoms.  Daughter is present and states appears at mental status baseline with known cognitive impairment and expressive aphasia since her stroke.  No new neurologic focality.  Patient is on no ongoing narcotics or sedating medications.  In ED head CT with 2 mm SDH right side, no mass-effect or herniation.  No other acute changes.  Neurosurgery in ED consulted, recommend repeat noncontrast head CT at 8 hours, SBP less than 150, HOB greater than 30 degrees.  UA WBC 10, nitrites and leukoesterase negative.  -[place] observation  -Repeat head CT 8 hours corresponding to 16:00 today per Neurosurgery  -Neurosurgery consult.  SBP less than 150, HOB greater than 30', repeat head CT 8 hours as above.  -Hold all AC including PTA aspirin.  -Local care laceration/abrasion bridge of nose  -Neurochecks.  -Avoid narcotics, sedating medications as able.  -Monitor UC/S as potentially contributory to fall..  History recent hospitalization May 9/12 for UTI/pneumonia  -OT/PT.    History CVA, residual expressive aphasia.  Hypertension  Appears PTA aspirin may be related to history of stroke.  Given SDH and forehead hematoma, on hold for now.  Will need discussion either with  PCP versus follow-up neurology appointment regarding risk/benefit in 89-year-old with history of falls  regarding restart of ASA for stroke.  -Continue PTA furosemide, spironolactone, BMP WNL, fluid status appears compensated.  No hypotension.    -Type 2 diabetes.  Hold PTA glipizide, metformin Januvia while in hospital  -Monitor glucoses, add SSI, consistent carb diet.  Assess insulin use, may need maintenance insulin.    PAD  Chronic pain including back  Continue PTA gabapentin, Mirapex, scheduled and as needed acetaminophen, Voltaren topical      DVT Prophylaxis: Pneumatic Compression Devices and Ambulate every shift  Code Status: DNR / DNI  Expected Discharge 1-2 days pending repeat head CT, Neurosurgery plan established, safe disposition.  Daughter states patient lives in a [facility] LTC with in-house PT/OT available.    No Rossi MD     Total unit/floor time 70 minutes, time consisted of the following, examination of patient and completing documentation;  >50% Counseling/discussion with Patient, Daughter and Son b [by phone] regarding current conditions including history fall, SDH, hematoma, history of stroke; and Coordination of Care time including phone discussion with ED provider; extensive review of records  including active diagnoses, and discussion with Nursing regarding admission orders and plans; as outlined above.      Primary Care Physician   Physician No Ref-Primary    Chief Complaint   fall    History is obtained from the patient, Daughter and ED Provider    History of Present Illness   Regla Benavidez is a 89 year old female with history of stroke with residual expressive aphasia, recurrent UTI presents after unwitnessed fall in her LTC.[facility] lives in [facility] and reports unwitnessed fall ambulating with wheeled walker to toilet.  She cannot recall details of the fall however on presentation head trauma with hematoma to forehead and abrasion on bridge of nose.  Patient does not report any prodrome symptoms.  Daughter is present and states appears at mental status  baseline with known cognitive impairment and expressive aphasia since her stroke.  No new neurologic focality.  Patient is on no ongoing narcotics or sedating medications.  In ED head CT with 2 mm SDH right side, no mass-effect or herniation.  No other acute changes.  Neurosurgery in ED consulted, recommend repeat noncontrast head CT at 8 hours, SBP less than 150, HOB greater than 30 degrees.  UA WBC 10, nitrites and leukoesterase negative.  Remainder of HPI as above.      Past Medical History    I have reviewed this patient's medical history and updated it with pertinent information if needed.   Past Medical History:   Diagnosis Date     Aspiration pneumonia (H) 11/15/2014    11/14; and UTI with sepsis      Cholelithiasis 2009    incidental finding     DJD (degenerative joint disease) of lumbar spine 2009     Elevated glucose      Expressive aphasia related to prior stroke      Hard of hearing     hearing aids     HTN (hypertension)      Shingles 2005     Varicose vein of leg        Past Surgical History   I have reviewed this patient's surgical history and updated it with pertinent information if needed.  Past Surgical History:   Procedure Laterality Date     CATARACT IOL, RT/LT         Prior to Admission Medications   Prior to Admission Medications   Prescriptions Last Dose Informant Patient Reported? Taking?   Cholecalciferol (VITAMIN D3 PO) 5/22/2022 at Unknown time Nursing Home Yes Yes   Sig: Take 2,000 Units by mouth daily   Lidocaine (LIDOCARE) 4 % Patch 5/22/2022 at Unknown time Nursing Home Yes Yes   Sig: Place 1 patch onto the skin every 24 hours To prevent lidocaine toxicity, patient should be patch free for 12 hrs daily. Apply in AM to lower back   acetaminophen (TYLENOL) 325 MG tablet 5/22/2022 at 1700 Nursing Home Yes Yes   Sig: Take 650 mg by mouth 3 times daily 0800/1200/1700   acetaminophen (TYLENOL) 500 MG tablet  Nursing Home Yes Yes   Sig: Take 500 mg by mouth 2 times daily as needed for mild  pain   aspirin 325 MG tablet 5/22/2022 at Unknown time [facility]iary No Yes   Sig: Take 1 tablet (325 mg) by mouth daily   blood glucose calibration (NO BRAND SPECIFIED) solution   No No   Sig: Use to calibrate blood glucose monitor as needed as directed. To accompany: Accu-Chek Safe-T pro plus   blood glucose monitoring (NO BRAND SPECIFIED) test strip   No No   Sig: Use to test blood sugar 1 times daily or as directed. To accompany: Accu-Chek safety pro plus   blood glucose monitoring (ONE TOUCH ULTRA 2) meter device kit   No No   Sig: Use to test blood sugar 1 times daily or as directed.   diclofenac (VOLTAREN) 1 % topical gel  at 2000  Yes Yes   Sig: Apply 2 g topically 3 times daily 0900, 1300, 2000   famotidine (PEPCID) 20 MG tablet 5/22/2022 at pm Nursing Home No Yes   Sig: Take 1 tablet (20 mg) by mouth 2 times daily   furosemide (LASIX) 20 MG tablet 5/22/2022 at pm Nursing Home Yes Yes   Sig: Take 20 mg by mouth 2 times daily 0900/1300   gabapentin (NEURONTIN) 100 MG capsule 5/22/2022 at pm Nursing Home No Yes   Sig: Take 1 capsule (100 mg) by mouth 2 times daily   glipiZIDE (GLUCOTROL) 10 MG tablet 5/22/2022 at pm  No Yes   Sig: Take 1 tablet (10 mg) by mouth 2 times daily (before meals)   Patient taking differently: Take 5 mg by mouth 2 times daily (before meals) AM & dinner (1700)   metFORMIN (GLUCOPHAGE) 500 MG tablet 5/22/2022 at Unknown time Nursing Home Yes Yes   Sig: Take 500 mg by mouth daily    polyethylene glycol (MIRALAX/GLYCOLAX) Packet 5/22/2022 at Unknown time Nursing Home Yes Yes   Sig: Take 8.5 g by mouth every 72 hours   pramipexole (MIRAPEX) 0.125 MG tablet 5/22/2022 at Unknown time Nursing Home Yes Yes   Sig: Take 0.375 mg by mouth every evening 1700   simvastatin (ZOCOR) 5 MG tablet 5/22/2022 at Unknown time penitentiary No Yes   Sig: Take 1 tablet (5 mg) by mouth every evening   sitagliptin (JANUVIA) 50 MG tablet 5/22/2022 at Unknown time penitentiary No Yes   Sig: Take 1 tablet (50 mg)  by mouth daily   spironolactone (ALDACTONE) 25 MG tablet 5/21/2022 Nursing Home No Yes   Sig: Take 0.5 tablets (12.5 mg) by mouth every other day   thin (NO BRAND SPECIFIED) lancets   No No   Sig: Use to test blood sugar 1 times daily or as directed. To accompany: Accu-Cheks safety pro plus      Facility-Administered Medications: None     Allergies   Allergies   Allergen Reactions     Cozaar [Losartan Potassium] Swelling     Leg swelling. Same from Avapro.     Hydrochlorothiazide Other (See Comments)     hyponatremia     Hydrocodone Nausea and Vomiting     Nausea       Social History   I have reviewed this patient's social history and updated it with pertinent information if needed. Regla Benavidez  reports that she has never smoked. She has never used smokeless tobacco. She reports that she does not drink alcohol and does not use drugs.    Family History   I have reviewed this patient's family history and updated it with pertinent information if needed.   Family History   Problem Relation Age of Onset     Diabetes Brother      Unknown/Adopted Mother      Unknown/Adopted Father      Coronary Artery Disease No family hx of      Hypertension No family hx of      Hyperlipidemia No family hx of      Cerebrovascular Disease No family hx of      Breast Cancer No family hx of      Colon Cancer No family hx of      Prostate Cancer No family hx of      Other Cancer No family hx of      Depression No family hx of      Anxiety Disorder No family hx of      Mental Illness No family hx of      Substance Abuse No family hx of      Anesthesia Reaction No family hx of      Asthma No family hx of      Osteoporosis No family hx of      Genetic Disorder No family hx of      Thyroid Disease No family hx of      Obesity No family hx of        Review of Systems   The 10 point Review of Systems is negative other than noted in the HPI     Physical Exam   Temp: 97.3  F (36.3  C) Temp src: Temporal BP: (!) 172/90 Pulse: 78   Resp: 12 SpO2:  95 %      Vital Signs with Ranges  Temp:  [97.3  F (36.3  C)] 97.3  F (36.3  C)  Pulse:  [76-82] 78  Resp:  [9-20] 12  BP: (159-172)/(78-90) 172/90  SpO2:  [92 %-96 %] 95 %  179 lbs 0 oz    General/Constitutional:   NAD, alert, calm, cooperative, expressive aphasia  HEENT/Head Exam: Hematoma forehead, open abrasion about 2 cm bridge of nose  Eyes:  PERRL, no conjunctivits  Mouth/Oral Pharynx:  Buccal mucosa WNL  Chest/Respiratory:  Air exchange bilateral lung fields; no rales or wheeze. Respiration nonlabored.  Cardiovascular:  no murmur appreciated.  LE edema trace  Gastrointestinal/Abdomen:  soft, nontender, no rebound, guarding or other peritoneal signs.  Musculoskeletal:  extremities warm, dry, noncyanotic; no acitve synovitis.  Neuro.  Gross motor tested, nonfocal, sensory intact  Psych oriented, affect calm     Data     Recent Labs   Lab 05/23/22  0840   WBC 9.8   HGB 13.4            POTASSIUM 4.6   CHLORIDE 107   CO2 29   BUN 32*   CR 0.97   ANIONGAP 4   AAYUSH 8.7   *   ALBUMIN 3.3*   PROTTOTAL 6.8   BILITOTAL 0.5   ALKPHOS 69   ALT 23   AST 16       Recent Results (from the past 24 hour(s))   XR Chest 2 Views    Narrative    CHEST TWO VIEWS  5/23/2022 9:21 AM     HISTORY: Fall, dementia, recent pneumonia.    COMPARISON: Chest x-ray on 5/9/2022.      Impression    IMPRESSION: AP and lateral views of the chest were obtained.  Cardiomediastinal silhouette is within normal limits. Mild left  basilar pulmonary opacities, likely atelectasis. No significant  pleural effusion or pneumothorax. Multilevel degenerative changes of  the spine. No definite acute osseous pathology. If clinical suspicion  of rib fractures, remains high, rib series is more sensitive for  detection of subtle rib fractures.    ANNETTE COATS MD         SYSTEM ID:  [id number]   CT Head w/o Contrast   Result Value    Radiologist flags Acute intracranial hemorrhage (AA)    Narrative    CT SCAN OF THE HEAD WITHOUT  CONTRAST   5/23/2022 9:51 AM     HISTORY: Fall, forehead injury, dementia.    TECHNIQUE:  Axial images of the head and coronal reformations without  IV contrast material. Radiation dose for this scan was reduced using  automated exposure control, adjustment of the mA and/or kV according  to patient size, or iterative reconstruction technique.    COMPARISON: CT head dated 5/9/2022.    FINDINGS: There appears to be a thin linear area of extra-axial  hyperdensity along the superior aspect of the right tentorial leaflet  measuring up to 2-3 mm in thickness (series 5 image 45) which is new  compared to previous head CTs, concerning for a thin recent subdural  hematoma. This is probably acute given the patient's history of recent  head trauma. There is no significant associated mass effect. No  midline shift/herniation. No other findings concerning for recent  intracranial hemorrhage.    Unchanged moderate-sized chronic infarct in the left middle cerebral  artery territory primarily involving the left frontal lobe and  anterior left insula/external capsule/basal ganglia region. No  definite CT findings of acute infarct. The ventricles are stable in  size and configuration without definite evidence for hydrocephalus.  There is mild to moderate generalized brain parenchymal volume loss.  Mild patchy nonspecific hypodensity in the cerebral white matter,  presumably due to chronic small vessel ischemic changes. Scattered  intracranial atherosclerotic calcifications.     There is a small to medium-sized left anterior frontal scalp hematoma  without underlying calvarial fracture. Bilateral lens implants.  Minimal cortical irregularity of the nasal arch is suggested, although  this area is incompletely evaluated. There is mild irregularity of the  overlying skin/superficial soft tissues, which may correspond to a  laceration/superficial soft tissue injury. Please correlate clinically  for possible point tenderness over the nasal  arch to exclude the  possibility of recent nasal arch fracture. The paranasal sinuses are  free of significant disease. A small amount of fluid/membrane  thickening is again noted in the right mastoid tip. Otherwise, the  mastoid air cells and middle ear cavities appear grossly clear.  Hyperostosis frontalis interna, as before.      Impression    IMPRESSION:  1. Findings concerning for a thin acute right tentorial leaflet  subdural hematoma, as described. No significant associated mass  effect/herniation. No other findings concerning for recent  intracranial hemorrhage.  2. 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  3. Brain atrophy and presumed chronic small vessel ischemic changes,  as described.  4. Anterior frontal scalp hematoma without underlying calvarial  fracture.  5. Soft tissue irregularity overlying the nasal arch, concerning for  superficial soft tissue laceration. Minimal apparent cortical  irregularity of the partially visualized nasal arch is  age-indeterminate and incompletely evaluated. Correlate clinically  with point tenderness to assess for the possibility of recent  nondisplaced nasal arch fracture.    [Critical Result: Acute intracranial hemorrhage]    Finding was identified on 5/23/2022 9:53 AM.     ANA MARIA DAWN was contacted by Dr. Oliver on 5/23/2022 10:12  AM and verbalized understanding of the critical result.     SHRUTHI OLIVER MD         SYSTEM ID:  [id number]

## 2022-05-24 NOTE — PROGRESS NOTES
05/24/22 1400   Quick Adds   Quick Adds Certification   Type of Visit Initial PT Evaluation       Present no   Living Environment   People in Home facility resident   Current Living Arrangements residential facility   Home Accessibility no concerns   Transportation Anticipated family or friend will provide   Living Environment Comments Pt lives in [facility]. Pt has no concerns regarding stairs. Pt has 24/7 assist from nursing staff.   Self-Care   Usual Activity Tolerance moderate   Current Activity Tolerance fair   Regular Exercise No   Equipment Currently Used at Home shower chair;grab bar, toilet;grab bar, tub/shower;walker, rolling;hospital bed;raised toilet seat   Fall history within last six months yes   Number of times patient has fallen within last six months 4   Activity/Exercise/Self-Care Comment Pt's son answering questions. Pt receives assist with bathing, cooking, and cleaning. Pt is very IND per pt's son and likes to dress herself. Pt reports ambulating at baseline with a FWW. Pt's son reports pt being able to ambulate ~150' w/ FWW before needing a rest break.   General Information   Onset of Illness/Injury or Date of Surgery 05/24/22   Referring Physician No Rossi MD   Patient/Family Therapy Goals Statement (PT) Son states goal is to return to LTC with increase in therapy services   Pertinent History of Current Problem (include personal factors and/or comorbidities that impact the POC) Per Chart: Regla Benavidez is a 89 year old female with history of stroke with residual expressive aphasia, recurrent UTI presents after unwitnessed fall in her LTC.   Existing Precautions/Restrictions fall   Weight-Bearing Status - LLE full weight-bearing   Weight-Bearing Status - RLE full weight-bearing   Cognition   Affect/Mental Status (Cognition) unable/difficult to assess   Orientation Status (Cognition) oriented to;person   Cognitive Status Comments Son  reports pt has expressive aphasia from earlier stroke in 2013   Pain Assessment   Patient Currently in Pain No   Integumentary/Edema   Integumentary/Edema Comments Scattered bruising on face and BUEs   Posture    Posture Forward head position;Protracted shoulders   Range of Motion (ROM)   Range of Motion ROM is WFL   Strength (Manual Muscle Testing)   Strength Comments BLE Hip Flexion: >3/5   Bed Mobility   Comment, (Bed Mobility) Did not assess   Transfers   Comment, (Transfers) Sit>stand w/ FWW and min A x 1   Gait/Stairs (Locomotion)   [place] Level (Gait) contact guard   Assistive Device (Gait) walker, front-wheeled   Distance in Feet (Required for LE Total Joints) 100'  (10' eval)   Comment, (Gait/Stairs) Pt ambulated with FWW and CGA ~10' for eval   Balance   Balance Comments Pt able to sit at edge of recliner unsupported without LOB. Pt presents with adequate static balance but impaired dynamic balance. Pt able to ambulate with FWW but no ocert LOB.   Sensory Examination   Sensory Perception patient reports no sensory changes   Clinical Impression   Criteria for Skilled Therapeutic Intervention Yes, treatment indicated   PT Diagnosis (PT) Impaired gait   Influenced by the following impairments Decreased activity tolerance; decreased balance; decreased strength   Functional limitations due to impairments Impaired functional mobility   Clinical Presentation (PT Evaluation Complexity) Stable/Uncomplicated   Clinical Presentation Rationale Clinical Judgement   Clinical Decision Making (Complexity) low complexity   Planned Therapy Interventions (PT) balance training;bed mobility training;gait training;home exercise program;patient/family education;strengthening;transfer training;progressive activity/exercise   Risk & Benefits of therapy have been explained evaluation/treatment results reviewed;care plan/treatment goals reviewed;risks/benefits reviewed;current/potential barriers reviewed;participants voiced  agreement with care plan;participants included;son   PT Discharge Planning   PT Discharge Recommendation (DC Rec) home with home care physical therapy;home with assist   PT Rationale for DC Rec Pt is below baseline. Pt is Mod I at baseline with FWW at her LTC. Pt currently needing assist with all functional mobility. Pt currently presenting with deficits in activity tolerance, balance and strength. However, anticipate pt will be able to dsicharge back to LTC with HHPT to address deficits and improve IND with safety and functional mobility. Pt has nursing staff at facility that provides 24/7 assist as needed. Pt does not drive and uses a FWW at baseline and would take a taxing effort to leave her home.   PT Brief overview of current status Sit>stand w/ FWW and min A x 1, gait w/ FWW and CGA   Therapy Certification   Start of care date 05/24/22   Certification date from 05/24/22   Certification date to 05/29/22   Medical Diagnosis Unwitnessed Fall   Total Evaluation Time   Total Evaluation Time (Minutes) 10   Physical Therapy Goals   PT Frequency 3x/week   PT Predicted Duration/Target Date for Goal Attainment 05/29/22   PT Goals Transfers;Bed Mobility;Gait   PT: Bed Mobility Supervision/stand-by assist;Supine to/from sit   PT: Transfers Supervision/stand-by assist;Sit to/from stand;Assistive device   PT: Gait Supervision/stand-by assist;Assistive device;100 feet

## 2022-05-24 NOTE — PLAN OF CARE
Reason for Admission: SDH    Cognitive/Mentation: A/Ox 2-3  Neuros/CMS: generalized weakness and aphasia  VS: stable.   Tele: Sinus rhythm.  GI: BS present, passing flatus. Continent.  : Continent purwick in place  Pulmonary: LS clear.  Pain: denies pain.     Drains/Lines: PIV patent and intact  Skin: bruising on face gash on bridge of nose  Activity: Assist x two with lift.  Diet: reg with thin liquids. Takes pills whole.     Therapies recs: pending  Discharge: pending    Aggression Stoplight Tool: green

## 2022-05-24 NOTE — PLAN OF CARE
[facility]      OUTPATIENT OCCUPATIONAL THERAPY  EVALUATION  PLAN OF TREATMENT FOR OUTPATIENT REHABILITATION  (COMPLETE FOR INITIAL CLAIMS ONLY)  Patient's Last Name, First Name, M.I.  YOB: 1926  Regla Benavidez  BOLIVAR                          Provider's Name  [facility] Medical Record No.  [id number]                               Onset Date:  05/23/22   Start of Care Date:  05/24/22     Type:     ___PT   _X_OT   ___SLP Medical Diagnosis:  Falls                        OT Diagnosis:  Decline in ADL independence and safety   Visits from SOC:  1   _________________________________________________________________________________  Plan of Treatment/Functional Goals    Planned Interventions: ADL retraining, progressive activity/exercise   Goals: See Occupational Therapy Goals on Care Plan in [employer] electronic health record.    Therapy Frequency: 5 times/wk  Predicted Duration of Therapy Intervention: 05/26/22  _________________________________________________________________________________    I CERTIFY THE NEED FOR THESE SERVICES FURNISHED UNDER        THIS PLAN OF TREATMENT AND WHILE UNDER MY CARE     (Physician co-signature of this document indicates review and certification of the therapy plan).              Certification date from: 05/24/22, Certification date to: 05/24/22    Referring Physician: No Rossi MD            Initial Assessment        See Occupational Therapy evaluation dated 05/24/22 in Epic electronic health record.

## 2022-05-24 NOTE — PROGRESS NOTES
[facility]    Hospitalist Progress Note          Date of Admission:  5/23/2022    Assessment & Plan   Regla Benavidez is a 89 year old female with history of stroke with residual expressive aphasia, recurrent UTI presents after unwitnessed fall in her LTC.      Mechanical fall, unwitnessed  SDH  Hematoma forehead, laceration/abrasion bridge of nose  Patient lives in [facility] and reports unwitnessed fall ambulating with wheeled walker to toilet.  She cannot recall details of the fall however on presentation head trauma with hematoma to forehead and abrasion on bridge of nose.  Patient does not report any prodrome symptoms.  Daughter is present and states appears at mental status baseline with known cognitive impairment and expressive aphasia since her stroke.  No new neurologic focality.  Patient is on no ongoing narcotics or sedating medications.  In ED head CT with 2 mm SDH right side, no mass-effect or herniation.  No other acute changes.  Neurosurgery in ED consulted, recommend repeat noncontrast head CT at 8 hours, SBP less than 150, HOB greater than 30 degrees.  UA WBC 10, nitrites and leukoesterase negative.  -[place] observation  -Repeat head CT 8 hours per Neurosurgery; No change, ie no increase SDH, no new findings  -Neurosurgery consult.  SBP less than 150, HOB greater than 30', repeat head CT 8 hours as above.  -Hold all AC including PTA aspirin.  -Local care laceration/abrasion bridge of nose, healing slowly, no signs of infection.  -Neurochecks.  -Avoid narcotics, sedating medications as able.  -Monitor UC/S as potentially contributory to fall: Pending..  History recent hospitalization May 9/12 for UTI/pneumonia  -OT/PT: Recommending TCU.  Discussed with JUAN patient is a resident of care suites [facility],  family prefers.  [facility] TCU.  -Needs neurosurgery follow-up on discharge with recommendations when to start PTA aspirin  versus discussion risk-benefit of aspirin given history of falls.    History CVA, residual expressive aphasia.  Hypertension  Appears PTA aspirin may be related to history of stroke.  Given SDH and forehead hematoma, on hold for now.  Will need discussion either with  PCP versus follow-up neurology appointment regarding risk/benefit in 89-year-old with history of falls regarding restart of ASA for stroke.  -Continue PTA furosemide, spironolactone, BMP WNL, fluid status appears compensated.  No hypotension.    -Type 2 diabetes.  Hold PTA glipizide, metformin Januvia while in hospital  -Monitor glucoses, add SSI, consistent carb diet.  Assess insulin use, may need maintenance insulin.    PAD  Chronic pain including back  Continue PTA gabapentin, Mirapex, scheduled and as needed acetaminophen, Voltaren topical      DVT Prophylaxis: Pneumatic Compression Devices and Ambulate every shift  Code Status: DNR / DNI  Expected Discharge 1 days pending Neurosurgery plan established with follow-up and TCU discharge    No Rossi MD  Text Page (7am - 6pm, M-F)    Total unit/floor time 35 minutes:  time consisted of the following, examination of patient, review of records including labs, imaging results, medications, interdisciplinary notes and completing documentation; > 50% Counseling/discussion with Patient and Son regarding current condition including SDH, fall risk, discharge planning and Coordination of Care time with Nursing  and Specialists, Neurosurgery regarding SDH care plan, management and surveillance.      Interval History   Feels well, reports ongoing lower leg pain, no headache or face pain.  Nurses report no neurologic focality.  No wound issues.  Eating.    SH: No tobacco.  Lives at [facility] suites    ROS: Complete ROS negative except as above.     -Data reviewed today: I reviewed all new labs and imaging results over the last 24 hours    Physical Exam   Temp: 97.8  F (36.6   C) Temp src: Oral BP: (!) 153/71 Pulse: 68   Resp: 16 SpO2: 92 % O2 Device: None (Room air)    Vitals:    05/23/22 0826   Weight: 81.2 kg (179 lb)     Vital Signs with Ranges  Temp:  [97.8  F (36.6  C)-98.1  F (36.7  C)] 97.8  F (36.6  C)  Pulse:  [65-78] 68  Resp:  [16] 16  BP: (128-178)/(67-78) 153/71  SpO2:  [92 %-95 %] 92 %  I/O last 3 completed shifts:  In: -   Out: 850 [Urine:850]    General/Constitutional:   NAD, more alert, calm, cooperative, expressive aphasia  HEENT/Head Exam: Hematoma forehead, decreasing/resolving, open abrasion about 2 cm bridge of nose, no signs of infection, no drainage  Eyes:  PERRL, no conjunctivits  Mouth/Oral Pharynx:  Buccal mucosa WNL  Chest/Respiratory:  Air exchange bilateral lung fields; no rales or wheeze. Respiration nonlabored room air.  Cardiovascular:  no murmur appreciated.  LE edema trace  Gastrointestinal/Abdomen:  soft, nontender, no rebound, guarding or other peritoneal signs.  Musculoskeletal:  extremities warm, dry, noncyanotic; no acitve synovitis.  Neuro.  Gross motor tested, nonfocal, sensory intact  Psych oriented, affect calm     Medications       acetaminophen  650 mg Oral TID     diclofenac  2 g Topical Once     diclofenac  2 g Topical TID     famotidine  20 mg Oral Daily     furosemide  20 mg Oral BID     gabapentin  100 mg Oral BID     insulin aspart  1-7 Units Subcutaneous TID AC     insulin aspart  1-5 Units Subcutaneous At Bedtime     Lidocaine  1 patch Transdermal Q24H     lidocaine   Transdermal Q8H     [START ON 5/25/2022] polyethylene glycol  8.5 g Oral Q72H     pramipexole  0.375 mg Oral QPM     spironolactone  12.5 mg Oral Every Other Day       Data   Recent Labs   Lab 05/24/22  1327 05/24/22  1155 05/24/22  0742 05/23/22  1703 05/23/22  0840   WBC 9.7  --   --   --  9.8   HGB 13.0  --   --   --  13.4   MCV 97  --   --   --  100     --   --   --  215     --   --   --  140   POTASSIUM 4.5  --   --   --  4.6   CHLORIDE 103  --   --    --  107   CO2 26  --   --   --  29   BUN 27  --   --   --  32*   CR 0.87  --   --   --  0.97   ANIONGAP 5  --   --   --  4   AAYUSH 8.9  --   --   --  8.7   * 128* 164*   < > 161*   ALBUMIN  --   --   --   --  3.3*   PROTTOTAL  --   --   --   --  6.8   BILITOTAL  --   --   --   --  0.5   ALKPHOS  --   --   --   --  69   ALT  --   --   --   --  23   AST  --   --   --   --  16    < > = values in this interval not displayed.       Recent Results (from the past 24 hour(s))   CT Head w/o Contrast    Narrative    EXAM: CT HEAD W/O CONTRAST  LOCATION: [facility]  DATE/TIME: 5/23/2022 4:51 PM    INDICATION: Follow-up subdural hematoma  COMPARISON: CT head 05/09/2022  TECHNIQUE: Routine CT Head without IV contrast. Multiplanar reformats. Dose reduction techniques were used.    FINDINGS:  INTRACRANIAL CONTENTS: Trace subdural hematoma along the right tentorial leaflet measuring 2 mm in maximum thickness similar to the previous exam. No new hemorrhage or enlarging extra-axial collection. No significant associated mass effect. Chronic   encephalomalacia and gliosis of the anterior left MCA territory similar to the prior exam. Mild presumed chronic small vessel ischemic changes. Mild generalized volume loss. No hydrocephalus.     VISUALIZED ORBITS/SINUSES/MASTOIDS: Prior bilateral cataract surgery. Visualized portions of the orbits are otherwise unremarkable. No paranasal sinus mucosal disease. No middle ear or mastoid effusion.    BONES/SOFT TISSUES: Left frontal scalp swelling/hematoma. No skull fracture.      Impression    IMPRESSION:  1.  Thin right tentorial leaflet subdural hematoma without significant change.  2.  Left frontal scalp contusion/hematoma without associated fracture.  3.  Background of brain atrophy and chronic ischemic changes similar to the prior exam.

## 2022-05-24 NOTE — PLAN OF CARE
[facility]      OUTPATIENT PHYSICAL THERAPY EVALUATION  PLAN OF TREATMENT FOR OUTPATIENT REHABILITATION  (COMPLETE FOR INITIAL CLAIMS ONLY)  Patient's Last Name, First Name, M.I.  YOB: 1926  Regla Benavidez  BOLIVAR                        Provider's Name  [facility] Medical Record No.  [id number]                               Onset Date:  05/24/22   Start of Care Date:  05/24/22      Type:     _X_PT   ___OT   ___SLP Medical Diagnosis:  Unwitnessed Fall                        PT Diagnosis:  Impaired gait   Visits from SOC:  1   _________________________________________________________________________________  Plan of Treatment/Functional Goals    Planned Interventions: balance training, bed mobility training, gait training, home exercise program, patient/family education, strengthening, transfer training, progressive activity/exercise     Goals: See Physical Therapy Goals on Care Plan in [employer] electronic health record.    Therapy Frequency: 3x/week  Predicted Duration of Therapy Intervention: 05/29/22  _________________________________________________________________________________    I CERTIFY THE NEED FOR THESE SERVICES FURNISHED UNDER        THIS PLAN OF TREATMENT AND WHILE UNDER MY CARE     (Physician co-signature of this document indicates review and certification of the therapy plan).              Certification date from: 05/24/22, Certification date to: 05/29/22    Referring Physician: No Rossi MD            Initial Assessment        See Physical Therapy evaluation dated 05/24/22 in Epic electronic health record.

## 2022-05-24 NOTE — PLAN OF CARE
BP (!) 153/71 (BP Location: Right arm)   Pulse 68   Temp 97.8  F (36.6  C) (Oral)   Resp 16   Wt 81.2 kg (179 lb)   LMP  (LMP Unknown)   SpO2 92%   BMI 36.15 kg/m      Patient is currently here with a traumatic subdural hemorrhage related to a fall. Patient is alert and oriented to person and place but is disoriented to both time and situation. Patient is neurologically intact aside from having aphasia, dysarthria, generalized weakness and forgetfulness. VSS on RA with intermittent hypertension. Telemetry readings reveal a normal sinus rhythm. Patient is currently on a regular diet with thin liquids. Takes pills whole or crushed in applesauce. Patient transfers and ambulates with an assist of two plus the utilization of a lift device. Patient is incontinent of both bowel and bladder, currently has an external catheter in position that was replaced and is displaying adequate urinary output. Patient's skin is intact and in good condition aside from multiple abrasions and bruises on the patient's face related to her recent fall. Denies pain. Patient is currently scoring yellow on the Aggression Stop Light Tool related to confusion. Plan to continue to assess for any potential neurological changes. Discharge plans pending care coordination huddle.

## 2022-05-24 NOTE — CONSULTS
Care Management Initial Consult    General Information  Assessment completed with:  ,    Type of CM/SW Visit: Initial Assessment    Primary Care Provider verified and updated as needed:     Readmission within the last 30 days:        Reason for Consult: discharge planning  Advance Care Planning:      Pt has [facility] naming sonaDvid as HCA.    Communication Assessment  Patient's communication style: spoken language (English or Bilingual)      Cognitive  Cognitive/Neuro/Behavioral: .WDL except  Level of Consciousness: confused  Arousal Level: opens eyes spontaneously  Orientation: disoriented to, time, situation  Mood/Behavior: calm, cooperative  Best Language: 1 - Mild to moderate  Speech: word-finding difficulty    Living Environment:   People in home:       Current living Arrangements:        Able to return to prior arrangements:         Family/Social Support:  Care provided by:    Provides care for:    Marital Status:   Children          Description of Support System: Supportive, Involved         Current Resources:   Patient receiving home care services:  Pt resides in SNF   Community Resources:  Pt has Kasey Zaidi ([phone]) Eloisa updated via voice mail of potential discharge home later today.    Equipment currently used at home:    Supplies currently used at home:      Employment/Financial:  Employment Status:          Financial Concerns:  [employer]           Lifestyle & Psychosocial Needs:  Social Determinants of Health     Tobacco Use: Low Risk      Smoking Tobacco Use: Never Smoker     Smokeless Tobacco Use: Never Used   Alcohol Use: Not on file   Financial Resource Strain: Not on file   Food Insecurity: No Food Insecurity     Worried About Running Out of Food in the Last Year: Never true     Ran Out of Food in the Last Year: Never true   Transportation Needs: No Transportation Needs     Lack of Transportation (Medical): No     Lack of Transportation (Non-Medical): No   Physical  Activity: Not on file   Stress: Not on file   Social Connections: Not on file   Intimate Partner Violence: Not on file   Depression: Not at risk     PHQ-2 Score: 0   Housing Stability: Not on file       Functional Status:  Prior to admission patient needed assistance: Pt is from LTC at [facility] and anticipated to return there at discharge.  Pt has been cleared through neurosurgery to discharge. Hospitalist indicates that pt may be clear to discharge back to [facility] later today pending final work up.    Mental Health Status:        Chemical Dependency Status:         Values/Beliefs:  Spiritual, Cultural Beliefs, Judaism Practices, Values that affect care:                 Additional Information:      SW received message from pt's daughter stating that if pt return home today, she wanted to make sure that pt's follow up therapy orders are included separately in discharge orders.  SW also received call from Delores at [facility] and is anticipating that pt likely to return today. ( [phone]) They are able to accept pt back when medically cleared and request that orders be sent through DOD.  JUAN met with pt and with David regarding potential discharge today. David requesting [facility] w/c ride at discharge.  Aniyah contacted [facility] and spoke with Tu to arrange a ride for 17: 15. This in the next available appointment. Pt has therapy evaluations this afternoon pending.  If pt is medically cleared for discharge; she has transportation. at 17:15. Via E w/c     JUAN cancelled pt's ride to LTC bed at [facility] as hospitalist clarified that pt need to be placed in the rehab unit.  JUAN contacted Delores in admissions at [facility] to update of change in discharge. Delores at [facility] states that pt was just moved from TCU to LTC having had recent therapy and exhausted previous therapy benefit.  Pt reached rehab plateau and was no longer participating in therapy sessions.  Delores in admissions  further states that they are able to accept pt  back to the same room with therapy if that is what is ordered without giving up the LTC bed and moving within the facility.  Hospitalist directed to SW note.  [facility] admissions requests that pt not be discharged today but delay until tomorrow if hospitalist medically clears pt for discharge.  Family updated of delay in discharge.    CLARICE Matos  [facility]  Care Transitions  [phone]

## 2022-05-24 NOTE — PROGRESS NOTES
[facility]    Neurosurgery  Daily Note    Assessment & Plan   89F, presented with thin acute right tentorial leaflet subdural hematoma. Stable on repeat imaging. She endorses headache, improving. Bilateral periorbital ecchymosis, abrasion to forehead.     Plan:  -Advance activity as tolerated  -follow-up with NSG in one month, new head CT prior.       Enrique Carver PA-C    Interval History   Stable.  Doing well.  Improving slowly.  Pain is reasonably controlled.  No fevers.     Physical Exam   Temp: 97.8  F (36.6  C) Temp src: Oral BP: (!) 153/71 Pulse: 68   Resp: 16 SpO2: 92 % O2 Device: None (Room air)    Vitals:    05/23/22 0826   Weight: 81.2 kg (179 lb)     Vital Signs with Ranges  Temp:  [97.5  F (36.4  C)-98.1  F (36.7  C)] 97.8  F (36.6  C)  Pulse:  [65-78] 68  Resp:  [16] 16  BP: (128-178)/(67-78) 153/71  SpO2:  [92 %-95 %] 92 %  I/O last 3 completed shifts:  In: -   Out: 850 [Urine:850]    Alert and oriented.  Moves all extremities equally.  Reflexes symmetrical.         Medications        acetaminophen  650 mg Oral TID     diclofenac  2 g Topical Once     diclofenac  2 g Topical TID     famotidine  20 mg Oral Daily     furosemide  20 mg Oral BID     gabapentin  100 mg Oral BID     insulin aspart  1-7 Units Subcutaneous TID AC     insulin aspart  1-5 Units Subcutaneous At Bedtime     Lidocaine  1 patch Transdermal Q24H     lidocaine   Transdermal Q8H     [START ON 5/25/2022] polyethylene glycol  8.5 g Oral Q72H     pramipexole  0.375 mg Oral QPM     spironolactone  12.5 mg Oral Every Other Day           Enrique Carver PA-C  [facility] Neurosurgery  [address]  [address] [place] [id number]    Tel [phone]  Pager [phone]

## 2022-05-24 NOTE — PROGRESS NOTES
05/24/22 1601   Quick Adds   Type of Visit Initial Occupational Therapy Evaluation   Living Environment   People in Home facility resident   Current Living Arrangements residential facility   Home Accessibility no concerns   Transportation Anticipated family or friend will provide   Living Environment Comments Pt lives in an assisted living, has 24/7 assist from staff but needs to ask for assist. Pt has raised toilet seats.   Self-Care   Usual Activity Tolerance moderate   Current Activity Tolerance fair   Regular Exercise No   Equipment Currently Used at Home shower chair;grab bar, toilet;grab bar, tub/shower;walker, rolling;hospital bed;raised toilet seat   Fall history within last six months yes   Number of times patient has fallen within last six months 4   Activity/Exercise/Self-Care Comment Pt's son answering questions. Pt receives assist with bathing, cooking, and cleaning. Pt is very IND per pt's son and likes to dress herself. Pt reports ambulating at baseline with a FWW. Pt's son reports pt being able to ambulate ~150' w/ FWW before needing a rest break.   Instrumental Activities of Daily Living (IADL)   IADL Comments IADLs completed by staff   General Information   Onset of Illness/Injury or Date of Surgery 05/23/22   Referring Physician No Rossi MD   Patient/Family Therapy Goal Statement (OT) Patient family would like pt to return to LTC facility with therapy   Additional Occupational Profile Info/Pertinent History of Current Problem 89 year old female with history of stroke with residual expressive aphasia, recurrent UTI presents after unwitnessed fall in her LTC.   Existing Precautions/Restrictions fall   Limitations/Impairments aphasia;hearing;insensate body part   Cognitive Status Examination   Orientation Status person   Follows Commands follows two-step commands;over 90% accuracy   Cognitive Status Comments Cognition challenging to assess as pt has baseline aphasia and hearing  impairment. Pt able to follow commands well during session.   Visual Perception   Visual Impairment/Limitations WFL   Sensory   Sensory Comments Pt reports numbness in LLE   Pain Assessment   Patient Currently in Pain No   Integumentary/Edema   Integumentary/Edema no deficits were identifed   Posture   Posture forward head position;protracted shoulders   Range of Motion Comprehensive   Comment, General Range of Motion UE and LE appear WFL   Strength Comprehensive (MMT)   Comment, General Manual Muscle Testing (MMT) Assessment Global weakness and poor activity tolerance noted   Bed Mobility   Comment (Bed Mobility) Min assist for LE   Transfers   Transfer Comments CGA with walker   Balance   Balance Comments Mild unsteadiness   Activities of Daily Living   BADL Assessment/Intervention bathing;lower body dressing;toileting   Bathing Assessment/Intervention   Comment, (Bathing) Dependent at baseline   Lower Body Dressing Assessment/Training   Comment, (Lower Body Dressing) Max assist   Toileting   Comment, (Toileting) Min assist   Clinical Impression   Criteria for Skilled Therapeutic Interventions Met (OT) Yes, treatment indicated   OT Diagnosis Decline in ADL independence and safety   Influenced by the following impairments Weakness, falls, poor activity tolerance   OT Problem List-Impairments impacting ADL problems related to;activity tolerance impaired;balance   Assessment of Occupational Performance 3-5 Performance Deficits   Identified Performance Deficits Impaired dressing and toileting independnece   Planned Therapy Interventions (OT) ADL retraining;progressive activity/exercise   Clinical Decision Making Complexity (OT) low complexity   Anticipated Equipment Needs Upon Discharge (OT)   (Pt owns all necessary AE)   Risk & Benefits of therapy have been explained evaluation/treatment results reviewed;care plan/treatment goals reviewed;risks/benefits reviewed;current/potential barriers reviewed;participants voiced  agreement with care plan;participants included;patient;son   OT Discharge Planning   OT Discharge Recommendation (DC Rec) home with assist;home with home care occupational therapy   OT Rationale for DC Rec Per son, pt able to have increased assistance with dressing and toileting at LTC facility. Pt would benefit from continued OT at LTC facility to progress ADL independence and safety   OT Brief overview of current status Frequent falls   Therapy Certification   Start of Care Date 05/24/22   Certification date from 05/24/22   Certification date to 05/24/22   Medical Diagnosis Falls   Total Evaluation Time (Minutes)   Total Evaluation Time (Minutes) 10   OT Goals   Therapy Frequency (OT) 5 times/wk   OT Predicted Duration/Target Date for Goal Attainment 05/26/22   OT Goals Hygiene/Grooming;Upper Body Dressing;Lower Body Dressing;Toilet Transfer/Toileting;OT Goal 1   OT: Hygiene/Grooming supervision/stand-by assist   OT: Upper Body Dressing Supervision/stand-by assist   OT: Lower Body Dressing Modified independent;using adaptive equipment   OT: Toilet Transfer/Toileting Modified independent;toilet transfer;cleaning and garment management;using adaptive equipment   OT: Goal 1 Patient will tolerate 10+ minutes of active standing without LOB to progres activity tolerance needed for ADL independence and safety

## 2022-05-25 NOTE — PLAN OF CARE
Goal Outcome Evaluation:  A&O ex to time. VSS on room air. Tele NSR. CMS intact. Neuros intact, generalized weakness and aphasia. Very [place], hearing aid in place to right ear. Lungs clear. BS+, BM-, flatus+. Skin with bruising and scabs on face and erythema on coccyx. Tolerating mod CHO diet. Denies N/V. BGs 169 and 239. Voiding incontinently with purewick. Denies pain. Up w/2, gb and walker. Turn q2.

## 2022-05-25 NOTE — PROGRESS NOTES
Care Management Discharge Note    Discharge Date: 05/25/2022       Discharge Disposition: Skilled Nursing Facility, Long Term Care    Discharge Services:  Pt is able to receive TCU services in current room at [facility].    Discharge DME:      Discharge Transportation: family has requested [facility] transportation.  [facility] transportation arranged for 10:30am today  Hospitalist needing to consult with family and transportation cancelled for 10:30am today and rescheduled for 14:00 in anticipation that pt will be able to discharge today.  Private pay costs discussed: transportation costs    PAS Confirmation Code:  N/A   Patient/family educated on Medicare website which has current facility and service quality ratings:      Education Provided on the Discharge Plan:  Yes  SW met with pt and son regarding return to [facility] today.  SW paged hospitalist to request discharge orders. Hospitalist declined and stated that she was going to call [facility] admissions and speak with son regarding the return to [facility].  SW spoke with Delores in admissions and with son regarding the set up at [facility] is that pt will be receiving the same level of care as TCU  The TCU beds are inter dispersed on the same floor of facility as the LTC beds.. [facility] does not have a separate TCU unit.  Delores in admissions is aware of the recommendation for TCU and therapy needs.  Both PT and OT are being ordered at discharge and Delores will be in touch with pt's [employer] representative regarding therapy benefits.    Persons Notified of Discharge Plans: Hospitalist, ZOIE, RN,HUC,BB  Bedside RN paged hospitalist for discharge orders. Hospitalist returned call with questions regarding thearpy orders and  Separate orders for PT to evaluate and treat and OT to evaluate and treat were requested as well as the SNF order set.  Hospitalist advised NOT to enter out patient therapy or home care orders but inpatient SNF orders.  Pt's daughter has contacted care team regarding  therapy orders as she is concerned that they are ordered correctly.    Patient/Family in Agreement with the Plan: yes     Handoff Referral Completed:     Additional Information:  Pt is anticipated to discharge back to [facility] today via [facility] w/c at 14:00       CLARICE Matos  [facility]  Care Transitions  [phone]

## 2022-05-25 NOTE — DISCHARGE SUMMARY
[facility]    Discharge Summary  Hospitalist    Date of Admission:  5/23/2022  Date of Discharge:  5/25/2022  2:19 PM  Discharging Provider: No Rossi MD  Date of Service (when I saw the patient): 05/25/22      History of Present Illness   Regla Benavidez is a 89 year old female with history of stroke with residual expressive aphasia, recurrent UTI presents after unwitnessed fall in her LTC.    Hospital Course   Regla Benavidez was admitted on 5/23/2022.  The following problems were addressed during her hospitalization:    Mechanical fall, unwitnessed  SDH  Hematoma forehead, laceration/abrasion bridge of nose  Patient lives in [facility] LT and reports unwitnessed fall ambulating with wheeled walker to toilet.  She cannot recall details of the fall however on presentation head trauma with hematoma to forehead and abrasion on bridge of nose.  Patient does not report any prodrome symptoms.  Daughter is present and states appears at mental status baseline with known cognitive impairment and expressive aphasia since her stroke.  No new neurologic focality.  Patient is on no ongoing narcotics or sedating medications.  In ED head CT with 2 mm SDH right side, no mass-effect or herniation.  No other acute changes.  Neurosurgery in ED consulted, recommend repeat noncontrast head CT at 8 hours, SBP less than 150, HOB greater than 30 degrees.  UA WBC 10, nitrites and leukoesterase negative.  -Repeat head CT 8 hours per Neurosurgery; No change, ie no increase SDH, no new findings  -Neurosurgery consult.  SBP less than 150, HOB greater than 30', repeat head CT 8 hours as above.  -Hold all AC including PTA aspirin.  -Local care laceration/abrasion bridge of nose, healing slowly, no signs of infection.  -Neurochecks.  -Avoid narcotics, sedating medications as able.  -Monitor UC/S as potentially contributory to fall:  History recent hospitalization May 9/12 for UTI/pneumonia.   On  day of discharge UC returned: + 10-50KK Enterococcus faecium and 50-100K Staph epidermidis, given presentation patient was treated, given known allergies treated with Levaquin 500mg/d x 5 days .  -OT/PT: Although TCU was recommended on discharge to allow medical supervision of known SDH, significant head trauma and history of falls for skilled nursing and Provider monitoring including BP, serial neurologic assessments, hemoglobin, discharge is family driven who wished patient to return to PTA LTC, [facility] with PT and OT orders to be performed at LTC per Family wishes.  Neurosurgery follow-up, see below .  Follow-up discussion with PCP at LTC re: risk-benefit of restarted PTA aspirin given history of falls.       History CVA, residual expressive aphasia.  Hypertension  Appears PTA aspirin may be related to history of stroke.  Given SDH and forehead hematoma, on hold for now.  Will need discussion either with  PCP versus follow-up neuroSurgery appointment regarding risk/benefit in 89-year-old with history of falls regarding restart of ASA for stroke.  -Continue PTA furosemide, spironolactone, BMP WNL, fluid status appears compensated.  No hypotension.     -Type 2 diabetes.  Hold PTA glipizide, metformin Januvia while in hospital  -Monitor glucoses, add SSI, consistent carb diet.  Glucoses WNL on this regimen  -Restart PTA medications on discharge..     PAD  Chronic pain including back  Continue PTA gabapentin, Mirapex, scheduled and as needed acetaminophen, Voltaren topical      Pending Results   These results will be followed up by Hospitalist Service  [Ordering Provider]   Unresulted Labs Ordered in the Past 30 Days of this Admission     Date and Time Order Name Status Description    5/23/2022 10:55 AM Urine Culture Preliminary           Code Status   DNR / DNI       Primary Care Physician   Physician No Ref-Primary    Physical Exam   Temp: 98  F (36.7  C) Temp src: Oral BP: 134/57 Pulse: 70   Resp: 16 SpO2: 92 %  O2 Device: None (Room air)    Vitals:    05/23/22 0826   Weight: 81.2 kg (179 lb)     Vital Signs with Ranges  Temp:  [97.6  F (36.4  C)-98  F (36.7  C)] 98  F (36.7  C)  Pulse:  [62-71] 70  Resp:  [16-18] 16  BP: (110-154)/(57-80) 134/57  SpO2:  [92 %-94 %] 92 %  I/O last 3 completed shifts:  In: 290 [P.O.:290]  Out: 2000 [Urine:2000]    General/Constitutional:   NAD, more alert, calm, cooperative, expressive aphasia  HEENT/Head Exam: Hematoma forehead, decreasing/resolving, open abrasion about 2 cm bridge of nose, no signs of infection, no drainage  Eyes:  PERRL, no conjunctivits  Mouth/Oral Pharynx:  Buccal mucosa WNL  Chest/Respiratory:  Air exchange bilateral lung fields; no rales or wheeze. Respiration nonlabored room air.  Cardiovascular:  no murmur appreciated.  LE edema trace  Gastrointestinal/Abdomen:  soft, nontender, no rebound, guarding or other peritoneal signs.  Musculoskeletal:  extremities warm, dry, noncyanotic; no acitve synovitis.  Neuro.  Gross motor tested, nonfocal, sensory intact  Psych oriented, affect calm     Discharge Disposition   Discharged to [facility] long-term care facility with OT/PT. Although TCU was recommended on discharge to allow medical supervision of known SDH, significant head trauma and history of falls for skilled nursing and Provider monitoring including BP, serial neurologic assessments, hemoglobin, discharge is family driven who wished patient to return to [facility] LTC, [facility] with PT and OT orders to be performed at LTC per Family wishes.  Condition at discharge: Fair    Consultations This Hospital Stay   CARE MANAGEMENT / SOCIAL WORK IP CONSULT  PHYSICAL THERAPY ADULT IP CONSULT  OCCUPATIONAL THERAPY ADULT IP CONSULT  NEUROSURGERY IP CONSULT  PHYSICAL THERAPY ADULT IP CONSULT  OCCUPATIONAL THERAPY ADULT IP CONSULT    Time Spent on this Encounter   I, No Rossi MD, personally saw the patient today and spent greater than 30 minutes discharging this  patient.    Discharge Orders      CT Head w/o contrast*     Follow-up and recommended labs and tests     4 week follow-up with Neurosurgery, new head CT prior.     [phone] for appointments.     General info for SNF    Length of Stay Estimate: Long Term Care  Condition at Discharge: Stable  Level of care:board and care  Rehabilitation Potential: Fair  Admission H&P remains valid and up-to-date: Yes  Recent Chemotherapy: N/A    Use Nursing Home Standing Orders: Yes     Mantoux instructions    Give two-step Mantoux (PPD) Per Facility Policy Yes     Follow Up and recommended labs and tests    Follow up with skilled nursing physician.  The following labs/tests are recommended:  BMP  Blood pressure follow-up;  discussion with Patient and Family risk/benefits of restarting Asprin giving history of falls, SDH and facial trauma.     Reason for your hospital stay    Presented after an unwitnessed fall sustaining subdural hematoma, facial trauma and hematoma     Wound care    Site:   bridge of nose  Instructions:  soap and water cleansing; daily surveillance     Additional Discharge Instructions    Per NeuroSurgery maintain SBP at 150 mmHg or less at all times. No Aspirin or anti-inflammatory products.   No lifting anything greater than 5-10 lbs., avoid putting her head below her heart, and avoid straining and sneezing with her mouth closed.     Activity - Up with nursing assistance    Assistance at all times  Activities per OT/PT recommendations  Fall Risk    No lifting anything greater than 5-10 lbs., avoid putting her head below her heart, and avoid straining and sneezing with her mouth closed.     Physical Therapy Adult Consult    Evaluate and treat as clinically indicated.    Reason:  Fall, SDH     Occupational Therapy Adult Consult    Evaluate and treat as clinically indicated.    Reason:  Fall, SDH     Fall precautions     Diet    Follow this diet upon discharge: Orders Placed This Encounter      Moderate Consistent  Carb (60 g CHO per Meal) Diet     Discharge Medications   Discharge Medication List as of 5/25/2022  1:32 PM      START taking these medications    Details   levofloxacin (LEVAQUIN) 250 MG tablet Take 1 tablet (250 mg) by mouth every 24 hours FIRST dose tomorrow 5/26/22, Disp-4 tablet, R-0, E-Prescribe         CONTINUE these medications which have NOT CHANGED    Details   !! acetaminophen (TYLENOL) 325 MG tablet Take 650 mg by mouth 3 times daily 0800/1200/1700, Historical      !! acetaminophen (TYLENOL) 500 MG tablet Take 500 mg by mouth 2 times daily as needed for mild pain, Historical      Cholecalciferol (VITAMIN D3 PO) Take 2,000 Units by mouth daily, Historical      diclofenac (VOLTAREN) 1 % topical gel Apply 2 g topically 3 times daily 0900, 1300, 2000, Historical      famotidine (PEPCID) 20 MG tablet Take 1 tablet (20 mg) by mouth 2 times daily, Disp-180 tablet, R-4, E-PrescribeThis replaces ranitidine.      furosemide (LASIX) 20 MG tablet Take 20 mg by mouth 2 times daily 0900/1300, Disp-60 tablet, R-12, Historical      gabapentin (NEURONTIN) 100 MG capsule Take 1 capsule (100 mg) by mouth 2 times daily, Disp-60 capsule, R-11, E-PrescribeDosage change      glipiZIDE (GLUCOTROL) 10 MG tablet Take 1 tablet (10 mg) by mouth 2 times daily (before meals), Disp-60 tablet, R-11, E-PrescribeDosage change      Lidocaine (LIDOCARE) 4 % Patch Place 1 patch onto the skin every 24 hours To prevent lidocaine toxicity, patient should be patch free for 12 hrs daily. Apply in AM to lower backHistorical      metFORMIN (GLUCOPHAGE) 500 MG tablet Take 500 mg by mouth daily , Disp-30 tablet, R-12, Historical      polyethylene glycol (MIRALAX/GLYCOLAX) Packet Take 8.5 g by mouth every 72 hours, Historical      pramipexole (MIRAPEX) 0.125 MG tablet Take 0.375 mg by mouth every evening 1700, Historical      simvastatin (ZOCOR) 5 MG tablet Take 1 tablet (5 mg) by mouth every evening, Disp-30 tablet, Transitional      sitagliptin  (JANUVIA) 50 MG tablet Take 1 tablet (50 mg) by mouth daily, Disp-30 tablet, R-11, E-Prescribe      spironolactone (ALDACTONE) 25 MG tablet Take 0.5 tablets (12.5 mg) by mouth every other day, Disp-15 tablet, R-11, E-Prescribe      blood glucose calibration (NO BRAND SPECIFIED) solution Use to calibrate blood glucose monitor as needed as directed. To accompany: Accu-Chek Safe-T pro plus, Disp-1 Bottle, R-3, Local Print      blood glucose monitoring (NO BRAND SPECIFIED) test strip Use to test blood sugar 1 times daily or as directed. To accompany: Accu-Chek safety pro plus, Disp-100 strip, R-6, Local Print      blood glucose monitoring (ONE TOUCH ULTRA 2) meter device kit Use to test blood sugar 1 times daily or as directed.Disp-1 kit,[id number] as covered by insurance      thin (NO BRAND SPECIFIED) lancets Use to test blood sugar 1 times daily or as directed. To accompany: Accu-Cheks safety pro plus, Disp-100 each, R-3, Local Print       !! - Potential duplicate medications found. Please discuss with provider.      STOP taking these medications       aspirin 325 MG tablet Comments:   Reason for Stopping:             Allergies   Allergies   Allergen Reactions     Cozaar [Losartan Potassium] Swelling     Leg swelling. Same from Avapro.     Hydrochlorothiazide Other (See Comments)     hyponatremia     Hydrocodone Nausea and Vomiting     Nausea     Data   Most Recent 3 CBC's:Recent Labs   Lab Test 05/24/22  1327 05/23/22  0840 05/12/22  0729 05/11/22  0638   WBC 9.7 9.8  --  8.4   HGB 13.0 13.4  --  12.6   MCV 97 100  --  96    215 161 174      Most Recent 3 BMP's:  Recent Labs   Lab Test 05/25/22  1156 05/25/22  0106 05/24/22  2207 05/24/22  1700 05/24/22  1327 05/23/22  1703 05/23/22  0840 05/12/22  0837 05/12/22  0729 05/11/22  1125 05/11/22  0638   NA  --   --   --   --  134  --  140  --   --   --  137   POTASSIUM  --   --   --   --  4.5  --  4.6  --   --   --  4.2   CHLORIDE  --   --   --   --  103  --   107  --   --   --  110*   CO2  --   --   --   --  26  --  29  --   --   --  23   BUN  --   --   --   --  27  --  32*  --   --   --  25   CR  --   --   --   --  0.87  --  0.97  --  0.79  --  0.77   ANIONGAP  --   --   --   --  5  --  4  --   --   --  4   AAYUSH  --   --   --   --  8.9  --  8.7  --   --   --  8.5   * 163* 239*   < > 189*   < > 161*   < >  --    < > 186*    < > = values in this interval not displayed.     Most Recent 2 LFT's:  Recent Labs   Lab Test 05/23/22  0840 05/09/22  1841   AST 16 27   ALT 23 26   ALKPHOS 69 77   BILITOTAL 0.5 0.5

## 2022-05-25 NOTE — PROGRESS NOTES
Care Management Follow Up    Length of Stay (days): 0    Expected Discharge Date: 05/25/2022     Concerns to be Addressed:       Patient plan of care discussed at interdisciplinary rounds: Yes    Anticipated Discharge Disposition: Skilled Nursing Facility, Long Term Care     Anticipated Discharge Services:    Anticipated Discharge DME:      Patient/family educated on Medicare website which has current facility and service quality ratings:    Education Provided on the Discharge Plan:  yes  Patient/Family in Agreement with the Plan:      Referrals Placed by CM/SW:    Private pay costs discussed: Not applicable    Additional Information:  Received call from patient's daughter Hiren.  She wanted to confirm that patient will have orders for PT and OT and that they are entered correctly for her LTC.  Explained that physician will use skilled nursing order set and will have proper PT and OT orders.  She confirmed her and family want patient to return to her LTC bed with PT and OT.    Belle Jensen RN, BSN, PHN  Inpatient Care Coordination  [facility]  Phone: [phone]

## 2022-05-25 NOTE — PLAN OF CARE
Pt here with traumatic SDH r/t fall. A&O to time and place only. Neuros intact ex confusion and some mild aphasia. Pt is hard of hearing. VSS on RA. Tele SR. regular diet, thin liquids, requires assistance with meals. Takes pills whole. Ax2 lift. Tylenol/Voltaren gel for pain. Pt scoring yellow on the Aggression Stop Light Tool for impulsivity overnight. Discharge pending to LTC.

## 2022-05-25 NOTE — PLAN OF CARE
Pt here with traumatic SDH. A&Ox1, orientated to self only. Neuros intact except confusion. VSS. [place]. Incontinent of bladder, purewick in place. Incontinent of stool x1. Tele SR w/1st Degree AVB. Mod Carb diet, thin liquids. Takes pills whole in apple sauce. Up with lift. Denies pain. Pt scoring green on the Aggression Stop Light Tool. Plan return to LTC with therapies. Discharge via stretcher at 1400.

## 2022-05-26 NOTE — PLAN OF CARE
Physical Therapy Discharge Summary    Reason for therapy discharge:    Discharged to transitional care facility.    Progress towards therapy goal(s). See goals on Care Plan in [facility] electronic health record for goal details.  Goals not met.  Barriers to achieving goals:   discharge from facility.    Therapy recommendation(s):    Continued therapy is recommended.  Rationale/Recommendations:  Pt would benefit from continued skilled PT services via TCU to address deficits and improve IND with safety and functional mobility.

## 2022-05-26 NOTE — PLAN OF CARE
Occupational Therapy Discharge Summary    Reason for therapy discharge:    Discharged to transitional care facility.    Progress towards therapy goal(s). See goals on Care Plan in [facility] electronic health record for goal details.  Goals partially met.  Barriers to achieving goals:   discharge from facility.    Therapy recommendation(s):    to progress ADL independence and safety

## 2022-05-26 NOTE — PROGRESS NOTES
Clinic Care Coordination Contact    Background: Care Coordination referral placed from [facility] discharge report for reason of patient meeting criteria for a TCM outreach call by [facility] [place] team.    Assessment: Upon chart review, CCR[facility] Team member will cancel/close the referral for TCM outreach due to reason below:    Patient has discharged to a Group home, Memory Care or Nursing Home. Per chart patient discharged to long-term care facility.     Plan: Care Coordination referral for TCM outreach canceled.    Suyapa Gibbons RN  [facility], [facility]

## 2022-10-22 NOTE — PROVIDER NOTIFICATION
"Text page to Dony KELLER: \"pt SBP >150. No PRNs, do you want hospitalist consulted? Thanks.\"  "
English

## 2022-12-26 NOTE — TELEPHONE ENCOUNTER
Patient's daughter is calling with the following request:    Cloudy urine with an odor, otherwise asymptomatic, asking if she can bring a urine sample in to be tested, patient has recurrent UTIs.   Would like to bring in a urine sample so the UTI can be treated, is okay with bringing her mom in if she needs to, but wants to take care of the UTI right away if possible.     Sraa Chua RN  04/24/17  8:43 AM     Yes

## 2023-01-01 ENCOUNTER — APPOINTMENT (OUTPATIENT)
Dept: CT IMAGING | Facility: CLINIC | Age: 88
DRG: 871 | End: 2023-01-01
Attending: EMERGENCY MEDICINE
Payer: COMMERCIAL

## 2023-01-01 ENCOUNTER — APPOINTMENT (OUTPATIENT)
Dept: GENERAL RADIOLOGY | Facility: CLINIC | Age: 88
DRG: 871 | End: 2023-01-01
Attending: THORACIC SURGERY (CARDIOTHORACIC VASCULAR SURGERY)
Payer: COMMERCIAL

## 2023-01-01 ENCOUNTER — APPOINTMENT (OUTPATIENT)
Dept: GENERAL RADIOLOGY | Facility: CLINIC | Age: 88
DRG: 871 | End: 2023-01-01
Attending: EMERGENCY MEDICINE
Payer: COMMERCIAL

## 2023-01-01 ENCOUNTER — APPOINTMENT (OUTPATIENT)
Dept: MRI IMAGING | Facility: CLINIC | Age: 88
DRG: 871 | End: 2023-01-01
Attending: NURSE PRACTITIONER
Payer: COMMERCIAL

## 2023-01-01 ENCOUNTER — HOSPITAL ENCOUNTER (INPATIENT)
Facility: CLINIC | Age: 88
LOS: 2 days | DRG: 871 | End: 2023-01-13
Attending: EMERGENCY MEDICINE | Admitting: STUDENT IN AN ORGANIZED HEALTH CARE EDUCATION/TRAINING PROGRAM
Payer: COMMERCIAL

## 2023-01-01 VITALS
DIASTOLIC BLOOD PRESSURE: 58 MMHG | SYSTOLIC BLOOD PRESSURE: 136 MMHG | HEART RATE: 89 BPM | OXYGEN SATURATION: 96 % | RESPIRATION RATE: 16 BRPM | BODY MASS INDEX: 36.15 KG/M2 | TEMPERATURE: 98.8 F | WEIGHT: 179 LBS

## 2023-01-01 DIAGNOSIS — J94.2 HEMOTHORAX, RIGHT: ICD-10-CM

## 2023-01-01 DIAGNOSIS — S12.9XXA CLOSED FRACTURE OF SPINOUS PROCESS OF CERVICAL VERTEBRA, INITIAL ENCOUNTER (H): ICD-10-CM

## 2023-01-01 DIAGNOSIS — S22.009A CLOSED FRACTURE OF THORACIC VERTEBRAL BODY (H): ICD-10-CM
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
## 2023-01-01 DIAGNOSIS — N39.0 URINARY TRACT INFECTION WITHOUT HEMATURIA, SITE UNSPECIFIED: ICD-10-CM

## 2023-01-01 DIAGNOSIS — S32.009A CLOSED FRACTURE OF LUMBAR VERTEBRAL BODY (H): ICD-10-CM

## 2023-01-01 LAB
ALBUMIN SERPL-MCNC: 3.4 G/DL (ref 3.4–5)
ALBUMIN UR-MCNC: 50 MG/DL
ALP SERPL-CCNC: 62 U/L (ref 40–150)
ALT SERPL W P-5'-P-CCNC: 27 U/L (ref 0–50)
ANION GAP SERPL CALCULATED.3IONS-SCNC: 7 MMOL/L (ref 3–14)
ANION GAP SERPL CALCULATED.3IONS-SCNC: 9 MMOL/L (ref 3–14)
APPEARANCE UR: ABNORMAL
APTT PPP: 25 SECONDS (ref 22–38)
AST SERPL W P-5'-P-CCNC: 43 U/L (ref 0–45)
ATRIAL RATE - MUSE: 73 BPM
BACTERIA #/AREA URNS HPF: ABNORMAL /HPF
BASOPHILS # BLD AUTO: 0.1 10E3/UL (ref 0–0.2)
BASOPHILS NFR BLD AUTO: 0 %
BILIRUB SERPL-MCNC: 0.8 MG/DL (ref 0.2–1.3)
BILIRUB UR QL STRIP: NEGATIVE
BUN SERPL-MCNC: 37 MG/DL (ref 7–30)
BUN SERPL-MCNC: 46 MG/DL (ref 7–30)
CALCIUM SERPL-MCNC: 8.6 MG/DL (ref 8.5–10.1)
CALCIUM SERPL-MCNC: 8.9 MG/DL (ref 8.5–10.1)
CHLORIDE BLD-SCNC: 103 MMOL/L (ref 94–109)
CHLORIDE BLD-SCNC: 112 MMOL/L (ref 94–109)
CO2 SERPL-SCNC: 25 MMOL/L (ref 20–32)
CO2 SERPL-SCNC: 26 MMOL/L (ref 20–32)
COLOR UR AUTO: ABNORMAL
CREAT SERPL-MCNC: 1.16 MG/DL (ref 0.52–1.04)
CREAT SERPL-MCNC: 1.41 MG/DL (ref 0.52–1.04)
DIASTOLIC BLOOD PRESSURE - MUSE: NORMAL MMHG
EOSINOPHIL # BLD AUTO: 0 10E3/UL (ref 0–0.7)
EOSINOPHIL NFR BLD AUTO: 0 %
ERYTHROCYTE [DISTWIDTH] IN BLOOD BY AUTOMATED COUNT: 13.2 % (ref 10–15)
ERYTHROCYTE [DISTWIDTH] IN BLOOD BY AUTOMATED COUNT: 13.5 % (ref 10–15)
FLUAV RNA SPEC QL NAA+PROBE: NEGATIVE
FLUBV RNA RESP QL NAA+PROBE: NEGATIVE
GFR SERPL CREATININE-BSD FRML MDRD: 34 ML/MIN/1.73M2
GFR SERPL CREATININE-BSD FRML MDRD: 43 ML/MIN/1.73M2
GLUCOSE BLD-MCNC: 243 MG/DL (ref 70–99)
GLUCOSE BLD-MCNC: 302 MG/DL (ref 70–99)
GLUCOSE BLDC GLUCOMTR-MCNC: 185 MG/DL (ref 70–99)
GLUCOSE BLDC GLUCOMTR-MCNC: 218 MG/DL (ref 70–99)
GLUCOSE BLDC GLUCOMTR-MCNC: 292 MG/DL (ref 70–99)
GLUCOSE UR STRIP-MCNC: 100 MG/DL
HCT VFR BLD AUTO: 33.3 % (ref 35–47)
HCT VFR BLD AUTO: 38.4 % (ref 35–47)
HGB BLD-MCNC: 10.2 G/DL (ref 11.7–15.7)
HGB BLD-MCNC: 12.6 G/DL (ref 11.7–15.7)
HGB UR QL STRIP: ABNORMAL
HOLD SPECIMEN: NORMAL
HOLD SPECIMEN: NORMAL
IMM GRANULOCYTES # BLD: 0.2 10E3/UL
IMM GRANULOCYTES NFR BLD: 1 %
INR PPP: 1.17 (ref 0.85–1.15)
INTERPRETATION ECG - MUSE: NORMAL
KETONES UR STRIP-MCNC: NEGATIVE MG/DL
LEUKOCYTE ESTERASE UR QL STRIP: ABNORMAL
LIPASE SERPL-CCNC: 37 U/L (ref 73–393)
LYMPHOCYTES # BLD AUTO: 0.9 10E3/UL (ref 0.8–5.3)
LYMPHOCYTES NFR BLD AUTO: 5 %
MCH RBC QN AUTO: 30.9 PG (ref 26.5–33)
MCH RBC QN AUTO: 31.4 PG (ref 26.5–33)
MCHC RBC AUTO-ENTMCNC: 30.6 G/DL (ref 31.5–36.5)
MCHC RBC AUTO-ENTMCNC: 32.8 G/DL (ref 31.5–36.5)
MCV RBC AUTO: 101 FL (ref 78–100)
MCV RBC AUTO: 96 FL (ref 78–100)
MONOCYTES # BLD AUTO: 0.7 10E3/UL (ref 0–1.3)
MONOCYTES NFR BLD AUTO: 4 %
MUCOUS THREADS #/AREA URNS LPF: PRESENT /LPF
NEUTROPHILS # BLD AUTO: 17.5 10E3/UL (ref 1.6–8.3)
NEUTROPHILS NFR BLD AUTO: 90 %
NITRATE UR QL: NEGATIVE
NRBC # BLD AUTO: 0 10E3/UL
NRBC BLD AUTO-RTO: 0 /100
P AXIS - MUSE: 65 DEGREES
PH UR STRIP: 5.5 [PH] (ref 5–7)
PLATELET # BLD AUTO: 179 10E3/UL (ref 150–450)
PLATELET # BLD AUTO: 183 10E3/UL (ref 150–450)
POTASSIUM BLD-SCNC: 4.5 MMOL/L (ref 3.4–5.3)
POTASSIUM BLD-SCNC: 4.8 MMOL/L (ref 3.4–5.3)
PR INTERVAL - MUSE: 194 MS
PROT SERPL-MCNC: 6.7 G/DL (ref 6.8–8.8)
QRS DURATION - MUSE: 76 MS
QT - MUSE: 400 MS
QTC - MUSE: 440 MS
R AXIS - MUSE: -32 DEGREES
RADIOLOGIST FLAGS: ABNORMAL
RBC # BLD AUTO: 3.3 10E6/UL (ref 3.8–5.2)
RBC # BLD AUTO: 4.01 10E6/UL (ref 3.8–5.2)
RBC URINE: 8 /HPF
RSV RNA SPEC NAA+PROBE: NEGATIVE
SARS-COV-2 RNA RESP QL NAA+PROBE: NEGATIVE
SODIUM SERPL-SCNC: 137 MMOL/L (ref 133–144)
SODIUM SERPL-SCNC: 145 MMOL/L (ref 133–144)
SP GR UR STRIP: 1.02 (ref 1–1.03)
SYSTOLIC BLOOD PRESSURE - MUSE: NORMAL MMHG
T AXIS - MUSE: 0 DEGREES
TROPONIN I SERPL HS-MCNC: 14 NG/L
UROBILINOGEN UR STRIP-MCNC: NORMAL MG/DL
VENTRICULAR RATE- MUSE: 73 BPM
WBC # BLD AUTO: 19.3 10E3/UL (ref 4–11)
WBC # BLD AUTO: 22.8 10E3/UL (ref 4–11)
WBC CLUMPS #/AREA URNS HPF: PRESENT /HPF
WBC URINE: >182 /HPF

## 2023-01-01 PROCEDURE — 99152 MOD SED SAME PHYS/QHP 5/>YRS: CPT

## 2023-01-01 PROCEDURE — 258N000003 HC RX IP 258 OP 636: Performed by: EMERGENCY MEDICINE

## 2023-01-01 PROCEDURE — 250N000013 HC RX MED GY IP 250 OP 250 PS 637: Performed by: HOSPITALIST

## 2023-01-01 PROCEDURE — 250N000012 HC RX MED GY IP 250 OP 636 PS 637: Performed by: STUDENT IN AN ORGANIZED HEALTH CARE EDUCATION/TRAINING PROGRAM

## 2023-01-01 PROCEDURE — 250N000011 HC RX IP 250 OP 636: Performed by: HOSPITALIST

## 2023-01-01 PROCEDURE — 258N000002 HC RX IP 258 OP 250: Performed by: HOSPITALIST

## 2023-01-01 PROCEDURE — 72131 CT LUMBAR SPINE W/O DYE: CPT

## 2023-01-01 PROCEDURE — 999N000065 XR CHEST PORT 1 VIEW

## 2023-01-01 PROCEDURE — 83690 ASSAY OF LIPASE: CPT | Performed by: EMERGENCY MEDICINE

## 2023-01-01 PROCEDURE — 96365 THER/PROPH/DIAG IV INF INIT: CPT | Mod: 59

## 2023-01-01 PROCEDURE — 72146 MRI CHEST SPINE W/O DYE: CPT

## 2023-01-01 PROCEDURE — 72125 CT NECK SPINE W/O DYE: CPT

## 2023-01-01 PROCEDURE — 250N000009 HC RX 250: Performed by: EMERGENCY MEDICINE

## 2023-01-01 PROCEDURE — 71045 X-RAY EXAM CHEST 1 VIEW: CPT

## 2023-01-01 PROCEDURE — 80053 COMPREHEN METABOLIC PANEL: CPT | Performed by: EMERGENCY MEDICINE

## 2023-01-01 PROCEDURE — 85027 COMPLETE CBC AUTOMATED: CPT | Performed by: STUDENT IN AN ORGANIZED HEALTH CARE EDUCATION/TRAINING PROGRAM

## 2023-01-01 PROCEDURE — 80048 BASIC METABOLIC PNL TOTAL CA: CPT | Performed by: STUDENT IN AN ORGANIZED HEALTH CARE EDUCATION/TRAINING PROGRAM

## 2023-01-01 PROCEDURE — 258N000003 HC RX IP 258 OP 636: Performed by: STUDENT IN AN ORGANIZED HEALTH CARE EDUCATION/TRAINING PROGRAM

## 2023-01-01 PROCEDURE — C9803 HOPD COVID-19 SPEC COLLECT: HCPCS

## 2023-01-01 PROCEDURE — 250N000011 HC RX IP 250 OP 636: Performed by: EMERGENCY MEDICINE

## 2023-01-01 PROCEDURE — 99233 SBSQ HOSP IP/OBS HIGH 50: CPT | Performed by: HOSPITALIST

## 2023-01-01 PROCEDURE — 96361 HYDRATE IV INFUSION ADD-ON: CPT

## 2023-01-01 PROCEDURE — 72148 MRI LUMBAR SPINE W/O DYE: CPT | Mod: 52

## 2023-01-01 PROCEDURE — 99153 MOD SED SAME PHYS/QHP EA: CPT

## 2023-01-01 PROCEDURE — 250N000011 HC RX IP 250 OP 636: Performed by: STUDENT IN AN ORGANIZED HEALTH CARE EDUCATION/TRAINING PROGRAM

## 2023-01-01 PROCEDURE — 36415 COLL VENOUS BLD VENIPUNCTURE: CPT | Performed by: STUDENT IN AN ORGANIZED HEALTH CARE EDUCATION/TRAINING PROGRAM

## 2023-01-01 PROCEDURE — 70450 CT HEAD/BRAIN W/O DYE: CPT

## 2023-01-01 PROCEDURE — 99239 HOSP IP/OBS DSCHRG MGMT >30: CPT | Performed by: HOSPITALIST

## 2023-01-01 PROCEDURE — 93005 ELECTROCARDIOGRAM TRACING: CPT

## 2023-01-01 PROCEDURE — 84484 ASSAY OF TROPONIN QUANT: CPT | Performed by: EMERGENCY MEDICINE

## 2023-01-01 PROCEDURE — 87637 SARSCOV2&INF A&B&RSV AMP PRB: CPT | Performed by: EMERGENCY MEDICINE

## 2023-01-01 PROCEDURE — 81001 URINALYSIS AUTO W/SCOPE: CPT | Performed by: EMERGENCY MEDICINE

## 2023-01-01 PROCEDURE — 99222 1ST HOSP IP/OBS MODERATE 55: CPT | Performed by: NURSE PRACTITIONER

## 2023-01-01 PROCEDURE — 82962 GLUCOSE BLOOD TEST: CPT

## 2023-01-01 PROCEDURE — 85025 COMPLETE CBC W/AUTO DIFF WBC: CPT | Performed by: EMERGENCY MEDICINE

## 2023-01-01 PROCEDURE — 120N000001 HC R&B MED SURG/OB

## 2023-01-01 PROCEDURE — 99285 EMERGENCY DEPT VISIT HI MDM: CPT | Mod: 25

## 2023-01-01 PROCEDURE — 85730 THROMBOPLASTIN TIME PARTIAL: CPT | Performed by: EMERGENCY MEDICINE

## 2023-01-01 PROCEDURE — 32551 INSERTION OF CHEST TUBE: CPT

## 2023-01-01 PROCEDURE — 250N000009 HC RX 250

## 2023-01-01 PROCEDURE — 72128 CT CHEST SPINE W/O DYE: CPT

## 2023-01-01 PROCEDURE — 85610 PROTHROMBIN TIME: CPT | Performed by: EMERGENCY MEDICINE

## 2023-01-01 PROCEDURE — 99223 1ST HOSP IP/OBS HIGH 75: CPT | Mod: AI | Performed by: STUDENT IN AN ORGANIZED HEALTH CARE EDUCATION/TRAINING PROGRAM

## 2023-01-01 PROCEDURE — 99238 HOSP IP/OBS DSCHRG MGMT 30/<: CPT

## 2023-01-01 PROCEDURE — 87088 URINE BACTERIA CULTURE: CPT | Performed by: EMERGENCY MEDICINE

## 2023-01-01 PROCEDURE — 0W9930Z DRAINAGE OF RIGHT PLEURAL CAVITY WITH DRAINAGE DEVICE, PERCUTANEOUS APPROACH: ICD-10-PCS | Performed by: EMERGENCY MEDICINE

## 2023-01-01 PROCEDURE — 999N000147 HC STATISTIC PT IP EVAL DEFER: Performed by: PHYSICAL THERAPIST

## 2023-01-01 PROCEDURE — 74177 CT ABD & PELVIS W/CONTRAST: CPT

## 2023-01-01 PROCEDURE — 36415 COLL VENOUS BLD VENIPUNCTURE: CPT | Performed by: EMERGENCY MEDICINE

## 2023-01-01 PROCEDURE — 96375 TX/PRO/DX INJ NEW DRUG ADDON: CPT

## 2023-01-01 RX ORDER — MORPHINE SULFATE 2 MG/ML
2 INJECTION, SOLUTION INTRAMUSCULAR; INTRAVENOUS
Status: DISCONTINUED | OUTPATIENT
Start: 2023-01-01 | End: 2023-01-01 | Stop reason: HOSPADM

## 2023-01-01 RX ORDER — MORPHINE SULFATE 10 MG/5ML
5 SOLUTION ORAL
Status: DISCONTINUED | OUTPATIENT
Start: 2023-01-01 | End: 2023-01-01 | Stop reason: HOSPADM

## 2023-01-01 RX ORDER — NICOTINE POLACRILEX 4 MG
15-30 LOZENGE BUCCAL
Status: DISCONTINUED | OUTPATIENT
Start: 2023-01-01 | End: 2023-01-01 | Stop reason: HOSPADM

## 2023-01-01 RX ORDER — NALOXONE HYDROCHLORIDE 0.4 MG/ML
0.2 INJECTION, SOLUTION INTRAMUSCULAR; INTRAVENOUS; SUBCUTANEOUS
Status: DISCONTINUED | OUTPATIENT
Start: 2023-01-01 | End: 2023-01-01 | Stop reason: HOSPADM

## 2023-01-01 RX ORDER — SODIUM CHLORIDE 450 MG/100ML
INJECTION, SOLUTION INTRAVENOUS CONTINUOUS
Status: DISCONTINUED | OUTPATIENT
Start: 2023-01-01 | End: 2023-01-01

## 2023-01-01 RX ORDER — LORAZEPAM 2 MG/ML
1 INJECTION INTRAMUSCULAR
Status: COMPLETED | OUTPATIENT
Start: 2023-01-01 | End: 2023-01-01

## 2023-01-01 RX ORDER — KETAMINE HYDROCHLORIDE 10 MG/ML
1 INJECTION, SOLUTION INTRAMUSCULAR; INTRAVENOUS ONCE
Status: COMPLETED | OUTPATIENT
Start: 2023-01-01 | End: 2023-01-01

## 2023-01-01 RX ORDER — KETAMINE HYDROCHLORIDE 10 MG/ML
10 INJECTION, SOLUTION INTRAMUSCULAR; INTRAVENOUS ONCE
Status: COMPLETED | OUTPATIENT
Start: 2023-01-01 | End: 2023-01-01

## 2023-01-01 RX ORDER — LORAZEPAM 0.5 MG/1
1 TABLET ORAL
Status: DISCONTINUED | OUTPATIENT
Start: 2023-01-01 | End: 2023-01-01 | Stop reason: HOSPADM

## 2023-01-01 RX ORDER — ACETAMINOPHEN 650 MG/1
650 SUPPOSITORY RECTAL ONCE
Status: COMPLETED | OUTPATIENT
Start: 2023-01-01 | End: 2023-01-01

## 2023-01-01 RX ORDER — GABAPENTIN 100 MG/1
100 CAPSULE ORAL 2 TIMES DAILY
Status: DISCONTINUED | OUTPATIENT
Start: 2023-01-01 | End: 2023-01-01 | Stop reason: HOSPADM

## 2023-01-01 RX ORDER — ACETAMINOPHEN 325 MG/1
650 TABLET ORAL EVERY 4 HOURS PRN
Status: DISCONTINUED | OUTPATIENT
Start: 2023-01-01 | End: 2023-01-01 | Stop reason: HOSPADM

## 2023-01-01 RX ORDER — SODIUM CHLORIDE 9 MG/ML
INJECTION, SOLUTION INTRAVENOUS CONTINUOUS
Status: DISCONTINUED | OUTPATIENT
Start: 2023-01-01 | End: 2023-01-01

## 2023-01-01 RX ORDER — CEFTRIAXONE 1 G/1
1 INJECTION, POWDER, FOR SOLUTION INTRAMUSCULAR; INTRAVENOUS ONCE
Status: COMPLETED | OUTPATIENT
Start: 2023-01-01 | End: 2023-01-01

## 2023-01-01 RX ORDER — MORPHINE SULFATE 20 MG/ML
5 SOLUTION ORAL
Status: DISCONTINUED | OUTPATIENT
Start: 2023-01-01 | End: 2023-01-01 | Stop reason: HOSPADM

## 2023-01-01 RX ORDER — CEFTRIAXONE 1 G/1
1 INJECTION, POWDER, FOR SOLUTION INTRAMUSCULAR; INTRAVENOUS EVERY 24 HOURS
Status: DISCONTINUED | OUTPATIENT
Start: 2023-01-01 | End: 2023-01-01

## 2023-01-01 RX ORDER — OLANZAPINE 5 MG/1
5 TABLET, ORALLY DISINTEGRATING ORAL EVERY 6 HOURS PRN
Status: DISCONTINUED | OUTPATIENT
Start: 2023-01-01 | End: 2023-01-01 | Stop reason: HOSPADM

## 2023-01-01 RX ORDER — KETAMINE HYDROCHLORIDE 10 MG/ML
1 INJECTION, SOLUTION INTRAMUSCULAR; INTRAVENOUS
Status: COMPLETED | OUTPATIENT
Start: 2023-01-01 | End: 2023-01-01

## 2023-01-01 RX ORDER — SIMVASTATIN 5 MG
5 TABLET ORAL AT BEDTIME
Status: DISCONTINUED | OUTPATIENT
Start: 2023-01-01 | End: 2023-01-01

## 2023-01-01 RX ORDER — ATROPINE SULFATE 10 MG/ML
2 SOLUTION/ DROPS OPHTHALMIC EVERY 4 HOURS PRN
Status: DISCONTINUED | OUTPATIENT
Start: 2023-01-01 | End: 2023-01-01 | Stop reason: HOSPADM

## 2023-01-01 RX ORDER — DEXTROSE MONOHYDRATE 25 G/50ML
25-50 INJECTION, SOLUTION INTRAVENOUS
Status: DISCONTINUED | OUTPATIENT
Start: 2023-01-01 | End: 2023-01-01 | Stop reason: HOSPADM

## 2023-01-01 RX ORDER — LORAZEPAM 2 MG/ML
1 INJECTION INTRAMUSCULAR
Status: DISCONTINUED | OUTPATIENT
Start: 2023-01-01 | End: 2023-01-01 | Stop reason: HOSPADM

## 2023-01-01 RX ORDER — CEFTRIAXONE 2 G/1
2 INJECTION, POWDER, FOR SOLUTION INTRAMUSCULAR; INTRAVENOUS EVERY 24 HOURS
Status: DISCONTINUED | OUTPATIENT
Start: 2023-01-01 | End: 2023-01-01

## 2023-01-01 RX ORDER — BISACODYL 10 MG
10 SUPPOSITORY, RECTAL RECTAL
Status: DISCONTINUED | OUTPATIENT
Start: 2023-01-15 | End: 2023-01-01 | Stop reason: HOSPADM

## 2023-01-01 RX ORDER — MORPHINE SULFATE 2 MG/ML
1 INJECTION, SOLUTION INTRAMUSCULAR; INTRAVENOUS
Status: DISCONTINUED | OUTPATIENT
Start: 2023-01-01 | End: 2023-01-01 | Stop reason: HOSPADM

## 2023-01-01 RX ORDER — CARBOXYMETHYLCELLULOSE SODIUM 5 MG/ML
1-2 SOLUTION/ DROPS OPHTHALMIC
Status: DISCONTINUED | OUTPATIENT
Start: 2023-01-01 | End: 2023-01-01 | Stop reason: HOSPADM

## 2023-01-01 RX ORDER — ONDANSETRON 2 MG/ML
4 INJECTION INTRAMUSCULAR; INTRAVENOUS EVERY 6 HOURS PRN
Status: DISCONTINUED | OUTPATIENT
Start: 2023-01-01 | End: 2023-01-01 | Stop reason: HOSPADM

## 2023-01-01 RX ORDER — MORPHINE SULFATE 10 MG/5ML
10 SOLUTION ORAL
Status: DISCONTINUED | OUTPATIENT
Start: 2023-01-01 | End: 2023-01-01 | Stop reason: HOSPADM

## 2023-01-01 RX ORDER — ONDANSETRON 4 MG/1
4 TABLET, ORALLY DISINTEGRATING ORAL EVERY 6 HOURS PRN
Status: DISCONTINUED | OUTPATIENT
Start: 2023-01-01 | End: 2023-01-01 | Stop reason: HOSPADM

## 2023-01-01 RX ORDER — NALOXONE HYDROCHLORIDE 0.4 MG/ML
0.1 INJECTION, SOLUTION INTRAMUSCULAR; INTRAVENOUS; SUBCUTANEOUS
Status: DISCONTINUED | OUTPATIENT
Start: 2023-01-01 | End: 2023-01-01 | Stop reason: HOSPADM

## 2023-01-01 RX ORDER — WATER 10 ML/10ML
INJECTION INTRAMUSCULAR; INTRAVENOUS; SUBCUTANEOUS
Status: COMPLETED
Start: 2023-01-01 | End: 2023-01-01

## 2023-01-01 RX ORDER — MORPHINE SULFATE 4 MG/ML
4 INJECTION, SOLUTION INTRAMUSCULAR; INTRAVENOUS
Status: COMPLETED | OUTPATIENT
Start: 2023-01-01 | End: 2023-01-01

## 2023-01-01 RX ORDER — OXYCODONE HYDROCHLORIDE 5 MG/1
5-10 TABLET ORAL EVERY 4 HOURS PRN
Status: DISCONTINUED | OUTPATIENT
Start: 2023-01-01 | End: 2023-01-01 | Stop reason: HOSPADM

## 2023-01-01 RX ORDER — LIDOCAINE 40 MG/G
CREAM TOPICAL
Status: DISCONTINUED | OUTPATIENT
Start: 2023-01-01 | End: 2023-01-01 | Stop reason: HOSPADM

## 2023-01-01 RX ORDER — LORAZEPAM 2 MG/ML
0.5 INJECTION INTRAMUSCULAR ONCE
Status: DISCONTINUED | OUTPATIENT
Start: 2023-01-01 | End: 2023-01-01

## 2023-01-01 RX ORDER — MORPHINE SULFATE 20 MG/ML
10 SOLUTION ORAL
Status: DISCONTINUED | OUTPATIENT
Start: 2023-01-01 | End: 2023-01-01 | Stop reason: HOSPADM

## 2023-01-01 RX ORDER — OLANZAPINE 10 MG/2ML
5 INJECTION, POWDER, FOR SOLUTION INTRAMUSCULAR ONCE
Status: COMPLETED | OUTPATIENT
Start: 2023-01-01 | End: 2023-01-01

## 2023-01-01 RX ORDER — KETAMINE HYDROCHLORIDE 10 MG/ML
20 INJECTION, SOLUTION INTRAMUSCULAR; INTRAVENOUS ONCE
Status: COMPLETED | OUTPATIENT
Start: 2023-01-01 | End: 2023-01-01

## 2023-01-01 RX ORDER — GLIPIZIDE 5 MG/1
5 TABLET ORAL DAILY
COMMUNITY

## 2023-01-01 RX ORDER — SALIVA STIMULANT COMB. NO.3
2 SPRAY, NON-AEROSOL (ML) MUCOUS MEMBRANE
Status: DISCONTINUED | OUTPATIENT
Start: 2023-01-01 | End: 2023-01-01 | Stop reason: HOSPADM

## 2023-01-01 RX ORDER — ONDANSETRON 2 MG/ML
4 INJECTION INTRAMUSCULAR; INTRAVENOUS EVERY 30 MIN PRN
Status: DISCONTINUED | OUTPATIENT
Start: 2023-01-01 | End: 2023-01-01 | Stop reason: HOSPADM

## 2023-01-01 RX ORDER — SPIRONOLACTONE 25 MG
12.5 TABLET ORAL EVERY OTHER DAY
Status: DISCONTINUED | OUTPATIENT
Start: 2023-01-01 | End: 2023-01-01

## 2023-01-01 RX ORDER — IOPAMIDOL 755 MG/ML
90 INJECTION, SOLUTION INTRAVASCULAR ONCE
Status: COMPLETED | OUTPATIENT
Start: 2023-01-01 | End: 2023-01-01

## 2023-01-01 RX ADMIN — INSULIN ASPART 2 UNITS: 100 INJECTION, SOLUTION INTRAVENOUS; SUBCUTANEOUS at 22:09

## 2023-01-01 RX ADMIN — MICONAZOLE NITRATE: 2 POWDER TOPICAL at 11:12

## 2023-01-01 RX ADMIN — SODIUM CHLORIDE: 9 INJECTION, SOLUTION INTRAVENOUS at 21:31

## 2023-01-01 RX ADMIN — MORPHINE SULFATE 4 MG: 4 INJECTION, SOLUTION INTRAMUSCULAR; INTRAVENOUS at 03:36

## 2023-01-01 RX ADMIN — MORPHINE SULFATE 2 MG: 2 INJECTION, SOLUTION INTRAMUSCULAR; INTRAVENOUS at 17:46

## 2023-01-01 RX ADMIN — SODIUM CHLORIDE: 4.5 INJECTION, SOLUTION INTRAVENOUS at 09:53

## 2023-01-01 RX ADMIN — SODIUM CHLORIDE: 4.5 INJECTION, SOLUTION INTRAVENOUS at 22:58

## 2023-01-01 RX ADMIN — SODIUM CHLORIDE 1000 ML: 9 INJECTION, SOLUTION INTRAVENOUS at 12:41

## 2023-01-01 RX ADMIN — OLANZAPINE 5 MG: 10 INJECTION, POWDER, FOR SOLUTION INTRAMUSCULAR at 19:27

## 2023-01-01 RX ADMIN — MORPHINE SULFATE 2 MG: 2 INJECTION, SOLUTION INTRAMUSCULAR; INTRAVENOUS at 02:24

## 2023-01-01 RX ADMIN — LORAZEPAM 1 MG: 2 INJECTION INTRAMUSCULAR; INTRAVENOUS at 02:52

## 2023-01-01 RX ADMIN — KETAMINE HYDROCHLORIDE 80 MG: 10 INJECTION, SOLUTION INTRAMUSCULAR; INTRAVENOUS at 15:42

## 2023-01-01 RX ADMIN — KETAMINE HYDROCHLORIDE 20 MG: 10 INJECTION, SOLUTION INTRAMUSCULAR; INTRAVENOUS at 16:00

## 2023-01-01 RX ADMIN — IOPAMIDOL 90 ML: 755 INJECTION, SOLUTION INTRAVENOUS at 13:07

## 2023-01-01 RX ADMIN — MORPHINE SULFATE 2 MG: 4 INJECTION, SOLUTION INTRAMUSCULAR; INTRAVENOUS at 19:51

## 2023-01-01 RX ADMIN — MORPHINE SULFATE 2 MG: 2 INJECTION, SOLUTION INTRAMUSCULAR; INTRAVENOUS at 08:26

## 2023-01-01 RX ADMIN — SODIUM CHLORIDE 67 ML: 900 INJECTION INTRAVENOUS at 13:08

## 2023-01-01 RX ADMIN — CEFTRIAXONE SODIUM 1 G: 1 INJECTION, POWDER, FOR SOLUTION INTRAMUSCULAR; INTRAVENOUS at 09:02

## 2023-01-01 RX ADMIN — MORPHINE SULFATE 2 MG: 2 INJECTION, SOLUTION INTRAMUSCULAR; INTRAVENOUS at 03:48

## 2023-01-01 RX ADMIN — INSULIN ASPART 2 UNITS: 100 INJECTION, SOLUTION INTRAVENOUS; SUBCUTANEOUS at 09:53

## 2023-01-01 RX ADMIN — KETAMINE HYDROCHLORIDE 10 MG: 10 INJECTION, SOLUTION INTRAMUSCULAR; INTRAVENOUS at 16:07

## 2023-01-01 RX ADMIN — LORAZEPAM 1 MG: 2 INJECTION INTRAMUSCULAR; INTRAVENOUS at 08:26

## 2023-01-01 RX ADMIN — MORPHINE SULFATE 2 MG: 2 INJECTION, SOLUTION INTRAMUSCULAR; INTRAVENOUS at 11:09

## 2023-01-01 RX ADMIN — CEFTRIAXONE SODIUM 1 G: 1 INJECTION, POWDER, FOR SOLUTION INTRAMUSCULAR; INTRAVENOUS at 15:30

## 2023-01-01 RX ADMIN — INSULIN ASPART 1 UNITS: 100 INJECTION, SOLUTION INTRAVENOUS; SUBCUTANEOUS at 12:54

## 2023-01-01 RX ADMIN — WATER 2.1 ML: 1 INJECTION INTRAMUSCULAR; INTRAVENOUS; SUBCUTANEOUS at 19:31

## 2023-01-01 RX ADMIN — MORPHINE SULFATE 2 MG: 2 INJECTION, SOLUTION INTRAMUSCULAR; INTRAVENOUS at 05:23

## 2023-01-01 RX ADMIN — MICONAZOLE NITRATE: 2 POWDER TOPICAL at 20:18

## 2023-01-01 RX ADMIN — MORPHINE SULFATE 4 MG: 4 INJECTION, SOLUTION INTRAMUSCULAR; INTRAVENOUS at 23:46

## 2023-01-01 RX ADMIN — LORAZEPAM 1 MG: 2 INJECTION INTRAMUSCULAR; INTRAVENOUS at 20:03

## 2023-01-01 RX ADMIN — KETAMINE HYDROCHLORIDE 80 MG: 10 INJECTION, SOLUTION INTRAMUSCULAR; INTRAVENOUS at 15:47

## 2023-01-01 ASSESSMENT — ACTIVITIES OF DAILY LIVING (ADL)
ADLS_ACUITY_SCORE: 35
ADLS_ACUITY_SCORE: 35
ADLS_ACUITY_SCORE: 39
ADLS_ACUITY_SCORE: 35
ADLS_ACUITY_SCORE: 39
ADLS_ACUITY_SCORE: 39
ADLS_ACUITY_SCORE: 35
ADLS_ACUITY_SCORE: 39
ADLS_ACUITY_SCORE: 35
ADLS_ACUITY_SCORE: 39
ADLS_ACUITY_SCORE: 39
ADLS_ACUITY_SCORE: 35
ADLS_ACUITY_SCORE: 39
ADLS_ACUITY_SCORE: 35
ADLS_ACUITY_SCORE: 39
ADLS_ACUITY_SCORE: 35
ADLS_ACUITY_SCORE: 35
ADLS_ACUITY_SCORE: 39
ADLS_ACUITY_SCORE: 35
ADLS_ACUITY_SCORE: 39

## 2023-01-01 ASSESSMENT — ENCOUNTER SYMPTOMS
ARTHRALGIAS: 1
NECK PAIN: 1
BACK PAIN: 1
FLANK PAIN: 1

## 2023-01-11 PROBLEM — J96.01 ACUTE RESPIRATORY FAILURE WITH HYPOXIA (H): Status: ACTIVE | Noted: 2022-01-01

## 2023-01-11 PROBLEM — J94.2 HEMOTHORAX, RIGHT: Status: ACTIVE | Noted: 2023-01-01

## 2023-01-11 PROBLEM — M79.672 PAIN IN BOTH FEET: Status: RESOLVED | Noted: 2018-08-04 | Resolved: 2023-01-01
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
## 2023-01-11 PROBLEM — N18.30 CKD (CHRONIC KIDNEY DISEASE) STAGE 3, GFR 30-59 ML/MIN (H): Status: ACTIVE | Noted: 2018-02-09

## 2023-01-11 PROBLEM — E11.65 TYPE 2 DIABETES MELLITUS WITH HYPERGLYCEMIA, WITHOUT LONG-TERM CURRENT USE OF INSULIN (H): Status: ACTIVE | Noted: 2017-07-05

## 2023-01-11 PROBLEM — S22.009A CLOSED FRACTURE OF THORACIC VERTEBRAL BODY (H): Status: ACTIVE | Noted: 2023-01-01

## 2023-01-11 PROBLEM — L03.119 CELLULITIS, LEG: Status: RESOLVED | Noted: 2018-03-02 | Resolved: 2023-01-01

## 2023-01-11 PROBLEM — M25.551 HIP PAIN, RIGHT: Status: RESOLVED | Noted: 2021-04-28 | Resolved: 2023-01-01

## 2023-01-11 PROBLEM — S32.009A CLOSED FRACTURE OF LUMBAR VERTEBRAL BODY (H): Status: ACTIVE | Noted: 2023-01-01

## 2023-01-11 PROBLEM — N39.0 URINARY TRACT INFECTION WITHOUT HEMATURIA, SITE UNSPECIFIED: Status: ACTIVE | Noted: 2022-01-01

## 2023-01-11 PROBLEM — N18.30 TYPE 2 DIABETES MELLITUS WITH STAGE 3 CHRONIC KIDNEY DISEASE, WITHOUT LONG-TERM CURRENT USE OF INSULIN (H): Status: ACTIVE | Noted: 2018-02-09

## 2023-01-11 PROBLEM — E11.22 TYPE 2 DIABETES MELLITUS WITH STAGE 3 CHRONIC KIDNEY DISEASE, WITHOUT LONG-TERM CURRENT USE OF INSULIN (H): Status: ACTIVE | Noted: 2018-02-09

## 2023-01-11 PROBLEM — I10 HYPERTENSION GOAL BP (BLOOD PRESSURE) < 140/80: Status: ACTIVE | Noted: 2019-11-25

## 2023-01-11 PROBLEM — M79.671 PAIN IN BOTH FEET: Status: RESOLVED | Noted: 2018-08-04 | Resolved: 2023-01-01

## 2023-01-11 PROBLEM — E13.42 DIABETIC POLYNEUROPATHY ASSOCIATED WITH OTHER SPECIFIED DIABETES MELLITUS (H): Chronic | Status: ACTIVE | Noted: 2018-08-04

## 2023-01-11 PROBLEM — S12.9XXA CLOSED FRACTURE OF SPINOUS PROCESS OF CERVICAL VERTEBRA, INITIAL ENCOUNTER (H): Status: ACTIVE | Noted: 2023-01-01

## 2023-01-11 PROBLEM — I62.00 SUBDURAL HEMORRHAGE (H): Status: RESOLVED | Noted: 2022-01-01 | Resolved: 2023-01-01

## 2023-01-11 PROBLEM — L03.119 CELLULITIS AND ABSCESS OF LEG: Status: RESOLVED | Noted: 2018-03-01 | Resolved: 2023-01-01

## 2023-01-11 PROBLEM — L02.419 CELLULITIS AND ABSCESS OF LEG: Status: RESOLVED | Noted: 2018-03-01 | Resolved: 2023-01-01

## 2023-01-11 NOTE — ED PROVIDER NOTES
History     Chief Complaint:  Fall and Back Pain       The history is provided by the patient and the EMS personnel. The history is limited by the condition of the patient.      Regla Benavidez is a 89 year old female with expressive aphasia due to prior stroke, CKD, recurrent UTIs, among others who presents with neck and back pain following a fall. EMS reports that she was found today on the floor at her living facility after an unwitnessed fall. Unknown down time. She reports pain in her back, neck, and right chest wall. Her right side pain includes her hip, flank, shoulder, and leg. She denies any  pains on her left side. Per her daughter's report she sometimes falls when she has a UTI. She is supposed to use a walker but often does not.     Independent historian: Patient's daughter who is at bedside and provides additional histroy and information about her baseline mental status.    Review of External Notes: I reviewed records from her living facility including medications, face sheet, and pulse     ROS:  Review of Systems   Unable to perform ROS: Dementia   Genitourinary: Positive for flank pain.   Musculoskeletal: Positive for arthralgias (Right hip, Right leg, Right shoulder), back pain and neck pain.       Allergies:  Cozaar [Losartan Potassium]  Hydrochlorothiazide  Hydrocodone     Medications:    Pepcid  Lasix  Neurontin  Glucotrol  Levaquin  Lidocare  Glucophage  Miralax  Mirapex  Zocor  Januvia  Aldactone  Lancets      Past Medical History:    Aspiration pneumonia  Cholelithiasis  DJD of lumbar spine  Elevated glucose  Expressive aphasia related to prior stroke  Hard of hearing  HTN  Shingles  Varicose vein of leg  Chronic midline low back pain without sciatica  Diabetic polyneuropathy  Cerebral infarction  Branch retinal vein occlusion of left eye  Restless leg syndrome  Hyponatremia  Recurrent UTI  Cyst of left ovary  Prerenal azotemia  Bilateral leg edema  Diabetes mellitus, type  two  Vaginitis  Dermatitis  Continuous leakage of urine  PAD  CKD, stage 3  Class 2 obesity  Cellulitis, left leg  Hyperuricemia  Bilateral bunions  Intertrigo  Acute respiratory failure with hypoxia  Subdural hemorrhage    Past Surgical History:    Cataract IOL, RT/LT    Family History:    Brother: Diabetes      Social History:  Patient came from care facility.  Patient is unaccompanied in the ED.  PCP: No Ref-Primary, Physician     Physical Exam     Patient Vitals for the past 24 hrs:   BP Temp Temp src Pulse Resp SpO2 Weight   01/11/23 1800 -- -- -- 89 21 94 % --   01/11/23 1710 -- -- -- 90 18 97 % --   01/11/23 1630 (!) 153/87 -- -- 96 28 92 % --   01/11/23 1615 (!) 138/124 -- -- 90 25 98 % --   01/11/23 1550 (!) 164/95 -- -- 82 19 99 % --   01/11/23 1544 (!) 167/77 -- -- 81 16 97 % --   01/11/23 1541 -- 98.4  F (36.9  C) Oral 80 22 98 % --   01/11/23 1347 136/80 -- -- 87 24 94 % --   01/11/23 1203 (!) 126/93 -- -- 72 20 95 % 81.2 kg (179 lb)   01/11/23 1014 (!) 150/67 97.6  F (36.4  C) Oral 78 18 95 % --        Physical Exam  General: Nontoxic. Somnolent but easily arouses to voice.  Resting comfortably  Head:  Posterior scalp hematoma. Remainder of head and scalp otherwise atraumatic and non tender to palpation  Eyes:  Pupils are equal, round, reactive to light EOMI, no nystagmus    Conjunctivae non-injected and sclerae white  ENT:    The external nose is normal    Pinnae are normal  Neck:  Diffuse tenderness to palpation across the posterior neck without definite focal bony tenderness to palpation.    Trachea is in the midline  CV:  Regular rate and rhythm     Normal S1/S2, no S3/S4    No murmur or rub. Radial pulses 2+ bilaterally.  Resp:  Lungs are clear and equal bilaterally  There is no tachypnea    No increased work of breathing    No rales, wheezing, or rhonchi  GI:  Abdomen is soft, no rigidity or guarding    No distension, or mass    No tenderness or rebound tenderness   MS:  Normal muscular tone.  Full painless ROM of all extremities. Extremities non tender to palpation and atraumatic. Hips/pelvis nontender to compression and stable.     Symmetric motor strength    No lower extremity edema  Skin:  Superficial small abrasion to the posterior left elbow.  No other wounds except as above.  No rash or acute skin lesions noted  Neuro: Awake and alert oriented to person only.  No facial droop.  Speech is normal and fluent  Moves all extremities spontaneously  Detailed strength testing is limited due to patient's dementia and difficulty with following commands however strength appears to be 5 out of 5 and intact in all extremities.  Patient responds to tactile stimuli in all extremities.  Patient able to touch nose with both index fingers without significant dysmetria.  Psych:  No agitation.      Emergency Department Course   ECG:  ECG results from 01/11/23   EKG 12-lead, tracing only     Value    Systolic Blood Pressure     Diastolic Blood Pressure     Ventricular Rate 73    Atrial Rate 73    TN Interval 194    QRS Duration 76        QTc 440    P Axis 65    R AXIS -32    T Axis 0    Interpretation ECG      Sinus rhythm  Left axis deviation  Minimal voltage criteria for LVH, may be normal variant ( R in aVL )  Septal infarct (cited on or before 15-NOV-2014)  Abnormal ECG  When compared with ECG of 23-MAY-2022 08:53,  TN interval has decreased  Angelo Dawson MD          Imaging:  XR Chest Port 1 View   Final Result   IMPRESSION: Placement of right chest tube with decrease in volume of previously described right hemothorax and improved aeration of the right lung. No significant pneumothorax. Left lung is clear. Bones are unchanged.      Lumbar spine CT w/o contrast   Final Result   IMPRESSION:   1. Fracture obliquely oriented two column fracture of the L2 vertebral   body with anterior extension to involve the ventral cortical margin   and posterior extension to involve the posterior aspect of the L2   inferior  endplate. There is no adjacent prevertebral soft tissue   swelling, and the margins of the above described fracture are   well-corticated/sclerotic. Given these findings, a subacute or chronic   fracture is favored over an acute fracture.   2. Diffuse idiopathic skeletal hyperostosis contributes to an   ankylosis of the lumbar spine. This could have increased the   likelihood of nonunion at the above described L2 fracture.   3. Multilevel degenerative change as above. Spinal canal and neural   foraminal stenosis as described above.   4. Please see separate CT abdomen and pelvis regarding abdominal solid   organ findings.      Findings and impression discussed with Dr. Dawson by phone at 1434 hours   on 1/11/2023.      PRASHANT ASHRAF MD            SYSTEM ID:  [id number]      CT Thoracic Spine w/o Contrast   Final Result   IMPRESSION:   1. Obliquely oriented acute fracture through the anterior aspect of   the T11 vertebral body with an oblique orientation such that it   involves the ventral cortical margin and the mid aspect of the   superior endplate. This fracture is widened up to 6 mm, with no   associated vertebral body height loss and no extension to involve the   posterior elements. This is seen in the setting of a diffuse ankylosis   of the thoracic spine relating to diffuse idiopathic skeletal   hyperostosis. While the above-described fracture is single column,   there is an increased likelihood of instability or widening at this   fracture given the background diffuse ankylosis of the spine.   2. No additional thoracic spine fractures elsewhere.   3. Osteopenia with degenerative change as above. No definite   high-grade spinal canal or neural foraminal stenosis.   4. Please see separate CT chest abdomen pelvis report with regards to   lung and mediastinal findings.      Findings and impression discussed with Dr. Dawson by phone at 1434 hours   on 1/11/2023.      PRASHANT ASHRAF MD            SYSTEM ID:  [id number]       CT Chest/Abdomen/Pelvis w Contrast   Final Result   Abnormal   IMPRESSION:   1.  Moderate right hemothorax with possible element of active   hemorrhage layering dependently, source not definitely visualized.   2.  Acute appearing fracture of the anterior T11 vertebral body   without definite extension into the posterior elements. Please see   concurrent spine reformats for additional description.   3.  No definite displaced rib fractures visualized, although extensive   motion artifact could obscure a mildly displaced fracture   4.  No evidence of acute intraperitoneal trauma.   5.  Findings suggestive of chronic bladder outlet obstruction.   6.  Incidental minimally complex 6.8 cm left adnexal cyst. Could   consider further evaluation with ultrasound on a nonemergent basis if   clinically indicated.         [Critical Result: Active extravasation]      Finding was identified on 1/11/2023 1:39 PM.       Dr. Dawson was contacted by me on 1/11/2023 1:56 PM and verbalized   understanding of the critical result.       LOU CORONADO MD            SYSTEM ID:  [id number]      CT Cervical Spine w/o Contrast   Final Result   IMPRESSION:    1. Age-indeterminate fracture involving the right C3 spinous process,   potentially acute.   2. Severe degenerative change with multiple stenoses.      PRASHANT PHILIP MD            SYSTEM ID:  [id number]      CT Head w/o Contrast   Final Result   IMPRESSION:    1. No acute intracranial abnormality.   2. Chronic changes as detailed.      PRASHANT PHILIP MD            SYSTEM ID:  [id number]      MR Thoracic Spine w/o Contrast    (Results Pending)   MR Lumbar Spine w/o Contrast    (Results Pending)      Report per radiology    Laboratory:  Labs Ordered and Resulted from Time of ED Arrival to Time of ED Departure   ROUTINE UA WITH MICROSCOPIC REFLEX TO CULTURE - Abnormal       Result Value    Color Urine Orange (*)     Appearance Urine Cloudy (*)     Glucose Urine 100 (*)     Bilirubin Urine  Negative      Ketones Urine Negative      Specific Gravity Urine 1.018      Blood Urine Small (*)     pH Urine 5.5      Protein Albumin Urine 50 (*)     Urobilinogen Urine Normal      Nitrite Urine Negative      Leukocyte Esterase Urine Large (*)     Bacteria Urine Many (*)     WBC Clumps Urine Present (*)     Mucus Urine Present (*)     RBC Urine 8 (*)     WBC Urine >182 (*)    INR - Abnormal    INR 1.17 (*)    COMPREHENSIVE METABOLIC PANEL - Abnormal    Sodium 137      Potassium 4.5      Chloride 103      Carbon Dioxide (CO2) 25      Anion Gap 9      Urea Nitrogen 37 (*)     Creatinine 1.16 (*)     Calcium 8.9      Glucose 302 (*)     Alkaline Phosphatase 62      AST 43      ALT 27      Protein Total 6.7 (*)     Albumin 3.4      Bilirubin Total 0.8      GFR Estimate 43 (*)    LIPASE - Abnormal    Lipase 37 (*)    CBC WITH PLATELETS AND DIFFERENTIAL - Abnormal    WBC Count 19.3 (*)     RBC Count 4.01      Hemoglobin 12.6      Hematocrit 38.4      MCV 96      MCH 31.4      MCHC 32.8      RDW 13.2      Platelet Count 179      % Neutrophils 90      % Lymphocytes 5      % Monocytes 4      % Eosinophils 0      % Basophils 0      % Immature Granulocytes 1      NRBCs per 100 WBC 0      Absolute Neutrophils 17.5 (*)     Absolute Lymphocytes 0.9      Absolute Monocytes 0.7      Absolute Eosinophils 0.0      Absolute Basophils 0.1      Absolute Immature Granulocytes 0.2      Absolute NRBCs 0.0     PARTIAL THROMBOPLASTIN TIME - Normal    aPTT 25     TROPONIN I - Normal    Troponin I High Sensitivity 14     URINE CULTURE      [facility]    -Chest Tube Insertion    Date/Time: 1/11/2023 2:54 PM  Performed by: Angelo Dawson MD  Authorized by: Angelo Dawson MD     Risks, benefits and alternatives discussed.    ED EVALUATION:      Assessment Time: 1/11/2023 2:47 PM      I have performed an Emergency Department Evaluation including taking a history and physical examination, this evaluation will be  documented in the electronic medical record for this ED encounter.     Indication: Right sided hemothorax due to blunt trauma    ASA Class: Class 4- Severe systemic disease, acute unstable problems    Mallampati: Grade 2- soft palate, base of uvula, tonsillar pillars, and portion of posterior pharyngeal wall visible    NPO Status: not NPO, emergent situation    UNIVERSAL PROTOCOL   Site Marked: Yes  Prior Images Obtained and Reviewed:  Yes  Required items: Required blood products, implants, devices and special equipment available    Patient identity confirmed:  Verbally with patient, arm band and hospital-assigned identification number  Patient was reevaluated immediately before administering moderate or deep sedation or anesthesia  Confirmation Checklist:  Patient's identity using two indicators, relevant allergies, procedure was appropriate and matched the consent or emergent situation and correct equipment/implants were available  Time out: Immediately prior to the procedure a time out was called    Universal Protocol: the Joint Commission Universal Protocol was followed    Preparation: Patient was prepped and draped in usual sterile fashion    ESBL (mL):  5    PRE-PROCEDURE DETAILS:     Skin preparation:  ChloraPrep    SEDATION  Patient Sedated: Yes    Sedation Type:  Moderate (conscious) sedation  Sedation:  Ketamine and see MAR for details  Vital signs: Vital signs monitored during sedation      ANESTHESIA (see MAR for exact dosages):     Anesthesia method:  Local infiltration    Local anesthetic:  Lidocaine 1% WITH epi        PROCEDURE DETAILS:     Placement location:  R lateral    Scalpel size:  10    Tube size (Fr):  28    Dissection instrument:  Finger and [place] clamp    Ultrasound guidance: no      Tension pneumothorax: no      Tube connected to:  Suction and water seal    Drainage characteristics:  Bloody (900mL immediate drainage after tube placement.)    Suture material:  0 silk    Dressing:  4x4  sterile gauze and petrolatum-impregnated gauze        POST-PROCEDURE DETAILS:     Post-insertion x-ray findings: tube in good position      PROCEDURE  Describe Procedure: Patient was placed in a semirecumbent position with the head of the bed at 30 degree.  The right side as noted above was prepped and the patient was medicated as above. An incision was made and blunt dissection up and over the rib was performed. Access was then obtained to the pleural cavity. A chest tube was inserted and secured as above.  Patient Tolerance:  Patient tolerated the procedure well with no immediate complications  Length of time physician/provider present for 1:1 monitoring during sedation: 30      Emergency Department Course & Assessments:       Interventions:  Medications   ondansetron (ZOFRAN) injection 4 mg (has no administration in time range)   morphine (PF) injection 4 mg (has no administration in time range)   OLANZapine (zyPREXA) injection 5 mg (has no administration in time range)   LORazepam (ATIVAN) injection 1 mg (has no administration in time range)   0.9% sodium chloride BOLUS (0 mLs Intravenous Stopped 1/11/23 1415)   iopamidol (ISOVUE-370) solution 90 mL (90 mLs Intravenous Given 1/11/23 1307)   Saline Flush (67 mLs Intravenous Given 1/11/23 1308)   cefTRIAXone (ROCEPHIN) 1 g vial to attach to  mL bag for ADULTS or NS 50 mL bag for PEDS (0 g Intravenous Stopped 1/11/23 1615)   acetaminophen (TYLENOL) Suppository 650 mg (650 mg Rectal Not Given 1/11/23 1835)   ketamine (KETALAR) injection 80 mg (80 mg Intravenous Given 1/11/23 1542)      ketamine (KETALAR) injection 10 mg (10 mg Intravenous Given 1/11/23 1607)   ketamine (KETALAR) injection 20 mg (20 mg Intravenous Given 1/11/23 1600)        Independent Interpretation (X-rays, CTs, rhythm strip):  I independently reviewed patient's post procedural chest radiographs. Chest tube in good position. No significant residual hemothorax. No significant pneumothorax.         Consultations/Discussion of Management or Tests:  1034 I obtained history and examined the patient as noted above.  1356 I spoke with radiology regarding the patient  1447 I spoke with Dr. Noe, Thoracic surgeon, regarding the patient   I spoke with Brielle Kent NP, Neurosurgery, who recommends orthotics consult for TLSO brace and continued spinal precautions for now.   ED Course as of 01/11/23 1905   Wed Jan 11, 2023   0956 Cervical collar applied by myself.   1207 Lab studies delayed. RN checking with lab staff. CT scans delayed due to delay in labs/creatinine.   1632 I discussed the case with Dr. Romero, hospitalist who accepts the patient for admission.        Social Determinants of Health affecting care:  None       Disposition:  The patient was admitted to the hospital under the care of Dr. Smith.     Impression & Plan      Medical Decision Making:  Regla Benavidez is a 89 year old female who presents to the ED for evaluation after being found down in her living facility.  On arrival to the emergency department ABCs are intact.  No evidence of focal neurologic deficits.  She has a history of expressive aphasia at baseline due to prior stroke and therefore has difficulty providing a reliable history regarding her fall as well as her current symptoms.  Work-up in the emergency department included head CT which was negative for any evidence of acute intracranial hemorrhage, mass lesion or other intracranial process.  No skull fracture.  Cervical spine CT with a C3 spinous process fracture.  CT of the thoracic and lumbar spine show an acute obliquely oriented fracture through the anterior aspect of T11 vertebral body with widening of 6 mm in the setting of diffuse ankylosis and idiopathic skeletal hyperostosis.  Given these findings there is concern for possible spinal instability related to this.  There is an additional more chronic appearing fracture involving the L2 vertebral body which includes 2  columns of the spine.  Given these findings the case was discussed with neurosurgery who recommended TLSO brace and spinal precautions for now.  Patient has no clinical evidence to suggest acute spinal cord dysfunction or acute neurologic deficits related to these findings.    CT scan of the chest abdomen and pelvis reveals a large right-sided hemothorax with no pneumothorax.  No clear rib fractures seen although image quality is degraded by respiratory motion.  No evidence of any other solid organ or other acute intraperitoneal trauma.  There is marked wall thickening of the urinary bladder with bladder diverticuli and cystocele consistent with her history of recurrent UTIs.  Incidentally noted large left adnexal cyst will require outpatient follow-up.  Given the large hemothorax the case was discussed with Dr. Noe of thoracic surgery who recommended chest tube placement.  28 Turk[place] right lateral chest tube was placed as noted above.  Immediately following placement 900 mL of dark blood was evacuated from the right pleural cavity.  The patient is not anticoagulated.  Initial hemoglobin is normal.  Remainder of the ED studies are otherwise reassuring with the exception of urinalysis which has significant pyuria and is consistent with urinary tract infection.  Patient was treated with IV ceftriaxone.  Given the findings the patient will be admitted to the hospital for ongoing monitoring evaluation and treatment.  The case was discussed with the hospitalist and the patient was admitted in stable and improved condition.    Critical Care Time:   Upon my evaluation, this patient had a critical illness with high probability of imminent or life-threatening deterioration due to acute trauma with large volume hemothorax/hemorrage, which required my direct attention, intervention, and personal management.    I have personally provided 45 minutes of critical care time exclusive of time spent on separately billable  procedures. Time includes review of laboratory data, radiology results, discussion with consultants, reassessment of the patient and monitoring for potential decompensation. Interventions were performed as documented above.     Diagnosis:    ICD-10-CM    1. Fall at home, initial encounter  W19.XXXA     Y92.009       2. Hemothorax, right  J94.2       3. Closed fracture of spinous process of cervical vertebra, initial encounter (H)  S12.9XXA       4. Closed fracture of thoracic vertebral body (H)  S22.009A       5. Closed fracture of lumbar vertebral body (H)  S32.009A       6. Urinary tract infection without hematuria, site unspecified  N39.0              Scribe Disclosure:  I, KOSTAS FIERRO, am serving as a scribe at 10:34 AM on 1/11/2023 to document services personally performed by Angelo Dawson MD based on my observations and the provider's statements to me.     1/11/2023   Angelo Dawson MD Daro, Ryan Clay, MD  01/11/23 1917

## 2023-01-11 NOTE — PHARMACY-ADMISSION MEDICATION HISTORY
Pharmacy Medication History  Admission medication history interview status for the 1/11/2023 admission is complete. See EPIC admission navigator for prior to admission medications     Location of Interview: Outside patient room but on unit  Medication history sources: MAR ([facility] in [place])    Significant changes made to the medication list:  None    In the past week, patient estimated taking medication this percent of the time: greater than 90%    Additional medication history information:   None    Medication reconciliation completed by provider prior to medication history? No    Time spent in this activity: 25 minutes    Prior to Admission medications    Medication Sig Last Dose Taking? Auth Provider Long Term End Date   acetaminophen (TYLENOL) 325 MG tablet Take 650 mg by mouth 3 times daily 0800/1200/1700 1/11/2023 Yes Unknown, Entered By History     acetaminophen (TYLENOL) 500 MG tablet Take 500 mg by mouth 2 times daily as needed for mild pain 1/10/2023 at PRN Yes Unknown, Entered By History     diclofenac (VOLTAREN) 1 % topical gel Apply 2 g topically 3 times daily 0900, 1300, 2000 1/11/2023 Yes Unknown, Entered By History     famotidine (PEPCID) 20 MG tablet Take 1 tablet (20 mg) by mouth 2 times daily 1/11/2023 Yes Leo Melgar MD     furosemide (LASIX) 20 MG tablet Take 20 mg by mouth 2 times daily 0900/1300 1/11/2023 Yes Leo Melgar MD Yes    gabapentin (NEURONTIN) 100 MG capsule Take 1 capsule (100 mg) by mouth 2 times daily 1/11/2023 Yes Leo Melgar MD Yes    glipiZIDE (GLUCOTROL) 5 MG tablet Take 5 mg by mouth daily 1/11/2023 Yes Unknown, Entered By History No    Lidocaine (LIDOCARE) 4 % Patch Place 1 patch onto the skin every 24 hours To prevent lidocaine toxicity, patient should be patch free for 12 hrs daily. Apply in AM to lower back 1/11/2023 Yes Unknown, Entered By History     metFORMIN (GLUCOPHAGE) 500 MG tablet Take 500 mg by mouth daily  1/11/2023 Yes  Leo Melgar MD Yes    polyethylene glycol (MIRALAX/GLYCOLAX) Packet Take 8.5 g by mouth every 72 hours 1/9/2023 Yes Unknown, Entered By History     pramipexole (MIRAPEX) 0.125 MG tablet Take 0.375 mg by mouth every evening 1700 1/10/2023 Yes Unknown, Entered By History No    simvastatin (ZOCOR) 5 MG tablet Take 1 tablet (5 mg) by mouth every evening 1/10/2023 Yes Kendall Saba MD Yes    sitagliptin (JANUVIA) 50 MG tablet Take 1 tablet (50 mg) by mouth daily 1/11/2023 Yes Leo Melgar MD Yes    spironolactone (ALDACTONE) 25 MG tablet Take 0.5 tablets (12.5 mg) by mouth every other day 1/10/2023 Yes Leo Melgar MD Yes    blood glucose calibration (NO BRAND SPECIFIED) solution Use to calibrate blood glucose monitor as needed as directed. To accompany: Accu-Chek Safe-T pro plus   Leo Melgar MD     blood glucose monitoring (NO BRAND SPECIFIED) test strip Use to test blood sugar 1 times daily or as directed. To accompany: Accu-Chek safety pro plus   Leo Melgar MD     blood glucose monitoring (ONE TOUCH ULTRA 2) meter device kit Use to test blood sugar 1 times daily or as directed.   Leo Melgar MD     thin (NO BRAND SPECIFIED) lancets Use to test blood sugar 1 times daily or as directed. To accompany: Accu-Cheks safety pro plus   Leo Melgar MD         The information provided in this note is only as accurate as the sources available at the time of update(s)

## 2023-01-11 NOTE — ED NOTES
Straight cath to collect urine sample. Noted cloudy, odorous urine.  Patient's pad remained dry. Placed purwick for urine collection.   Blood pressure (!) 126/93, pulse 72, temperature 97.6  F (36.4  C), temperature source Oral, resp. rate 20, weight 81.2 kg (179 lb), SpO2 95 %, not currently breastfeeding.

## 2023-01-11 NOTE — CONSULTS
[facility]    Neurosurgery Consultation     Date of Admission:  1/11/2023  Date of Consult (When I saw the patient): 01/11/23    Assessment & Plan   Regla Benavidez is a 89 year old female with history of dementia who was admitted on 1/11/2023 with back pain after an unwitnessed fall at her assisted living facility. Radiographic findings include a right C3 spinous process fracture, acute obliquely oriented fracture through the anterior aspect of the T11 vertebral body that is widened up to 6 mm, subacute obliquely oriented two column fracture of the L2 vertebral body with posterior extension, in the setting of diffuse idiopathic skeletal hyperostosis.     Plan:   - No surgical intervention planned at this time  - Admission through Hospitalist team   - Thoracic/lumbar spine MRI for further evaluation   - Orthotics consult for TLSO brace  - Continue pain control measures as needed   - Appreciate assistance from specialties     I have discussed the following assessment and plan with Dr. Lund.     Brielle Kent, CNP  [facility] [place] [address]  Tel [phone]  Pager [phone]    Code Status    Prior    Reason for Consult   Reason for consult: I was asked by Dr. Dawson to evaluate this patient for spine fractures s/p fall.    Primary Care Physician   Physician No Ref-Primary    Chief Complaint   Back pain, spine fractures     History is obtained from the electronic health record, emergency department physician and patient's daughter    History of Present Illness   Regla Benavidez is a 89 year old female with history of dementia who was admitted on 1/11/2023 with back pain after an unwitnessed fall at her assisted living facility. Per chart review, patient was found down on the floor. She presented to the ED via EMS. Daughter at bedside states patient is at baseline neuro exam with aphasia related to  prior stroke. On exam, patient reports continued back pain. Denies neck pain. Denies radicular leg pain or numbness. She has generalized weakness but able to move all extremities. Radiographic findings include a right C3 spinous process fracture, acute obliquely oriented fracture through the anterior aspect of the T11 vertebral body that is widened up to 6 mm, subacute obliquely oriented two column fracture of the L2 vertebral body with posterior extension, in the setting of diffuse idiopathic skeletal hyperostosis.     CT CERVICAL SPINE WITHOUT CONTRAST  1/11/2023 1:36 PM   IMPRESSION:   1. Age-indeterminate fracture involving the right C3 spinous process,  potentially acute.  2. Severe degenerative change with multiple stenoses.    CT THORACIC SPINE WITHOUT CONTRAST January 11, 2023 2:00 PM                 IMPRESSION:  1. Obliquely oriented acute fracture through the anterior aspect of  the T11 vertebral body with an oblique orientation such that it  involves the ventral cortical margin and the mid aspect of the  superior endplate. This fracture is widened up to 6 mm, with no  associated vertebral body height loss and no extension to involve the  posterior elements. 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 While the above-described fracture is single column,  there is an increased likelihood of instability or widening at this  fracture given the background diffuse ankylosis of the spine.  2. No additional thoracic spine fractures elsewhere.  3. Osteopenia with degenerative change as above. No definite  high-grade spinal canal or neural foraminal stenosis.  4. Please see separate CT chest abdomen pelvis report with regards to  lung and mediastinal findings.    CT LUMBAR SPINE WITHOUT CONTRAST January 11, 2023 2:00 PM   IMPRESSION:  1. Fracture obliquely oriented two column fracture of the L2 vertebral  body with anterior  extension to involve the ventral cortical margin  and posterior extension to involve the posterior aspect of the L2  inferior endplate. There is no adjacent prevertebral soft tissue  swelling, and the margins of the above described fracture are  well-corticated/sclerotic. Given these findings, a subacute or chronic  fracture is favored over an acute fracture.  2. Diffuse idiopathic skeletal hyperostosis contributes to an  ankylosis of the lumbar spine. This could have increased the  likelihood of nonunion at the above described L2 fracture.  3. Multilevel degenerative change as above. Spinal canal and neural  foraminal stenosis as described above.  4. Please see separate CT abdomen and pelvis regarding abdominal solid  organ findings.    Past Medical History   I have reviewed this patient's medical history and updated it with pertinent information if needed.   Past Medical History:   Diagnosis Date     Aspiration pneumonia (H) 11/15/2014    11/14; and UTI with sepsis      Cholelithiasis 2009    incidental finding     DJD (degenerative joint disease) of lumbar spine 2009     Elevated glucose      Expressive aphasia related to prior stroke      Hard of hearing     hearing aids     HTN (hypertension)      Shingles 2005     Varicose vein of leg        Past Surgical History   I have reviewed this patient's surgical history and updated it with pertinent information if needed.  Past Surgical History:   Procedure Laterality Date     CATARACT IOL, RT/LT         Prior to Admission Medications   Prior to Admission Medications   Prescriptions Last Dose Informant Patient Reported? Taking?   Lidocaine (LIDOCARE) 4 % Patch 1/11/2023 Nursing Home Yes Yes   Sig: Place 1 patch onto the skin every 24 hours To prevent lidocaine toxicity, patient should be patch free for 12 hrs daily. Apply in AM to lower back   acetaminophen (TYLENOL) 325 MG tablet 1/11/2023 alf Yes Yes   Sig: Take 650 mg by mouth 3 times daily 0800/1200/1700    acetaminophen (TYLENOL) 500 MG tablet 1/10/2023 at [facility] Yes Yes   Sig: Take 500 mg by mouth 2 times daily as needed for mild pain   blood glucose calibration (NO BRAND SPECIFIED) solution   No No   Sig: Use to calibrate blood glucose monitor as needed as directed. To accompany: Accu-Chek Safe-T pro plus   blood glucose monitoring (NO BRAND SPECIFIED) test strip   No No   Sig: Use to test blood sugar 1 times daily or as directed. To accompany: Accu-Chek safety pro plus   blood glucose monitoring (ONE TOUCH ULTRA 2) meter device kit   No No   Sig: Use to test blood sugar 1 times daily or as directed.   diclofenac (VOLTAREN) 1 % topical gel 1/11/2023  Yes Yes   Sig: Apply 2 g topically 3 times daily 0900, 1300, 2000   famotidine (PEPCID) 20 MG tablet 1/11/2023 residential No Yes   Sig: Take 1 tablet (20 mg) by mouth 2 times daily   furosemide (LASIX) 20 MG tablet 1/11/2023 residential Yes Yes   Sig: Take 20 mg by mouth 2 times daily 0900/1300   gabapentin (NEURONTIN) 100 MG capsule 1/11/2023 residential No Yes   Sig: Take 1 capsule (100 mg) by mouth 2 times daily   glipiZIDE (GLUCOTROL) 5 MG tablet 1/11/2023  Yes Yes   Sig: Take 5 mg by mouth daily   metFORMIN (GLUCOPHAGE) 500 MG tablet 1/11/2023 residential Yes Yes   Sig: Take 500 mg by mouth daily    polyethylene glycol (MIRALAX/GLYCOLAX) Packet 1/9/2023 residential Yes Yes   Sig: Take 8.5 g by mouth every 72 hours   pramipexole (MIRAPEX) 0.125 MG tablet 1/10/2023 residential Yes Yes   Sig: Take 0.375 mg by mouth every evening 1700   simvastatin (ZOCOR) 5 MG tablet 1/10/2023 residential No Yes   Sig: Take 1 tablet (5 mg) by mouth every evening   sitagliptin (JANUVIA) 50 MG tablet 1/11/2023 residential No Yes   Sig: Take 1 tablet (50 mg) by mouth daily   spironolactone (ALDACTONE) 25 MG tablet 1/10/2023 residential No Yes   Sig: Take 0.5 tablets (12.5 mg) by mouth every other day   thin (NO BRAND SPECIFIED) lancets   No No   Sig: Use to test  blood sugar 1 times daily or as directed. To accompany: Accu-Cheks safety pro plus      Facility-Administered Medications: None     Allergies   Allergies   Allergen Reactions     Cozaar [Losartan Potassium] Swelling     Leg swelling. Same from Avapro.     Hydrochlorothiazide Other (See Comments)     hyponatremia     Hydrocodone Nausea and Vomiting     Nausea       Social History   I have reviewed this patient's social history and updated it with pertinent information if needed. Regla Benavidez  reports that she has never smoked. She has never used smokeless tobacco. She reports that she does not drink alcohol and does not use drugs.    Family History   I have reviewed this patient's family history and updated it with pertinent information if needed.   Family History   Problem Relation Age of Onset     Diabetes Brother      Unknown/Adopted Mother      Unknown/Adopted Father      Coronary Artery Disease No family hx of      Hypertension No family hx of      Hyperlipidemia No family hx of      Cerebrovascular Disease No family hx of      Breast Cancer No family hx of      Colon Cancer No family hx of      Prostate Cancer No family hx of      Other Cancer No family hx of      Depression No family hx of      Anxiety Disorder No family hx of      Mental Illness No family hx of      Substance Abuse No family hx of      Anesthesia Reaction No family hx of      Asthma No family hx of      Osteoporosis No family hx of      Genetic Disorder No family hx of      Thyroid Disease No family hx of      Obesity No family hx of        Review of Systems   10 point ROS negative other than symptoms noted in HPI.    Physical Exam   Temp: 98.4  F (36.9  C) Temp src: Oral BP: 136/80 Pulse: 80   Resp: 22 SpO2: 98 % O2 Device: Nasal cannula Oxygen Delivery: 2 LPM  Vital Signs with Ranges  Temp:  [97.6  F (36.4  C)-98.4  F (36.9  C)] 98.4  F (36.9  C)  Pulse:  [72-87] 80  Resp:  [18-24] 22  BP: (126-150)/(67-93) 136/80  FiO2 (%):  [21 %] 21  %  SpO2:  [94 %-98 %] 98 %  179 lbs 0 oz     , Blood pressure 136/80, pulse 80, temperature 98.4  F (36.9  C), temperature source Oral, resp. rate 22, weight 81.2 kg (179 lb), SpO2 98 %, not currently breastfeeding.  179 lbs 0 oz    NEUROLOGICAL EXAMINATION:   Awake, alert, baseline dementia, hard of hearing   Baseline aphasia related to prior stroke   Able to follow simple commands   Generalized weakness but able to move all extremities   Cervical collar in place   Unable to assess tenderness to palpation of spine due to patient's pain/discomfort with movement in bed.     Data     CBC RESULTS:   Recent Labs   Lab Test 01/11/23  1035   WBC 19.3*   RBC 4.01   HGB 12.6   HCT 38.4   MCV 96   MCH 31.4   MCHC 32.8   RDW 13.2        Basic Metabolic Panel:  Lab Results   Component Value Date     01/11/2023     04/28/2021      Lab Results   Component Value Date    POTASSIUM 4.5 01/11/2023    POTASSIUM 4.4 05/06/2021     Lab Results   Component Value Date    CHLORIDE 103 01/11/2023    CHLORIDE 107 04/28/2021     Lab Results   Component Value Date    AAYUSH 8.9 01/11/2023    AAYUSH 9.2 04/28/2021     Lab Results   Component Value Date    CO2 25 01/11/2023    CO2 30 04/28/2021     Lab Results   Component Value Date    BUN 37 01/11/2023    BUN 39 04/28/2021     Lab Results   Component Value Date    CR 1.16 01/11/2023    CR 1.06 05/06/2021     Lab Results   Component Value Date     01/11/2023     05/06/2021     INR:  Lab Results   Component Value Date    INR 1.17 01/11/2023    INR 1.11 04/28/2021

## 2023-01-11 NOTE — H&P
[facility]    History and Physical - Hospitalist Service       Date of Admission:  1/11/2023    Assessment & Plan      Regla Benavidez is a 89 year old female admitted on 1/11/2023. She presents with a fall and neck and back pain.       Fall at home, initial encounter    Closed fracture of spinous process of cervical vertebra, initial encounter (H)    Closed fracture of thoracic vertebral body (H)    Closed fracture of lumbar vertebral body (H)    Assessment: Presents with neck/back pain following a unwitnessed fall.  n CT cervical shows Age-indeterminate fracture involving the right C3 spinous process, potentially acute. CT thoracic shows Obliquely oriented acute fracture through the anterior aspect of the T11 vertebral body with an oblique orientation such that it involves the ventral cortical margin and the mid aspect of the superior endplate. This fracture is widened up to 6 mm. CT lumbar shows fracture obliquely oriented two column fracture of the L2 vertebral body with anterior extension.    Plan:   -Admit inpatient  -Neurosurgery was consulted, recommend MRI for further assessment of fractures.  -No surgical intervention warranted at this time, family does not wish to pursue this  -Fall precautions  -Pain control as needed  -Social work consulted  -Physical therapy evaluation        Acute respiratory failure with hypoxia (H)    Hemothorax, right    Assessment: CT chest shows moderate right hemothorax with possible element of active  hemorrhage layering dependently, source not definitely visualized. S/p chest tube placement.     Plan:   - thoracic surgery eval      Urinary tract infection without hematuria, site unspecified    Recurrent UTI    Assessment: UA is highly suggestive of an active UTI, possibly contributing to her fall.    Plan:   -IV ceftriaxone      Cerebral infarction (H)    Assessment/Plan: Baseline with expressive aphasia, otherwise no acute focal deficits at this  time.       Chronic midline low back pain without sciatica    Assessment/Plan: pain control PRN      CKD (chronic kidney disease) stage 3, GFR 30-59 ml/min (H)    Assessment/Plan: Creatinine on admission 1.16, around baseline, will hydrate overnight and avoid NSAIDs/nephrotoxins.      Type 2 diabetes mellitus with stage 3 chronic kidney disease, without long-term current use of insulin (H)    Diabetic polyneuropathy associated with other specified diabetes mellitus (H)    Assessment/Plan: PTA      Hypertension goal BP (blood pressure) < 140/80    Hyperlipidemia.     Assessment/Plan: Can resume prior to admission Lasix/spironolactone in a.m. if vitals stable.  Resume prior to admission statin once verified.       Diet: NPO for Medical/Clinical Reasons Except for: No Exceptions    DVT Prophylaxis: Pneumatic Compression Devices  Garduno Catheter: Not present  Lines: None     Cardiac Monitoring: None  Code Status:   DNR / DNI    Clinically Significant Risk Factors Present on Admission              # Hypoalbuminemia: Lowest albumin = 3.4 g/dL at 1/11/2023 10:35 AM, will monitor as appropriate  # Coagulation Defect: INR = 1.17 (Ref range: 0.85 - 1.15) and/or PTT = 25 Seconds (Ref range: 22 - 38 Seconds), will monitor for bleeding                 Disposition Plan      Expected Discharge Date: 01/13/2023                  Blas Smith MD  Hospitalist Service  [facility]  Securely message with [employer] (more info)  Text page via [facility] Paging/Directory     ______________________________________________________________________    Chief Complaint     Fall   Neck / Back Pain    History is obtained from the patient    History of Present Illness      Regla Benavidez is a 89 year old female with past medical history of recurrent UTIs, CVA, hypertension, chronic kidney disease who presents for evaluation of Neck pain, back pain and a mechanical fall.    Limited secondary to patient's dementia.    EMS reports  that the patient was found today on the floor of her living facility by staff.  She reported of significant back pain and neck pain.  It appears that her fall was unwitnessed.  She currently reports significant pain in her back/neck/right side.  She also endorsed some right-sided chest pain.  No recent fevers or chills.  Patient denies any nausea or vomiting at this time.  She does not recall the events leading up to her fall at home.  Denies any recent abdominal pain or bloody stools.  She otherwise has no other complaints this time.    Past Medical History    Past Medical History:   Diagnosis Date     Aspiration pneumonia (H) 11/15/2014    11/14; and UTI with sepsis      Cholelithiasis 2009    incidental finding     DJD (degenerative joint disease) of lumbar spine 2009     Elevated glucose      Expressive aphasia related to prior stroke      Hard of hearing     hearing aids     HTN (hypertension)      Shingles 2005     Varicose vein of leg        Past Surgical History   Past Surgical History:   Procedure Laterality Date     CATARACT IOL, RT/LT         Prior to Admission Medications   Prior to Admission Medications   Prescriptions Last Dose Informant Patient Reported? Taking?   Lidocaine (LIDOCARE) 4 % Patch 1/11/2023 Nursing Home Yes Yes   Sig: Place 1 patch onto the skin every 24 hours To prevent lidocaine toxicity, patient should be patch free for 12 hrs daily. Apply in AM to lower back   acetaminophen (TYLENOL) 325 MG tablet 1/11/2023 [facility] Yes Yes   Sig: Take 650 mg by mouth 3 times daily 0800/1200/1700   acetaminophen (TYLENOL) 500 MG tablet 1/10/2023 at [facility] Yes Yes   Sig: Take 500 mg by mouth 2 times daily as needed for mild pain   blood glucose calibration (NO BRAND SPECIFIED) solution   No No   Sig: Use to calibrate blood glucose monitor as needed as directed. To accompany: Accu-Chek Safe-T pro plus   blood glucose monitoring (NO BRAND SPECIFIED) test strip   No No   Sig: Use to test  blood sugar 1 times daily or as directed. To accompany: Accu-Chek safety pro plus   blood glucose monitoring (ONE TOUCH ULTRA 2) meter device kit   No No   Sig: Use to test blood sugar 1 times daily or as directed.   diclofenac (VOLTAREN) 1 % topical gel 1/11/2023  Yes Yes   Sig: Apply 2 g topically 3 times daily 0900, 1300, 2000   famotidine (PEPCID) 20 MG tablet 1/11/2023 [facility] No Yes   Sig: Take 1 tablet (20 mg) by mouth 2 times daily   furosemide (LASIX) 20 MG tablet 1/11/2023 [facility] Yes Yes   Sig: Take 20 mg by mouth 2 times daily 0900/1300   gabapentin (NEURONTIN) 100 MG capsule 1/11/2023 [facility] No Yes   Sig: Take 1 capsule (100 mg) by mouth 2 times daily   glipiZIDE (GLUCOTROL) 5 MG tablet 1/11/2023  Yes Yes   Sig: Take 5 mg by mouth daily   metFORMIN (GLUCOPHAGE) 500 MG tablet 1/11/2023 [facility] Yes Yes   Sig: Take 500 mg by mouth daily    polyethylene glycol (MIRALAX/GLYCOLAX) Packet 1/9/2023 [facility] Yes Yes   Sig: Take 8.5 g by mouth every 72 hours   pramipexole (MIRAPEX) 0.125 MG tablet 1/10/2023 [facility] Yes Yes   Sig: Take 0.375 mg by mouth every evening 1700   simvastatin (ZOCOR) 5 MG tablet 1/10/2023 [facility] No Yes   Sig: Take 1 tablet (5 mg) by mouth every evening   sitagliptin (JANUVIA) 50 MG tablet 1/11/2023 [facility] No Yes   Sig: Take 1 tablet (50 mg) by mouth daily   spironolactone (ALDACTONE) 25 MG tablet 1/10/2023 [facility] No Yes   Sig: Take 0.5 tablets (12.5 mg) by mouth every other day   thin (NO BRAND SPECIFIED) lancets   No No   Sig: Use to test blood sugar 1 times daily or as directed. To accompany: Accu-Cheks safety pro plus      Facility-Administered Medications: None        Review of Systems    The 10 point Review of Systems is negative other than noted in the HPI or here.     Social History   I have reviewed this patient's social history and updated it with pertinent information if needed.  Social History     Tobacco Use     Smoking status:  Never     Smokeless tobacco: Never   Substance Use Topics     Alcohol use: No     Drug use: No       Family History   I have reviewed this patient's family history and updated it with pertinent information if needed.  Family History   Problem Relation Age of Onset     Diabetes Brother      Unknown/Adopted Mother      Unknown/Adopted Father      Coronary Artery Disease No family hx of      Hypertension No family hx of      Hyperlipidemia No family hx of      Cerebrovascular Disease No family hx of      Breast Cancer No family hx of      Colon Cancer No family hx of      Prostate Cancer No family hx of      Other Cancer No family hx of      Depression No family hx of      Anxiety Disorder No family hx of      Mental Illness No family hx of      Substance Abuse No family hx of      Anesthesia Reaction No family hx of      Asthma No family hx of      Osteoporosis No family hx of      Genetic Disorder No family hx of      Thyroid Disease No family hx of      Obesity No family hx of        Allergies   Allergies   Allergen Reactions     Cozaar [Losartan Potassium] Swelling     Leg swelling. Same from Avapro.     Hydrochlorothiazide Other (See Comments)     hyponatremia     Hydrocodone Nausea and Vomiting     Nausea        Physical Exam   Vital Signs: Temp: 98.4  F (36.9  C) Temp src: Oral BP: (!) 138/124 Pulse: 90   Resp: 25 SpO2: 98 % O2 Device: Nasal cannula Oxygen Delivery: 2 LPM  Weight: 179 lbs 0 oz    Constitutional: awake, alert, cooperative, no apparent distress.   Eyes: Lids and lashes normal, pupils equal, round and reactive to light   ENT: Normocephalic, without obvious abnormality, atraumatic, sinuses nontender on palpation   Hematologic / Lymphatic: no cervical lymphadenopathy   Neck: in C-collar  Respiratory: CTABL, chest tube present.   Cardiovascular: RRR with no m/r/g   GI: Normal bowel sounds, soft, non-distended, non-tender.   Skin: normal skin color, texture, turgor   Musculoskeletal: There is no  redness, warmth, or swelling of the joints.  Neurologic: Awake, alert, oriented to name only. Cranial nerves II-XII are grossly intact. Motor is 5 out of 5 bilaterally. Sensory is intact.   Neuropsychiatric: normal mood and affect    ----------------------------------------------------------------------------------------------------------------------------------    Medical Decision Making       75 MINUTES SPENT BY ME on the date of service doing chart review, history, exam, documentation & further activities per the note.      Data     I have personally reviewed the following data over the past 24 hrs:    19.3 (H)  \   12.6   / 179     137 103 37 (H) /  302 (H)   4.5 25 1.16 (H) \       ALT: 27 AST: 43 AP: 62 TBILI: 0.8   ALB: 3.4 TOT PROTEIN: 6.7 (L) LIPASE: 37 (L)       INR:  1.17 (H) PTT:  25   D-dimer:  N/A Fibrinogen:  N/A       Imaging results reviewed over the past 24 hrs:   Recent Results (from the past 24 hour(s))   CT Head w/o Contrast    Narrative    CT SCAN OF THE HEAD WITHOUT CONTRAST   1/11/2023 1:35 PM     HISTORY: Fall, head injury.    TECHNIQUE:  Axial images of the head and coronal reformations without  IV contrast material. Radiation dose for this scan was reduced using  automated exposure control, adjustment of the mA and/or kV according  to patient size, or iterative reconstruction technique.    COMPARISON: Head CT 5/23/2022    FINDINGS:  No evidence of hemorrhage. 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 Volume loss and white matter  hypoattenuation likely represents chronic small vessel ischemic  change. No acute osseous abnormality. Posterior scalp contusion  without evidence of underlying skull fracture. Suspected right pleural  effusion on  imaging.      Impression    IMPRESSION:   1. No acute intracranial abnormality.  2. Chronic changes as detailed.    PRASHANT HPILIP MD         SYSTEM ID:  [id number]   CT Cervical Spine w/o Contrast    Narrative    CT  CERVICAL SPINE WITHOUT CONTRAST  1/11/2023 1:36 PM     HISTORY: Fall, head injury. Evaluate for neck injury.     TECHNIQUE: Axial images of the cervical spine were obtained without  intravenous contrast. Multiplanar reformations were performed.   Radiation dose for this scan was reduced using automated exposure  control, adjustment of the mA and/or kV according to patient size, or  iterative reconstruction technique.    COMPARISON: None.    FINDINGS: Mild motion artifact. Age-indeterminate fracture involving  the right C3 spinous process. No other fractures are identified.  Severe degenerative disc disease involving the mid to lower cervical  spine. Multilevel facet arthropathy. Disc osteophyte complexes with  central protrusion component contribute to mild to moderate spinal  canal stenosis at multiple levels. Moderate to severe spinal canal  stenoses at C5-C6 and C6-C7. Severe foraminal stenosis at multiple  levels bilaterally. Scattered vascular calcifications. Right pleural  effusion. Refer to chest report.      Impression    IMPRESSION:   1. Age-indeterminate fracture involving the right C3 spinous process,  potentially acute.  2. Severe degenerative change with multiple stenoses.    PRASHANT PHILIP MD         SYSTEM ID:  [id number]   CT Chest/Abdomen/Pelvis w Contrast   Result Value    Radiologist flags Active extravasation (AA)    Narrative    CT CHEST/ABDOMEN/PELVIS W CONTRAST 1/11/2023 1:36 PM    CLINICAL HISTORY: Fall, dementia, back pain    TECHNIQUE: CT scan of the chest, abdomen, and pelvis was performed  following injection of IV contrast. Multiplanar reformats were  obtained. Dose reduction techniques were used.   CONTRAST: 90mL [id number]    COMPARISON: Chest radiograph 5/23/2022    FINDINGS:   LUNGS AND PLEURA: Moderate volume high density pleural effusion in the  right hemithorax, suspicious for hemothorax. There is dependent  markedly hyperdense material within the pleural space which  could  represent an element of active hemorrhage (for example series 2 image  61). Associated atelectasis of the right lower lobe. No pneumothorax.  Left lung is clear.    MEDIASTINUM/AXILLAE: No evidence of acute trauma. No suspicious  lymphadenopathy.    CORONARY ARTERY CALCIFICATION: Moderate.    HEPATOBILIARY: Cholelithiasis without evidence of acute cholecystitis.    PANCREAS: Normal.    SPLEEN: Normal.    ADRENAL GLANDS: Normal.    KIDNEYS/BLADDER: Likely bilateral renal cysts, no specific follow-up  recommended. No hydronephrosis. Urinary bladder demonstrates marked  wall thickening internal foci of gas and small diverticuli. Small  cystocele, best appreciated on the lateral view.    BOWEL: No obstruction or inflammatory change. No mesenteric hematoma  or pneumoperitoneum.    PELVIC ORGANS: Large left adnexal cyst measuring up to 6.8 x 4.4 cm  with some likely within internal septations with associated  calcification.    ADDITIONAL FINDINGS: Moderate calcified and noncalcified  atherosclerosis.    MUSCULOSKELETAL: Acute appearing fracture involving the anterior  aspect of the T11 vertebral body without definite extension in the  posterior elements. Additional fracture of the L2 vertebral body,  favor chronic given surrounding sclerosis. No additional fractures  visualized. Of note, no highly displaced right rib fractures are  visualized, although extensive motion artifact could obscure a mildly  displaced fracture.      Impression    IMPRESSION:  1.  Moderate right hemothorax with possible element of active  hemorrhage layering dependently, source not definitely visualized.  2.  Acute appearing fracture of the anterior T11 vertebral body  without definite extension into the posterior elements. Please see  concurrent spine reformats for additional description.  3.  No definite displaced rib fractures visualized, although extensive  motion artifact could obscure a mildly displaced fracture  4.  No evidence of  acute intraperitoneal trauma.  5.  Findings suggestive of chronic bladder outlet obstruction.  6.  Incidental minimally complex 6.8 cm left adnexal cyst. Could  consider further evaluation with ultrasound on a nonemergent basis if  clinically indicated.      [Critical Result: Active extravasation]    Finding was identified on 1/11/2023 1:39 PM.     Dr. Dawson was contacted by me on 1/11/2023 1:56 PM and verbalized  understanding of the critical result.     LOU CORONADO MD         SYSTEM ID:  [id number]   CT Thoracic Spine w/o Contrast    Narrative    CT THORACIC SPINE WITHOUT CONTRAST January 11, 2023 2:00 PM    INDICATION: Fall, back pain.    TECHNIQUE: CT scan of the thoracic spine without contrast. Dose  reduction techniques were used.    COMPARISON: None.    FINDINGS: Multilevel bridging anterior marginal osteophytes from T2  T3-T11 T12 are in a manner compatible with diffuse idiopathic skeletal  hyperostosis. This contributes to diffuse ankylosis of the thoracic  spine. Obliquely oriented acute fracture involving the anterior aspect  of the T11 vertebral body extending through the ventral cortical  margin and the mid aspect of the superior T11 endplate. This fracture  is widened up to 6 mm. No associated vertebral body height loss. No  extension to involve the posterior elements. While this is only a  single column fracture, the possibility for instability is elevated  given the diffuse ankylosis of the thoracic spine.    No additional acute thoracic spine fractures elsewhere.  Dextroscoliotic curvature to the thoracic spine. Exaggerated thoracic  kyphosis. Diffuse osteopenia. Multilevel mild degenerative disc  disease with a mild to moderate T12-L1 degenerative disc disease.  Multilevel mild bilateral facet arthropathy. No identifiable  high-grade spinal canal or neural foraminal stenosis. Please see  separate CT chest, abdomen and pelvis regarding lung and mediastinal  findings.      Impression     IMPRESSION:  1. Obliquely oriented acute fracture through the anterior aspect of  the T11 vertebral body with an oblique orientation such that it  involves the ventral cortical margin and the mid aspect of the  superior endplate. This fracture is widened up to 6 mm, with no  associated vertebral body height loss and no extension to involve the  posterior elements. 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 While the above-described fracture is single column,  there is an increased likelihood of instability or widening at this  fracture given the background diffuse ankylosis of the spine.  2. No additional thoracic spine fractures elsewhere.  3. Osteopenia with degenerative change as above. No definite  high-grade spinal canal or neural foraminal stenosis.  4. Please see separate CT chest abdomen pelvis report with regards to  lung and mediastinal findings.    Findings and impression discussed with Dr. Dawson by phone at 1434 hours  on 1/11/2023.    PRASHANT ASHRAF MD         SYSTEM ID:  [id number]   Lumbar spine CT w/o contrast    Narrative    CT LUMBAR SPINE WITHOUT CONTRAST January 11, 2023 2:00 PM    INDICATION: Fall, back pain.    TECHNIQUE: CT scan of the lumbar spine without contrast. Dose  reduction techniques were used.    COMPARISON: 12/21/2018 lumbar spine plain film.    FINDINGS: Five nonrib-bearing lumbar-type vertebrae. Obliquely  oriented fracture through the L2 vertebral body with involvement of  the ventral cortical margin anteriorly and oblique extension to  involve the posterior inferior endplate of L2. This demonstrates  sclerotic margins with a small amount of internal air. In addition,  there is no significant adjacent prevertebral soft tissue swelling.  Given the paucity of soft tissue swelling and the sclerotic/corticated  margins, this is favored to reflect a subacute or chronic fracture  rather than an acute  fracture. No definite acute lumbar spine fracture  elsewhere. Multilevel bridging anterior marginal osteophytes are in a  manner compatible with diffuse idiopathic skeletal hyperostosis.  Osteopenia with maintained vertebral body heights. Levoscoliotic  curvature with minimal anterolisthesis of L3 on L4. Severe L5-S1  degenerative disc disease. Additional multilevel mild degenerative  disc disease elsewhere. Moderate bilateral L2-L3, L3-L4 facet  arthropathy. Severe bilateral L4-L5 facet arthropathy with moderate  right and moderate to severe left L5-S1 facet arthropathy. At least  moderate L2-L3 and severe L3-L4 spinal canal stenosis. Severe L4-L5  and moderate L5-S1 spinal canal stenosis. On the right, there is a  moderate L1 L2-L3 L4 neural foraminal stenosis with moderate right  L5-S1 neural foraminal stenosis. On the left, there is moderate L2-L3  and L3-L4 neural foraminal stenosis with multilevel mild-to-moderate  neural foraminal stenosis elsewhere. Degenerative change at the  bilateral sacroiliac joints. Please see separate CT chest, abdomen and  pelvis regarding abdominal solid organ findings      Impression    IMPRESSION:  1. Fracture obliquely oriented two column fracture of the L2 vertebral  body with anterior extension to involve the ventral cortical margin  and posterior extension to involve the posterior aspect of the L2  inferior endplate. There is no adjacent prevertebral soft tissue  swelling, and the margins of the above described fracture are  well-corticated/sclerotic. Given these findings, a subacute or chronic  fracture is favored over an acute fracture.  2. Diffuse idiopathic skeletal hyperostosis contributes to an  ankylosis of the lumbar spine. This could have increased the  likelihood of nonunion at the above described L2 fracture.  3. Multilevel degenerative change as above. Spinal canal and neural  foraminal stenosis as described above.  4. Please see separate CT abdomen and pelvis  regarding abdominal solid  organ findings.    Findings and impression discussed with Dr. Dawson by phone at 1434 hours  on 1/11/2023.    PRASHANT ASHRAF MD         SYSTEM ID:  [id number]   XR Chest Port 1 View    Narrative    EXAM: XR CHEST PORT 1 VIEW  LOCATION: [facility]  DATE/TIME: 1/11/2023 4:33 PM    INDICATION: hemothorax  COMPARISON: Same date CT      Impression    IMPRESSION: Placement of right chest tube with decrease in volume of previously described right hemothorax and improved aeration of the right lung. No significant pneumothorax. Left lung is clear. Bones are unchanged.     Most Recent 3 CBC's:Recent Labs   Lab Test 01/11/23  1035 05/24/22  1327 05/23/22  0840   WBC 19.3* 9.7 9.8   HGB 12.6 13.0 13.4   MCV 96 97 100    219 215     Most Recent 3 BMP's:Recent Labs   Lab Test 01/11/23  1035 05/25/22  1156 05/25/22  0106 05/24/22  1700 05/24/22  1327 05/23/22  1703 05/23/22  0840     --   --   --  134  --  140   POTASSIUM 4.5  --   --   --  4.5  --  4.6   CHLORIDE 103  --   --   --  103  --  107   CO2 25  --   --   --  26  --  29   BUN 37*  --   --   --  27  --  32*   CR 1.16*  --   --   --  0.87  --  0.97   ANIONGAP 9  --   --   --  5  --  4   AAYUSH 8.9  --   --   --  8.9  --  8.7   * 156* 163*   < > 189*   < > 161*    < > = values in this interval not displayed.     Most Recent 2 LFT's:Recent Labs   Lab Test 01/11/23 1035 05/23/22  0840   AST 43 16   ALT 27 23   ALKPHOS 62 69   BILITOTAL 0.8 0.5     Most Recent 3 INR's:Recent Labs   Lab Test 01/11/23 1034 04/28/21  1648   INR 1.17* 1.11

## 2023-01-11 NOTE — ED TRIAGE NOTES
Pt BIBA from assisted living where she had a unwitnessed fall in bathroom. Found supine by staff. Has small lac to back of head. Patient has dementia at baseline. C/o R low back/side pain.

## 2023-01-11 NOTE — ED NOTES
Bed: ED24  Expected date:   Expected time:   Means of arrival:   [place]  [facility] [phone] 89 [place] head hip pain eta 8320

## 2023-01-11 NOTE — ED NOTES
[facility]  ED Nurse Handoff Report    ED Chief complaint: Fall and Back Pain      ED Diagnosis:   Final diagnoses:   None       Code Status: Full Code    Allergies:   Allergies   Allergen Reactions     Cozaar [Losartan Potassium] Swelling     Leg swelling. Same from Avapro.     Hydrochlorothiazide Other (See Comments)     hyponatremia     Hydrocodone Nausea and Vomiting     Nausea       Patient Story:   Patient had a fall at her facility. Found supine with laceration at the back of her head. Also complained of pain on her right side. Found to have right pleural effusion and spinal abnormalities. Has a diagnosis of dementia.     Focused Assessment:    Alert, confused.   C-collar in place.   Laceration at the scalp. Large bruise at the left lateral leg.   Chest tube on the right being placed in stab room.   Purwick in place     Treatments and/or interventions provided:   Medications   ondansetron (ZOFRAN) injection 4 mg (has no administration in time range)   morphine (PF) injection 4 mg (has no administration in time range)   acetaminophen (TYLENOL) Suppository 650 mg (has no administration in time range)   ketamine (KETALAR) injection 80 mg (has no administration in time range)   0.9% sodium chloride BOLUS (0 mLs Intravenous Stopped 1/11/23 1415)   iopamidol (ISOVUE-370) solution 90 mL (90 mLs Intravenous Given 1/11/23 1307)   Saline Flush (67 mLs Intravenous Given 1/11/23 1308)   cefTRIAXone (ROCEPHIN) 1 g vial to attach to  mL bag for ADULTS or NS 50 mL bag for PEDS (1 g Intravenous New Bag 1/11/23 1530)   ketamine (KETALAR) injection 80 mg (80 mg Intravenous Given 1/11/23 1547)       Patient's response to treatments and/or interventions: *  Results for orders placed or performed during the hospital encounter of 01/11/23   CT Head w/o Contrast     Status: None    Narrative    CT SCAN OF THE HEAD WITHOUT CONTRAST   1/11/2023 1:35 PM     HISTORY: Fall, head injury.    TECHNIQUE:  Axial images  of the head and coronal reformations without  IV contrast material. Radiation dose for this scan was reduced using  automated exposure control, adjustment of the mA and/or kV according  to patient size, or iterative reconstruction technique.    COMPARISON: Head CT 5/23/2022    FINDINGS:  No evidence of hemorrhage. 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 Volume loss and white matter  hypoattenuation likely represents chronic small vessel ischemic  change. No acute osseous abnormality. Posterior scalp contusion  without evidence of underlying skull fracture. Suspected right pleural  effusion on  imaging.      Impression    IMPRESSION:   1. No acute intracranial abnormality.  2. Chronic changes as detailed.    PRASHANT PHILIP MD         SYSTEM ID:  [id number]   CT Cervical Spine w/o Contrast     Status: None    Narrative    CT CERVICAL SPINE WITHOUT CONTRAST  1/11/2023 1:36 PM     HISTORY: Fall, head injury. Evaluate for neck injury.     TECHNIQUE: Axial images of the cervical spine were obtained without  intravenous contrast. Multiplanar reformations were performed.   Radiation dose for this scan was reduced using automated exposure  control, adjustment of the mA and/or kV according to patient size, or  iterative reconstruction technique.    COMPARISON: None.    FINDINGS: Mild motion artifact. Age-indeterminate fracture involving  the right C3 spinous process. No other fractures are identified.  Severe degenerative disc disease involving the mid to lower cervical  spine. Multilevel facet arthropathy. Disc osteophyte complexes with  central protrusion component contribute to mild to moderate spinal  canal stenosis at multiple levels. Moderate to severe spinal canal  stenoses at C5-C6 and C6-C7. Severe foraminal stenosis at multiple  levels bilaterally. Scattered vascular calcifications. Right pleural  effusion. Refer to chest report.      Impression    IMPRESSION:   1. Age-indeterminate  fracture involving the right C3 spinous process,  potentially acute.  2. Severe degenerative change with multiple stenoses.    PRASHANT PHILIP MD         SYSTEM ID:  [id number]   CT Chest/Abdomen/Pelvis w Contrast     Status: Abnormal   Result Value Ref Range    Radiologist flags Active extravasation (AA)     Narrative    CT CHEST/ABDOMEN/PELVIS W CONTRAST 1/11/2023 1:36 PM    CLINICAL HISTORY: Fall, dementia, back pain    TECHNIQUE: CT scan of the chest, abdomen, and pelvis was performed  following injection of IV contrast. Multiplanar reformats were  obtained. Dose reduction techniques were used.   CONTRAST: 90mL ISOVUE-370    COMPARISON: Chest radiograph 5/23/2022    FINDINGS:   LUNGS AND PLEURA: Moderate volume high density pleural effusion in the  right hemithorax, suspicious for hemothorax. There is dependent  markedly hyperdense material within the pleural space which could  represent an element of active hemorrhage (for example series 2 image  61). Associated atelectasis of the right lower lobe. No pneumothorax.  Left lung is clear.    MEDIASTINUM/AXILLAE: No evidence of acute trauma. No suspicious  lymphadenopathy.    CORONARY ARTERY CALCIFICATION: Moderate.    HEPATOBILIARY: Cholelithiasis without evidence of acute cholecystitis.    PANCREAS: Normal.    SPLEEN: Normal.    ADRENAL GLANDS: Normal.    KIDNEYS/BLADDER: Likely bilateral renal cysts, no specific follow-up  recommended. No hydronephrosis. Urinary bladder demonstrates marked  wall thickening internal foci of gas and small diverticuli. Small  cystocele, best appreciated on the lateral view.    BOWEL: No obstruction or inflammatory change. No mesenteric hematoma  or pneumoperitoneum.    PELVIC ORGANS: Large left adnexal cyst measuring up to 6.8 x 4.4 cm  with some likely within internal septations with associated  calcification.    ADDITIONAL FINDINGS: Moderate calcified and noncalcified  atherosclerosis.    MUSCULOSKELETAL: Acute appearing fracture  involving the anterior  aspect of the T11 vertebral body without definite extension in the  posterior elements. Additional fracture of the L2 vertebral body,  favor chronic given surrounding sclerosis. No additional fractures  visualized. Of note, no highly displaced right rib fractures are  visualized, although extensive motion artifact could obscure a mildly  displaced fracture.      Impression    IMPRESSION:  1.  Moderate right hemothorax with possible element of active  hemorrhage layering dependently, source not definitely visualized.  2.  Acute appearing fracture of the anterior T11 vertebral body  without definite extension into the posterior elements. Please see  concurrent spine reformats for additional description.  3.  No definite displaced rib fractures visualized, although extensive  motion artifact could obscure a mildly displaced fracture  4.  No evidence of acute intraperitoneal trauma.  5.  Findings suggestive of chronic bladder outlet obstruction.  6.  Incidental minimally complex 6.8 cm left adnexal cyst. Could  consider further evaluation with ultrasound on a nonemergent basis if  clinically indicated.      [Critical Result: Active extravasation]    Finding was identified on 1/11/2023 1:39 PM.     Dr. Dawson was contacted by me on 1/11/2023 1:56 PM and verbalized  understanding of the critical result.     LOU CORONADO MD         SYSTEM ID:  [id number]   CT Thoracic Spine w/o Contrast     Status: None    Narrative    CT THORACIC SPINE WITHOUT CONTRAST January 11, 2023 2:00 PM    INDICATION: Fall, back pain.    TECHNIQUE: CT scan of the thoracic spine without contrast. Dose  reduction techniques were used.    COMPARISON: None.    FINDINGS: Multilevel bridging anterior marginal osteophytes from T2  T3-T11 T12 are in a manner compatible with diffuse idiopathic skeletal  hyperostosis. This contributes to diffuse ankylosis of the thoracic  spine. Obliquely oriented acute fracture involving the anterior  aspect  of the T11 vertebral body extending through the ventral cortical  margin and the mid aspect of the superior T11 endplate. This fracture  is widened up to 6 mm. No associated vertebral body height loss. No  extension to involve the posterior elements. While this is only a  single column fracture, the possibility for instability is elevated  given the diffuse ankylosis of the thoracic spine.    No additional acute thoracic spine fractures elsewhere.  Dextroscoliotic curvature to the thoracic spine. Exaggerated thoracic  kyphosis. Diffuse osteopenia. Multilevel mild degenerative disc  disease with a mild to moderate T12-L1 degenerative disc disease.  Multilevel mild bilateral facet arthropathy. No identifiable  high-grade spinal canal or neural foraminal stenosis. Please see  separate CT chest, abdomen and pelvis regarding lung and mediastinal  findings.      Impression    IMPRESSION:  1. Obliquely oriented acute fracture through the anterior aspect of  the T11 vertebral body with an oblique orientation such that it  involves the ventral cortical margin and the mid aspect of the  superior endplate. This fracture is widened up to 6 mm, with no  associated vertebral body height loss and no extension to involve the  posterior elements. 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 While the above-described fracture is single column,  there is an increased likelihood of instability or widening at this  fracture given the background diffuse ankylosis of the spine.  2. No additional thoracic spine fractures elsewhere.  3. Osteopenia with degenerative change as above. No definite  high-grade spinal canal or neural foraminal stenosis.  4. Please see separate CT chest abdomen pelvis report with regards to  lung and mediastinal findings.    Findings and impression discussed with Dr. Dawson by phone at 1434 hours  on 1/11/2023.    PRASHANT BECKHAM  MD [facility]ACALE         SYSTEM ID:  [id number]   Lumbar spine CT w/o contrast     Status: None    Narrative    CT LUMBAR SPINE WITHOUT CONTRAST January 11, 2023 2:00 PM    INDICATION: Fall, back pain.    TECHNIQUE: CT scan of the lumbar spine without contrast. Dose  reduction techniques were used.    COMPARISON: 12/21/2018 lumbar spine plain film.    FINDINGS: Five nonrib-bearing lumbar-type vertebrae. Obliquely  oriented fracture through the L2 vertebral body with involvement of  the ventral cortical margin anteriorly and oblique extension to  involve the posterior inferior endplate of L2. This demonstrates  sclerotic margins with a small amount of internal air. In addition,  there is no significant adjacent prevertebral soft tissue swelling.  Given the paucity of soft tissue swelling and the sclerotic/corticated  margins, this is favored to reflect a subacute or chronic fracture  rather than an acute fracture. No definite acute lumbar spine fracture  elsewhere. Multilevel bridging anterior marginal osteophytes are in a  manner compatible with diffuse idiopathic skeletal hyperostosis.  Osteopenia with maintained vertebral body heights. Levoscoliotic  curvature with minimal anterolisthesis of L3 on L4. Severe L5-S1  degenerative disc disease. Additional multilevel mild degenerative  disc disease elsewhere. Moderate bilateral L2-L3, L3-L4 facet  arthropathy. Severe bilateral L4-L5 facet arthropathy with moderate  right and moderate to severe left L5-S1 facet arthropathy. At least  moderate L2-L3 and severe L3-L4 spinal canal stenosis. Severe L4-L5  and moderate L5-S1 spinal canal stenosis. On the right, there is a  moderate L1 L2-L3 L4 neural foraminal stenosis with moderate right  L5-S1 neural foraminal stenosis. On the left, there is moderate L2-L3  and L3-L4 neural foraminal stenosis with multilevel mild-to-moderate  neural foraminal stenosis elsewhere. Degenerative change at the  bilateral sacroiliac joints. Please see  separate CT chest, abdomen and  pelvis regarding abdominal solid organ findings      Impression    IMPRESSION:  1. Fracture obliquely oriented two column fracture of the L2 vertebral  body with anterior extension to involve the ventral cortical margin  and posterior extension to involve the posterior aspect of the L2  inferior endplate. There is no adjacent prevertebral soft tissue  swelling, and the margins of the above described fracture are  well-corticated/sclerotic. Given these findings, a subacute or chronic  fracture is favored over an acute fracture.  2. Diffuse idiopathic skeletal hyperostosis contributes to an  ankylosis of the lumbar spine. This could have increased the  likelihood of nonunion at the above described L2 fracture.  3. Multilevel degenerative change as above. Spinal canal and neural  foraminal stenosis as described above.  4. Please see separate CT abdomen and pelvis regarding abdominal solid  organ findings.    Findings and impression discussed with Dr. Dawson by phone at 1434 hours  on 1/11/2023.    PRASHANT ASHRAF MD         SYSTEM ID:  [id number]   [place] Draw     Status: None    Narrative    The following orders were created for panel order [place] Draw.  Procedure                               Abnormality         Status                     ---------                               -----------         ------                     Extra Blue Top Tube[[id number]]                                                         Extra Red Top Tube[[id number]]                                                          Extra Green Top (Lithium...[[id number]]                      Final result               Extra Purple Top Tube[[id number]]                            Final result                 Please view results for these tests on the individual orders.   UA with Microscopic reflex to Culture     Status: Abnormal    Specimen: Urine, Catheter   Result Value Ref Range    Color Urine Orange (A) Colorless, Straw,  Light Yellow, Yellow    Appearance Urine Cloudy (A) Clear    Glucose Urine 100 (A) Negative mg/dL    Bilirubin Urine Negative Negative    Ketones Urine Negative Negative mg/dL    Specific Gravity Urine 1.018 1.003 - 1.035    Blood Urine Small (A) Negative    pH Urine 5.5 5.0 - 7.0    Protein Albumin Urine 50 (A) Negative mg/dL    Urobilinogen Urine Normal Normal, 2.0 mg/dL    Nitrite Urine Negative Negative    Leukocyte Esterase Urine Large (A) Negative    Bacteria Urine Many (A) None Seen /HPF    WBC Clumps Urine Present (A) None Seen /HPF    Mucus Urine Present (A) None Seen /LPF    RBC Urine 8 (H) <=2 /HPF    WBC Urine >182 (H) <=5 /HPF    Narrative    Urine Culture ordered based on laboratory criteria   Extra Green Top (Lithium Heparin) Tube     Status: None   Result Value Ref Range    Hold Specimen JIC    Extra Purple Top Tube     Status: None   Result Value Ref Range    Hold Specimen JIC    INR     Status: Abnormal   Result Value Ref Range    INR 1.17 (H) 0.85 - 1.15   Comprehensive metabolic panel     Status: Abnormal   Result Value Ref Range    Sodium 137 133 - 144 mmol/L    Potassium 4.5 3.4 - 5.3 mmol/L    Chloride 103 94 - 109 mmol/L    Carbon Dioxide (CO2) 25 20 - 32 mmol/L    Anion Gap 9 3 - 14 mmol/L    Urea Nitrogen 37 (H) 7 - 30 mg/dL    Creatinine 1.16 (H) 0.52 - 1.04 mg/dL    Calcium 8.9 8.5 - 10.1 mg/dL    Glucose 302 (H) 70 - 99 mg/dL    Alkaline Phosphatase 62 40 - 150 U/L    AST 43 0 - 45 U/L    ALT 27 0 - 50 U/L    Protein Total 6.7 (L) 6.8 - 8.8 g/dL    Albumin 3.4 3.4 - 5.0 g/dL    Bilirubin Total 0.8 0.2 - 1.3 mg/dL    GFR Estimate 43 (L) >60 mL/min/1.73m2   Lipase     Status: Abnormal   Result Value Ref Range    Lipase 37 (L) 73 - 393 U/L   Partial thromboplastin time     Status: Normal   Result Value Ref Range    aPTT 25 22 - 38 Seconds   Troponin I     Status: Normal   Result Value Ref Range    Troponin I High Sensitivity 14 <54 ng/L   CBC with platelets and differential     Status:  Abnormal   Result Value Ref Range    WBC Count 19.3 (H) 4.0 - 11.0 10e3/uL    RBC Count 4.01 3.80 - 5.20 10e6/uL    Hemoglobin 12.6 11.7 - 15.7 g/dL    Hematocrit 38.4 35.0 - 47.0 %    MCV 96 78 - 100 fL    MCH 31.4 26.5 - 33.0 pg    MCHC 32.8 31.5 - 36.5 g/dL    RDW 13.2 10.0 - 15.0 %    Platelet Count 179 150 - 450 10e3/uL    % Neutrophils 90 %    % Lymphocytes 5 %    % Monocytes 4 %    % Eosinophils 0 %    % Basophils 0 %    % Immature Granulocytes 1 %    NRBCs per 100 WBC 0 <1 /100    Absolute Neutrophils 17.5 (H) 1.6 - 8.3 10e3/uL    Absolute Lymphocytes 0.9 0.8 - 5.3 10e3/uL    Absolute Monocytes 0.7 0.0 - 1.3 10e3/uL    Absolute Eosinophils 0.0 0.0 - 0.7 10e3/uL    Absolute Basophils 0.1 0.0 - 0.2 10e3/uL    Absolute Immature Granulocytes 0.2 <=0.4 10e3/uL    Absolute NRBCs 0.0 10e3/uL   EKG 12-lead, tracing only     Status: None   Result Value Ref Range    Systolic Blood Pressure  mmHg    Diastolic Blood Pressure  mmHg    Ventricular Rate 73 BPM    Atrial Rate 73 BPM    ME Interval 194 ms    QRS Duration 76 ms     ms    QTc 440 ms    P Axis 65 degrees    R AXIS -32 degrees    T Axis 0 degrees    Interpretation ECG       Sinus rhythm  Left axis deviation  Minimal voltage criteria for LVH, may be normal variant ( R in aVL )  Septal infarct (cited on or before 15-NOV-2014)  Abnormal ECG  When compared with ECG of 23-MAY-2022 08:53,  ME interval has decreased  Confirmed by GENERATED REPORT, COMPUTER ([id number]),  AUNG RACHEL ([id number]) on 1/11/2023 1:10:42 PM     CBC with platelets differential     Status: Abnormal    Narrative    The following orders were created for panel order CBC with platelets differential.  Procedure                               Abnormality         Status                     ---------                               -----------         ------                     CBC with platelets and d...[[id number]]  Abnormal            Final result                 Please view results for these  tests on the individual orders.         To be done/followed up on inpatient unit:  **  Continue cares. Chest tube care    Does this patient have any cognitive concerns?: Dementia    Activity level - Baseline/Home:  Total Care  Activity Level - Current:   Total Care    Patient's Preferred language: English   Needed?: No    Isolation: None  Infection: Not Applicable  Patient tested for COVID 19 prior to admission: NO  Bariatric?: No    Vital Signs:   Vitals:    01/11/23 1014 01/11/23 1203 01/11/23 1347 01/11/23 1541   BP: (!) 150/67 (!) 126/93 136/80    Pulse: 78 72 87 80   Resp: 18 20 24 22   Temp: 97.6  F (36.4  C)   98.4  F (36.9  C)   TempSrc: Oral   Oral   SpO2: 95% 95% 94% 98%   Weight:  81.2 kg (179 lb)       NSR    Was the PSS-3 completed:   No- confusion  What interventions are required if any?  Anticipation of needs, explanation of cares     Family Comments:  daughter at bedside.   OBS brochure/video discussed/provided to patient/family: N/A    For the majority of the shift this patient's behavior was Green.   Behavioral interventions performed were : Anticipation of needs, explanation of cares

## 2023-01-11 NOTE — ED NOTES
Daughter at bedside.   Maintained patient on C-collar.   Patient transferred to stab room for chest tube insertion.   Blood pressure 136/80, pulse 87, temperature 97.6  F (36.4  C), temperature source Oral, resp. rate 24, weight 81.2 kg (179 lb), SpO2 94 %, not currently breastfeeding.

## 2023-01-11 NOTE — ED NOTES
Melchor Benavidez (son) is POA and would like to be notified before any major decisions made/surgery needed, etc. [phone]

## 2023-01-12 NOTE — PROGRESS NOTES
"[facility] Progress Note    ED 24    Paged to ED for patient who was going on comfort care.  Patient was sleeping so I came back later to see the family.    Alberta's daughter and son were there when I came back.  They had already \"planned\" for this eventuality and felt comfortable with their mom going on comfort care and eventually hospice.     I did let them know that [facility] will remain available for them should they wish for some further support.     Jeana Vega MA  Associate   [phone] - pager    "

## 2023-01-12 NOTE — PROGRESS NOTES
Chart reviewed, including imaging. Hemothorax well drained by chest tube and no apparent continuing bleeding. Agree with chest tube for today. Full consult to follow.

## 2023-01-12 NOTE — ED NOTES
Pt restless and bed and pulling at lines. IM zyprexa given, no improvement. IV morphine given as patient was groaning and appeared to be in pain. No improvement. IV ativan given for restlessness. Pt then went to MRI, nurse to accompany for monitoring.

## 2023-01-12 NOTE — PROGRESS NOTES
Thoracic Surgery    /58   Pulse 94   Temp 98.8  F (37.1  C) (Temporal)   Resp 12   Wt 81.2 kg (179 lb)   LMP  (LMP Unknown)   SpO2 95%   BMI 36.15 kg/m      Patient hx and imaging reviewed-- in ED with large hemothorax, now with chest tube and improvement of hemothorax-- now with 1.3 cm apical PTX    Will see patient for full consult later this morning- no urgent thoracic surgery intervention needed    DMITRIY PenaC with Dr. Asad Noe  [facility]  Cell [phone]

## 2023-01-12 NOTE — PROGRESS NOTES
[facility]  Medicine Progress Note - Hospitalist Service    Date of Admission:  1/11/2023    Assessment & Plan        Regla Benavidez is a 89 year old female admitted on 1/11/2023. She presents with a fall and neck and back pain.        Fall at home, initial encounter    Closed fracture of spinous process of cervical vertebra, initial encounter (H)    Closed fracture of thoracic vertebral body (H)    Closed fracture of lumbar vertebral body (H)   Acute respiratory failure with hypoxia (H)    Hemothorax, right   Urinary tract infection without hematuria, site unspecified    Recurrent UTI and sepsis    Cerebral infarction (H)     Chronic midline low back pain without sciatica     CKD (chronic kidney disease) stage 3, GFR 30-59 ml/min (H)     Type 2 diabetes mellitus with stage 3 chronic kidney disease, without long-term current use of insulin (H)    Diabetic polyneuropathy associated with other specified diabetes mellitus (H)   Hypertension goal BP (blood pressure) < 140/80    Hyperlipidemia     Patient was restless overnight and in pain for which she required IV morphine and also had to be placed on wrist restraints briefly.  She was restarted on IV Rocephin for UTI/sepsis.  She is very somnolent this morning and also apneic for several seconds in between.  Unable to obtain any pertinent information from patient.  Discussed with patient's daughter Hiren over the phone at length. Patient is at the nursing home, and family has noted significant decline over the last few months.  She has fallen few times mostly while she is trying to reach objects and unable to stand up.  Has expressive aphasia at baseline.  She has been processing slower than usual.  Daughter Hiren and son Charles came to visit patient this afternoon.  Another son Ed who is POA and is in [place] also during the meeting over the phone.  Discussed at length about her advanced age, cognitive impairment, declining health, frequent  falls now with cervical and thoracic spine fractures with ongoing pain which is already challenging to manage with restlessness and somnolence with pain medication.  Discussed risk of aspiration, difficulty participating in therapy and mobility and risk of DVT/PE and bedsores.  Also discussed difficulty of maintaining nutrition given her somnolence if we were to take care of her pain appropriately and not let her suffer.  Patient is DNR/DNI and her  POLST indicates no artificial feeding.  After at length conversation, family decided for comfort care.   for hospice consult.  -Comfort care orders placed.    More than 1 hour spent on care today reviewing the chart, outlining care plan, care conference and communication with nursing staff and consult services.  Also discussed with neurosurgery and thoracic surgery consult services.          Diet: Advance Diet as Tolerated: Clear Liquid Diet    DVT Prophylaxis: Pneumatic Compression Devices  Garduno Catheter: Not present  Lines: None     Cardiac Monitoring: None  Code Status: No CPR- Do NOT Intubate      Clinically Significant Risk Factors Present on Admission              # Hypoalbuminemia: Lowest albumin = 3.4 g/dL at 1/11/2023 10:35 AM, will monitor as appropriate  # Coagulation Defect: INR = 1.17 (Ref range: 0.85 - 1.15) and/or PTT = 25 Seconds (Ref range: 22 - 38 Seconds), will monitor for bleeding                 Disposition Plan     Expected Discharge Date: 01/13/2023                  Alexandra Orlando MD  Hospitalist Service  [facility]  Securely message with [employer] (more info)  Text page via [facility] Paging/Directory   ______________________________________________________________________    Interval History   Restless overnight and had to be restrained, now off restraints.  Needed IV morphine for pain, has been somnolent with apneic spells in between.  He is on oxygen mask.  Unable to obtain pertinent ROS given her  mentation.    Physical Exam   Vital Signs: Temp: 98.8  F (37.1  C) Temp src: Temporal BP: 122/58 Pulse: 94   Resp: 12 SpO2: 95 % O2 Device: Oxymask Oxygen Delivery: 2 LPM  Weight: 179 lbs 0 oz    General: Somnolent but arouses to voice and falls back to sleep quickly  HEENT: PERRLA. Mucosa dry.  C-collar in place  Lungs: Coarse breath sounds on right side, chest tube in place to waterseal.  Breath sounds diminished but normal on left.  Apneic spells in between.  CVS: S1S2 regular, no tachycardia or murmur.   Abdomen: Soft, NT, ND. BS heard.  MSK: No edema or deformities.  Neuro: Somnolent.  Skin: No rash.       Medical Decision Making       70 MINUTES SPENT BY ME on the date of service doing chart review, history, exam, documentation & further activities per the note.      Data     I have personally reviewed the following data over the past 24 hrs:    22.8 (H)  \   10.2 (L)   / 183     145 (H) 112 (H) 46 (H) /  185 (H)   4.8 26 1.41 (H) \       Imaging results reviewed over the past 24 hrs:   Recent Results (from the past 24 hour(s))   XR Chest Port 1 View    Narrative    EXAM: XR CHEST PORT 1 VIEW  LOCATION: [facility]  DATE/TIME: 1/11/2023 4:33 PM    INDICATION: hemothorax  COMPARISON: Same date CT      Impression    IMPRESSION: Placement of right chest tube with decrease in volume of previously described right hemothorax and improved aeration of the right lung. No significant pneumothorax. Left lung is clear. Bones are unchanged.   MR Thoracic Spine w/o Contrast    Narrative    EXAM: MR THORACIC SPINE W/O CONTRAST, MR LUMBAR SPINE W/O CONTRAST  LOCATION: [facility]  DATE/TIME: 1/11/2023 8:52 PM    INDICATION: T11 fracture. Pain.  COMPARISON: CT thoracic and lumbar spine on the same date.  TECHNIQUE:   1) Routine Thoracic Spine MRI without IV contrast.  2) Routine Lumbar Spine MRI without IV contrast.    FINDINGS:    THORACIC SPINE:  The exam is moderate to  severely degraded by motion.    Vertebral body height is normal. Slight anterolisthesis of T1-T3. There is an acute or subacute obliquely oriented fracture extending from the anterior cortex of T11 and posterior/oblique fashion to the superior endplate with mild distraction. Within the   limits of motion artifact, no definite involvement of the posterior column. No additional fracture is noted. Thoracic disc spaces are preserved. Broad-based disc bulge with facet arthropathy at T10-T11 contributes to moderate central canal stenosis. A   broad-based disc bulge at C6-C7 contributes to severe central canal stenosis, incompletely evaluated. No gross evidence for abnormal signal in the thoracic cord.     No extraspinal abnormality.    LUMBAR SPINE:   The exam is moderate to significantly degraded by motion.    Nomenclature is based on 5 lumbar type vertebral bodies. Mild levoscoliosis. Vertebral body height is normal. There is grade I degenerative anterolisthesis of L3 and L4. Slight anterolisthesis of L2. There is an oblique horizontally oriented fracture   through the right lateral cortex of L2 to the anterior/inferior cortex on the left into the inferior endplate. Extensive Modic type III changes in this locale. Normal distal spinal cord and cauda equina with conus medullaris at L1. No gross extraspinal   abnormality. Unremarkable visualized bony pelvis.    Motion precludes evaluation of the central canal. On the sagittal images there appears to be severe central canal stenosis at L3-L4 and at L4-L5. Severe foraminal narrowing on the left at L5-S1, L4-L5, L3-L4 and bilaterally at L2-L3.      Impression    IMPRESSION:  THORACIC SPINE MRI:  1.  The exam is moderate to severely degraded by motion.  2.  Obliquely oriented fracture extending from the anterior cortex of T11 to the superior endplate with mild distraction. No definite involvement of the middle or posterior column.  3.  Severe central canal stenosis at C6-C7,  incompletely evaluated.  4.  Moderate central canal stenosis at T10-T11.    LUMBAR SPINE MRI:  1.  The exam is limited and severely degraded by motion.  2.  There is an oblique horizontal fracture through the right lateral cortex of L2 extending into the anterior/inferior cortex on the left with involvement of the the inferior endplate.  3.  No definite involvement of the middle or posterior column.  4.  Severe central canal stenosis at L3-L4 and at L4-L5.  5.  Multilevel severe foraminal narrowing as described.     MR Lumbar Spine w/o Contrast    Narrative    EXAM: MR THORACIC SPINE W/O CONTRAST, MR LUMBAR SPINE W/O CONTRAST  LOCATION: [facility]  DATE/TIME: 1/11/2023 8:52 PM    INDICATION: T11 fracture. Pain.  COMPARISON: CT thoracic and lumbar spine on the same date.  TECHNIQUE:   1) Routine Thoracic Spine MRI without IV contrast.  2) Routine Lumbar Spine MRI without IV contrast.    FINDINGS:    THORACIC SPINE:  The exam is moderate to severely degraded by motion.    Vertebral body height is normal. Slight anterolisthesis of T1-T3. There is an acute or subacute obliquely oriented fracture extending from the anterior cortex of T11 and posterior/oblique fashion to the superior endplate with mild distraction. Within the   limits of motion artifact, no definite involvement of the posterior column. No additional fracture is noted. Thoracic disc spaces are preserved. Broad-based disc bulge with facet arthropathy at T10-T11 contributes to moderate central canal stenosis. A   broad-based disc bulge at C6-C7 contributes to severe central canal stenosis, incompletely evaluated. No gross evidence for abnormal signal in the thoracic cord.     No extraspinal abnormality.    LUMBAR SPINE:   The exam is moderate to significantly degraded by motion.    Nomenclature is based on 5 lumbar type vertebral bodies. Mild levoscoliosis. Vertebral body height is normal. There is grade I degenerative  anterolisthesis of L3 and L4. Slight anterolisthesis of L2. There is an oblique horizontally oriented fracture   through the right lateral cortex of L2 to the anterior/inferior cortex on the left into the inferior endplate. Extensive Modic type III changes in this locale. Normal distal spinal cord and cauda equina with conus medullaris at L1. No gross extraspinal   abnormality. Unremarkable visualized bony pelvis.    Motion precludes evaluation of the central canal. On the sagittal images there appears to be severe central canal stenosis at L3-L4 and at L4-L5. Severe foraminal narrowing on the left at L5-S1, L4-L5, L3-L4 and bilaterally at L2-L3.      Impression    IMPRESSION:  THORACIC SPINE MRI:  1.  The exam is moderate to severely degraded by motion.  2.  Obliquely oriented fracture extending from the anterior cortex of T11 to the superior endplate with mild distraction. No definite involvement of the middle or posterior column.  3.  Severe central canal stenosis at C6-C7, incompletely evaluated.  4.  Moderate central canal stenosis at T10-T11.    LUMBAR SPINE MRI:  1.  The exam is limited and severely degraded by motion.  2.  There is an oblique horizontal fracture through the right lateral cortex of L2 extending into the anterior/inferior cortex on the left with involvement of the the inferior endplate.  3.  No definite involvement of the middle or posterior column.  4.  Severe central canal stenosis at L3-L4 and at L4-L5.  5.  Multilevel severe foraminal narrowing as described.     XR Chest Port 1 View    Narrative    EXAM: XR CHEST PORT 1 VIEW  LOCATION: [facility]  DATE/TIME: 1/12/2023 5:24 AM    INDICATION: Hemothorax. Chest tube. Follow-up.  COMPARISON: CT chest, abdomen and pelvis with IV contrast 1/11/2023.      Impression    IMPRESSION: Interval placement of large caliber right chest tube, with near complete resolution of large right pneumothorax seen previously. Small right  apical pneumothorax has developed measuring 1.3 cm at the apex. A few strands of platelike   atelectasis right midlung and the left base. No significant pleural effusion on either side. Cardiac size approaches upper limits of normal. Pulmonary vascularity is normal. Degenerative changes both shoulders and the spine. Monitoring leads overlying   the chest.

## 2023-01-12 NOTE — PROGRESS NOTES
We received an order for a TLSO eval for the patient in ED24.  O:  I met with the patient and her family in ED24.  The patient is not alert.  The family has requested I hold off on the TLSO eval at this time until after they speak with the Physician about possible hospice placement.  A:  N/A  P: N/A  Eric FARR

## 2023-01-12 NOTE — CARE PLAN
PT: Orders received. Chart reviewed and discussed with care team.  PT not indicated due to hold for POC goals and possible hospice.  Family with pt and requesting hold. Pt not currently alert and not fitted for TLSO.  Defer discharge recommendations to med team.  Will complete orders. Please reorder if/when appropriate.

## 2023-01-12 NOTE — PROGRESS NOTES
Thoracic Surgery:    /58   Pulse 98   Temp 98.8  F (37.1  C) (Temporal)   Resp 10   Wt 81.2 kg (179 lb)   LMP  (LMP Unknown)   SpO2 94%   BMI 36.15 kg/m    On 2 L Oxymask    Patient is a 89-yr-old female with past medical history of dementia, recurrent UTIs, CVA, htn, CKD who presented to ED after an unwitnessed fall at her assisted living facility. Patient c/o back and neck pain accompanied by right chest pain.     Workup in ED showed large right hemothorax with associated RLL atelectasis and concern for possible active bleed into right chest. Also with C3 spinous process fracture, T11 fracture, L2 fracture. Patient not anticoagulated and INR normal at 1.1.  Right chest tube placed with large bloody output and chest x-ray showing nice clearance of right hemothorax with small residual right apical PTX. No bright red blood drainage into Atrium collection chamber and not hypotensive. Hgb from 12.6 yesterday to 10.2 today.  Patient currently resting soundly-- not responding to voice on my exam today. Family not present. D/W Dr. Orlando and as per his discussion with family, they have opted for comfort care, so he will consult Hospice.     Hgb: 10.2<--12.6  CT 1/11/23: large right hemothorax with compressive atelectasis RLL  CXR s/p right chest tube last night shows good clearance of hemothorax and nice re-expansion of right lung.   CXR this morning shows small right apical PTX (1.3 cm) and no hemothorax/effusion, good position of right chest tube    Exam: Right chest tube secured to right lateral chest with stay-fix device and lots of foam tape. No obvious hematoma. No air leak with regular respirations (patient did not awaken for my exam)      A/P:  1) Traumatic right hemothorax s/p unwitnessed mechanical fall: right chest tube in place with large volume bloody output which has now tapered (120 ml output since 0700 this morning), no evidence of active ongoing bleeding into chest, good re-expansion of right  lung and clearance of hemothorax, no air leak with regular respirations. Patient on Comfort Cares. Will order chest tube clamp trial tonight and PCXR tomorrow morning. Anticipate removing chest tube tomorrow if hgb stable and PCXR with no recurrent large PTX nor hemothorax. Pain control, supplemental 02 for comfort.    Verónica Merlos PA-C with Dr. Asad Noe  [facility]  Cell [phone]    Time spent at patient bedside, floor and care unit with reviewing records and tests, reviewing history, ordering tests and clamp trial, communicating with other providers, documenting clinical info in the EHR, independently interpreting results and communicating results to patient/family, coordinating care is 26 minutes

## 2023-01-12 NOTE — PROGRESS NOTES
[facility]    Neurosurgery  Daily Note    Assessment & Plan   Regla Benavidez is a 89 year old female with history of dementia who was admitted on 1/11/2023 with back pain after an unwitnessed fall at her assisted living facility. Radiographic findings include a right C3 spinous process fracture, acute obliquely oriented fracture through the anterior aspect of the T11 vertebral body that is widened up to 6 mm, subacute obliquely oriented two column fracture of the L2 vertebral body with posterior extension, in the setting of diffuse idiopathic skeletal hyperostosis.   Patient seen in ED. Denies pain, does follow some simple commands.     Plan:  -TLSO has been ordered, family asking to hold off on fitting this, as possible hospice is being considered.   Ok to remove cervical collar if desired      Enrique Carver PA-C    Interval History   Stable.  Doing well.  Improving slowly.  Pain is reasonably controlled.  No fevers.    Physical Exam   Temp: 98.8  F (37.1  C) Temp src: Temporal BP: 122/58 Pulse: 94   Resp: 12 SpO2: 95 % O2 Device: Oxymask Oxygen Delivery: 2 LPM  Vitals:    01/11/23 1203   Weight: 179 lb (81.2 kg)     Vital Signs with Ranges  Temp:  [98.4  F (36.9  C)-98.8  F (37.1  C)] 98.8  F (37.1  C)  Pulse:  [] 94  Resp:  [11-28] 12  BP: ()/() 122/58  SpO2:  [91 %-99 %] 95 %  I/O last 3 completed shifts:  In: -   Out: 1240 [Urine:150; Blood:90; Chest Tube:1000]    Alert and follows some simple commands.  Moves all extremities equally.        Medications     NaCl 75 mL/hr at 01/12/23 1254        gabapentin  100 mg Oral BID     pramipexole  0.375 mg Oral QPM     sodium chloride (PF)  3 mL Intracatheter Q8H           Enrique Carver PA-C  [facility]  [address]  [address] [place] [id number]    Tel [phone]  Pager [phone]

## 2023-01-13 NOTE — PROGRESS NOTES
Referral Received - [facility]         [facility] would like to thank you for the [facility]  [facility]osp[facility] referral.    Referral received.and initial insurance information sent to  Hospice intake.    We are reviewing your patient's eligibility with intake team and medical director at this time.    Our plan is to visit your patient as soon as possible. We will update the Charge Nurse on the unit with [facility] eligibility.    ADDENDUM 1300: Son, Neftali, is present now (arrived shortly after writer left). He will call writer when his sister arrives to discuss GIP and possibly sign consents.    TASHA Sellers  [facility]  Contact information available via [facility] Paging/Directory     Listed as [facility]

## 2023-01-13 NOTE — PROVIDER NOTIFICATION
MD Notification    Notified Person: MD    Notified Person Name: Jeanette    Notification Date/Time: 1/13/23 @ 0218    Notification Interaction: [employer]     Purpose of Notification: Requested ulloa order, comfort care, in severe pain with any movement, incontinent    Orders Received: orders for ulloa obtained        Repaged MD to request discontinue of IVF, daily weights and cardiac monitoring due to comfort care status

## 2023-01-13 NOTE — CONSULTS
Care Management Follow Up    Length of Stay (days): 2    Expected Discharge Date: 01/15/2023     Concerns to be Addressed:       Patient plan of care discussed at interdisciplinary rounds: Yes    Anticipated Discharge Disposition:       Anticipated Discharge Services:    Anticipated Discharge DME:      Patient/family educated on Medicare website which has current facility and service quality ratings:    Education Provided on the Discharge Plan:    Patient/Family in Agreement with the Plan:      Referrals Placed by CM/SW:    Private pay costs discussed: Not applicable    Additional Information:   received call from [facility], who stated that patient met criteria for their services and they will plan to meet with patient/family.  will discontinue consults for discharge planning. Please reconsult in the event of any additional social work needs.      ANA Estes, SW  [phone]  [facility]

## 2023-01-13 NOTE — DISCHARGE SUMMARY
[facility]    Death Summary - Hospitalist Service     Date of Admission:  1/11/2023  Date of Death: 1/13/2023  Discharging Provider: Alexandra Orlando MD    Discharge Diagnoses   Please refer to the hospital course below     Cause of death: Trauma    Hospital Course   Regla Benavidez is a 89 year old female admitted on 1/11/2023. She presents with a fall and neck and back pain.        Fall at home, initial encounter    Closed fracture of spinous process of cervical vertebra, initial encounter (H)    Closed fracture of thoracic vertebral body (H)    Closed fracture of lumbar vertebral body (H)   Acute respiratory failure with hypoxia (H)    Hemothorax, right   Urinary tract infection without hematuria, site unspecified    Recurrent UTI and sepsis    Cerebral infarction (H)     Chronic midline low back pain without sciatica     CKD (chronic kidney disease) stage 3, GFR 30-59 ml/min (H)     Type 2 diabetes mellitus with CKD stage 3 , without long-term current use of insulin (H)    Diabetic polyneuropathy associated with other specified diabetes mellitus (H)       Hypertension goal BP (blood pressure) < 140/80    Hyperlipidemia     Patient was restless overnight after admission and in pain for which she required IV morphine and also had to be placed on wrist restraints briefly.  She was restarted on IV Rocephin for UTI/sepsis.  She was then very somnolent in the morning and also apneic for several seconds in between.  Unable to obtain any pertinent information from patient.  Discussed with patient's daughter Hiren over the phone at length. Patient at the nursing home, and family has noted significant decline over the last few months.  She had fallen few times mostly while trying to reach objects and unable to stand up.  Had expressive aphasia at baseline. Lately, she had been processing slower than usual.  Daughter Hiren and son Charles came to visit patient and had care conference.  Another son Ed  who is [facility] and is in [place] also joined the meeting over the phone.  Discussed at length about her advanced age, cognitive impairment, declining health, frequent falls now with cervical and thoracic spine fractures with ongoing pain which was already challenging to manage with restlessness and somnolence with pain medication.  Discussed risk of aspiration, difficulty participating in therapy and mobility and risk of DVT/PE and bedsores.  Also discussed difficulty of maintaining nutrition given her somnolence if we were to take care of her pain appropriately and not let her suffer.  Patient was DNR/DNI and her  POLST indicated no artificial feeding.  After at length conversation, family decided for comfort care.    -Comfort cares initiated 1/12. Patient passed away 1/13/23 and pronounced at 1:35 pm.    I contacted Medical examiner, Case Number [id number]    Alexandra Orlando MD  [facility]  ______________________________________________________________________      Significant Results and Procedures   Most Recent 3 CBC's:Recent Labs   Lab Test 01/12/23  0555 01/11/23  1035 05/24/22  1327   WBC 22.8* 19.3* 9.7   HGB 10.2* 12.6 13.0   * 96 97    179 219     Most Recent 3 BMP's:Recent Labs   Lab Test 01/12/23  1252 01/12/23  0901 01/12/23  0555 01/11/23  2112 01/11/23  1035 05/24/22  1700 05/24/22  1327   NA  --   --  145*  --  137  --  134   POTASSIUM  --   --  4.8  --  4.5  --  4.5   CHLORIDE  --   --  112*  --  103  --  103   CO2  --   --  26  --  25  --  26   BUN  --   --  46*  --  37*  --  27   CR  --   --  1.41*  --  1.16*  --  0.87   ANIONGAP  --   --  7  --  9  --  5   AAYUSH  --   --  8.6  --  8.9  --  8.9   * 218* 243*   < > 302*   < > 189*    < > = values in this interval not displayed.     Most Recent 2 LFT's:Recent Labs   Lab Test 01/11/23  1035 05/23/22  0840   AST 43 16   ALT 27 23   ALKPHOS 62 69   BILITOTAL 0.8 0.5   ,   Results for orders placed or performed  during the hospital encounter of 01/11/23   CT Head w/o Contrast    Narrative    CT SCAN OF THE HEAD WITHOUT CONTRAST   1/11/2023 1:35 PM     HISTORY: Fall, head injury.    TECHNIQUE:  Axial images of the head and coronal reformations without  IV contrast material. Radiation dose for this scan was reduced using  automated exposure control, adjustment of the mA and/or kV according  to patient size, or iterative reconstruction technique.    COMPARISON: Head CT 5/23/2022    FINDINGS:  No evidence of hemorrhage. 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 Volume loss and white matter  hypoattenuation likely represents chronic small vessel ischemic  change. No acute osseous abnormality. Posterior scalp contusion  without evidence of underlying skull fracture. Suspected right pleural  effusion on  imaging.      Impression    IMPRESSION:   1. No acute intracranial abnormality.  2. Chronic changes as detailed.    PRASHANT PHILIP MD         SYSTEM ID:  [id number]   CT Cervical Spine w/o Contrast    Narrative    CT CERVICAL SPINE WITHOUT CONTRAST  1/11/2023 1:36 PM     HISTORY: Fall, head injury. Evaluate for neck injury.     TECHNIQUE: Axial images of the cervical spine were obtained without  intravenous contrast. Multiplanar reformations were performed.   Radiation dose for this scan was reduced using automated exposure  control, adjustment of the mA and/or kV according to patient size, or  iterative reconstruction technique.    COMPARISON: None.    FINDINGS: Mild motion artifact. Age-indeterminate fracture involving  the right C3 spinous process. No other fractures are identified.  Severe degenerative disc disease involving the mid to lower cervical  spine. Multilevel facet arthropathy. Disc osteophyte complexes with  central protrusion component contribute to mild to moderate spinal  canal stenosis at multiple levels. Moderate to severe spinal canal  stenoses at C5-C6 and C6-C7. Severe foraminal  stenosis at multiple  levels bilaterally. Scattered vascular calcifications. Right pleural  effusion. Refer to chest report.      Impression    IMPRESSION:   1. Age-indeterminate fracture involving the right C3 spinous process,  potentially acute.  2. Severe degenerative change with multiple stenoses.    PRASHANT PHILIP MD         SYSTEM ID:  [id number]   CT Chest/Abdomen/Pelvis w Contrast     Value    Radiologist flags Active extravasation (AA)    Narrative    CT CHEST/ABDOMEN/PELVIS W CONTRAST 1/11/2023 1:36 PM    CLINICAL HISTORY: Fall, dementia, back pain    TECHNIQUE: CT scan of the chest, abdomen, and pelvis was performed  following injection of IV contrast. Multiplanar reformats were  obtained. Dose reduction techniques were used.   CONTRAST: 90mL [id number]    COMPARISON: Chest radiograph 5/23/2022    FINDINGS:   LUNGS AND PLEURA: Moderate volume high density pleural effusion in the  right hemithorax, suspicious for hemothorax. There is dependent  markedly hyperdense material within the pleural space which could  represent an element of active hemorrhage (for example series 2 image  61). Associated atelectasis of the right lower lobe. No pneumothorax.  Left lung is clear.    MEDIASTINUM/AXILLAE: No evidence of acute trauma. No suspicious  lymphadenopathy.    CORONARY ARTERY CALCIFICATION: Moderate.    HEPATOBILIARY: Cholelithiasis without evidence of acute cholecystitis.    PANCREAS: Normal.    SPLEEN: Normal.    ADRENAL GLANDS: Normal.    KIDNEYS/BLADDER: Likely bilateral renal cysts, no specific follow-up  recommended. No hydronephrosis. Urinary bladder demonstrates marked  wall thickening internal foci of gas and small diverticuli. Small  cystocele, best appreciated on the lateral view.    BOWEL: No obstruction or inflammatory change. No mesenteric hematoma  or pneumoperitoneum.    PELVIC ORGANS: Large left adnexal cyst measuring up to 6.8 x 4.4 cm  with some likely within internal septations with  associated  calcification.    ADDITIONAL FINDINGS: Moderate calcified and noncalcified  atherosclerosis.    MUSCULOSKELETAL: Acute appearing fracture involving the anterior  aspect of the T11 vertebral body without definite extension in the  posterior elements. Additional fracture of the L2 vertebral body,  favor chronic given surrounding sclerosis. No additional fractures  visualized. Of note, no highly displaced right rib fractures are  visualized, although extensive motion artifact could obscure a mildly  displaced fracture.      Impression    IMPRESSION:  1.  Moderate right hemothorax with possible element of active  hemorrhage layering dependently, source not definitely visualized.  2.  Acute appearing fracture of the anterior T11 vertebral body  without definite extension into the posterior elements. Please see  concurrent spine reformats for additional description.  3.  No definite displaced rib fractures visualized, although extensive  motion artifact could obscure a mildly displaced fracture  4.  No evidence of acute intraperitoneal trauma.  5.  Findings suggestive of chronic bladder outlet obstruction.  6.  Incidental minimally complex 6.8 cm left adnexal cyst. Could  consider further evaluation with ultrasound on a nonemergent basis if  clinically indicated.      [Critical Result: Active extravasation]    Finding was identified on 1/11/2023 1:39 PM.     Dr. Dawson was contacted by me on 1/11/2023 1:56 PM and verbalized  understanding of the critical result.     LOU CORONADO MD         SYSTEM ID:  [id number]   CT Thoracic Spine w/o Contrast    Narrative    CT THORACIC SPINE WITHOUT CONTRAST January 11, 2023 2:00 PM    INDICATION: Fall, back pain.    TECHNIQUE: CT scan of the thoracic spine without contrast. Dose  reduction techniques were used.    COMPARISON: None.    FINDINGS: Multilevel bridging anterior marginal osteophytes from T2  T3-T11 T12 are in a manner compatible with diffuse idiopathic  skeletal  hyperostosis. This contributes to diffuse ankylosis of the thoracic  spine. Obliquely oriented acute fracture involving the anterior aspect  of the T11 vertebral body extending through the ventral cortical  margin and the mid aspect of the superior T11 endplate. This fracture  is widened up to 6 mm. No associated vertebral body height loss. No  extension to involve the posterior elements. While this is only a  single column fracture, the possibility for instability is elevated  given the diffuse ankylosis of the thoracic spine.    No additional acute thoracic spine fractures elsewhere.  Dextroscoliotic curvature to the thoracic spine. Exaggerated thoracic  kyphosis. Diffuse osteopenia. Multilevel mild degenerative disc  disease with a mild to moderate T12-L1 degenerative disc disease.  Multilevel mild bilateral facet arthropathy. No identifiable  high-grade spinal canal or neural foraminal stenosis. Please see  separate CT chest, abdomen and pelvis regarding lung and mediastinal  findings.      Impression    IMPRESSION:  1. Obliquely oriented acute fracture through the anterior aspect of  the T11 vertebral body with an oblique orientation such that it  involves the ventral cortical margin and the mid aspect of the  superior endplate. This fracture is widened up to 6 mm, with no  associated vertebral body height loss and no extension to involve the  posterior elements. 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 While the above-described fracture is single column,  there is an increased likelihood of instability or widening at this  fracture given the background diffuse ankylosis of the spine.  2. No additional thoracic spine fractures elsewhere.  3. Osteopenia with degenerative change as above. No definite  high-grade spinal canal or neural foraminal stenosis.  4. Please see separate CT chest abdomen pelvis report with  regards to  lung and mediastinal findings.    Findings and impression discussed with Dr. Dawson by phone at 1434 hours  on 1/11/2023.    PRASHANT ASHRAF MD         SYSTEM ID:  [id number]   Lumbar spine CT w/o contrast    Narrative    CT LUMBAR SPINE WITHOUT CONTRAST January 11, 2023 2:00 PM    INDICATION: Fall, back pain.    TECHNIQUE: CT scan of the lumbar spine without contrast. Dose  reduction techniques were used.    COMPARISON: 12/21/2018 lumbar spine plain film.    FINDINGS: Five nonrib-bearing lumbar-type vertebrae. Obliquely  oriented fracture through the L2 vertebral body with involvement of  the ventral cortical margin anteriorly and oblique extension to  involve the posterior inferior endplate of L2. This demonstrates  sclerotic margins with a small amount of internal air. In addition,  there is no significant adjacent prevertebral soft tissue swelling.  Given the paucity of soft tissue swelling and the sclerotic/corticated  margins, this is favored to reflect a subacute or chronic fracture  rather than an acute fracture. No definite acute lumbar spine fracture  elsewhere. Multilevel bridging anterior marginal osteophytes are in a  manner compatible with diffuse idiopathic skeletal hyperostosis.  Osteopenia with maintained vertebral body heights. Levoscoliotic  curvature with minimal anterolisthesis of L3 on L4. Severe L5-S1  degenerative disc disease. Additional multilevel mild degenerative  disc disease elsewhere. Moderate bilateral L2-L3, L3-L4 facet  arthropathy. Severe bilateral L4-L5 facet arthropathy with moderate  right and moderate to severe left L5-S1 facet arthropathy. At least  moderate L2-L3 and severe L3-L4 spinal canal stenosis. Severe L4-L5  and moderate L5-S1 spinal canal stenosis. On the right, there is a  moderate L1 L2-L3 L4 neural foraminal stenosis with moderate right  L5-S1 neural foraminal stenosis. On the left, there is moderate L2-L3  and L3-L4 neural foraminal stenosis with  multilevel mild-to-moderate  neural foraminal stenosis elsewhere. Degenerative change at the  bilateral sacroiliac joints. Please see separate CT chest, abdomen and  pelvis regarding abdominal solid organ findings      Impression    IMPRESSION:  1. Fracture obliquely oriented two column fracture of the L2 vertebral  body with anterior extension to involve the ventral cortical margin  and posterior extension to involve the posterior aspect of the L2  inferior endplate. There is no adjacent prevertebral soft tissue  swelling, and the margins of the above described fracture are  well-corticated/sclerotic. Given these findings, a subacute or chronic  fracture is favored over an acute fracture.  2. Diffuse idiopathic skeletal hyperostosis contributes to an  ankylosis of the lumbar spine. This could have increased the  likelihood of nonunion at the above described L2 fracture.  3. Multilevel degenerative change as above. Spinal canal and neural  foraminal stenosis as described above.  4. Please see separate CT abdomen and pelvis regarding abdominal solid  organ findings.    Findings and impression discussed with Dr. Dawson by phone at 1434 hours  on 1/11/2023.    PRASHANT ASHRAF MD         SYSTEM ID:  [id number]   XR Chest Port 1 View    Narrative    EXAM: XR CHEST PORT 1 VIEW  LOCATION: [facility]  DATE/TIME: 1/11/2023 4:33 PM    INDICATION: hemothorax  COMPARISON: Same date CT      Impression    IMPRESSION: Placement of right chest tube with decrease in volume of previously described right hemothorax and improved aeration of the right lung. No significant pneumothorax. Left lung is clear. Bones are unchanged.   MR Thoracic Spine w/o Contrast    Narrative    EXAM: MR THORACIC SPINE W/O CONTRAST, MR LUMBAR SPINE W/O CONTRAST  LOCATION: [facility]  DATE/TIME: 1/11/2023 8:52 PM    INDICATION: T11 fracture. Pain.  COMPARISON: CT thoracic and lumbar spine on the same  date.  TECHNIQUE:   1) Routine Thoracic Spine MRI without IV contrast.  2) Routine Lumbar Spine MRI without IV contrast.    FINDINGS:    THORACIC SPINE:  The exam is moderate to severely degraded by motion.    Vertebral body height is normal. Slight anterolisthesis of T1-T3. There is an acute or subacute obliquely oriented fracture extending from the anterior cortex of T11 and posterior/oblique fashion to the superior endplate with mild distraction. Within the   limits of motion artifact, no definite involvement of the posterior column. No additional fracture is noted. Thoracic disc spaces are preserved. Broad-based disc bulge with facet arthropathy at T10-T11 contributes to moderate central canal stenosis. A   broad-based disc bulge at C6-C7 contributes to severe central canal stenosis, incompletely evaluated. No gross evidence for abnormal signal in the thoracic cord.     No extraspinal abnormality.    LUMBAR SPINE:   The exam is moderate to significantly degraded by motion.    Nomenclature is based on 5 lumbar type vertebral bodies. Mild levoscoliosis. Vertebral body height is normal. There is grade I degenerative anterolisthesis of L3 and L4. Slight anterolisthesis of L2. There is an oblique horizontally oriented fracture   through the right lateral cortex of L2 to the anterior/inferior cortex on the left into the inferior endplate. Extensive Modic type III changes in this locale. Normal distal spinal cord and cauda equina with conus medullaris at L1. No gross extraspinal   abnormality. Unremarkable visualized bony pelvis.    Motion precludes evaluation of the central canal. On the sagittal images there appears to be severe central canal stenosis at L3-L4 and at L4-L5. Severe foraminal narrowing on the left at L5-S1, L4-L5, L3-L4 and bilaterally at L2-L3.      Impression    IMPRESSION:  THORACIC SPINE MRI:  1.  The exam is moderate to severely degraded by motion.  2.  Obliquely oriented fracture extending from  the anterior cortex of T11 to the superior endplate with mild distraction. No definite involvement of the middle or posterior column.  3.  Severe central canal stenosis at C6-C7, incompletely evaluated.  4.  Moderate central canal stenosis at T10-T11.    LUMBAR SPINE MRI:  1.  The exam is limited and severely degraded by motion.  2.  There is an oblique horizontal fracture through the right lateral cortex of L2 extending into the anterior/inferior cortex on the left with involvement of the the inferior endplate.  3.  No definite involvement of the middle or posterior column.  4.  Severe central canal stenosis at L3-L4 and at L4-L5.  5.  Multilevel severe foraminal narrowing as described.     MR Lumbar Spine w/o Contrast    Narrative    EXAM: MR THORACIC SPINE W/O CONTRAST, MR LUMBAR SPINE W/O CONTRAST  LOCATION: [facility]  DATE/TIME: 1/11/2023 8:52 PM    INDICATION: T11 fracture. Pain.  COMPARISON: CT thoracic and lumbar spine on the same date.  TECHNIQUE:   1) Routine Thoracic Spine MRI without IV contrast.  2) Routine Lumbar Spine MRI without IV contrast.    FINDINGS:    THORACIC SPINE:  The exam is moderate to severely degraded by motion.    Vertebral body height is normal. Slight anterolisthesis of T1-T3. There is an acute or subacute obliquely oriented fracture extending from the anterior cortex of T11 and posterior/oblique fashion to the superior endplate with mild distraction. Within the   limits of motion artifact, no definite involvement of the posterior column. No additional fracture is noted. Thoracic disc spaces are preserved. Broad-based disc bulge with facet arthropathy at T10-T11 contributes to moderate central canal stenosis. A   broad-based disc bulge at C6-C7 contributes to severe central canal stenosis, incompletely evaluated. No gross evidence for abnormal signal in the thoracic cord.     No extraspinal abnormality.    LUMBAR SPINE:   The exam is moderate to  significantly degraded by motion.    Nomenclature is based on 5 lumbar type vertebral bodies. Mild levoscoliosis. Vertebral body height is normal. There is grade I degenerative anterolisthesis of L3 and L4. Slight anterolisthesis of L2. There is an oblique horizontally oriented fracture   through the right lateral cortex of L2 to the anterior/inferior cortex on the left into the inferior endplate. Extensive Modic type III changes in this locale. Normal distal spinal cord and cauda equina with conus medullaris at L1. No gross extraspinal   abnormality. Unremarkable visualized bony pelvis.    Motion precludes evaluation of the central canal. On the sagittal images there appears to be severe central canal stenosis at L3-L4 and at L4-L5. Severe foraminal narrowing on the left at L5-S1, L4-L5, L3-L4 and bilaterally at L2-L3.      Impression    IMPRESSION:  THORACIC SPINE MRI:  1.  The exam is moderate to severely degraded by motion.  2.  Obliquely oriented fracture extending from the anterior cortex of T11 to the superior endplate with mild distraction. No definite involvement of the middle or posterior column.  3.  Severe central canal stenosis at C6-C7, incompletely evaluated.  4.  Moderate central canal stenosis at T10-T11.    LUMBAR SPINE MRI:  1.  The exam is limited and severely degraded by motion.  2.  There is an oblique horizontal fracture through the right lateral cortex of L2 extending into the anterior/inferior cortex on the left with involvement of the the inferior endplate.  3.  No definite involvement of the middle or posterior column.  4.  Severe central canal stenosis at L3-L4 and at L4-L5.  5.  Multilevel severe foraminal narrowing as described.     XR Chest Port 1 View    Narrative    EXAM: XR CHEST PORT 1 VIEW  LOCATION: [facility]  DATE/TIME: 1/12/2023 5:24 AM    INDICATION: Hemothorax. Chest tube. Follow-up.  COMPARISON: CT chest, abdomen and pelvis with IV contrast  1/11/2023.      Impression    IMPRESSION: Interval placement of large caliber right chest tube, with near complete resolution of large right pneumothorax seen previously. Small right apical pneumothorax has developed measuring 1.3 cm at the apex. A few strands of platelike   atelectasis right midlung and the left base. No significant pleural effusion on either side. Cardiac size approaches upper limits of normal. Pulmonary vascularity is normal. Degenerative changes both shoulders and the spine. Monitoring leads overlying   the chest.   XR Chest Port 1 View    Narrative    EXAM: CHEST SINGLE VIEW PORTABLE  LOCATION: [facility]  DATE/TIME: 1/13/2023 5:57 AM    INDICATION: Hemothorax.  COMPARISON: 1/12/2023 at 0523.      Impression    IMPRESSION:   1. The tiny right apical pneumothorax visualized on the comparison study is not definitely visualized on this study. Of note, there is a right pleural drainage catheter in place.  2. No other significant interval change since the recent comparison study.                       Consultations This Hospital Stay   ORTHOSIS BRACE IP CONSULT  RESPIRATORY CARE IP CONSULT  ORTHOSIS BRACE IP CONSULT  PHYSICAL THERAPY ADULT IP CONSULT  THORACIC SURGERY IP CONSULT  NEUROSURGERY IP CONSULT  CARE MANAGEMENT / SOCIAL WORK IP CONSULT  SPIRITUAL HEALTH SERVICES IP CONSULT  CARE MANAGEMENT / SOCIAL WORK IP CONSULT  [facility] ADULT CONSULT    Primary Care Physician   Physician No Ref-Primary    Time Spent on this Encounter   Alexandra DIEHL MD, personally saw the patient today and spent greater than 30 minutes discharging this patient.

## 2023-01-13 NOTE — PROGRESS NOTES
[facility]  Medicine Progress Note - Hospitalist Service    Date of Admission:  1/11/2023    Assessment & Plan        Regla Benavidez is a 89 year old female admitted on 1/11/2023. She presents with a fall and neck and back pain.        Fall at home, initial encounter    Closed fracture of spinous process of cervical vertebra, initial encounter (H)    Closed fracture of thoracic vertebral body (H)    Closed fracture of lumbar vertebral body (H)   Acute respiratory failure with hypoxia (H)    Hemothorax, right   Urinary tract infection without hematuria, site unspecified    Recurrent UTI and sepsis    Cerebral infarction (H)     Chronic midline low back pain without sciatica     CKD (chronic kidney disease) stage 3, GFR 30-59 ml/min (H)     Type 2 diabetes mellitus with stage 3 chronic kidney disease, without long-term current use of insulin (H)    Diabetic polyneuropathy associated with other specified diabetes mellitus (H)   Hypertension goal BP (blood pressure) < 140/80    Hyperlipidemia     Patient was restless overnight and in pain for which she required IV morphine and also had to be placed on wrist restraints briefly.  She was restarted on IV Rocephin for UTI/sepsis.  She is very somnolent this morning and also apneic for several seconds in between.  Unable to obtain any pertinent information from patient.  Discussed with patient's daughter Hiren over the phone at length. Patient is at the nursing home, and family has noted significant decline over the last few months.  She has fallen few times mostly while she is trying to reach objects and unable to stand up.  Has expressive aphasia at baseline.  She has been processing slower than usual.  Daughter Hiren and son Charles came to visit patient this afternoon.  Another son Ed who is POA and is in [place] also during the meeting over the phone.  Discussed at length about her advanced age, cognitive impairment, declining health, frequent  falls now with cervical and thoracic spine fractures with ongoing pain which is already challenging to manage with restlessness and somnolence with pain medication.  Discussed risk of aspiration, difficulty participating in therapy and mobility and risk of DVT/PE and bedsores.  Also discussed difficulty of maintaining nutrition given her somnolence if we were to take care of her pain appropriately and not let her suffer.  Patient is DNR/DNI and her  POLST indicates no artificial feeding.  After at length conversation, family decided for comfort care.    -Comfort care orders placed.  -Continue current management  -GIP consulted.      Diet: Regular Diet Adult    DVT Prophylaxis: Pneumatic Compression Devices  Garduno Catheter: PRESENT, indication:    Lines: None     Cardiac Monitoring: None  Code Status: No CPR- Do NOT Intubate      Clinically Significant Risk Factors              # Hypoalbuminemia: Lowest albumin = 3.4 g/dL at 1/11/2023 10:35 AM, will monitor as appropriate                    Disposition Plan      Expected Discharge Date: 01/15/2023                  Alexadnra Orlando MD  Hospitalist Service  [facility]  Securely message with [employer] (more info)  Text page via [employer] Paging/Directory   ______________________________________________________________________    Interval History     Reviewed with nursing staff and saw patient this AM. Somnolent, unable to communicate.      Physical Exam   Vital Signs:     BP: 136/58 Pulse: 89   Resp: 23 SpO2: 96 %      Weight: 179 lbs 0 oz    General: Somnolent/minimally barely opens eyes to voice.  No restlessness or agitation.   HEENT: Mucosa dry   Lungs: Coarse breath sounds on right side, chest tube in place, shallow breaths.    Neuro: Somnolent.  Skin: No rash.       Medical Decision Making       25 MINUTES SPENT BY ME on the date of service doing chart review, history, exam, documentation & further activities per the note.      Data          Imaging results reviewed over the past 24 hrs:   Recent Results (from the past 24 hour(s))   XR Chest Port 1 View    Narrative    EXAM: CHEST SINGLE VIEW PORTABLE  LOCATION: [facility]  DATE/TIME: 1/13/2023 5:57 AM    INDICATION: Hemothorax.  COMPARISON: 1/12/2023 at 0523.      Impression    IMPRESSION:   1. The tiny right apical pneumothorax visualized on the comparison study is not definitely visualized on this study. Of note, there is a right pleural drainage catheter in place.  2. No other significant interval change since the recent comparison study.

## 2023-01-13 NOTE — PROGRESS NOTES
RECEIVING UNIT ED HANDOFF REVIEW    ED Nurse Handoff Report was reviewed by: Valentine Martinez RN on January 13, 2023 at 1:53 AM

## 2023-01-13 NOTE — PLAN OF CARE
Goal Outcome Evaluation:         Pt arrived from ED on comfort cares. Multiple fractures to spine due to fall, chest tube to right hemothorax, dark red bloody output in container. Minimal output since arriving to the floor. Pt screaming in pain with any movement. Incontinent of urine, obtained orders to place ulloa for comfort to minimize pain with movement, ulloa placed with urine return. Scattered bruises r/t recent fall. Dark purple discoloration to buttocks. T/R q2h as tolerated, suggest medicating prior to repositioning to decrease pain with movement. JANNETH orientation, pt not responsive other than crying out. Removes oxymask but more kept in place after comfort medications given. IVF discontinued. New IV placed to L AC. Attempted oral cares, pt clamps down, very dry mucosa. Chest xray completed this morning. Discharge plan unknown at this time.

## 2023-01-13 NOTE — DEATH PRONOUNCEMENT
NP DEATH PRONOUNCEMENT    Called to pronounce Regla Benavidez dead.    Physical Exam: Spontaneous respirations absent, Breath sounds absent, Carotid pulse absent and Heart sounds absent    Patient was pronounced dead at 1:35 PM, 2023.    Preliminary Cause of Death: Cardiopulmonary arrest in the setting of comfort cares related to medical problems listed below    Principal Problem:    Urinary tract infection without hematuria, site unspecified  Active Problems:    Cerebral infarction (H)    Recurrent UTI    Chronic midline low back pain without sciatica    Type 2 diabetes mellitus with hyperglycemia, without long-term current use of insulin (H)    CKD (chronic kidney disease) stage 3, GFR 30-59 ml/min (H)    Type 2 diabetes mellitus with stage 3 chronic kidney disease, without long-term current use of insulin (H)    Diabetic polyneuropathy associated with other specified diabetes mellitus (H)    Hypertension goal BP (blood pressure) < 140/80    Acute respiratory failure with hypoxia (H)    Hemothorax, right    Fall at home, initial encounter    Closed fracture of spinous process of cervical vertebra, initial encounter (H)    Closed fracture of thoracic vertebral body (H)    Closed fracture of lumbar vertebral body (H)       Infectious disease present?: YES, UTI    Communicable disease present? (examples: HIV, chicken pox, TB, Ebola, CJD) :  NO    Multi-drug resistant organism present? (example: MRSA): NO    Please consider an autopsy if any of the following exist:  NO Unexpected or unexplained death during or following any dental, medical, or surgical diagnostic treatment procedures.   NO Death of mother at or up to seven days after delivery.     NO All  and pediatric deaths.     NO Death where the cause is sufficiently obscure to delay completion of the death certificate.   NO Deaths in which autopsy would confirm a suspected illness/condition that would affect surviving family members or  recipients of transplanted organs.     The following deaths must be reported to the 's Office:  NO A death that may be due entirely or in part to any factors other than natural disease (recent surgery, recent trauma, suspected abuse/neglect).   NO A death that may be an accident, suicide, or homicide.     NO Any sudden, unexpected death in which there is no prior history of significant heart disease or any other condition associated with sudden death.   NO A death under suspicious, unusual, or unexpected circumstances.    NO Any death which is apparently due to natural causes but in which the  does not have a personal physician familiar with the patient s medical history, social, or environmental situation or the circumstances of the terminal event.   NO Any death apparently due to Sudden Infant Death Syndrome.     NO Deaths that occur during, in association with, or as consequences of a diagnostic, therapeutic, or anesthetic procedure.   YES Any death in which a fracture of a major bone has occurred within the past (6) six months.   NO A death of persons note seen by their physician within 120 days of demise.     NO Any death in which the  was an inmate of a public institution or was in the custody of Law Enforcement personnel.   NO  All unexpected deaths of children   NO Solid organ donors   NO Unidentified bodies   YES Deaths of persons whose bodies are to be cremated or otherwise disposed of so that the bodies will later be unavailable for examination;   NO Deaths unattended by a physician outside of a licensed healthcare facility or licensed residential hospice program   NO Deaths occurring within 24 hours of arrival to a health care facility if death is unexpected.    NO Deaths associated with the decedent s employment.   NO Deaths attributed to acts of terrorism.   NO Any death in which there is uncertainty as to whether it is a medical examiner s care should be discussed with the  medical investigator.        Body disposition: Autopsy was discussed with family member:  Children David, Charles, and Obdulio in person.  Permission for autopsy was declined. Bedside RN Sari Puente will call medical examiner to determine if patient is a medical examiner case based on meeting the criteria above. All lines, drains, and tubes to be left in patient, unless determined patient not ME case.    Tj Dias NP  [facility]  Securely message with the Voc[employer] Web Console (learn more here)  Text page via [facility] Paging/Directory

## 2023-01-13 NOTE — PROGRESS NOTES
IV Morphine given at 1745 for restlessness and tachypnea. Patient now resting comfortably, respirations 18. Comfort cares continued. Chest tube with total of 170ml output over 12hrs.

## 2023-01-13 NOTE — PROGRESS NOTES
THORACIC SURGERY PROGRESS NOTE    Patient sleeping, wakes to her name and squeezing shoulder, but unable to provide any history.    CT: Set to -20 cm H20. Bloody drainage in tubing and atrium, up to 1700 ml this morning. No air leak noted.     CXR: No pneumothorax, CT in appropriate position.    Plan:  Continue CT to suction today. Clamp trial not completed last night as she got to floor late in the night. Will repeat CT clamp from midnight tonight until CXR tomorrow AM, then return to suction. Anticipate then if CXR stable, will be able to remove CT.    Cha Ledesma PA-C with Dr. Asad Noe  [facility]  Office: [phone]  Cell: [phone]

## 2023-01-13 NOTE — PROGRESS NOTES
Notified provider about indwelling ulloa catheter discussed removal or continued need.    Did provider choose to remove indwelling ulloa catheter? No    Provider's ulloa indication for keeping indwelling ulloa catheter: End of Life.    Is there an order for indwelling ulloa catheter? Yes    *If there is a plan to keep ulloa catheter in place at discharge daily notification with provider is not necessary.

## 2023-01-13 NOTE — PROGRESS NOTES
Chart checked and patient has transitioned to comfort cares. Neurosurgery will sign off. Please page with questions.    Arianna Ferris [facility] Neurosurgery  [address] [place] [id number]  Tel [phone]  Fax [phone]

## 2023-01-14 LAB
BACTERIA UR CULT: ABNORMAL

## 2023-06-02 NOTE — PROGRESS NOTES
[facility] Care Coordination Contact  Received request and rx  from member's son Ed via email for compression stockings for member on 10/9/18. Request was sent to [facility] and they also needed the circumference for member's ankle and calf in order for insurance to cover.  [place] nurse Daphney was able to get these measurements to this CM today, so forwarded them to [facility] along with son's request for 2 pr.   Autumn Jacobsen RN, PHN, [facility] Care Coordination  [phone]     Sarecycline Counseling: Patient advised regarding possible photosensitivity and discoloration of the teeth, skin, lips, tongue and gums.  Patient instructed to avoid sunlight, if possible.  When exposed to sunlight, patients should wear protective clothing, sunglasses, and sunscreen.  The patient was instructed to call the office immediately if the following severe adverse effects occur:  hearing changes, easy bruising/bleeding, severe headache, or vision changes.  The patient verbalized understanding of the proper use and possible adverse effects of sarecycline.  All of the patient's questions and concerns were addressed.

## 2023-09-20 NOTE — TELEPHONE ENCOUNTER
Reason for Call:  Other prescription    Detailed comments:  Patient has been prescribed saccharomyces boulardii (FLORASTOR) 250 MG capsule    Per the nurse at [facility] living can it be switched to Lactinex as this is covered by her insurance      Please call as soon as possible as the meds are being set up now      Phone Number Patient can be reached at: Other phone number:     Daphney at [phone]    Best Time: Anytime  ASAP    Can we leave a detailed message on this number? YES    Call taken on 1/10/2017 at 8:32 AM by ÓSCAR CLARK     See phone encounter dated 09/20/2024

## 2024-08-29 NOTE — PROGRESS NOTES
Per [facility], delivered on 02/06/2018.     Angela Benavidez  Case Management Specialist  [facility]  [phone]     CXR negative - No pneumothorax, No opacities, No free air